# Patient Record
Sex: MALE | Race: WHITE | Employment: OTHER | URBAN - NONMETROPOLITAN AREA
[De-identification: names, ages, dates, MRNs, and addresses within clinical notes are randomized per-mention and may not be internally consistent; named-entity substitution may affect disease eponyms.]

---

## 2017-04-07 LAB
ALBUMIN SERPL-MCNC: 4.2 G/DL (ref 3.2–5.3)
ALK PHOSPHATASE: 88 IU/L (ref 35–121)
ALT SERPL-CCNC: 24 IU/L (ref 5–59)
AST SERPL-CCNC: 24 IU/L (ref 10–42)
BILIRUB SERPL-MCNC: 0.7 MG/DL (ref 0.2–1.3)
BILIRUBIN DIRECT: 0.1 MG/DL (ref 0–0.2)
CHOLESTEROL/HDL RATIO: 5
CHOLESTEROL: 180 MG/DL
HDLC SERPL-MCNC: 36 MG/DL (ref 40–60)
LDL CHOLESTEROL CALCULATED: 109 MG/DL
LDL/HDL RATIO: 3
TOTAL PROTEIN: 6.6 G/DL (ref 5.8–8)
TRIGL SERPL-MCNC: 175 MG/DL
VLDLC SERPL CALC-MCNC: 35 MG/DL

## 2017-04-11 ENCOUNTER — OFFICE VISIT (OUTPATIENT)
Dept: INTERNAL MEDICINE | Age: 77
End: 2017-04-11

## 2017-04-11 VITALS
HEART RATE: 74 BPM | WEIGHT: 243 LBS | DIASTOLIC BLOOD PRESSURE: 80 MMHG | HEIGHT: 68 IN | BODY MASS INDEX: 36.83 KG/M2 | SYSTOLIC BLOOD PRESSURE: 140 MMHG

## 2017-04-11 DIAGNOSIS — I25.10 CORONARY ARTERY DISEASE DUE TO LIPID RICH PLAQUE: Primary | ICD-10-CM

## 2017-04-11 DIAGNOSIS — E78.00 PURE HYPERCHOLESTEROLEMIA: ICD-10-CM

## 2017-04-11 DIAGNOSIS — E66.01 MORBID OBESITY DUE TO EXCESS CALORIES (HCC): ICD-10-CM

## 2017-04-11 DIAGNOSIS — I10 ESSENTIAL HYPERTENSION: ICD-10-CM

## 2017-04-11 DIAGNOSIS — I25.83 CORONARY ARTERY DISEASE DUE TO LIPID RICH PLAQUE: Primary | ICD-10-CM

## 2017-04-11 PROCEDURE — G8427 DOCREV CUR MEDS BY ELIG CLIN: HCPCS | Performed by: INTERNAL MEDICINE

## 2017-04-11 PROCEDURE — 1036F TOBACCO NON-USER: CPT | Performed by: INTERNAL MEDICINE

## 2017-04-11 PROCEDURE — G8598 ASA/ANTIPLAT THER USED: HCPCS | Performed by: INTERNAL MEDICINE

## 2017-04-11 PROCEDURE — 4040F PNEUMOC VAC/ADMIN/RCVD: CPT | Performed by: INTERNAL MEDICINE

## 2017-04-11 PROCEDURE — 1123F ACP DISCUSS/DSCN MKR DOCD: CPT | Performed by: INTERNAL MEDICINE

## 2017-04-11 PROCEDURE — 99213 OFFICE O/P EST LOW 20 MIN: CPT | Performed by: INTERNAL MEDICINE

## 2017-04-11 PROCEDURE — 93000 ELECTROCARDIOGRAM COMPLETE: CPT | Performed by: INTERNAL MEDICINE

## 2017-04-11 PROCEDURE — G8419 CALC BMI OUT NRM PARAM NOF/U: HCPCS | Performed by: INTERNAL MEDICINE

## 2017-04-11 RX ORDER — PANTOPRAZOLE SODIUM 40 MG/1
TABLET, DELAYED RELEASE ORAL
Qty: 90 TABLET | Refills: 2 | Status: SHIPPED | OUTPATIENT
Start: 2017-04-11 | End: 2018-01-03 | Stop reason: SDUPTHER

## 2017-04-11 RX ORDER — ATORVASTATIN CALCIUM 10 MG/1
10 TABLET, FILM COATED ORAL DAILY
Qty: 90 TABLET | Refills: 3 | Status: SHIPPED | OUTPATIENT
Start: 2017-04-11 | End: 2018-02-08 | Stop reason: SDUPTHER

## 2017-04-11 RX ORDER — CLOPIDOGREL BISULFATE 75 MG/1
TABLET ORAL
Qty: 90 TABLET | Refills: 2 | Status: SHIPPED | OUTPATIENT
Start: 2017-04-11 | End: 2018-04-02 | Stop reason: SDUPTHER

## 2017-10-06 LAB
ALT SERPL-CCNC: 23 IU/L (ref 5–59)
ANION GAP SERPL CALCULATED.3IONS-SCNC: 14 MMOL/L
AST SERPL-CCNC: 25 IU/L (ref 10–42)
BUN BLDV-MCNC: 23 MG/DL (ref 10–25)
CALCIUM SERPL-MCNC: 9.1 MG/DL (ref 8.7–10.8)
CHLORIDE BLD-SCNC: 104 MMOL/L (ref 95–111)
CHOLESTEROL/HDL RATIO: 5.1
CHOLESTEROL: 190 MG/DL
CO2: 25 MMOL/L (ref 21–32)
CREAT SERPL-MCNC: 1.3 MG/DL (ref 0.7–1.5)
EGFR AFRICAN AMERICAN: 65
EGFR IF NONAFRICAN AMERICAN: 53
GLUCOSE: 108 MG/DL (ref 70–100)
HDLC SERPL-MCNC: 37 MG/DL (ref 40–60)
LDL CHOLESTEROL CALCULATED: 120 MG/DL
LDL/HDL RATIO: 3.2
POTASSIUM SERPL-SCNC: 4.6 MMOL/L (ref 3.5–5.4)
SODIUM BLD-SCNC: 138 MMOL/L (ref 134–147)
TRIGL SERPL-MCNC: 163 MG/DL
VLDLC SERPL CALC-MCNC: 33 MG/DL

## 2017-10-09 ENCOUNTER — OFFICE VISIT (OUTPATIENT)
Dept: INTERNAL MEDICINE CLINIC | Age: 77
End: 2017-10-09
Payer: MEDICARE

## 2017-10-09 VITALS
DIASTOLIC BLOOD PRESSURE: 80 MMHG | HEART RATE: 56 BPM | BODY MASS INDEX: 35.16 KG/M2 | WEIGHT: 232 LBS | HEIGHT: 68 IN | SYSTOLIC BLOOD PRESSURE: 142 MMHG

## 2017-10-09 DIAGNOSIS — I10 ESSENTIAL HYPERTENSION: ICD-10-CM

## 2017-10-09 DIAGNOSIS — C64.9 MALIGNANT NEOPLASM OF KIDNEY, UNSPECIFIED LATERALITY (HCC): ICD-10-CM

## 2017-10-09 DIAGNOSIS — E78.00 PURE HYPERCHOLESTEROLEMIA: ICD-10-CM

## 2017-10-09 DIAGNOSIS — I71.40 ABDOMINAL AORTIC ANEURYSM (AAA) WITHOUT RUPTURE: ICD-10-CM

## 2017-10-09 DIAGNOSIS — I25.10 ASCVD (ARTERIOSCLEROTIC CARDIOVASCULAR DISEASE): Primary | ICD-10-CM

## 2017-10-09 DIAGNOSIS — G47.33 OSA (OBSTRUCTIVE SLEEP APNEA): ICD-10-CM

## 2017-10-09 DIAGNOSIS — N18.30 CKD (CHRONIC KIDNEY DISEASE) STAGE 3, GFR 30-59 ML/MIN (HCC): ICD-10-CM

## 2017-10-09 PROCEDURE — 93000 ELECTROCARDIOGRAM COMPLETE: CPT | Performed by: INTERNAL MEDICINE

## 2017-10-09 PROCEDURE — G8417 CALC BMI ABV UP PARAM F/U: HCPCS | Performed by: INTERNAL MEDICINE

## 2017-10-09 PROCEDURE — G8427 DOCREV CUR MEDS BY ELIG CLIN: HCPCS | Performed by: INTERNAL MEDICINE

## 2017-10-09 PROCEDURE — 1123F ACP DISCUSS/DSCN MKR DOCD: CPT | Performed by: INTERNAL MEDICINE

## 2017-10-09 PROCEDURE — 99215 OFFICE O/P EST HI 40 MIN: CPT | Performed by: INTERNAL MEDICINE

## 2017-10-09 PROCEDURE — G8598 ASA/ANTIPLAT THER USED: HCPCS | Performed by: INTERNAL MEDICINE

## 2017-10-09 PROCEDURE — 1036F TOBACCO NON-USER: CPT | Performed by: INTERNAL MEDICINE

## 2017-10-09 PROCEDURE — 4040F PNEUMOC VAC/ADMIN/RCVD: CPT | Performed by: INTERNAL MEDICINE

## 2017-10-09 PROCEDURE — G8484 FLU IMMUNIZE NO ADMIN: HCPCS | Performed by: INTERNAL MEDICINE

## 2017-10-09 ASSESSMENT — PATIENT HEALTH QUESTIONNAIRE - PHQ9
2. FEELING DOWN, DEPRESSED OR HOPELESS: 0
1. LITTLE INTEREST OR PLEASURE IN DOING THINGS: 0
SUM OF ALL RESPONSES TO PHQ9 QUESTIONS 1 & 2: 0
SUM OF ALL RESPONSES TO PHQ QUESTIONS 1-9: 0

## 2017-10-09 NOTE — PROGRESS NOTES
[facility]  PHYSICIANS [place] [address] [address] [place] [address] Mila Bardales, [address] Stef Lowery [facility]  Dept: [phone]  Dept Fax: [phone]      Chief Complaint   Patient presents with    Coronary Artery Disease    Hypertension    Hyperlipidemia       Patient presents for evaluation of ASCVD, CVA. I have never seen the patient before. This patient is followed regularly by   The patient used to see Dr. Sam Shahid is left practice. He has an extensive vascular history including an old CVA. He has had a carotid endarterectomy L as well as cardiac stents. He has had open AAA repair. He says he is feeling rather well. Denies chest pain, shortness of breath, dizziness or syncope. Patient Active Problem List   Diagnosis    CAD (coronary artery disease)    Obesity    Hypertension    Hyperlipidemia    AAA (abdominal aortic aneurysm) (HCC)    Renal cancer (HCC)    Preop cardiovascular exam    Renal calculus    CKD (chronic kidney disease) stage 3, GFR 30-59 ml/min    BENJAMIN (obstructive sleep apnea) can't tolerate CPAP       Current Outpatient Prescriptions   Medication Sig Dispense Refill    pantoprazole (PROTONIX) 40 MG tablet Take 1 tablet by mouth  daily 90 tablet 2    metoprolol tartrate (LOPRESSOR) 25 MG tablet New dose 1/2 tab BID 90 tablet 2    atorvastatin (LIPITOR) 10 MG tablet Take 1 tablet by mouth daily 90 tablet 3    clopidogrel (PLAVIX) 75 MG tablet Take 1 tablet by mouth  daily 90 tablet 2    aspirin 325 MG EC tablet Take 325 mg by mouth daily as needed       Ascorbic Acid (VITAMIN C) 1000 MG tablet Take 1,000 mg by mouth 2 times daily.  paroxetine (PAXIL) 20 MG tablet Take 20 mg by mouth daily.  hydrOXYzine (VISTARIL) 25 MG capsule Take 25 mg by mouth 4 times daily as needed.  Flaxseed, Linseed, (FLAXSEED OIL) 1000 MG CAPS Take 4 capsules by mouth 2 times daily        No current facility-administered medications for this visit.         Past Surgical

## 2017-12-20 RX ORDER — CLOPIDOGREL BISULFATE 75 MG/1
75 TABLET ORAL DAILY
Qty: 90 TABLET | Refills: 3 | Status: SHIPPED | OUTPATIENT
Start: 2017-12-20 | End: 2018-12-31 | Stop reason: SDUPTHER

## 2018-01-04 RX ORDER — PANTOPRAZOLE SODIUM 40 MG/1
TABLET, DELAYED RELEASE ORAL
Qty: 90 TABLET | Refills: 2 | Status: SHIPPED | OUTPATIENT
Start: 2018-01-04 | End: 2018-08-18 | Stop reason: SDUPTHER

## 2018-02-09 RX ORDER — ATORVASTATIN CALCIUM 10 MG/1
10 TABLET, FILM COATED ORAL DAILY
Qty: 90 TABLET | Refills: 3 | Status: ON HOLD | OUTPATIENT
Start: 2018-02-09 | End: 2019-11-30 | Stop reason: HOSPADM

## 2018-04-02 ENCOUNTER — OFFICE VISIT (OUTPATIENT)
Dept: INTERNAL MEDICINE CLINIC | Age: 78
End: 2018-04-02
Payer: MEDICARE

## 2018-04-02 VITALS
HEIGHT: 68 IN | SYSTOLIC BLOOD PRESSURE: 148 MMHG | BODY MASS INDEX: 36.68 KG/M2 | WEIGHT: 242 LBS | DIASTOLIC BLOOD PRESSURE: 78 MMHG | HEART RATE: 62 BPM

## 2018-04-02 DIAGNOSIS — I25.10 ASCVD (ARTERIOSCLEROTIC CARDIOVASCULAR DISEASE): Primary | ICD-10-CM

## 2018-04-02 DIAGNOSIS — I73.9 PAD (PERIPHERAL ARTERY DISEASE) (HCC): ICD-10-CM

## 2018-04-02 DIAGNOSIS — E78.5 HYPERLIPIDEMIA, UNSPECIFIED HYPERLIPIDEMIA TYPE: ICD-10-CM

## 2018-04-02 DIAGNOSIS — I71.40 ABDOMINAL AORTIC ANEURYSM (AAA) WITHOUT RUPTURE: ICD-10-CM

## 2018-04-02 DIAGNOSIS — I10 ESSENTIAL HYPERTENSION: ICD-10-CM

## 2018-04-02 DIAGNOSIS — N18.30 CKD (CHRONIC KIDNEY DISEASE) STAGE 3, GFR 30-59 ML/MIN (HCC): ICD-10-CM

## 2018-04-02 DIAGNOSIS — G47.33 OSA (OBSTRUCTIVE SLEEP APNEA): ICD-10-CM

## 2018-04-02 DIAGNOSIS — E66.9 OBESITY (BMI 30-39.9): ICD-10-CM

## 2018-04-02 PROCEDURE — G8598 ASA/ANTIPLAT THER USED: HCPCS | Performed by: INTERNAL MEDICINE

## 2018-04-02 PROCEDURE — 1036F TOBACCO NON-USER: CPT | Performed by: INTERNAL MEDICINE

## 2018-04-02 PROCEDURE — 99214 OFFICE O/P EST MOD 30 MIN: CPT | Performed by: INTERNAL MEDICINE

## 2018-04-02 PROCEDURE — 93000 ELECTROCARDIOGRAM COMPLETE: CPT | Performed by: INTERNAL MEDICINE

## 2018-04-02 PROCEDURE — G8427 DOCREV CUR MEDS BY ELIG CLIN: HCPCS | Performed by: INTERNAL MEDICINE

## 2018-04-02 PROCEDURE — 1123F ACP DISCUSS/DSCN MKR DOCD: CPT | Performed by: INTERNAL MEDICINE

## 2018-04-02 PROCEDURE — 4040F PNEUMOC VAC/ADMIN/RCVD: CPT | Performed by: INTERNAL MEDICINE

## 2018-04-02 PROCEDURE — G8417 CALC BMI ABV UP PARAM F/U: HCPCS | Performed by: INTERNAL MEDICINE

## 2018-04-02 RX ORDER — NITROGLYCERIN 0.4 MG/1
0.4 TABLET SUBLINGUAL EVERY 5 MIN PRN
COMMUNITY
Start: 2018-01-09 | End: 2020-01-20

## 2018-04-02 RX ORDER — THIAMINE HCL 100 MG
TABLET ORAL DAILY
COMMUNITY

## 2018-04-20 LAB
ALT SERPL-CCNC: 28 U/L (ref 5–50)
ANION GAP SERPL CALCULATED.3IONS-SCNC: 12 MEQ/L (ref 10–19)
BUN BLDV-MCNC: 21 MG/DL (ref 8–23)
CALCIUM SERPL-MCNC: 8.9 MG/DL (ref 8.5–10.5)
CHLORIDE BLD-SCNC: 102 MEQ/L (ref 95–107)
CO2: 28 MEQ/L (ref 19–31)
CREAT SERPL-MCNC: 1.6 MG/DL (ref 0.8–1.4)
EGFR AFRICAN AMERICAN: 47.1 ML/MIN/1.73 M2
EGFR IF NONAFRICAN AMERICAN: 40.7 ML/MIN/1.73 M2
GLUCOSE: 118 MG/DL (ref 70–99)
LDL CHOLESTEROL DIRECT: 141 MG/DL
POTASSIUM SERPL-SCNC: 5 MEQ/L (ref 3.5–5.4)
SODIUM BLD-SCNC: 142 MEQ/L (ref 135–146)

## 2018-07-16 LAB
CHOLESTEROL, TOTAL: 192 MG/DL
CHOLESTEROL/HDL RATIO: 5.8
CREATININE, URINE: 94.8
HDLC SERPL-MCNC: 33 MG/DL (ref 35–70)
LDL CHOLESTEROL CALCULATED: 119 MG/DL (ref 0–160)
MICROALBUMIN/CREAT 24H UR: <12 MG/G{CREAT}
MICROALBUMIN/CREAT UR-RTO: NORMAL
TRIGL SERPL-MCNC: 200 MG/DL
VLDLC SERPL CALC-MCNC: 40 MG/DL

## 2018-08-20 RX ORDER — PANTOPRAZOLE SODIUM 40 MG/1
TABLET, DELAYED RELEASE ORAL
Qty: 90 TABLET | Refills: 3 | Status: SHIPPED | OUTPATIENT
Start: 2018-08-20 | End: 2018-09-04 | Stop reason: SDUPTHER

## 2018-09-04 ENCOUNTER — OFFICE VISIT (OUTPATIENT)
Dept: INTERNAL MEDICINE CLINIC | Age: 78
End: 2018-09-04
Payer: MEDICARE

## 2018-09-04 VITALS
DIASTOLIC BLOOD PRESSURE: 70 MMHG | SYSTOLIC BLOOD PRESSURE: 100 MMHG | BODY MASS INDEX: 35.77 KG/M2 | HEIGHT: 68 IN | HEART RATE: 68 BPM | WEIGHT: 236 LBS

## 2018-09-04 DIAGNOSIS — I25.10 ASCVD (ARTERIOSCLEROTIC CARDIOVASCULAR DISEASE): Primary | ICD-10-CM

## 2018-09-04 DIAGNOSIS — G47.33 OSA (OBSTRUCTIVE SLEEP APNEA): ICD-10-CM

## 2018-09-04 DIAGNOSIS — K21.9 GASTROESOPHAGEAL REFLUX DISEASE, ESOPHAGITIS PRESENCE NOT SPECIFIED: ICD-10-CM

## 2018-09-04 DIAGNOSIS — I71.40 ABDOMINAL AORTIC ANEURYSM (AAA) WITHOUT RUPTURE: ICD-10-CM

## 2018-09-04 DIAGNOSIS — E78.5 HYPERLIPIDEMIA, UNSPECIFIED HYPERLIPIDEMIA TYPE: ICD-10-CM

## 2018-09-04 DIAGNOSIS — N18.30 CKD (CHRONIC KIDNEY DISEASE) STAGE 3, GFR 30-59 ML/MIN (HCC): ICD-10-CM

## 2018-09-04 DIAGNOSIS — I10 ESSENTIAL HYPERTENSION: ICD-10-CM

## 2018-09-04 PROCEDURE — 1123F ACP DISCUSS/DSCN MKR DOCD: CPT | Performed by: INTERNAL MEDICINE

## 2018-09-04 PROCEDURE — 4040F PNEUMOC VAC/ADMIN/RCVD: CPT | Performed by: INTERNAL MEDICINE

## 2018-09-04 PROCEDURE — 1036F TOBACCO NON-USER: CPT | Performed by: INTERNAL MEDICINE

## 2018-09-04 PROCEDURE — G8427 DOCREV CUR MEDS BY ELIG CLIN: HCPCS | Performed by: INTERNAL MEDICINE

## 2018-09-04 PROCEDURE — G8598 ASA/ANTIPLAT THER USED: HCPCS | Performed by: INTERNAL MEDICINE

## 2018-09-04 PROCEDURE — G8417 CALC BMI ABV UP PARAM F/U: HCPCS | Performed by: INTERNAL MEDICINE

## 2018-09-04 PROCEDURE — 93000 ELECTROCARDIOGRAM COMPLETE: CPT | Performed by: INTERNAL MEDICINE

## 2018-09-04 PROCEDURE — 1101F PT FALLS ASSESS-DOCD LE1/YR: CPT | Performed by: INTERNAL MEDICINE

## 2018-09-04 PROCEDURE — 99214 OFFICE O/P EST MOD 30 MIN: CPT | Performed by: INTERNAL MEDICINE

## 2018-09-04 RX ORDER — PANTOPRAZOLE SODIUM 40 MG/1
TABLET, DELAYED RELEASE ORAL
Qty: 90 TABLET | Refills: 3 | Status: SHIPPED | OUTPATIENT
Start: 2018-09-04 | End: 2019-09-03 | Stop reason: SDUPTHER

## 2018-09-04 NOTE — PROGRESS NOTES
[facility] PROFESSIONAL SERVS  PHYSICIANS [place] [address] [address] [place], [address]  Dept: [phone]  Dept Fax: [phone]      Chief Complaint   Patient presents with    Hypertension       Patient presents for evaluation of ASCVD, CVA. I last saw him 5 months ago. This patient is followed regularly by   The patient used to see Dr. Stella Guerrier is left practice. He has an extensive vascular history including an old CVA. He has had a carotid endarterectomy L, as well as cardiac stents. He has had open AAA repair. He says he is feeling rather well. Denies chest pain, shortness of breath, dizziness or syncope. He has no limitations.     Patient Active Problem List   Diagnosis    CAD (coronary artery disease)    Obesity    Hypertension    Hyperlipidemia    AAA (abdominal aortic aneurysm) (HCC)    Renal cancer (HCC)    Preop cardiovascular exam    Renal calculus    CKD (chronic kidney disease) stage 3, GFR 30-59 ml/min    BENJAMIN (obstructive sleep apnea) can't tolerate CPAP       Current Outpatient Prescriptions   Medication Sig Dispense Refill    Turmeric Curcumin 500 MG CAPS Take 500 mg by mouth 2 times daily       pantoprazole (PROTONIX) 40 MG tablet TAKE 1 TABLET BY MOUTH  DAILY 90 tablet 3    metoprolol tartrate (LOPRESSOR) 25 MG tablet 1/2 tab BID 90 tablet 2    Magnesium 500 MG TABS Take by mouth 2 times daily      CINNAMON PO Take 1,000 mg by mouth daily      Probiotic Product (PROBIOTIC DAILY PO) Take by mouth daily      nitroGLYCERIN (NITROSTAT) 0.4 MG SL tablet Place 0.4 mg under the tongue every 5 minutes as needed       atorvastatin (LIPITOR) 10 MG tablet Take 1 tablet by mouth daily 90 tablet 3    clopidogrel (PLAVIX) 75 MG tablet Take 1 tablet by mouth daily 90 tablet 3    aspirin 325 MG EC tablet Take 325 mg by mouth daily as needed       Ascorbic Acid (VITAMIN C) 1000 MG tablet Take 1,000 mg by mouth daily       paroxetine (PAXIL) 20 MG tablet Take 20 mg by mouth daily.  hydrOXYzine (VISTARIL) 25 MG capsule Take 25 mg by mouth 4 times daily as needed.  Flaxseed, Linseed, (FLAXSEED OIL) 1000 MG CAPS Take 4 capsules by mouth 2 times daily        No current facility-administered medications for this visit. Review of Systems - General ROS: negative  Psychological ROS: negative  Hematological and Lymphatic ROS: negative  Respiratory ROS: no cough, shortness of breath, or wheezing  Cardiovascular ROS: no chest pain or dyspnea on exertion  Gastrointestinal ROS: no abdominal pain, change in bowel habits, or black or bloody stools  Genito-Urinary ROS: no dysuria, trouble voiding, or hematuria  Musculoskeletal ROS: negative  Neurological ROS: no TIA or stroke symptoms  Dermatological ROS: negative    Blood pressure 100/70, pulse 68, height 5' 7.72\" (1.72 m), weight 236 lb (107 kg). Physical Examination: General appearance - alert, well appearing, and in no distress  Mental status - alert, oriented to person, place, and time  Neck - supple, no significant adenopathy  Chest - clear to auscultation, no wheezes, rales or rhonchi, symmetric air entry  Heart - normal rate, regular rhythm, normal S1, S2, no murmurs, rubs, clicks or gallops  Abdomen - soft, nontender, nondistended, no masses or organomegaly  Neurological - alert, oriented, normal speech, no focal findings or movement disorder noted  Musculoskeletal - no joint tenderness, deformity or swelling  Extremities - peripheral pulses normal, no pedal edema, no clubbing or cyanosis  Skin - normal coloration and turgor, no rashes, no suspicious skin lesions noted        Diagnosis Orders   1. ASCVD (arteriosclerotic cardiovascular disease)     2. Essential hypertension  EKG 12 Lead   3. Abdominal aortic aneurysm (AAA) without rupture ([facility] [address]     4. Hyperlipidemia, unspecified hyperlipidemia type     5. CKD (chronic kidney disease) stage 3, GFR 30-59 ml/min     6.  BENJAMIN (obstructive sleep apnea) can't

## 2018-10-10 ENCOUNTER — APPOINTMENT (OUTPATIENT)
Dept: CT IMAGING | Age: 78
End: 2018-10-10
Payer: MEDICARE

## 2018-10-10 ENCOUNTER — HOSPITAL ENCOUNTER (EMERGENCY)
Age: 78
Discharge: HOME | End: 2018-10-10
Attending: EMERGENCY MEDICINE
Payer: MEDICARE

## 2018-10-10 VITALS
SYSTOLIC BLOOD PRESSURE: 110 MMHG | DIASTOLIC BLOOD PRESSURE: 58 MMHG | TEMPERATURE: 97.9 F | HEART RATE: 53 BPM | OXYGEN SATURATION: 94 % | WEIGHT: 240 LBS | RESPIRATION RATE: 16 BRPM | HEIGHT: 68 IN | BODY MASS INDEX: 36.37 KG/M2

## 2018-10-10 DIAGNOSIS — R10.11 RIGHT UPPER QUADRANT ABDOMINAL PAIN: Primary | ICD-10-CM

## 2018-10-10 DIAGNOSIS — I71.20 THORACIC AORTIC ANEURYSM WITHOUT RUPTURE: ICD-10-CM

## 2018-10-10 LAB
ALBUMIN SERPL-MCNC: 4.1 G/DL (ref 3.5–5.1)
ALP BLD-CCNC: 78 U/L (ref 38–126)
ALT SERPL-CCNC: 22 U/L (ref 11–66)
ANION GAP SERPL CALCULATED.3IONS-SCNC: 12 MEQ/L (ref 8–16)
AST SERPL-CCNC: 23 U/L (ref 5–40)
BASOPHILS # BLD: 1.2 %
BASOPHILS ABSOLUTE: 0.1 THOU/MM3 (ref 0–0.1)
BILIRUB SERPL-MCNC: 0.5 MG/DL (ref 0.3–1.2)
BILIRUBIN URINE: NEGATIVE
BLOOD, URINE: NEGATIVE
BUN BLDV-MCNC: 22 MG/DL (ref 7–22)
CALCIUM SERPL-MCNC: 9.2 MG/DL (ref 8.5–10.5)
CHARACTER, URINE: CLEAR
CHLORIDE BLD-SCNC: 101 MEQ/L (ref 98–111)
CO2: 27 MEQ/L (ref 23–33)
COLOR: YELLOW
CREAT SERPL-MCNC: 1.4 MG/DL (ref 0.4–1.2)
EOSINOPHIL # BLD: 4 %
EOSINOPHILS ABSOLUTE: 0.2 THOU/MM3 (ref 0–0.4)
ERYTHROCYTE [DISTWIDTH] IN BLOOD BY AUTOMATED COUNT: 13.2 % (ref 11.5–14.5)
ERYTHROCYTE [DISTWIDTH] IN BLOOD BY AUTOMATED COUNT: 46.1 FL (ref 35–45)
GFR SERPL CREATININE-BSD FRML MDRD: 49 ML/MIN/1.73M2
GLUCOSE BLD-MCNC: 128 MG/DL (ref 70–108)
GLUCOSE URINE: NEGATIVE MG/DL
HCT VFR BLD CALC: 47.4 % (ref 42–52)
HEMOGLOBIN: 16 GM/DL (ref 14–18)
IMMATURE GRANS (ABS): 0.06 THOU/MM3 (ref 0–0.07)
IMMATURE GRANULOCYTES: 1 %
KETONES, URINE: NEGATIVE
LEUKOCYTE ESTERASE, URINE: NEGATIVE
LIPASE: 35.1 U/L (ref 5.6–51.3)
LYMPHOCYTES # BLD: 17 %
LYMPHOCYTES ABSOLUTE: 1 THOU/MM3 (ref 1–4.8)
MCH RBC QN AUTO: 31.9 PG (ref 26–33)
MCHC RBC AUTO-ENTMCNC: 33.8 GM/DL (ref 32.2–35.5)
MCV RBC AUTO: 94.6 FL (ref 80–94)
MONOCYTES # BLD: 10.2 %
MONOCYTES ABSOLUTE: 0.6 THOU/MM3 (ref 0.4–1.3)
NITRITE, URINE: NEGATIVE
NUCLEATED RED BLOOD CELLS: 0 /100 WBC
OSMOLALITY CALCULATION: 284.4 MOSMOL/KG (ref 275–300)
PH UA: 5
PLATELET # BLD: 152 THOU/MM3 (ref 130–400)
PMV BLD AUTO: 9.5 FL (ref 9.4–12.4)
POTASSIUM SERPL-SCNC: 4.8 MEQ/L (ref 3.5–5.2)
PROTEIN UA: NEGATIVE
RBC # BLD: 5.01 MILL/MM3 (ref 4.7–6.1)
SEG NEUTROPHILS: 66.6 %
SEGMENTED NEUTROPHILS ABSOLUTE COUNT: 3.9 THOU/MM3 (ref 1.8–7.7)
SODIUM BLD-SCNC: 140 MEQ/L (ref 135–145)
SPECIFIC GRAVITY, URINE: 1.02 (ref 1–1.03)
TOTAL PROTEIN: 6.4 G/DL (ref 6.1–8)
UROBILINOGEN, URINE: 0.2 EU/DL
WBC # BLD: 5.8 THOU/MM3 (ref 4.8–10.8)

## 2018-10-10 PROCEDURE — 36415 COLL VENOUS BLD VENIPUNCTURE: CPT

## 2018-10-10 PROCEDURE — 85025 COMPLETE CBC W/AUTO DIFF WBC: CPT

## 2018-10-10 PROCEDURE — 74176 CT ABD & PELVIS W/O CONTRAST: CPT

## 2018-10-10 PROCEDURE — 83690 ASSAY OF LIPASE: CPT

## 2018-10-10 PROCEDURE — 99284 EMERGENCY DEPT VISIT MOD MDM: CPT

## 2018-10-10 PROCEDURE — 81003 URINALYSIS AUTO W/O SCOPE: CPT

## 2018-10-10 PROCEDURE — 80053 COMPREHEN METABOLIC PANEL: CPT

## 2018-10-10 ASSESSMENT — ENCOUNTER SYMPTOMS
WHEEZING: 0
SORE THROAT: 0
PHOTOPHOBIA: 0
SHORTNESS OF BREATH: 0
VOICE CHANGE: 0
CHEST TIGHTNESS: 0
BACK PAIN: 0
BLOOD IN STOOL: 0
NAUSEA: 0
COUGH: 0
DIARRHEA: 0
VOMITING: 0
FACIAL SWELLING: 0
TROUBLE SWALLOWING: 0
ABDOMINAL PAIN: 1

## 2018-10-10 ASSESSMENT — PAIN SCALES - GENERAL
PAINLEVEL_OUTOF10: 10
PAINLEVEL_OUTOF10: 1

## 2018-10-10 ASSESSMENT — PAIN DESCRIPTION - PAIN TYPE
TYPE: ACUTE PAIN
TYPE: ACUTE PAIN

## 2018-10-10 ASSESSMENT — PAIN DESCRIPTION - LOCATION
LOCATION: FLANK
LOCATION: FLANK

## 2018-10-10 ASSESSMENT — PAIN DESCRIPTION - ORIENTATION
ORIENTATION: RIGHT
ORIENTATION: RIGHT

## 2018-10-10 ASSESSMENT — PAIN DESCRIPTION - FREQUENCY
FREQUENCY: INTERMITTENT
FREQUENCY: INTERMITTENT

## 2018-10-10 NOTE — ED PROVIDER NOTES
Start date: 5     Quit date: 5/24/1970    Smokeless tobacco: Never Used    Alcohol use No    Drug use: No    Sexual activity: Not Asked     Other Topics Concern    None     Social History Narrative    None       REVIEW OF SYSTEMS       Review of Systems   Constitutional: Negative for chills, diaphoresis and fever. HENT: Negative for facial swelling, sore throat, trouble swallowing and voice change. Eyes: Negative for photophobia and visual disturbance. Respiratory: Negative for cough, chest tightness, shortness of breath and wheezing. Cardiovascular: Negative for chest pain, palpitations and leg swelling. Gastrointestinal: Positive for abdominal pain. Negative for blood in stool, diarrhea, nausea and vomiting. Endocrine: Negative for polydipsia and polyuria. Genitourinary: Negative for dysuria, frequency, hematuria and urgency. Musculoskeletal: Negative for back pain, neck pain and neck stiffness. Skin: Negative for pallor and rash. Neurological: Negative for seizures, speech difficulty, weakness, numbness and headaches. Hematological: Does not bruise/bleed easily. Psychiatric/Behavioral: Negative for confusion. The patient is not nervous/anxious. Except as noted above the remainder of the review of systems wasreviewed and is negative. PHYSICAL EXAM    (up to 7 for level 4, 8 or more for level 5)     ED Triage Vitals [10/10/18 1105]   BP Temp Temp Source Pulse Resp SpO2 Height Weight   130/70 97.9 °F (36.6 °C) Oral 88 18 95 % 5' 8\" (1.727 m) 240 lb (108.9 kg)       Physical Exam   Constitutional: He is oriented to person, place, and time. Vital signs are normal. He appears well-developed and well-nourished. Non-toxic appearance. He does not appear ill. No distress. HENT:   Head: Normocephalic and atraumatic. Mouth/Throat: Oropharynx is clear and moist.   Eyes: Conjunctivae and EOM are normal. Right conjunctiva is not injected. Left conjunctiva is not injected.  No RDW-SD 46.1 (*)     All other components within normal limits   COMPREHENSIVE METABOLIC PANEL - Abnormal; Notable for the following:     Glucose 128 (*)     CREATININE 1.4 (*)     All other components within normal limits   GLOMERULAR FILTRATION RATE, ESTIMATED - Abnormal; Notable for the following:     Est, Glom Filt Rate 49 (*)     All other components within normal limits   URINE RT REFLEX TO CULTURE   LIPASE   ANION GAP   OSMOLALITY       All other labs were within normalrange or not returned as of this dictation. EMERGENCY DEPARTMENT COURSE and Medical Decision Making:     MDM/   The patient presents with abdominal pain. The patient is feeling better with a benign repeat examination. There is no evidence of an acute abdomen at this time. Based on history, physical exam, risk factors, and tests,  my suspicion for bowel obstruction, acute pancreatitis, abscess, perforated viscous, diverticulitis, cholecystis, appendicitis is very low and I feel the patient can be managed as an outpatient with follow up. I also see no evidence of aortic dissection, AAA, or Acute Coronary Syndrome. Instructions have been given for the patient to return to the ED for worsening of the pain, high fevers, intractable vomiting, or bleeding. Strict follow up and return precautions have been discussed. I urged the patient to contact his CT surgeon and discuss aortic findings. I do not feel these are the source of his presenting complaint today, however. Strict return precautions and follow up instructions were discussed with the patient with which the patient agrees    ED Medications administered this visit:  Medications - No data to display    CONSULTS: (None if blank)  None    Procedures:(None if blank)       CLINICAL IMPRESSION      1. Right upper quadrant abdominal pain    2.  Thoracic aortic aneurysm without rupture [facility])          DISPOSITION/PLAN   DISPOSITION Decision To Discharge 10/10/2018 01:00:06

## 2019-01-02 RX ORDER — CLOPIDOGREL BISULFATE 75 MG/1
75 TABLET ORAL DAILY
Qty: 90 TABLET | Refills: 1 | Status: SHIPPED | OUTPATIENT
Start: 2019-01-02 | End: 2019-06-04 | Stop reason: SDUPTHER

## 2019-03-05 ENCOUNTER — OFFICE VISIT (OUTPATIENT)
Dept: INTERNAL MEDICINE CLINIC | Age: 79
End: 2019-03-05
Payer: MEDICARE

## 2019-03-05 VITALS
DIASTOLIC BLOOD PRESSURE: 86 MMHG | BODY MASS INDEX: 36.98 KG/M2 | SYSTOLIC BLOOD PRESSURE: 202 MMHG | HEART RATE: 52 BPM | WEIGHT: 244 LBS | HEIGHT: 68 IN

## 2019-03-05 DIAGNOSIS — I10 ESSENTIAL HYPERTENSION: Primary | ICD-10-CM

## 2019-03-05 DIAGNOSIS — I71.40 ABDOMINAL AORTIC ANEURYSM (AAA) WITHOUT RUPTURE: ICD-10-CM

## 2019-03-05 DIAGNOSIS — E78.5 HYPERLIPIDEMIA, UNSPECIFIED HYPERLIPIDEMIA TYPE: ICD-10-CM

## 2019-03-05 DIAGNOSIS — I25.10 ASCVD (ARTERIOSCLEROTIC CARDIOVASCULAR DISEASE): ICD-10-CM

## 2019-03-05 PROCEDURE — G8417 CALC BMI ABV UP PARAM F/U: HCPCS | Performed by: INTERNAL MEDICINE

## 2019-03-05 PROCEDURE — 93000 ELECTROCARDIOGRAM COMPLETE: CPT | Performed by: INTERNAL MEDICINE

## 2019-03-05 PROCEDURE — G8598 ASA/ANTIPLAT THER USED: HCPCS | Performed by: INTERNAL MEDICINE

## 2019-03-05 PROCEDURE — 1036F TOBACCO NON-USER: CPT | Performed by: INTERNAL MEDICINE

## 2019-03-05 PROCEDURE — 4040F PNEUMOC VAC/ADMIN/RCVD: CPT | Performed by: INTERNAL MEDICINE

## 2019-03-05 PROCEDURE — G8484 FLU IMMUNIZE NO ADMIN: HCPCS | Performed by: INTERNAL MEDICINE

## 2019-03-05 PROCEDURE — 1123F ACP DISCUSS/DSCN MKR DOCD: CPT | Performed by: INTERNAL MEDICINE

## 2019-03-05 PROCEDURE — G8427 DOCREV CUR MEDS BY ELIG CLIN: HCPCS | Performed by: INTERNAL MEDICINE

## 2019-03-05 PROCEDURE — 1101F PT FALLS ASSESS-DOCD LE1/YR: CPT | Performed by: INTERNAL MEDICINE

## 2019-03-05 PROCEDURE — 99214 OFFICE O/P EST MOD 30 MIN: CPT | Performed by: INTERNAL MEDICINE

## 2019-03-05 ASSESSMENT — PATIENT HEALTH QUESTIONNAIRE - PHQ9
SUM OF ALL RESPONSES TO PHQ QUESTIONS 1-9: 0
1. LITTLE INTEREST OR PLEASURE IN DOING THINGS: 0
SUM OF ALL RESPONSES TO PHQ QUESTIONS 1-9: 0
SUM OF ALL RESPONSES TO PHQ9 QUESTIONS 1 & 2: 0
2. FEELING DOWN, DEPRESSED OR HOPELESS: 0

## 2019-05-08 NOTE — TELEPHONE ENCOUNTER
Kassi Lindsay (wife) calls stating Dr Anna Giraldo had Saundra Bushra increase his Atorvastatin 10 mg daily to 2 tablets daily at his last visit. They need a new prescription with the correct instructions sent to [facility]. **Asking if he should continue on 2 tablets of the 10 mg or should it be written for 1 tablet of the 20 mg? Please advise.     DOLV  3/5/19  DONV  9/3/19    [facility]

## 2019-05-14 DIAGNOSIS — E78.5 HYPERLIPIDEMIA, UNSPECIFIED HYPERLIPIDEMIA TYPE: Primary | ICD-10-CM

## 2019-05-14 RX ORDER — ATORVASTATIN CALCIUM 20 MG/1
20 TABLET, FILM COATED ORAL DAILY
Qty: 90 TABLET | Refills: 1 | Status: SHIPPED | OUTPATIENT
Start: 2019-05-14 | End: 2019-10-28 | Stop reason: SDUPTHER

## 2019-06-07 RX ORDER — CLOPIDOGREL BISULFATE 75 MG/1
75 TABLET ORAL DAILY
Qty: 90 TABLET | Refills: 1 | Status: SHIPPED | OUTPATIENT
Start: 2019-06-07 | End: 2019-09-03 | Stop reason: SDUPTHER

## 2019-06-13 ENCOUNTER — HOSPITAL ENCOUNTER (OUTPATIENT)
Dept: INTERVENTIONAL RADIOLOGY/VASCULAR | Age: 79
Discharge: HOME | End: 2019-06-13
Payer: MEDICARE

## 2019-06-13 ENCOUNTER — HOSPITAL ENCOUNTER (OUTPATIENT)
Dept: NON INVASIVE DIAGNOSTICS | Age: 79
Discharge: HOME | End: 2019-06-13
Payer: MEDICARE

## 2019-06-13 DIAGNOSIS — H53.131 SUDDEN VISUAL LOSS OF RIGHT EYE: ICD-10-CM

## 2019-06-13 LAB
LV EF: 63 %
LVEF MODALITY: NORMAL

## 2019-06-13 PROCEDURE — 93880 EXTRACRANIAL BILAT STUDY: CPT

## 2019-06-13 PROCEDURE — 93306 TTE W/DOPPLER COMPLETE: CPT

## 2019-09-03 ENCOUNTER — NURSE ONLY (OUTPATIENT)
Dept: LAB | Age: 79
End: 2019-09-03

## 2019-09-03 ENCOUNTER — OFFICE VISIT (OUTPATIENT)
Dept: INTERNAL MEDICINE CLINIC | Age: 79
End: 2019-09-03
Payer: MEDICARE

## 2019-09-03 VITALS
SYSTOLIC BLOOD PRESSURE: 132 MMHG | HEART RATE: 64 BPM | DIASTOLIC BLOOD PRESSURE: 72 MMHG | HEIGHT: 68 IN | WEIGHT: 248 LBS | BODY MASS INDEX: 37.59 KG/M2

## 2019-09-03 DIAGNOSIS — Z86.73 H/O: CVA (CEREBROVASCULAR ACCIDENT): ICD-10-CM

## 2019-09-03 DIAGNOSIS — I25.10 ASCVD (ARTERIOSCLEROTIC CARDIOVASCULAR DISEASE): Primary | ICD-10-CM

## 2019-09-03 DIAGNOSIS — E78.5 HYPERLIPIDEMIA, UNSPECIFIED HYPERLIPIDEMIA TYPE: ICD-10-CM

## 2019-09-03 DIAGNOSIS — I10 ESSENTIAL HYPERTENSION: ICD-10-CM

## 2019-09-03 DIAGNOSIS — K21.9 GASTROESOPHAGEAL REFLUX DISEASE, ESOPHAGITIS PRESENCE NOT SPECIFIED: ICD-10-CM

## 2019-09-03 DIAGNOSIS — N18.30 CKD (CHRONIC KIDNEY DISEASE) STAGE 3, GFR 30-59 ML/MIN (HCC): ICD-10-CM

## 2019-09-03 DIAGNOSIS — I71.40 ABDOMINAL AORTIC ANEURYSM (AAA) WITHOUT RUPTURE: ICD-10-CM

## 2019-09-03 LAB
ANION GAP SERPL CALCULATED.3IONS-SCNC: 13 MEQ/L (ref 8–16)
BUN BLDV-MCNC: 21 MG/DL (ref 7–22)
CALCIUM SERPL-MCNC: 9.6 MG/DL (ref 8.5–10.5)
CHLORIDE BLD-SCNC: 104 MEQ/L (ref 98–111)
CO2: 27 MEQ/L (ref 23–33)
CREAT SERPL-MCNC: 1.5 MG/DL (ref 0.4–1.2)
GFR SERPL CREATININE-BSD FRML MDRD: 45 ML/MIN/1.73M2
GLUCOSE BLD-MCNC: 100 MG/DL (ref 70–108)
POTASSIUM SERPL-SCNC: 4.7 MEQ/L (ref 3.5–5.2)
SODIUM BLD-SCNC: 144 MEQ/L (ref 135–145)

## 2019-09-03 PROCEDURE — G8427 DOCREV CUR MEDS BY ELIG CLIN: HCPCS | Performed by: INTERNAL MEDICINE

## 2019-09-03 PROCEDURE — 1036F TOBACCO NON-USER: CPT | Performed by: INTERNAL MEDICINE

## 2019-09-03 PROCEDURE — G8598 ASA/ANTIPLAT THER USED: HCPCS | Performed by: INTERNAL MEDICINE

## 2019-09-03 PROCEDURE — 99214 OFFICE O/P EST MOD 30 MIN: CPT | Performed by: INTERNAL MEDICINE

## 2019-09-03 PROCEDURE — 4040F PNEUMOC VAC/ADMIN/RCVD: CPT | Performed by: INTERNAL MEDICINE

## 2019-09-03 PROCEDURE — 93000 ELECTROCARDIOGRAM COMPLETE: CPT | Performed by: INTERNAL MEDICINE

## 2019-09-03 PROCEDURE — G8417 CALC BMI ABV UP PARAM F/U: HCPCS | Performed by: INTERNAL MEDICINE

## 2019-09-03 PROCEDURE — 1123F ACP DISCUSS/DSCN MKR DOCD: CPT | Performed by: INTERNAL MEDICINE

## 2019-09-03 RX ORDER — ATORVASTATIN CALCIUM 20 MG/1
20 TABLET, FILM COATED ORAL DAILY
Qty: 90 TABLET | Refills: 3 | Status: SHIPPED | OUTPATIENT
Start: 2019-09-03 | End: 2019-10-28 | Stop reason: SDUPTHER

## 2019-09-03 RX ORDER — PANTOPRAZOLE SODIUM 40 MG/1
TABLET, DELAYED RELEASE ORAL
Qty: 90 TABLET | Refills: 3 | Status: SHIPPED | OUTPATIENT
Start: 2019-09-03 | End: 2020-09-22

## 2019-09-03 RX ORDER — CLOPIDOGREL BISULFATE 75 MG/1
75 TABLET ORAL DAILY
Qty: 90 TABLET | Refills: 3 | Status: SHIPPED | OUTPATIENT
Start: 2019-09-03 | End: 2020-07-14

## 2019-09-11 ENCOUNTER — TELEPHONE (OUTPATIENT)
Dept: INTERNAL MEDICINE CLINIC | Age: 79
End: 2019-09-11

## 2019-10-26 ENCOUNTER — HOSPITAL ENCOUNTER (EMERGENCY)
Age: 79
Discharge: HOME | End: 2019-10-26
Attending: EMERGENCY MEDICINE
Payer: MEDICARE

## 2019-10-26 ENCOUNTER — APPOINTMENT (OUTPATIENT)
Dept: GENERAL RADIOLOGY | Age: 79
End: 2019-10-26
Payer: MEDICARE

## 2019-10-26 VITALS
BODY MASS INDEX: 36.37 KG/M2 | RESPIRATION RATE: 16 BRPM | TEMPERATURE: 97.4 F | DIASTOLIC BLOOD PRESSURE: 64 MMHG | HEIGHT: 68 IN | SYSTOLIC BLOOD PRESSURE: 119 MMHG | OXYGEN SATURATION: 93 % | WEIGHT: 240 LBS | HEART RATE: 60 BPM

## 2019-10-26 DIAGNOSIS — R07.9 CHEST PAIN, UNSPECIFIED TYPE: Primary | ICD-10-CM

## 2019-10-26 LAB
ALBUMIN SERPL-MCNC: 3.9 G/DL (ref 3.5–5.1)
ALP BLD-CCNC: 73 U/L (ref 38–126)
ALT SERPL-CCNC: 21 U/L (ref 11–66)
ANION GAP SERPL CALCULATED.3IONS-SCNC: 14 MEQ/L (ref 8–16)
AST SERPL-CCNC: 23 U/L (ref 5–40)
BASOPHILS # BLD: 1.1 %
BASOPHILS ABSOLUTE: 0.1 THOU/MM3 (ref 0–0.1)
BILIRUB SERPL-MCNC: 0.5 MG/DL (ref 0.3–1.2)
BUN BLDV-MCNC: 19 MG/DL (ref 7–22)
CALCIUM SERPL-MCNC: 9.3 MG/DL (ref 8.5–10.5)
CHLORIDE BLD-SCNC: 103 MEQ/L (ref 98–111)
CO2: 25 MEQ/L (ref 23–33)
CREAT SERPL-MCNC: 1.5 MG/DL (ref 0.4–1.2)
EKG ATRIAL RATE: 85 BPM
EKG P AXIS: 57 DEGREES
EKG P-R INTERVAL: 180 MS
EKG Q-T INTERVAL: 380 MS
EKG QRS DURATION: 82 MS
EKG QTC CALCULATION (BAZETT): 452 MS
EKG R AXIS: -16 DEGREES
EKG T AXIS: 10 DEGREES
EKG VENTRICULAR RATE: 85 BPM
EOSINOPHIL # BLD: 9 %
EOSINOPHILS ABSOLUTE: 0.7 THOU/MM3 (ref 0–0.4)
ERYTHROCYTE [DISTWIDTH] IN BLOOD BY AUTOMATED COUNT: 13.7 % (ref 11.5–14.5)
ERYTHROCYTE [DISTWIDTH] IN BLOOD BY AUTOMATED COUNT: 48 FL (ref 35–45)
GFR SERPL CREATININE-BSD FRML MDRD: 45 ML/MIN/1.73M2
GLUCOSE BLD-MCNC: 138 MG/DL (ref 70–108)
HCT VFR BLD CALC: 52.3 % (ref 42–52)
HEMOGLOBIN: 17.4 GM/DL (ref 14–18)
IMMATURE GRANS (ABS): 0.1 THOU/MM3 (ref 0–0.07)
IMMATURE GRANULOCYTES: 1.3 %
LIPASE: 39.5 U/L (ref 5.6–51.3)
LYMPHOCYTES # BLD: 21.1 %
LYMPHOCYTES ABSOLUTE: 1.7 THOU/MM3 (ref 1–4.8)
MCH RBC QN AUTO: 32 PG (ref 26–33)
MCHC RBC AUTO-ENTMCNC: 33.3 GM/DL (ref 32.2–35.5)
MCV RBC AUTO: 96.1 FL (ref 80–94)
MONOCYTES # BLD: 10.2 %
MONOCYTES ABSOLUTE: 0.8 THOU/MM3 (ref 0.4–1.3)
NUCLEATED RED BLOOD CELLS: 0 /100 WBC
OSMOLALITY CALCULATION: 287.6 MOSMOL/KG (ref 275–300)
PLATELET # BLD: 97 THOU/MM3 (ref 130–400)
PLATELET ESTIMATE: ABNORMAL
PMV BLD AUTO: 10.3 FL (ref 9.4–12.4)
POTASSIUM SERPL-SCNC: 4.5 MEQ/L (ref 3.5–5.2)
PRO-BNP: 194.1 PG/ML (ref 0–1800)
RBC # BLD: 5.44 MILL/MM3 (ref 4.7–6.1)
REASON FOR REJECTION: NORMAL
REJECTED TEST: NORMAL
SCAN OF BLOOD SMEAR: NORMAL
SEG NEUTROPHILS: 57.3 %
SEGMENTED NEUTROPHILS ABSOLUTE COUNT: 4.5 THOU/MM3 (ref 1.8–7.7)
SODIUM BLD-SCNC: 142 MEQ/L (ref 135–145)
TOTAL PROTEIN: 6.3 G/DL (ref 6.1–8)
TROPONIN T: < 0.01 NG/ML
TROPONIN T: < 0.01 NG/ML
WBC # BLD: 7.9 THOU/MM3 (ref 4.8–10.8)

## 2019-10-26 PROCEDURE — 83690 ASSAY OF LIPASE: CPT

## 2019-10-26 PROCEDURE — 36415 COLL VENOUS BLD VENIPUNCTURE: CPT

## 2019-10-26 PROCEDURE — 99285 EMERGENCY DEPT VISIT HI MDM: CPT

## 2019-10-26 PROCEDURE — 83880 ASSAY OF NATRIURETIC PEPTIDE: CPT

## 2019-10-26 PROCEDURE — 71045 X-RAY EXAM CHEST 1 VIEW: CPT

## 2019-10-26 PROCEDURE — 93005 ELECTROCARDIOGRAM TRACING: CPT | Performed by: EMERGENCY MEDICINE

## 2019-10-26 PROCEDURE — 84484 ASSAY OF TROPONIN QUANT: CPT

## 2019-10-26 PROCEDURE — 85025 COMPLETE CBC W/AUTO DIFF WBC: CPT

## 2019-10-26 PROCEDURE — 80053 COMPREHEN METABOLIC PANEL: CPT

## 2019-10-26 RX ORDER — ASPIRIN 81 MG/1
243 TABLET, CHEWABLE ORAL ONCE
Status: DISCONTINUED | OUTPATIENT
Start: 2019-10-26 | End: 2019-10-26 | Stop reason: HOSPADM

## 2019-10-26 ASSESSMENT — ENCOUNTER SYMPTOMS
EYE ITCHING: 0
STRIDOR: 0
EYE PAIN: 0
VOMITING: 0
ABDOMINAL PAIN: 0
ABDOMINAL DISTENTION: 0
SHORTNESS OF BREATH: 0
COUGH: 0
DIARRHEA: 0
CHEST TIGHTNESS: 1
WHEEZING: 0
BACK PAIN: 0
NAUSEA: 0
EYE DISCHARGE: 0
PHOTOPHOBIA: 0
EYE REDNESS: 0
CONSTIPATION: 0
SORE THROAT: 0
RHINORRHEA: 0

## 2019-10-26 ASSESSMENT — PAIN SCALES - GENERAL: PAINLEVEL_OUTOF10: 5

## 2019-10-26 ASSESSMENT — PAIN DESCRIPTION - PAIN TYPE: TYPE: ACUTE PAIN

## 2019-10-26 ASSESSMENT — PAIN DESCRIPTION - DESCRIPTORS: DESCRIPTORS: PRESSURE

## 2019-10-26 ASSESSMENT — PAIN DESCRIPTION - LOCATION: LOCATION: CHEST

## 2019-10-28 ENCOUNTER — CARE COORDINATION (OUTPATIENT)
Dept: CASE MANAGEMENT | Age: 79
End: 2019-10-28

## 2019-10-29 ENCOUNTER — OFFICE VISIT (OUTPATIENT)
Dept: INTERNAL MEDICINE CLINIC | Age: 79
End: 2019-10-29
Payer: MEDICARE

## 2019-10-29 VITALS
DIASTOLIC BLOOD PRESSURE: 63 MMHG | HEIGHT: 68 IN | HEART RATE: 72 BPM | WEIGHT: 249 LBS | RESPIRATION RATE: 16 BRPM | SYSTOLIC BLOOD PRESSURE: 138 MMHG | BODY MASS INDEX: 37.74 KG/M2

## 2019-10-29 DIAGNOSIS — N18.30 CKD (CHRONIC KIDNEY DISEASE) STAGE 3, GFR 30-59 ML/MIN (HCC): ICD-10-CM

## 2019-10-29 DIAGNOSIS — I71.40 ABDOMINAL AORTIC ANEURYSM (AAA) WITHOUT RUPTURE: ICD-10-CM

## 2019-10-29 DIAGNOSIS — E78.5 HYPERLIPIDEMIA, UNSPECIFIED HYPERLIPIDEMIA TYPE: ICD-10-CM

## 2019-10-29 DIAGNOSIS — I25.10 ASCVD (ARTERIOSCLEROTIC CARDIOVASCULAR DISEASE): ICD-10-CM

## 2019-10-29 DIAGNOSIS — I10 ESSENTIAL HYPERTENSION: ICD-10-CM

## 2019-10-29 DIAGNOSIS — Z86.73 H/O: CVA (CEREBROVASCULAR ACCIDENT): ICD-10-CM

## 2019-10-29 DIAGNOSIS — R07.9 CHEST PAIN, UNSPECIFIED TYPE: Primary | ICD-10-CM

## 2019-10-29 PROCEDURE — 1036F TOBACCO NON-USER: CPT | Performed by: INTERNAL MEDICINE

## 2019-10-29 PROCEDURE — 1123F ACP DISCUSS/DSCN MKR DOCD: CPT | Performed by: INTERNAL MEDICINE

## 2019-10-29 PROCEDURE — G8417 CALC BMI ABV UP PARAM F/U: HCPCS | Performed by: INTERNAL MEDICINE

## 2019-10-29 PROCEDURE — 99214 OFFICE O/P EST MOD 30 MIN: CPT | Performed by: INTERNAL MEDICINE

## 2019-10-29 PROCEDURE — 4040F PNEUMOC VAC/ADMIN/RCVD: CPT | Performed by: INTERNAL MEDICINE

## 2019-10-29 PROCEDURE — G8598 ASA/ANTIPLAT THER USED: HCPCS | Performed by: INTERNAL MEDICINE

## 2019-10-29 PROCEDURE — G8427 DOCREV CUR MEDS BY ELIG CLIN: HCPCS | Performed by: INTERNAL MEDICINE

## 2019-10-29 PROCEDURE — G8484 FLU IMMUNIZE NO ADMIN: HCPCS | Performed by: INTERNAL MEDICINE

## 2019-11-27 ENCOUNTER — HOSPITAL ENCOUNTER (OUTPATIENT)
Age: 79
Setting detail: OBSERVATION
Discharge: HOME | End: 2019-11-30
Attending: FAMILY MEDICINE | Admitting: INTERNAL MEDICINE
Payer: MEDICARE

## 2019-11-27 ENCOUNTER — APPOINTMENT (OUTPATIENT)
Dept: GENERAL RADIOLOGY | Age: 79
End: 2019-11-27
Payer: MEDICARE

## 2019-11-27 DIAGNOSIS — R07.89 CHEST TIGHTNESS: ICD-10-CM

## 2019-11-27 PROBLEM — I48.91 ATRIAL FIBRILLATION WITH RVR (HCC): Status: ACTIVE | Noted: 2019-11-27

## 2019-11-27 LAB
ALBUMIN SERPL-MCNC: 4.1 G/DL (ref 3.5–5.1)
ALP BLD-CCNC: 91 U/L (ref 38–126)
ALT SERPL-CCNC: 28 U/L (ref 11–66)
ANION GAP SERPL CALCULATED.3IONS-SCNC: 17 MEQ/L (ref 8–16)
APTT: 32.5 SECONDS (ref 22–38)
AST SERPL-CCNC: 29 U/L (ref 5–40)
BASOPHILS # BLD: 1.1 %
BASOPHILS ABSOLUTE: 0.1 THOU/MM3 (ref 0–0.1)
BILIRUB SERPL-MCNC: 0.3 MG/DL (ref 0.3–1.2)
BILIRUBIN DIRECT: < 0.2 MG/DL (ref 0–0.3)
BUN BLDV-MCNC: 18 MG/DL (ref 7–22)
CALCIUM IONIZED: 1.13 MMOL/L (ref 1.12–1.32)
CALCIUM SERPL-MCNC: 9 MG/DL (ref 8.5–10.5)
CHLORIDE BLD-SCNC: 104 MEQ/L (ref 98–111)
CO2: 20 MEQ/L (ref 23–33)
CREAT SERPL-MCNC: 1.4 MG/DL (ref 0.4–1.2)
EKG ATRIAL RATE: 327 BPM
EKG ATRIAL RATE: 93 BPM
EKG P AXIS: 53 DEGREES
EKG P AXIS: 64 DEGREES
EKG P-R INTERVAL: 176 MS
EKG P-R INTERVAL: 286 MS
EKG Q-T INTERVAL: 372 MS
EKG Q-T INTERVAL: 394 MS
EKG QRS DURATION: 128 MS
EKG QRS DURATION: 134 MS
EKG QTC CALCULATION (BAZETT): 489 MS
EKG QTC CALCULATION (BAZETT): 500 MS
EKG R AXIS: -23 DEGREES
EKG R AXIS: -6 DEGREES
EKG T AXIS: -16 DEGREES
EKG T AXIS: -43 DEGREES
EKG VENTRICULAR RATE: 109 BPM
EKG VENTRICULAR RATE: 93 BPM
EOSINOPHIL # BLD: 10.5 %
EOSINOPHILS ABSOLUTE: 0.9 THOU/MM3 (ref 0–0.4)
ERYTHROCYTE [DISTWIDTH] IN BLOOD BY AUTOMATED COUNT: 13.2 % (ref 11.5–14.5)
ERYTHROCYTE [DISTWIDTH] IN BLOOD BY AUTOMATED COUNT: 47.3 FL (ref 35–45)
GFR SERPL CREATININE-BSD FRML MDRD: 49 ML/MIN/1.73M2
GLUCOSE BLD-MCNC: 149 MG/DL (ref 70–108)
HCT VFR BLD CALC: 53.8 % (ref 42–52)
HEMOGLOBIN: 17.7 GM/DL (ref 14–18)
IMMATURE GRANS (ABS): 0.09 THOU/MM3 (ref 0–0.07)
IMMATURE GRANULOCYTES: 1 %
INR BLD: 0.93 (ref 0.85–1.13)
LYMPHOCYTES # BLD: 15 %
LYMPHOCYTES ABSOLUTE: 1.3 THOU/MM3 (ref 1–4.8)
MAGNESIUM: 2.2 MG/DL (ref 1.6–2.4)
MCH RBC QN AUTO: 31.8 PG (ref 26–33)
MCHC RBC AUTO-ENTMCNC: 32.9 GM/DL (ref 32.2–35.5)
MCV RBC AUTO: 96.8 FL (ref 80–94)
MONOCYTES # BLD: 8.1 %
MONOCYTES ABSOLUTE: 0.7 THOU/MM3 (ref 0.4–1.3)
NUCLEATED RED BLOOD CELLS: 0 /100 WBC
OSMOLALITY CALCULATION: 286 MOSMOL/KG (ref 275–300)
PLATELET # BLD: 163 THOU/MM3 (ref 130–400)
PMV BLD AUTO: 9.8 FL (ref 9.4–12.4)
POTASSIUM SERPL-SCNC: 4.4 MEQ/L (ref 3.5–5.2)
PRO-BNP: 156.8 PG/ML (ref 0–1800)
RBC # BLD: 5.56 MILL/MM3 (ref 4.7–6.1)
REASON FOR REJECTION: NORMAL
REJECTED TEST: NORMAL
SEG NEUTROPHILS: 64.3 %
SEGMENTED NEUTROPHILS ABSOLUTE COUNT: 5.7 THOU/MM3 (ref 1.8–7.7)
SODIUM BLD-SCNC: 141 MEQ/L (ref 135–145)
TOTAL PROTEIN: 6.6 G/DL (ref 6.1–8)
TROPONIN T: < 0.01 NG/ML
TROPONIN T: < 0.01 NG/ML
TSH SERPL DL<=0.05 MIU/L-ACNC: 3.64 UIU/ML (ref 0.4–4.2)
WBC # BLD: 8.9 THOU/MM3 (ref 4.8–10.8)

## 2019-11-27 PROCEDURE — 96365 THER/PROPH/DIAG IV INF INIT: CPT

## 2019-11-27 PROCEDURE — 82248 BILIRUBIN DIRECT: CPT

## 2019-11-27 PROCEDURE — 6360000002 HC RX W HCPCS: Performed by: FAMILY MEDICINE

## 2019-11-27 PROCEDURE — 84443 ASSAY THYROID STIM HORMONE: CPT

## 2019-11-27 PROCEDURE — 96366 THER/PROPH/DIAG IV INF ADDON: CPT

## 2019-11-27 PROCEDURE — 85025 COMPLETE CBC W/AUTO DIFF WBC: CPT

## 2019-11-27 PROCEDURE — 2500000003 HC RX 250 WO HCPCS

## 2019-11-27 PROCEDURE — 84484 ASSAY OF TROPONIN QUANT: CPT

## 2019-11-27 PROCEDURE — 99284 EMERGENCY DEPT VISIT MOD MDM: CPT

## 2019-11-27 PROCEDURE — 96376 TX/PRO/DX INJ SAME DRUG ADON: CPT

## 2019-11-27 PROCEDURE — G0378 HOSPITAL OBSERVATION PER HR: HCPCS

## 2019-11-27 PROCEDURE — 71045 X-RAY EXAM CHEST 1 VIEW: CPT

## 2019-11-27 PROCEDURE — 85730 THROMBOPLASTIN TIME PARTIAL: CPT

## 2019-11-27 PROCEDURE — 93005 ELECTROCARDIOGRAM TRACING: CPT | Performed by: FAMILY MEDICINE

## 2019-11-27 PROCEDURE — 83735 ASSAY OF MAGNESIUM: CPT

## 2019-11-27 PROCEDURE — 83880 ASSAY OF NATRIURETIC PEPTIDE: CPT

## 2019-11-27 PROCEDURE — 2580000003 HC RX 258: Performed by: FAMILY MEDICINE

## 2019-11-27 PROCEDURE — 6360000002 HC RX W HCPCS

## 2019-11-27 PROCEDURE — 36415 COLL VENOUS BLD VENIPUNCTURE: CPT

## 2019-11-27 PROCEDURE — 80053 COMPREHEN METABOLIC PANEL: CPT

## 2019-11-27 PROCEDURE — 99220 PR INITIAL OBSERVATION CARE/DAY 70 MINUTES: CPT | Performed by: INTERNAL MEDICINE

## 2019-11-27 PROCEDURE — 6370000000 HC RX 637 (ALT 250 FOR IP)

## 2019-11-27 PROCEDURE — 82330 ASSAY OF CALCIUM: CPT

## 2019-11-27 PROCEDURE — 85610 PROTHROMBIN TIME: CPT

## 2019-11-27 RX ORDER — ASCORBIC ACID 500 MG
1000 TABLET ORAL DAILY
Status: DISCONTINUED | OUTPATIENT
Start: 2019-11-28 | End: 2019-11-30 | Stop reason: HOSPADM

## 2019-11-27 RX ORDER — NITROGLYCERIN 0.4 MG/1
0.4 TABLET SUBLINGUAL EVERY 5 MIN PRN
Status: DISCONTINUED | OUTPATIENT
Start: 2019-11-27 | End: 2019-11-29 | Stop reason: SDUPTHER

## 2019-11-27 RX ORDER — HYDROXYZINE PAMOATE 25 MG/1
25 CAPSULE ORAL 4 TIMES DAILY PRN
Status: DISCONTINUED | OUTPATIENT
Start: 2019-11-27 | End: 2019-11-30 | Stop reason: HOSPADM

## 2019-11-27 RX ORDER — HEPARIN SODIUM 1000 [USP'U]/ML
80 INJECTION, SOLUTION INTRAVENOUS; SUBCUTANEOUS ONCE
Status: COMPLETED | OUTPATIENT
Start: 2019-11-27 | End: 2019-11-27

## 2019-11-27 RX ORDER — PANTOPRAZOLE SODIUM 40 MG/1
40 TABLET, DELAYED RELEASE ORAL
Status: DISCONTINUED | OUTPATIENT
Start: 2019-11-28 | End: 2019-11-30 | Stop reason: HOSPADM

## 2019-11-27 RX ORDER — ECHINACEA 400 MG
4 CAPSULE ORAL 2 TIMES DAILY
Status: DISCONTINUED | OUTPATIENT
Start: 2019-11-27 | End: 2019-11-27 | Stop reason: RX

## 2019-11-27 RX ORDER — HEPARIN SODIUM 10000 [USP'U]/100ML
18 INJECTION, SOLUTION INTRAVENOUS CONTINUOUS
Status: DISCONTINUED | OUTPATIENT
Start: 2019-11-27 | End: 2019-11-30

## 2019-11-27 RX ORDER — SODIUM CHLORIDE 9 MG/ML
INJECTION, SOLUTION INTRAVENOUS CONTINUOUS
Status: DISCONTINUED | OUTPATIENT
Start: 2019-11-27 | End: 2019-11-28 | Stop reason: DRUGHIGH

## 2019-11-27 RX ORDER — SODIUM CHLORIDE 0.9 % (FLUSH) 0.9 %
10 SYRINGE (ML) INJECTION PRN
Status: DISCONTINUED | OUTPATIENT
Start: 2019-11-27 | End: 2019-11-29

## 2019-11-27 RX ORDER — PAROXETINE HYDROCHLORIDE 20 MG/1
20 TABLET, FILM COATED ORAL DAILY
Status: DISCONTINUED | OUTPATIENT
Start: 2019-11-28 | End: 2019-11-30 | Stop reason: HOSPADM

## 2019-11-27 RX ORDER — ONDANSETRON 2 MG/ML
4 INJECTION INTRAMUSCULAR; INTRAVENOUS EVERY 6 HOURS PRN
Status: DISCONTINUED | OUTPATIENT
Start: 2019-11-27 | End: 2019-11-30 | Stop reason: HOSPADM

## 2019-11-27 RX ORDER — CLOPIDOGREL BISULFATE 75 MG/1
75 TABLET ORAL DAILY
Status: DISCONTINUED | OUTPATIENT
Start: 2019-11-28 | End: 2019-11-30 | Stop reason: HOSPADM

## 2019-11-27 RX ORDER — ACETAMINOPHEN 325 MG/1
650 TABLET ORAL EVERY 4 HOURS PRN
Status: DISCONTINUED | OUTPATIENT
Start: 2019-11-27 | End: 2019-11-29

## 2019-11-27 RX ORDER — HEPARIN SODIUM 1000 [USP'U]/ML
40 INJECTION, SOLUTION INTRAVENOUS; SUBCUTANEOUS PRN
Status: DISCONTINUED | OUTPATIENT
Start: 2019-11-27 | End: 2019-11-30 | Stop reason: HOSPADM

## 2019-11-27 RX ORDER — HEPARIN SODIUM 1000 [USP'U]/ML
80 INJECTION, SOLUTION INTRAVENOUS; SUBCUTANEOUS PRN
Status: DISCONTINUED | OUTPATIENT
Start: 2019-11-27 | End: 2019-11-30 | Stop reason: HOSPADM

## 2019-11-27 RX ORDER — SODIUM CHLORIDE 0.9 % (FLUSH) 0.9 %
10 SYRINGE (ML) INJECTION EVERY 12 HOURS SCHEDULED
Status: DISCONTINUED | OUTPATIENT
Start: 2019-11-27 | End: 2019-11-29

## 2019-11-27 RX ORDER — ATORVASTATIN CALCIUM 10 MG/1
10 TABLET, FILM COATED ORAL NIGHTLY
Status: DISCONTINUED | OUTPATIENT
Start: 2019-11-27 | End: 2019-11-30

## 2019-11-27 RX ORDER — METOPROLOL TARTRATE 50 MG/1
50 TABLET, FILM COATED ORAL 2 TIMES DAILY
Status: DISCONTINUED | OUTPATIENT
Start: 2019-11-27 | End: 2019-11-30 | Stop reason: HOSPADM

## 2019-11-27 RX ADMIN — HEPARIN SODIUM 8710 UNITS: 1000 INJECTION INTRAVENOUS; SUBCUTANEOUS at 18:18

## 2019-11-27 RX ADMIN — SODIUM CHLORIDE: 9 INJECTION, SOLUTION INTRAVENOUS at 18:29

## 2019-11-27 RX ADMIN — HEPARIN SODIUM 18 UNITS/KG/HR: 10000 INJECTION, SOLUTION INTRAVENOUS at 18:19

## 2019-11-27 ASSESSMENT — PAIN SCALES - GENERAL: PAINLEVEL_OUTOF10: 0

## 2019-11-27 ASSESSMENT — ENCOUNTER SYMPTOMS
CHEST TIGHTNESS: 1
ABDOMINAL PAIN: 0

## 2019-11-28 LAB
ANION GAP SERPL CALCULATED.3IONS-SCNC: 14 MEQ/L (ref 8–16)
APTT: 96 SECONDS (ref 22–38)
APTT: > 200 SECONDS (ref 22–38)
APTT: > 200 SECONDS (ref 22–38)
BUN BLDV-MCNC: 17 MG/DL (ref 7–22)
CALCIUM SERPL-MCNC: 8.6 MG/DL (ref 8.5–10.5)
CHLORIDE BLD-SCNC: 104 MEQ/L (ref 98–111)
CO2: 21 MEQ/L (ref 23–33)
CREAT SERPL-MCNC: 1.4 MG/DL (ref 0.4–1.2)
ERYTHROCYTE [DISTWIDTH] IN BLOOD BY AUTOMATED COUNT: 13.4 % (ref 11.5–14.5)
ERYTHROCYTE [DISTWIDTH] IN BLOOD BY AUTOMATED COUNT: 46.6 FL (ref 35–45)
GFR SERPL CREATININE-BSD FRML MDRD: 49 ML/MIN/1.73M2
GLUCOSE BLD-MCNC: 118 MG/DL (ref 70–108)
HCT VFR BLD CALC: 49.1 % (ref 42–52)
HEMOGLOBIN: 16.7 GM/DL (ref 14–18)
MCH RBC QN AUTO: 32.2 PG (ref 26–33)
MCHC RBC AUTO-ENTMCNC: 34 GM/DL (ref 32.2–35.5)
MCV RBC AUTO: 94.8 FL (ref 80–94)
OSMOLALITY CALCULATION: 280.2 MOSMOL/KG (ref 275–300)
PLATELET # BLD: 135 THOU/MM3 (ref 130–400)
PMV BLD AUTO: 9.7 FL (ref 9.4–12.4)
POTASSIUM SERPL-SCNC: 4.7 MEQ/L (ref 3.5–5.2)
RBC # BLD: 5.18 MILL/MM3 (ref 4.7–6.1)
SODIUM BLD-SCNC: 139 MEQ/L (ref 135–145)
T4 FREE: 1.05 NG/DL (ref 0.93–1.76)
TROPONIN T: < 0.01 NG/ML
WBC # BLD: 7.8 THOU/MM3 (ref 4.8–10.8)

## 2019-11-28 PROCEDURE — 84439 ASSAY OF FREE THYROXINE: CPT

## 2019-11-28 PROCEDURE — 36415 COLL VENOUS BLD VENIPUNCTURE: CPT

## 2019-11-28 PROCEDURE — 84484 ASSAY OF TROPONIN QUANT: CPT

## 2019-11-28 PROCEDURE — 99226 PR SBSQ OBSERVATION CARE/DAY 35 MINUTES: CPT | Performed by: NURSE PRACTITIONER

## 2019-11-28 PROCEDURE — 2580000003 HC RX 258: Performed by: INTERNAL MEDICINE

## 2019-11-28 PROCEDURE — 85730 THROMBOPLASTIN TIME PARTIAL: CPT

## 2019-11-28 PROCEDURE — 6360000002 HC RX W HCPCS: Performed by: FAMILY MEDICINE

## 2019-11-28 PROCEDURE — 2709999900 HC NON-CHARGEABLE SUPPLY

## 2019-11-28 PROCEDURE — 6370000000 HC RX 637 (ALT 250 FOR IP): Performed by: INTERNAL MEDICINE

## 2019-11-28 PROCEDURE — 85027 COMPLETE CBC AUTOMATED: CPT

## 2019-11-28 PROCEDURE — 80048 BASIC METABOLIC PNL TOTAL CA: CPT

## 2019-11-28 PROCEDURE — G0378 HOSPITAL OBSERVATION PER HR: HCPCS

## 2019-11-28 PROCEDURE — 99204 OFFICE O/P NEW MOD 45 MIN: CPT | Performed by: INTERNAL MEDICINE

## 2019-11-28 PROCEDURE — 94760 N-INVAS EAR/PLS OXIMETRY 1: CPT

## 2019-11-28 RX ORDER — SODIUM CHLORIDE 9 MG/ML
INJECTION, SOLUTION INTRAVENOUS CONTINUOUS
Status: DISCONTINUED | OUTPATIENT
Start: 2019-11-28 | End: 2019-11-30 | Stop reason: HOSPADM

## 2019-11-28 RX ADMIN — PANTOPRAZOLE SODIUM 40 MG: 40 TABLET, DELAYED RELEASE ORAL at 06:39

## 2019-11-28 RX ADMIN — ATORVASTATIN CALCIUM 10 MG: 10 TABLET, FILM COATED ORAL at 20:31

## 2019-11-28 RX ADMIN — ACETAMINOPHEN 650 MG: 325 TABLET ORAL at 10:23

## 2019-11-28 RX ADMIN — ATORVASTATIN CALCIUM 10 MG: 10 TABLET, FILM COATED ORAL at 00:27

## 2019-11-28 RX ADMIN — HEPARIN SODIUM 13.96 UNITS/KG/HR: 10000 INJECTION, SOLUTION INTRAVENOUS at 09:58

## 2019-11-28 RX ADMIN — METOPROLOL TARTRATE 50 MG: 50 TABLET, FILM COATED ORAL at 10:24

## 2019-11-28 RX ADMIN — SODIUM CHLORIDE: 9 INJECTION, SOLUTION INTRAVENOUS at 18:54

## 2019-11-28 RX ADMIN — PAROXETINE HYDROCHLORIDE 20 MG: 20 TABLET, FILM COATED ORAL at 10:24

## 2019-11-28 RX ADMIN — METOPROLOL TARTRATE 50 MG: 50 TABLET, FILM COATED ORAL at 20:30

## 2019-11-28 RX ADMIN — ASPIRIN 325 MG: 325 TABLET, DELAYED RELEASE ORAL at 10:22

## 2019-11-28 RX ADMIN — CLOPIDOGREL BISULFATE 75 MG: 75 TABLET ORAL at 10:22

## 2019-11-28 RX ADMIN — METOPROLOL TARTRATE 50 MG: 50 TABLET, FILM COATED ORAL at 00:27

## 2019-11-28 ASSESSMENT — PAIN - FUNCTIONAL ASSESSMENT
PAIN_FUNCTIONAL_ASSESSMENT: ACTIVITIES ARE NOT PREVENTED
PAIN_FUNCTIONAL_ASSESSMENT: ACTIVITIES ARE NOT PREVENTED

## 2019-11-28 ASSESSMENT — PAIN SCALES - GENERAL
PAINLEVEL_OUTOF10: 0
PAINLEVEL_OUTOF10: 0
PAINLEVEL_OUTOF10: 4
PAINLEVEL_OUTOF10: 0
PAINLEVEL_OUTOF10: 4
PAINLEVEL_OUTOF10: 0

## 2019-11-28 ASSESSMENT — PAIN DESCRIPTION - FREQUENCY
FREQUENCY: CONTINUOUS
FREQUENCY: CONTINUOUS

## 2019-11-28 ASSESSMENT — PAIN DESCRIPTION - ONSET
ONSET: GRADUAL
ONSET: GRADUAL

## 2019-11-28 ASSESSMENT — PAIN DESCRIPTION - DESCRIPTORS
DESCRIPTORS: HEADACHE
DESCRIPTORS: HEADACHE

## 2019-11-28 ASSESSMENT — PAIN DESCRIPTION - PAIN TYPE
TYPE: ACUTE PAIN
TYPE: ACUTE PAIN

## 2019-11-28 ASSESSMENT — PAIN DESCRIPTION - LOCATION
LOCATION: HEAD
LOCATION: HEAD

## 2019-11-29 ENCOUNTER — APPOINTMENT (OUTPATIENT)
Dept: CARDIAC CATH/INVASIVE PROCEDURES | Age: 79
End: 2019-11-29
Payer: MEDICARE

## 2019-11-29 LAB
ABO: NORMAL
ACTIVATED CLOTTING TIME: 378 SECONDS (ref 1–150)
ANION GAP SERPL CALCULATED.3IONS-SCNC: 12 MEQ/L (ref 8–16)
ANTIBODY SCREEN: NORMAL
APTT: 50 SECONDS (ref 22–38)
APTT: 66.6 SECONDS (ref 22–38)
BUN BLDV-MCNC: 16 MG/DL (ref 7–22)
CALCIUM SERPL-MCNC: 8.8 MG/DL (ref 8.5–10.5)
CHLORIDE BLD-SCNC: 102 MEQ/L (ref 98–111)
CHOLESTEROL, TOTAL: 132 MG/DL (ref 100–199)
CO2: 20 MEQ/L (ref 23–33)
CREAT SERPL-MCNC: 1.3 MG/DL (ref 0.4–1.2)
EKG ATRIAL RATE: 58 BPM
EKG P AXIS: 22 DEGREES
EKG P-R INTERVAL: 194 MS
EKG Q-T INTERVAL: 464 MS
EKG QRS DURATION: 78 MS
EKG QTC CALCULATION (BAZETT): 455 MS
EKG T AXIS: 22 DEGREES
EKG VENTRICULAR RATE: 58 BPM
ERYTHROCYTE [DISTWIDTH] IN BLOOD BY AUTOMATED COUNT: 13.2 % (ref 11.5–14.5)
ERYTHROCYTE [DISTWIDTH] IN BLOOD BY AUTOMATED COUNT: 46.4 FL (ref 35–45)
GFR SERPL CREATININE-BSD FRML MDRD: 53 ML/MIN/1.73M2
GLUCOSE BLD-MCNC: 116 MG/DL (ref 70–108)
HCT VFR BLD CALC: 48.2 % (ref 42–52)
HDLC SERPL-MCNC: 28 MG/DL
HEMOGLOBIN: 16 GM/DL (ref 14–18)
INR BLD: 0.95 (ref 0.85–1.13)
LDL CHOLESTEROL CALCULATED: 32 MG/DL
MCH RBC QN AUTO: 31.7 PG (ref 26–33)
MCHC RBC AUTO-ENTMCNC: 33.2 GM/DL (ref 32.2–35.5)
MCV RBC AUTO: 95.6 FL (ref 80–94)
PLATELET # BLD: 144 THOU/MM3 (ref 130–400)
PMV BLD AUTO: 9.7 FL (ref 9.4–12.4)
POTASSIUM REFLEX MAGNESIUM: 4.4 MEQ/L (ref 3.5–5.2)
RBC # BLD: 5.04 MILL/MM3 (ref 4.7–6.1)
RH FACTOR: NORMAL
RH FACTOR: NORMAL
SODIUM BLD-SCNC: 134 MEQ/L (ref 135–145)
TRIGL SERPL-MCNC: 362 MG/DL (ref 0–199)
WBC # BLD: 7.5 THOU/MM3 (ref 4.8–10.8)

## 2019-11-29 PROCEDURE — G0378 HOSPITAL OBSERVATION PER HR: HCPCS

## 2019-11-29 PROCEDURE — C1894 INTRO/SHEATH, NON-LASER: HCPCS

## 2019-11-29 PROCEDURE — 86900 BLOOD TYPING SEROLOGIC ABO: CPT

## 2019-11-29 PROCEDURE — 85347 COAGULATION TIME ACTIVATED: CPT

## 2019-11-29 PROCEDURE — C1887 CATHETER, GUIDING: HCPCS

## 2019-11-29 PROCEDURE — 94760 N-INVAS EAR/PLS OXIMETRY 1: CPT

## 2019-11-29 PROCEDURE — 92928 PRQ TCAT PLMT NTRAC ST 1 LES: CPT | Performed by: INTERNAL MEDICINE

## 2019-11-29 PROCEDURE — 85027 COMPLETE CBC AUTOMATED: CPT

## 2019-11-29 PROCEDURE — 6360000002 HC RX W HCPCS

## 2019-11-29 PROCEDURE — 93307 TTE W/O DOPPLER COMPLETE: CPT

## 2019-11-29 PROCEDURE — 86850 RBC ANTIBODY SCREEN: CPT

## 2019-11-29 PROCEDURE — 6360000004 HC RX CONTRAST MEDICATION: Performed by: INTERNAL MEDICINE

## 2019-11-29 PROCEDURE — 86901 BLOOD TYPING SEROLOGIC RH(D): CPT

## 2019-11-29 PROCEDURE — 85730 THROMBOPLASTIN TIME PARTIAL: CPT

## 2019-11-29 PROCEDURE — 93458 L HRT ARTERY/VENTRICLE ANGIO: CPT | Performed by: INTERNAL MEDICINE

## 2019-11-29 PROCEDURE — 6370000000 HC RX 637 (ALT 250 FOR IP): Performed by: INTERNAL MEDICINE

## 2019-11-29 PROCEDURE — 93458 L HRT ARTERY/VENTRICLE ANGIO: CPT

## 2019-11-29 PROCEDURE — 36415 COLL VENOUS BLD VENIPUNCTURE: CPT

## 2019-11-29 PROCEDURE — 80048 BASIC METABOLIC PNL TOTAL CA: CPT

## 2019-11-29 PROCEDURE — 99232 SBSQ HOSP IP/OBS MODERATE 35: CPT | Performed by: NURSE PRACTITIONER

## 2019-11-29 PROCEDURE — 2500000003 HC RX 250 WO HCPCS

## 2019-11-29 PROCEDURE — 2709999900 HC NON-CHARGEABLE SUPPLY

## 2019-11-29 PROCEDURE — C1769 GUIDE WIRE: HCPCS

## 2019-11-29 PROCEDURE — 2580000003 HC RX 258: Performed by: INTERNAL MEDICINE

## 2019-11-29 PROCEDURE — C1874 STENT, COATED/COV W/DEL SYS: HCPCS

## 2019-11-29 PROCEDURE — 80061 LIPID PANEL: CPT

## 2019-11-29 PROCEDURE — 99024 POSTOP FOLLOW-UP VISIT: CPT | Performed by: PHYSICIAN ASSISTANT

## 2019-11-29 PROCEDURE — 6370000000 HC RX 637 (ALT 250 FOR IP)

## 2019-11-29 PROCEDURE — 93005 ELECTROCARDIOGRAM TRACING: CPT | Performed by: NURSE PRACTITIONER

## 2019-11-29 PROCEDURE — C1725 CATH, TRANSLUMIN NON-LASER: HCPCS

## 2019-11-29 PROCEDURE — C9600 PERC DRUG-EL COR STENT SING: HCPCS

## 2019-11-29 PROCEDURE — 85610 PROTHROMBIN TIME: CPT

## 2019-11-29 RX ORDER — NITROGLYCERIN 0.4 MG/1
0.4 TABLET SUBLINGUAL EVERY 5 MIN PRN
Status: DISCONTINUED | OUTPATIENT
Start: 2019-11-29 | End: 2019-11-30 | Stop reason: HOSPADM

## 2019-11-29 RX ORDER — ASPIRIN 325 MG
325 TABLET ORAL ONCE
Status: COMPLETED | OUTPATIENT
Start: 2019-11-29 | End: 2019-11-29

## 2019-11-29 RX ORDER — SODIUM CHLORIDE 0.9 % (FLUSH) 0.9 %
10 SYRINGE (ML) INJECTION EVERY 12 HOURS SCHEDULED
Status: DISCONTINUED | OUTPATIENT
Start: 2019-11-29 | End: 2019-11-30 | Stop reason: HOSPADM

## 2019-11-29 RX ORDER — SODIUM CHLORIDE 9 MG/ML
INJECTION, SOLUTION INTRAVENOUS CONTINUOUS
Status: DISCONTINUED | OUTPATIENT
Start: 2019-11-29 | End: 2019-11-30 | Stop reason: HOSPADM

## 2019-11-29 RX ORDER — SODIUM CHLORIDE 0.9 % (FLUSH) 0.9 %
10 SYRINGE (ML) INJECTION PRN
Status: DISCONTINUED | OUTPATIENT
Start: 2019-11-29 | End: 2019-11-30 | Stop reason: HOSPADM

## 2019-11-29 RX ORDER — ACETAMINOPHEN 325 MG/1
650 TABLET ORAL EVERY 4 HOURS PRN
Status: DISCONTINUED | OUTPATIENT
Start: 2019-11-29 | End: 2019-11-30 | Stop reason: HOSPADM

## 2019-11-29 RX ORDER — SODIUM CHLORIDE 0.9 % (FLUSH) 0.9 %
10 SYRINGE (ML) INJECTION EVERY 12 HOURS SCHEDULED
Status: DISCONTINUED | OUTPATIENT
Start: 2019-11-29 | End: 2019-11-29 | Stop reason: SDUPTHER

## 2019-11-29 RX ORDER — SODIUM CHLORIDE 0.9 % (FLUSH) 0.9 %
10 SYRINGE (ML) INJECTION PRN
Status: DISCONTINUED | OUTPATIENT
Start: 2019-11-29 | End: 2019-11-29 | Stop reason: SDUPTHER

## 2019-11-29 RX ADMIN — PAROXETINE HYDROCHLORIDE 20 MG: 20 TABLET, FILM COATED ORAL at 16:08

## 2019-11-29 RX ADMIN — IOPAMIDOL 75 ML: 755 INJECTION, SOLUTION INTRAVENOUS at 14:18

## 2019-11-29 RX ADMIN — SODIUM CHLORIDE: 9 INJECTION, SOLUTION INTRAVENOUS at 07:02

## 2019-11-29 RX ADMIN — METOPROLOL TARTRATE 50 MG: 50 TABLET, FILM COATED ORAL at 16:08

## 2019-11-29 RX ADMIN — METOPROLOL TARTRATE 50 MG: 50 TABLET, FILM COATED ORAL at 23:32

## 2019-11-29 RX ADMIN — ASPIRIN 325 MG: 325 TABLET, FILM COATED ORAL at 04:39

## 2019-11-29 RX ADMIN — PANTOPRAZOLE SODIUM 40 MG: 40 TABLET, DELAYED RELEASE ORAL at 04:39

## 2019-11-29 RX ADMIN — ATORVASTATIN CALCIUM 10 MG: 10 TABLET, FILM COATED ORAL at 23:32

## 2019-11-29 RX ADMIN — CLOPIDOGREL BISULFATE 75 MG: 75 TABLET ORAL at 04:39

## 2019-11-29 RX ADMIN — ACETAMINOPHEN 650 MG: 325 TABLET ORAL at 17:32

## 2019-11-29 ASSESSMENT — PAIN SCALES - GENERAL
PAINLEVEL_OUTOF10: 0
PAINLEVEL_OUTOF10: 3
PAINLEVEL_OUTOF10: 0

## 2019-11-30 VITALS
WEIGHT: 249 LBS | OXYGEN SATURATION: 92 % | HEART RATE: 55 BPM | TEMPERATURE: 98.3 F | DIASTOLIC BLOOD PRESSURE: 72 MMHG | SYSTOLIC BLOOD PRESSURE: 149 MMHG | BODY MASS INDEX: 37.74 KG/M2 | RESPIRATION RATE: 16 BRPM | HEIGHT: 68 IN

## 2019-11-30 PROBLEM — I48.91 ATRIAL FIBRILLATION WITH RVR (HCC): Status: RESOLVED | Noted: 2019-11-27 | Resolved: 2019-11-30

## 2019-11-30 LAB
ANION GAP SERPL CALCULATED.3IONS-SCNC: 15 MEQ/L (ref 8–16)
BUN BLDV-MCNC: 16 MG/DL (ref 7–22)
CALCIUM SERPL-MCNC: 8.8 MG/DL (ref 8.5–10.5)
CHLORIDE BLD-SCNC: 105 MEQ/L (ref 98–111)
CO2: 19 MEQ/L (ref 23–33)
CREAT SERPL-MCNC: 1.5 MG/DL (ref 0.4–1.2)
GFR SERPL CREATININE-BSD FRML MDRD: 45 ML/MIN/1.73M2
GLUCOSE BLD-MCNC: 112 MG/DL (ref 70–108)
POTASSIUM REFLEX MAGNESIUM: 4.5 MEQ/L (ref 3.5–5.2)
REASON FOR REJECTION: NORMAL
REJECTED TEST: NORMAL
SODIUM BLD-SCNC: 139 MEQ/L (ref 135–145)

## 2019-11-30 PROCEDURE — 6370000000 HC RX 637 (ALT 250 FOR IP): Performed by: INTERNAL MEDICINE

## 2019-11-30 PROCEDURE — 99217 PR OBSERVATION CARE DISCHARGE MANAGEMENT: CPT | Performed by: NURSE PRACTITIONER

## 2019-11-30 PROCEDURE — 36415 COLL VENOUS BLD VENIPUNCTURE: CPT

## 2019-11-30 PROCEDURE — G0378 HOSPITAL OBSERVATION PER HR: HCPCS

## 2019-11-30 PROCEDURE — 80048 BASIC METABOLIC PNL TOTAL CA: CPT

## 2019-11-30 PROCEDURE — 99214 OFFICE O/P EST MOD 30 MIN: CPT | Performed by: PHYSICIAN ASSISTANT

## 2019-11-30 PROCEDURE — 94760 N-INVAS EAR/PLS OXIMETRY 1: CPT

## 2019-11-30 PROCEDURE — 6370000000 HC RX 637 (ALT 250 FOR IP): Performed by: PHYSICIAN ASSISTANT

## 2019-11-30 RX ORDER — ATORVASTATIN CALCIUM 40 MG/1
40 TABLET, FILM COATED ORAL NIGHTLY
Status: DISCONTINUED | OUTPATIENT
Start: 2019-11-30 | End: 2019-11-30 | Stop reason: HOSPADM

## 2019-11-30 RX ORDER — ASPIRIN 81 MG/1
81 TABLET ORAL DAILY
Qty: 90 TABLET | Refills: 0 | Status: SHIPPED | OUTPATIENT
Start: 2019-11-30 | End: 2019-12-17 | Stop reason: ALTCHOICE

## 2019-11-30 RX ORDER — METOPROLOL TARTRATE 50 MG/1
50 TABLET, FILM COATED ORAL 2 TIMES DAILY
Qty: 60 TABLET | Refills: 3 | Status: SHIPPED | OUTPATIENT
Start: 2019-11-30 | End: 2019-12-17 | Stop reason: SDUPTHER

## 2019-11-30 RX ORDER — ATORVASTATIN CALCIUM 40 MG/1
40 TABLET, FILM COATED ORAL NIGHTLY
Qty: 30 TABLET | Refills: 0 | Status: SHIPPED | OUTPATIENT
Start: 2019-11-30 | End: 2019-12-17 | Stop reason: SDUPTHER

## 2019-11-30 RX ADMIN — CLOPIDOGREL BISULFATE 75 MG: 75 TABLET ORAL at 09:17

## 2019-11-30 RX ADMIN — PAROXETINE HYDROCHLORIDE 20 MG: 20 TABLET, FILM COATED ORAL at 09:18

## 2019-11-30 RX ADMIN — APIXABAN 2.5 MG: 2.5 TABLET, FILM COATED ORAL at 09:17

## 2019-11-30 RX ADMIN — METOPROLOL TARTRATE 50 MG: 50 TABLET, FILM COATED ORAL at 09:18

## 2019-11-30 RX ADMIN — PANTOPRAZOLE SODIUM 40 MG: 40 TABLET, DELAYED RELEASE ORAL at 06:05

## 2019-11-30 RX ADMIN — ASPIRIN 325 MG: 325 TABLET, DELAYED RELEASE ORAL at 09:21

## 2019-11-30 ASSESSMENT — PAIN SCALES - GENERAL
PAINLEVEL_OUTOF10: 0
PAINLEVEL_OUTOF10: 0

## 2019-12-17 ENCOUNTER — OFFICE VISIT (OUTPATIENT)
Dept: CARDIOLOGY CLINIC | Age: 79
End: 2019-12-17
Payer: MEDICARE

## 2019-12-17 VITALS
SYSTOLIC BLOOD PRESSURE: 124 MMHG | WEIGHT: 251.2 LBS | HEIGHT: 68 IN | BODY MASS INDEX: 38.07 KG/M2 | HEART RATE: 64 BPM | DIASTOLIC BLOOD PRESSURE: 72 MMHG

## 2019-12-17 DIAGNOSIS — Z95.820 S/P ANGIOPLASTY WITH STENT: ICD-10-CM

## 2019-12-17 DIAGNOSIS — I10 ESSENTIAL HYPERTENSION: ICD-10-CM

## 2019-12-17 DIAGNOSIS — I48.0 PAROXYSMAL ATRIAL FIBRILLATION (HCC): ICD-10-CM

## 2019-12-17 DIAGNOSIS — I25.10 CORONARY ARTERY DISEASE INVOLVING NATIVE CORONARY ARTERY OF NATIVE HEART WITHOUT ANGINA PECTORIS: Primary | ICD-10-CM

## 2019-12-17 PROCEDURE — 99213 OFFICE O/P EST LOW 20 MIN: CPT | Performed by: PHYSICIAN ASSISTANT

## 2019-12-17 PROCEDURE — 1036F TOBACCO NON-USER: CPT | Performed by: PHYSICIAN ASSISTANT

## 2019-12-17 PROCEDURE — 1123F ACP DISCUSS/DSCN MKR DOCD: CPT | Performed by: PHYSICIAN ASSISTANT

## 2019-12-17 PROCEDURE — G8427 DOCREV CUR MEDS BY ELIG CLIN: HCPCS | Performed by: PHYSICIAN ASSISTANT

## 2019-12-17 PROCEDURE — 93000 ELECTROCARDIOGRAM COMPLETE: CPT | Performed by: PHYSICIAN ASSISTANT

## 2019-12-17 PROCEDURE — G8484 FLU IMMUNIZE NO ADMIN: HCPCS | Performed by: PHYSICIAN ASSISTANT

## 2019-12-17 PROCEDURE — 4040F PNEUMOC VAC/ADMIN/RCVD: CPT | Performed by: PHYSICIAN ASSISTANT

## 2019-12-17 PROCEDURE — G8417 CALC BMI ABV UP PARAM F/U: HCPCS | Performed by: PHYSICIAN ASSISTANT

## 2019-12-17 PROCEDURE — G8598 ASA/ANTIPLAT THER USED: HCPCS | Performed by: PHYSICIAN ASSISTANT

## 2019-12-17 RX ORDER — ATORVASTATIN CALCIUM 40 MG/1
40 TABLET, FILM COATED ORAL NIGHTLY
Qty: 90 TABLET | Refills: 3 | Status: SHIPPED | OUTPATIENT
Start: 2019-12-17 | End: 2020-10-07 | Stop reason: SDUPTHER

## 2019-12-17 RX ORDER — ASPIRIN 81 MG/1
81 TABLET ORAL DAILY
Qty: 90 TABLET | Refills: 3 | Status: CANCELLED | OUTPATIENT
Start: 2019-12-17

## 2019-12-17 RX ORDER — METOPROLOL TARTRATE 50 MG/1
50 TABLET, FILM COATED ORAL 2 TIMES DAILY
Qty: 180 TABLET | Refills: 3 | Status: SHIPPED | OUTPATIENT
Start: 2019-12-17 | End: 2020-01-20 | Stop reason: DRUGHIGH

## 2020-01-17 ENCOUNTER — TELEPHONE (OUTPATIENT)
Dept: CARDIOLOGY CLINIC | Age: 80
End: 2020-01-17

## 2020-01-17 NOTE — TELEPHONE ENCOUNTER
Pts wife Raymonde Councilman on nurse line. Call to [facility]. No answer and unable to LM.

## 2020-01-20 ENCOUNTER — OFFICE VISIT (OUTPATIENT)
Dept: CARDIOLOGY CLINIC | Age: 80
End: 2020-01-20
Payer: MEDICARE

## 2020-01-20 VITALS
HEART RATE: 68 BPM | SYSTOLIC BLOOD PRESSURE: 133 MMHG | WEIGHT: 248 LBS | DIASTOLIC BLOOD PRESSURE: 67 MMHG | HEIGHT: 68 IN | BODY MASS INDEX: 37.59 KG/M2

## 2020-01-20 PROCEDURE — 4040F PNEUMOC VAC/ADMIN/RCVD: CPT | Performed by: PHYSICIAN ASSISTANT

## 2020-01-20 PROCEDURE — G8484 FLU IMMUNIZE NO ADMIN: HCPCS | Performed by: PHYSICIAN ASSISTANT

## 2020-01-20 PROCEDURE — G8427 DOCREV CUR MEDS BY ELIG CLIN: HCPCS | Performed by: PHYSICIAN ASSISTANT

## 2020-01-20 PROCEDURE — 1123F ACP DISCUSS/DSCN MKR DOCD: CPT | Performed by: PHYSICIAN ASSISTANT

## 2020-01-20 PROCEDURE — 1036F TOBACCO NON-USER: CPT | Performed by: PHYSICIAN ASSISTANT

## 2020-01-20 PROCEDURE — 99213 OFFICE O/P EST LOW 20 MIN: CPT | Performed by: PHYSICIAN ASSISTANT

## 2020-01-20 PROCEDURE — G8417 CALC BMI ABV UP PARAM F/U: HCPCS | Performed by: PHYSICIAN ASSISTANT

## 2020-01-20 RX ORDER — METOPROLOL TARTRATE 50 MG/1
75 TABLET, FILM COATED ORAL 2 TIMES DAILY
Qty: 180 TABLET | Refills: 3 | Status: SHIPPED | OUTPATIENT
Start: 2020-01-20 | End: 2020-03-02 | Stop reason: SDUPTHER

## 2020-01-20 NOTE — TELEPHONE ENCOUNTER
Spoke to wife Priyank Engle. Patient has been having BP issues and irregular heart rate. Appointment made with Karishma RENAE today.

## 2020-01-20 NOTE — PROGRESS NOTES
Subjective:      Patient ID: Nasima Mcelroy is a [de-identified] y.o. male. [facility] PROFESSIONAL SERVICES  [facility]   [address]   [address]   [address] [address]   Dept: [phone]   Dept Fax: [phone]: [phone]      No chief complaint on file.     ***  Cardiologist:  Dr. Ab Gómez        General:   No fever, no chills, No fatigue or weight loss  Pulmonary:    No dyspnea, no wheezing  Cardiac:    Denies recent chest pain   GI:     No nausea or vomiting, no abdominal pain  Neuro:    No dizziness or light headedness  Musculoskeletal:  No recent active issues  Extremities:   No edema, good peripheral pulses      Past Medical History:  No date: AAA (abdominal aortic aneurysm) ([facility])  No date: CAD (coronary artery disease)  No date: Cancer of kidney ([facility] [address]  No date: Heart attack ([facility] [address]  No date: Hx of blood clots  No date: Hyperlipidemia  No date: Hypertension  No date: Kidney stones  No date: Obesity  No date: Osteoarthritis  No date: Stroke ([facility])     -- Tetracyclines & Related -- Rash    Current Outpatient Medications:  atorvastatin (LIPITOR) 40 MG tablet, Take 1 tablet by mouth nightly, Disp: 90 tablet, Rfl: 3  metoprolol tartrate (LOPRESSOR) 50 MG tablet, Take 1 tablet by mouth 2 times daily, Disp: 180 tablet, Rfl: 3  apixaban (ELIQUIS) 2.5 MG TABS tablet, Take 1 tablet by mouth 2 times daily, Disp: 180 tablet, Rfl: 3  pantoprazole (PROTONIX) 40 MG tablet, TAKE 1 TABLET BY MOUTH  DAILY, Disp: 90 tablet, Rfl: 3  clopidogrel (PLAVIX) 75 MG tablet, Take 1 tablet by mouth daily, Disp: 90 tablet, Rfl: 3  Turmeric Curcumin 500 MG CAPS, Take 500 mg by mouth 2 times daily , Disp: , Rfl:   Magnesium 500 MG TABS, Take by mouth daily , Disp: , Rfl:   CINNAMON PO, Take 1,000 mg by mouth daily, Disp: , Rfl:   Probiotic Product (PROBIOTIC DAILY PO), Take by mouth daily, Disp: , Rfl:   nitroGLYCERIN (NITROSTAT) 0.4 MG SL tablet, Place 0.4 mg under the tongue every 5 minutes as needed , Disp: , Rfl:
 CINNAMON PO Take 1,000 mg by mouth daily      Probiotic Product (PROBIOTIC DAILY PO) Take by mouth daily      Ascorbic Acid (VITAMIN C) 1000 MG tablet Take 1,000 mg by mouth daily       Flaxseed, Linseed, (FLAXSEED OIL) 1000 MG CAPS Take 4 capsules by mouth 2 times daily        No current facility-administered medications for this visit. Social History     Socioeconomic History    Marital status:      Spouse name: Pastora aDmon Number of children: 1    Years of education: None    Highest education level: None   Occupational History    None   Social Needs    Financial resource strain: None    Food insecurity:     Worry: None     Inability: None    Transportation needs:     Medical: None     Non-medical: None   Tobacco Use    Smoking status: Former Smoker     Packs/day: 1.00     Years: 15.00     Pack years: 15.00     Types: Pipe     Start date:      Last attempt to quit: 1970     Years since quittin.6    Smokeless tobacco: Never Used   Substance and Sexual Activity    Alcohol use: No     Alcohol/week: 0.0 standard drinks    Drug use: No    Sexual activity: None   Lifestyle    Physical activity:     Days per week: None     Minutes per session: None    Stress: None   Relationships    Social connections:     Talks on phone: None     Gets together: None     Attends Alevism service: None     Active member of club or organization: None     Attends meetings of clubs or organizations: None     Relationship status: None    Intimate partner violence:     Fear of current or ex partner: None     Emotionally abused: None     Physically abused: None     Forced sexual activity: None   Other Topics Concern    None   Social History Narrative    None       Family History   Problem Relation Age of Onset    Alzheimer's Disease Mother     Heart Disease Father        Blood pressure 133/67, pulse 68, height 5' 8\" (1.727 m), weight 248 lb (112.5 kg).     General:   Well developed, well

## 2020-02-27 ENCOUNTER — TELEPHONE (OUTPATIENT)
Dept: CARDIOLOGY CLINIC | Age: 80
End: 2020-02-27

## 2020-03-03 ENCOUNTER — OFFICE VISIT (OUTPATIENT)
Dept: CARDIOLOGY CLINIC | Age: 80
End: 2020-03-03
Payer: MEDICARE

## 2020-03-03 VITALS
DIASTOLIC BLOOD PRESSURE: 80 MMHG | HEIGHT: 68 IN | BODY MASS INDEX: 37.86 KG/M2 | WEIGHT: 249.8 LBS | SYSTOLIC BLOOD PRESSURE: 134 MMHG | HEART RATE: 57 BPM

## 2020-03-03 PROCEDURE — 1036F TOBACCO NON-USER: CPT | Performed by: INTERNAL MEDICINE

## 2020-03-03 PROCEDURE — G8417 CALC BMI ABV UP PARAM F/U: HCPCS | Performed by: INTERNAL MEDICINE

## 2020-03-03 PROCEDURE — 1123F ACP DISCUSS/DSCN MKR DOCD: CPT | Performed by: INTERNAL MEDICINE

## 2020-03-03 PROCEDURE — 4040F PNEUMOC VAC/ADMIN/RCVD: CPT | Performed by: INTERNAL MEDICINE

## 2020-03-03 PROCEDURE — G8427 DOCREV CUR MEDS BY ELIG CLIN: HCPCS | Performed by: INTERNAL MEDICINE

## 2020-03-03 PROCEDURE — 93000 ELECTROCARDIOGRAM COMPLETE: CPT | Performed by: INTERNAL MEDICINE

## 2020-03-03 PROCEDURE — G8484 FLU IMMUNIZE NO ADMIN: HCPCS | Performed by: INTERNAL MEDICINE

## 2020-03-03 PROCEDURE — 99213 OFFICE O/P EST LOW 20 MIN: CPT | Performed by: INTERNAL MEDICINE

## 2020-03-03 RX ORDER — PAROXETINE HYDROCHLORIDE 20 MG/1
20 TABLET, FILM COATED ORAL EVERY MORNING
COMMUNITY

## 2020-03-03 RX ORDER — AMLODIPINE BESYLATE 2.5 MG/1
2.5 TABLET ORAL DAILY
Qty: 90 TABLET | Refills: 3 | Status: SHIPPED | OUTPATIENT
Start: 2020-03-03 | End: 2020-12-14

## 2020-03-03 RX ORDER — METOPROLOL TARTRATE 50 MG/1
75 TABLET, FILM COATED ORAL 2 TIMES DAILY
Qty: 270 TABLET | Refills: 3 | Status: SHIPPED | OUTPATIENT
Start: 2020-03-03 | End: 2020-12-14

## 2020-03-03 NOTE — PROGRESS NOTES
[address] [facility] [address] [place] [address]  Dept: [phone]  Dept Fax: [phone]  Loc: [phone]    Visit Date: 3/3/2020    Mr. Severino Quevedo is a [de-identified] y.o. male  who presented for:  Chief Complaint   Patient presents with    3 Month Follow-Up     CAD  PCI 11/2019      HPI:   RHODA   Aayush Dean is a pleasant [de-identified]year old male patient who  has a past medical history of AAA (abdominal aortic aneurysm) ([facility] [address] CAD (coronary artery disease), Cancer of kidney ([facility] [address] [facility] Heart attack ([facility] [address] [facility] Hx of blood clots, Hyperlipidemia, Hypertension, Kidney stones, Obesity, Osteoarthritis, and Stroke ([facility] [address] [facility] The patient underwent LHC in 11/2019 for unstable angina. He was found to have severe ISR in RCA, underwent successful PCI. He presents for follow up. Patient denies chest pain, shortness of breath, dyspnea on exertion, orthopnea, paroxysmal nocturnal dyspnea, palpitations, dizziness, syncope, weight gain or leg swelling. He is participating in cardiac rehab. Has known h/o atrial fibrillation.       Current Outpatient Medications:     amLODIPine (NORVASC) 2.5 MG tablet, Take 1 tablet by mouth daily, Disp: 90 tablet, Rfl: 3    metoprolol tartrate (LOPRESSOR) 50 MG tablet, Take 1.5 tablets by mouth 2 times daily (Patient taking differently: Take 50 mg by mouth 2 times daily ), Disp: 270 tablet, Rfl: 3    PARoxetine (PAXIL) 20 MG tablet, Take 20 mg by mouth every morning, Disp: , Rfl:     atorvastatin (LIPITOR) 40 MG tablet, Take 1 tablet by mouth nightly, Disp: 90 tablet, Rfl: 3    apixaban (ELIQUIS) 2.5 MG TABS tablet, Take 1 tablet by mouth 2 times daily, Disp: 180 tablet, Rfl: 3    pantoprazole (PROTONIX) 40 MG tablet, TAKE 1 TABLET BY MOUTH  DAILY, Disp: 90 tablet, Rfl: 3    clopidogrel (PLAVIX) 75 MG tablet, Take 1 tablet by mouth daily, Disp: 90 tablet, Rfl: 3    Turmeric Curcumin 500 MG CAPS, Take 500 mg by mouth 2 times daily , Disp: , Rfl:    Magnesium 500 MG TABS, Take by mouth daily , Disp: , Rfl:     CINNAMON PO, Take 1,000 mg by mouth daily, Disp: , Rfl:     Probiotic Product (PROBIOTIC DAILY PO), Take by mouth daily, Disp: , Rfl:     Ascorbic Acid (VITAMIN C) 1000 MG tablet, Take 1,000 mg by mouth daily , Disp: , Rfl:     Flaxseed, Linseed, (FLAXSEED OIL) 1000 MG CAPS, Take 4 capsules by mouth 2 times daily , Disp: , Rfl:     Past Medical History  Kaajl Horner  has a past medical history of AAA (abdominal aortic aneurysm) ([facility] [address] CAD (coronary artery disease), Cancer of kidney ([facility] [address] Heart attack ([facility] [address] Hx of blood clots, Hyperlipidemia, Hypertension, Kidney stones, Obesity, Osteoarthritis, and Stroke ([facility] [address] Social History  Kajal Horner  reports that he quit smoking about 49 years ago. His smoking use included pipe. He started smoking about 65 years ago. He has a 15.00 pack-year smoking history. He has never used smokeless tobacco. He reports that he does not drink alcohol or use drugs. Family History  Kajal Horner family history includes Alzheimer's Disease in his mother; Heart Disease in his father. \    Past Surgical History   Past Surgical History:   Procedure Laterality Date    ABDOMEN SURGERY      AAA    ABDOMINAL AORTIC ANEURYSM REPAIR  2005    CARDIAC SURGERY  2008, 1996    stents   [address]  2005    AAA repair    CAROTID ENDARTERECTOMY  2006    FINGER AMPUTATION      JOINT REPLACEMENT  2007    knees bilateral    KNEE SURGERY Left 6-2013    PARTIAL NEPHRECTOMY  2010    Right    PTCA      SHOULDER SURGERY  1997    TONSILLECTOMY  1948    VASCULAR SURGERY         Review of Systems   Constitutional: Negative for chills and fever  HENT: Negative for congestion, sinus pressure, sneezing and sore throat. Eyes: Negative for pain, discharge, redness and itching.    Respiratory: Negative for apnea, cough  Gastrointestinal: Negative for blood in stool, constipation, diarrhea   Endocrine: Negative for cold intolerance, heat intolerance, polydipsia. Genitourinary: Negative for dysuria, enuresis, flank pain and hematuria. Musculoskeletal: Negative for arthralgias, joint swelling and neck pain. Neurological: Negative for numbness and headaches. Psychiatric/Behavioral: Negative for agitation, confusion, decreased concentration and dysphoric mood. Objective:     /80   Pulse 57   Ht 5' 8\" (1.727 m)   Wt 249 lb 12.8 oz (113.3 kg)   BMI 37.98 kg/m²     Wt Readings from Last 3 Encounters:   03/03/20 249 lb 12.8 oz (113.3 kg)   01/20/20 248 lb (112.5 kg)   12/17/19 251 lb 3.2 oz (113.9 kg)     BP Readings from Last 3 Encounters:   03/03/20 134/80   01/20/20 133/67   12/17/19 124/72       Nursing note and vitals reviewed. Physical Exam   Constitutional: Oriented to person, place, and time. Appears well-developed and well-nourished. ENT: Moist mucous membranes. No bleeding. Tongue is midline. Head: Normocephalic and atraumatic. Eyes: EOM are normal. Pupils are equal, round, and reactive to light. Neck: Normal range of motion. Neck supple. No JVD present. Cardiovascular: Normal rate, regular rhythm, systolic murmur, no rubs, and intact distal pulses. Pulmonary/Chest: Effort normal and breath sounds normal. No respiratory distress. No wheezes. No rales. Abdominal: Soft. Bowel sounds are normal. No distension. There is no tenderness. Musculoskeletal: Normal range of motion. no edema. Neurological: Alert and oriented to person, place, and time. No cranial nerve deficit. Coordination normal.   Skin: Skin is warm and dry. Psychiatric: Normal mood and affect.        Lab Results   Component Value Date    CKTOTAL 60 04/28/2015    CKTOTAL 78 11/04/2014       Lab Results   Component Value Date    WBC 7.5 11/29/2019    RBC 5.04 11/29/2019    RBC 5.17 10/27/2015    HGB 16.0 11/29/2019    HCT 48.2 11/29/2019    MCV 95.6 11/29/2019    MCH 31.7 11/29/2019    MCHC 33.2 11/29/2019    RDW 13.8 10/27/2015     11/29/2019    MPV msec                                              cm/s                                                     TV Peak A-Wave: 44.7                              AV P1/2t: 573 msec     cm/s   MV E' Septal Velocity: 5.9 IVRT: 60 msec   cm/s                                              TV Peak Gradient: 1.26   MV A' Septal Velocity: 8.3                        mmHg   cm/s                       AV DVI (Vmax):0.85   MV E' Lateral Velocity:                           PV Peak Velocity: 59.5   7.4 cm/s                                          cm/s   MV A' Lateral Velocity:                           PV Peak Gradient: 1.42   8.5 cm/s                                          mmHg   E/E' septal: 11.69   E/E' lateral: 9.32   MR Velocity: 452 cm/s                             ID ED Velocity: 115 cm/s     [id number]        Ekg:   EKG Interpretation:  normal EKG, normal sinus rhythm, RBBB, unchanged from previous tracings. Cath:                 CARDIAC CATHETERIZATION     PATIENT NAME: Mike Ware                   :        1940  MED REC NO:   [id number]                           ROOM:       0031  ACCOUNT NO:   [de-identified]                           ADMIT DATE: 2019  PROVIDER:     Maryjane Flood MD     DATE OF PROCEDURE:  2019     CHIEF COMPLAINT:  Chest pain.     INDICATIONS:  1. Unstable angina. 2.  Known history of coronary artery disease. 3.  History of myocardial infarction. 4.  Prior history of PCI, . 5.  Recurrent hospitalizations for chest pain with worsening angina  consistent with class III/IV anginal symptoms resistant to medications.     PROCEDURES PERFORMED:  1. Left cardiac catheterization with selective coronary angiogram.  2.  Left ventriculography.   3.  Successful PCI with stenting of proximal RCA in-stent restenosis.     DESCRIPTION OF PROCEDURE:  After informed consent, the patient was  brought to the cardiac catheterization room. He was prepped and draped  in a sterile fashion. 2% lidocaine was injected in the skin and  subcutaneous tissue overlying the right radial artery. Using modified  Seldinger technique, I was able to obtain access in the right radial  artery. Standard antithrombotic/antispasmodic medications were given  through the right radial sheath. I then proceeded with diagnostic  coronary angiogram using 6-[place]izen [place] JL-3.5 diagnostic catheter and  6-[place]izen [place] JR-4 diagnostic catheters. The patient was found to have  severe in-stent restenosis in the proximal RCA stent. Severity was  around 90% to 95%. Also, slow flow consistent with AWAIS-2 flow was  noted in the right coronary artery. With injection, the patient became  slightly bradycardic, heart rate down to 40s which resolved after he was  given atropine. Heparin was given and ACT was confirmed to be above  250. Over the 0.035 J-wire, I passed the 6-[place]izen [place] JR-4 guiding catheter  which I used to cannulate the right coronary artery. Runthrough wire  was used to cross the lesion and wire the RCA. I then proceeded with  predilation using 2.5 mm x 50 mm noncompliant balloon. Stenosis  improved with residual 40% to 50%. I then decided to proceed with  another predilation, this time using a 3.0 mm x 12 mm noncompliant  balloon. Upon revision of prior cath, apparently 3.0 stents were placed  back in 2008. I then proceeded with stenting. A 3.0 mm x 23 mm Xience  drug-eluting stent was placed covering the lesion from normal-to-normal  segments. The same stent balloon was used for postdilation at high  pressure up to 20 atmospheres x2. Repeat angiogram revealed the stent  to be well expanded. Wires were removed. Angiogram revealed no  complications including no dissection, distal embolization, or  perforation.   Minimal blood loss estimated at less than 20 mL.     FINDINGS:  HEMODYNAMICS: LVEDP 15 mmHg.     LEFT VENTRICULOGRAM:  No significant wall motion abnormality noted. Ejection fraction estimated to be at 60%.     CORONARY ANGIOGRAM:  1. RCA:  Right coronary artery was noticed to have severe 90% to 95%  in-stent restenosis within the proximal RCA. Mid RCA has mild diffuse  disease. Distal RCA with mild diffuse disease. RCA is a dominant  vessel and bifurcates into RPDA and RPL. Both RPL and RPDA with mild  diffuse disease without any obstructive lesions. 2.  Left main:  Left main coronary artery has no significant stenosis  and is widely patent. Vessel bifurcates into LAD and LCX. 3.  Left anterior descending artery:  Left anterior descending artery  has 30% diffuse stenosis in the proximal LAD. There seems to be a high  first diagonal branch versus possibly ramus branch that has 30% to 50%  proximal stenosis. Mid LAD has mild diffuse disease, distal LAD with  mild diffuse disease. 4.  Left circumflex artery:  LCX has 30% stenosis in the proximal  segment. OM1 is a large branching vessel with mild diffuse disease. Distal LCX has 30% to 50% lesion.     MEDICATIONS:  See MAR.     COMPLICATIONS:  None.     ACCESS:  Right radial artery access. Vasc Band was applied for  hemostasis.     CONCLUSION:  1. Unstable angina. 2.  Coronary artery disease. 3.  Severe in-stent restenosis within previously placed stent in the  proximal RCA, status post successful PCI stenting.     RECOMMENDATIONS:  1. Continue aspirin. 2.  Continue Plavix. 3.  The patient receives renal dose of Plavix of 300.  4. We will increase Lipitor from 10 mg p.o. daily to 40 mg p.o. daily. 5.  Further optimization of coronary artery disease and risk factor  modification. 6.  Outpatient followup with Cardiology.     Findings and plan of care were discussed with the patient and his family  in detail. Questions were answered.   They are agreeable to the plan.           Kory Thorpe MD    AssessmentPlan: Scott Lazo is a pleasant [de-identified]year old male patient who  has a past medical history of AAA (abdominal aortic aneurysm) ([facility] [address] CAD (coronary artery disease), Cancer of kidney ([address] Heart attack ([facility] [address] Hx of blood clots, Hyperlipidemia, Hypertension, Kidney stones, Obesity, Osteoarthritis, and Stroke ([facility] [address] The patient underwent LHC in 11/2019 for unstable angina. He was found to have severe ISR in RCA, underwent successful PCI. He presents for follow up. Patient denies chest pain, shortness of breath, dyspnea on exertion, orthopnea, paroxysmal nocturnal dyspnea, palpitations, dizziness, syncope, weight gain or leg swelling. He is participating in cardiac rehab. Has known h/o atrial fibrillation. 1. CAD  2. Severe ISR RCA with UA (S/P PCI 11/2019)  3. PAF  4. LAE  5. HTN, INADEQUATELY CONTROLLED      Denies chest pain or MULLINS   Feels well   Cont cardiac rehab   BP inadequately controlled   Added Norvasc 2.5 mg po daily    The patient was instructed to check the blood pressure at home, and record the readings. Patient will call office with blood pressure readings, will adjust patient's antihypertensive medications as needed accordingly    Cont metoprolol   Cont Plavix   Cont LIPITOR    Cont eliquis         Above findings and plan of care were discussed with patient in details, patient's questions were answered.      Disposition:  RTC in 12 MONTHS      Electronically signed by Ana Monge MD, [facility]    3/3/2020 at 11:46 AM

## 2020-07-14 RX ORDER — CLOPIDOGREL BISULFATE 75 MG/1
75 TABLET ORAL DAILY
Qty: 90 TABLET | Refills: 3 | Status: SHIPPED | OUTPATIENT
Start: 2020-07-14 | End: 2021-06-08

## 2020-09-22 RX ORDER — PANTOPRAZOLE SODIUM 40 MG/1
TABLET, DELAYED RELEASE ORAL
Qty: 90 TABLET | Refills: 3 | Status: SHIPPED | OUTPATIENT
Start: 2020-09-22

## 2020-10-08 RX ORDER — ATORVASTATIN CALCIUM 40 MG/1
40 TABLET, FILM COATED ORAL NIGHTLY
Qty: 90 TABLET | Refills: 1 | Status: SHIPPED | OUTPATIENT
Start: 2020-10-08 | End: 2021-03-16 | Stop reason: SDUPTHER

## 2020-12-14 RX ORDER — METOPROLOL TARTRATE 50 MG/1
TABLET, FILM COATED ORAL
Qty: 270 TABLET | Refills: 3 | Status: SHIPPED | OUTPATIENT
Start: 2020-12-14 | End: 2021-01-07 | Stop reason: DRUGHIGH

## 2020-12-14 RX ORDER — AMLODIPINE BESYLATE 2.5 MG/1
2.5 TABLET ORAL DAILY
Qty: 90 TABLET | Refills: 3 | Status: SHIPPED | OUTPATIENT
Start: 2020-12-14 | End: 2021-09-23 | Stop reason: SDUPTHER

## 2021-01-06 ENCOUNTER — TELEPHONE (OUTPATIENT)
Dept: CARDIOLOGY CLINIC | Age: 81
End: 2021-01-06

## 2021-01-06 NOTE — TELEPHONE ENCOUNTER
Called patient and appt scheduled on 1/7/2021 at 0900 with Ben Gaspar. I called [facility] for records and spoke with Marlyn Dawson.

## 2021-01-06 NOTE — PROGRESS NOTES
[facility] PROFESSIONAL SERVICES  [facility] [place]   [address] [facility] [address]   Dept: [phone]   Dept Fax: [phone]   Loc: [phone]      Chief Complaint   Patient presents with    Follow-Up from Hospital     [facility] for afib     [facility] ER f/u for AFB RVR on 1/5/21 in 78 y/o male with history of AFB, CAD/ISR with PCI 11/20/19, AAA repair, HTN, HLD, BENJAMIN with CPAP, CVA, blood clots, skin cancer, kidney cancer. He returned to [place] ABUNDIO [place] prior to being sent home from the ER. Denies chest pain, palpitations, sob, QUINTON, lightheadedness, dizziness or syncope. Did note chest discomfort, sob, palpitations, and some fatigue at the time of RVR.     Cardiologist:  Dr. Damaris Hansontery:   No fever, no chills, No fatigue or weight loss  Pulmonary:    No dyspnea, no wheezing  Cardiac:    Denies recent chest pain   GI:     No nausea or vomiting, no abdominal pain  Neuro:    No dizziness or light headedness  Musculoskeletal:  No recent active issues  Extremities:   No edema, good peripheral pulses      Past Medical History:   Diagnosis Date    AAA (abdominal aortic aneurysm) ([facility] [address]     CAD (coronary artery disease)     Cancer (HCC)     cheek and nose     Cancer of kidney ([facility] [address]     Heart attack ([facility] [address]     History of placement of chest tube     Due to pt falling and breaking 4 ribs and puncturing his lung    Hx of blood clots     Hyperlipidemia     Hypertension     Kidney stones     Obesity     Osteoarthritis     Stroke (HCC)        Allergies   Allergen Reactions    Tetracyclines & Related Rash       Current Outpatient Medications   Medication Sig Dispense Refill    metoprolol tartrate (LOPRESSOR) 25 MG tablet Take 25 mg by mouth 2 times daily      Coenzyme Q10 (CO Q-10) 100 MG CAPS Take by mouth      Cholecalciferol (VITAMIN D3) 125 MCG (5000 UT) TABS Take by mouth      APPLE CIDER VINEGAR PO Take 450 mg by mouth  amLODIPine (NORVASC) 2.5 MG tablet TAKE 1 TABLET BY MOUTH  DAILY 90 tablet 3    apixaban (ELIQUIS) 2.5 MG TABS tablet Take 1 tablet by mouth 2 times daily 180 tablet 1    atorvastatin (LIPITOR) 40 MG tablet Take 1 tablet by mouth nightly 90 tablet 1    pantoprazole (PROTONIX) 40 MG tablet TAKE 1 TABLET BY MOUTH  DAILY 90 tablet 3    clopidogrel (PLAVIX) 75 MG tablet TAKE 1 TABLET BY MOUTH  DAILY 90 tablet 3    PARoxetine (PAXIL) 20 MG tablet Take 20 mg by mouth every morning      Turmeric Curcumin 500 MG CAPS Take 500 mg by mouth 2 times daily       Magnesium 500 MG TABS Take by mouth daily       CINNAMON PO Take 1,000 mg by mouth daily      Probiotic Product (PROBIOTIC DAILY PO) Take by mouth daily      Ascorbic Acid (VITAMIN C) 1000 MG tablet Take 1,000 mg by mouth daily       Flaxseed, Linseed, (FLAXSEED OIL) 1000 MG CAPS Take 4 capsules by mouth 2 times daily        No current facility-administered medications for this visit.         Social History     Socioeconomic History    Marital status:      Spouse name: Lee Ann Hall Number of children: 1    Years of education: None    Highest education level: None   Occupational History    None   Social Needs    Financial resource strain: None    Food insecurity     Worry: None     Inability: None    Transportation needs     Medical: None     Non-medical: None   Tobacco Use    Smoking status: Former Smoker     Packs/day: 1.00     Years: 15.00     Pack years: 15.00     Types: Pipe     Start date:      Quit date: 1970     Years since quittin.6    Smokeless tobacco: Never Used   Substance and Sexual Activity    Alcohol use: No     Alcohol/week: 0.0 standard drinks    Drug use: No    Sexual activity: None   Lifestyle    Physical activity     Days per week: None     Minutes per session: None    Stress: None   Relationships    Social connections     Talks on phone: None     Gets together: None     Attends Restorationist service: None Active member of club or organization: None     Attends meetings of clubs or organizations: None     Relationship status: None    Intimate partner violence     Fear of current or ex partner: None     Emotionally abused: None     Physically abused: None     Forced sexual activity: None   Other Topics Concern    None   Social History Narrative    None       Family History   Problem Relation Age of Onset    Alzheimer's Disease Mother     Heart Disease Father        Blood pressure (!) 153/73, pulse 61, height 5' 8\" (1.727 m), weight 257 lb 12.8 oz (116.9 kg). General:   Well developed, well nourished  Lungs:   Clear to auscultation  Heart:    Normal S1 S2, No murmur, rubs, or gallops  Abdomen:   Soft, non tender, no organomegalies, positive bowel sounds  Extremities:   No edema, no cyanosis, good peripheral pulses  Neurological:   Awake, alert, oriented. No obvious focal deficits  Musculoskeletal:  No obvious deformities    EKG:     [facility]: 11/29/19  FINDINGS:  HEMODYNAMICS:  LVEDP 15 mmHg.     LEFT VENTRICULOGRAM:  No significant wall motion abnormality noted. Ejection fraction estimated to be at 60%.     CORONARY ANGIOGRAM:  1. RCA:  Right coronary artery was noticed to have severe 90% to 95%  in-stent restenosis within the proximal RCA. Mid RCA has mild diffuse  disease. Distal RCA with mild diffuse disease. RCA is a dominant  vessel and bifurcates into RPDA and RPL. Both RPL and RPDA with mild  diffuse disease without any obstructive lesions. 2.  Left main:  Left main coronary artery has no significant stenosis  and is widely patent. Vessel bifurcates into LAD and LCX. 3.  Left anterior descending artery:  Left anterior descending artery  has 30% diffuse stenosis in the proximal LAD. There seems to be a high  first diagonal branch versus possibly ramus branch that has 30% to 50%  proximal stenosis. Mid LAD has mild diffuse disease, distal LAD with  mild diffuse disease. 4.  Left circumflex artery:  LCX has 30% stenosis in the proximal  segment. OM1 is a large branching vessel with mild diffuse disease. Distal LCX has 30% to 50% lesion. CONCLUSION:  1. Unstable angina. 2.  Coronary artery disease. 3.  Severe in-stent restenosis within previously placed stent in the  proximal RCA, status post successful PCI stenting.     RECOMMENDATIONS:  1. Continue aspirin. 2.  Continue Plavix. 3.  The patient receives renal dose of Plavix of 300.  4. We will increase Lipitor from 10 mg p.o. daily to 40 mg p.o. daily. 5.  Further optimization of coronary artery disease and risk factor  modification. 6.  Outpatient followup with Cardiology.     Findings and plan of care were discussed with the patient and his family  in detail. Questions were answered. They are agreeable to the plan.           Mary Peguero MD    Echo: 11/29/19  Summary   Normal left ventricle size and systolic function. Ejection fraction was   estimated at 60 %. There were no regional left ventricular wall motion   abnormalities and wall thickness was within normal limits. The left atrium is Mildly dilated. Signature      ----------------------------------------------------------------   Electronically signed by Jeanine Goldberg MD        Diagnosis Orders   1. Paroxysmal atrial fibrillation (HCC)     2. Coronary artery disease involving native coronary artery of native heart without angina pectoris     3. S/P angioplasty with stent     4. Essential hypertension     5. Hyperlipidemia, unspecified hyperlipidemia type     6. BENJAMIN on CPAP         No orders of the defined types were placed in this encounter. Hospital f/u for PAFB/RVR 1/5/21. Converted to SR prior to being discharged from ER. Apical regular today. HR 61  On eliquis, lopressor. HR on lower side, no further titration of lopressor at this time. B/P elevated in office today but he has not taken his medications yet today. His spouse is with him and states they monitor his B/P and HR at home and they have been good with B/P in the 130's/70's     Discussed use, benefit, and side effects of prescribed medications. All patient questions answered. Pt voiced understanding. Instructed to continue current medications, diet and exercise. Continue risk factor modification and medical management. Patient agreed with treatment plan. Follow up as directed.     Continue Dr Noe Rodriguez current treatment plan  Follow up with Dr Nereyda Rollins as scheduled or sooner if needed

## 2021-01-07 ENCOUNTER — OFFICE VISIT (OUTPATIENT)
Dept: CARDIOLOGY CLINIC | Age: 81
End: 2021-01-07
Payer: MEDICARE

## 2021-01-07 VITALS
DIASTOLIC BLOOD PRESSURE: 73 MMHG | HEIGHT: 68 IN | WEIGHT: 257.8 LBS | SYSTOLIC BLOOD PRESSURE: 153 MMHG | BODY MASS INDEX: 39.07 KG/M2 | HEART RATE: 61 BPM

## 2021-01-07 DIAGNOSIS — Z95.820 S/P ANGIOPLASTY WITH STENT: ICD-10-CM

## 2021-01-07 DIAGNOSIS — I48.0 PAROXYSMAL ATRIAL FIBRILLATION (HCC): Primary | ICD-10-CM

## 2021-01-07 DIAGNOSIS — Z99.89 OSA ON CPAP: ICD-10-CM

## 2021-01-07 DIAGNOSIS — I25.10 CORONARY ARTERY DISEASE INVOLVING NATIVE CORONARY ARTERY OF NATIVE HEART WITHOUT ANGINA PECTORIS: ICD-10-CM

## 2021-01-07 DIAGNOSIS — G47.33 OSA ON CPAP: ICD-10-CM

## 2021-01-07 DIAGNOSIS — E78.5 HYPERLIPIDEMIA, UNSPECIFIED HYPERLIPIDEMIA TYPE: ICD-10-CM

## 2021-01-07 DIAGNOSIS — I10 ESSENTIAL HYPERTENSION: ICD-10-CM

## 2021-01-07 PROCEDURE — G8427 DOCREV CUR MEDS BY ELIG CLIN: HCPCS | Performed by: NURSE PRACTITIONER

## 2021-01-07 PROCEDURE — G8417 CALC BMI ABV UP PARAM F/U: HCPCS | Performed by: NURSE PRACTITIONER

## 2021-01-07 PROCEDURE — G8484 FLU IMMUNIZE NO ADMIN: HCPCS | Performed by: NURSE PRACTITIONER

## 2021-01-07 PROCEDURE — 1123F ACP DISCUSS/DSCN MKR DOCD: CPT | Performed by: NURSE PRACTITIONER

## 2021-01-07 PROCEDURE — 1036F TOBACCO NON-USER: CPT | Performed by: NURSE PRACTITIONER

## 2021-01-07 PROCEDURE — 99213 OFFICE O/P EST LOW 20 MIN: CPT | Performed by: NURSE PRACTITIONER

## 2021-01-07 PROCEDURE — 4040F PNEUMOC VAC/ADMIN/RCVD: CPT | Performed by: NURSE PRACTITIONER

## 2021-01-07 RX ORDER — METOPROLOL TARTRATE 50 MG/1
50 TABLET, FILM COATED ORAL 2 TIMES DAILY
COMMUNITY
End: 2021-07-21 | Stop reason: SDUPTHER

## 2021-01-07 RX ORDER — DIMENHYDRINATE 50 MG
TABLET ORAL
COMMUNITY

## 2021-03-03 ENCOUNTER — TELEPHONE (OUTPATIENT)
Dept: CARDIOLOGY CLINIC | Age: 81
End: 2021-03-03

## 2021-03-03 NOTE — TELEPHONE ENCOUNTER
Pt wife called asking if pt is ok to hold plavix for 7 days and eliquis for 3 days prior to an epidural   Pt needs to start holding tomorrow 3/4

## 2021-03-16 ENCOUNTER — OFFICE VISIT (OUTPATIENT)
Dept: CARDIOLOGY CLINIC | Age: 81
End: 2021-03-16
Payer: MEDICARE

## 2021-03-16 VITALS
HEIGHT: 68 IN | SYSTOLIC BLOOD PRESSURE: 158 MMHG | DIASTOLIC BLOOD PRESSURE: 63 MMHG | BODY MASS INDEX: 39.31 KG/M2 | WEIGHT: 259.4 LBS | HEART RATE: 48 BPM

## 2021-03-16 DIAGNOSIS — E78.5 HYPERLIPIDEMIA, UNSPECIFIED HYPERLIPIDEMIA TYPE: ICD-10-CM

## 2021-03-16 PROCEDURE — 99214 OFFICE O/P EST MOD 30 MIN: CPT | Performed by: INTERNAL MEDICINE

## 2021-03-16 PROCEDURE — 1123F ACP DISCUSS/DSCN MKR DOCD: CPT | Performed by: INTERNAL MEDICINE

## 2021-03-16 PROCEDURE — G8417 CALC BMI ABV UP PARAM F/U: HCPCS | Performed by: INTERNAL MEDICINE

## 2021-03-16 PROCEDURE — G8427 DOCREV CUR MEDS BY ELIG CLIN: HCPCS | Performed by: INTERNAL MEDICINE

## 2021-03-16 PROCEDURE — 1036F TOBACCO NON-USER: CPT | Performed by: INTERNAL MEDICINE

## 2021-03-16 PROCEDURE — 93000 ELECTROCARDIOGRAM COMPLETE: CPT | Performed by: INTERNAL MEDICINE

## 2021-03-16 PROCEDURE — G8484 FLU IMMUNIZE NO ADMIN: HCPCS | Performed by: INTERNAL MEDICINE

## 2021-03-16 PROCEDURE — 4040F PNEUMOC VAC/ADMIN/RCVD: CPT | Performed by: INTERNAL MEDICINE

## 2021-03-16 RX ORDER — ATORVASTATIN CALCIUM 40 MG/1
40 TABLET, FILM COATED ORAL NIGHTLY
Qty: 90 TABLET | Refills: 3 | Status: SHIPPED | OUTPATIENT
Start: 2021-03-16 | End: 2022-02-02

## 2021-03-16 NOTE — PROGRESS NOTES
[id number] [address] [facility] [address]  Dept: [phone]  Dept Fax: [phone]  Loc: [phone]    Visit Date: 3/16/2021    Mr. Wende Councilman is a 79 y.o. male  who presented for:    Chest pain     HPI:   RHODA Bertrand is a pleasant 79year old male patient who  has a past medical history of AAA (abdominal aortic aneurysm) ([facility] [address] CAD (coronary artery disease), Cancer ([facility] [address] Cancer of kidney ([facility] [address] Heart attack ([facility] [address] History of placement of chest tube, Hx of blood clots, Hyperlipidemia, Hypertension, Kidney stones, Obesity, Osteoarthritis, and Stroke ([facility] [address] He had prior CEA, AAA repair. The patient underwent LHC in 11/2019 for unstable angina. He was found to have severe ISR in RCA, underwent successful PCI. In 01/2021, the patient was evaluated AF RVR, converted to SR, discharged home from ER. He recently called office and requested holding Eliquis, Plavix for a planned Epidural. He had rib fracture 07/2020, been having mild rib cage pains since then. The patient presents for follow up. Patient denies chest pain, shortness of breath, dyspnea on exertion, orthopnea, paroxysmal nocturnal dyspnea, palpitations, dizziness, syncope, weight gain or leg swelling.          Current Outpatient Medications:     metoprolol tartrate (LOPRESSOR) 25 MG tablet, Take 25 mg by mouth 2 times daily, Disp: , Rfl:     Coenzyme Q10 (CO Q-10) 100 MG CAPS, Take by mouth, Disp: , Rfl:     Cholecalciferol (VITAMIN D3) 125 MCG (5000 UT) TABS, Take by mouth, Disp: , Rfl:     APPLE CIDER VINEGAR PO, Take 450 mg by mouth, Disp: , Rfl:     amLODIPine (NORVASC) 2.5 MG tablet, TAKE 1 TABLET BY MOUTH  DAILY, Disp: 90 tablet, Rfl: 3    apixaban (ELIQUIS) 2.5 MG TABS tablet, Take 1 tablet by mouth 2 times daily, Disp: 180 tablet, Rfl: 1    atorvastatin (LIPITOR) 40 MG tablet, Take 1 tablet by mouth nightly, Disp: 90 tablet, Rfl: 1    pantoprazole (PROTONIX) 40 MG tablet, TAKE 1 TABLET BY MOUTH  DAILY, Disp: 90 tablet, Rfl: 3    clopidogrel (PLAVIX) 75 MG tablet, TAKE 1 TABLET BY MOUTH  DAILY, Disp: 90 tablet, Rfl: 3    PARoxetine (PAXIL) 20 MG tablet, Take 20 mg by mouth every morning, Disp: , Rfl:     Turmeric Curcumin 500 MG CAPS, Take 500 mg by mouth 2 times daily , Disp: , Rfl:     Magnesium 500 MG TABS, Take by mouth daily , Disp: , Rfl:     CINNAMON PO, Take 1,000 mg by mouth daily, Disp: , Rfl:     Probiotic Product (PROBIOTIC DAILY PO), Take by mouth daily, Disp: , Rfl:     Ascorbic Acid (VITAMIN C) 1000 MG tablet, Take 1,000 mg by mouth daily , Disp: , Rfl:     Flaxseed, Linseed, (FLAXSEED OIL) 1000 MG CAPS, Take 4 capsules by mouth 2 times daily , Disp: , Rfl:     Past Medical History  James Daigle  has a past medical history of AAA (abdominal aortic aneurysm) ([facility] [address] CAD (coronary artery disease), Cancer ([facility] [address] Cancer of kidney ([facility] [address] Heart attack ([facility] [address] History of placement of chest tube, Hx of blood clots, Hyperlipidemia, Hypertension, Kidney stones, Obesity, Osteoarthritis, and Stroke ([facility] [address] Social History  James Daigle  reports that he quit smoking about 50 years ago. His smoking use included pipe. He started smoking about 66 years ago. He has a 15.00 pack-year smoking history. He has never used smokeless tobacco. He reports that he does not drink alcohol or use drugs. Family History  James Daigle family history includes Alzheimer's Disease in his mother; Heart Disease in his father.     Past Surgical History   Past Surgical History:   Procedure Laterality Date    ABDOMEN SURGERY      AAA    ABDOMINAL AORTIC ANEURYSM REPAIR  2005    CARDIAC SURGERY  2008, 1996    stents   [address]  2005    AAA repair    CAROTID ENDARTERECTOMY  2006    FINGER AMPUTATION      JOINT REPLACEMENT  2007    knees bilateral    KNEE SURGERY Left 6-2013    PARTIAL NEPHRECTOMY  2010    Right    PTCA     [facility] TONSILLECTOMY  1948    VASCULAR SURGERY         Review of Systems   Constitutional: Negative for chills and fever  HENT: Negative for congestion, sinus pressure, sneezing and sore throat. Eyes: Negative for pain, discharge, redness and itching. Respiratory: Negative for apnea, cough  Gastrointestinal: Negative for blood in stool, constipation, diarrhea   Endocrine: Negative for cold intolerance, heat intolerance, polydipsia. Genitourinary: Negative for dysuria, enuresis, flank pain and hematuria. Musculoskeletal: Negative for arthralgias, joint swelling and neck pain. Neurological: Negative for numbness and headaches. Psychiatric/Behavioral: Negative for agitation, confusion, decreased concentration and dysphoric mood. Objective: There were no vitals taken for this visit. Wt Readings from Last 3 Encounters:   01/07/21 257 lb 12.8 oz (116.9 kg)   03/03/20 249 lb 12.8 oz (113.3 kg)   01/20/20 248 lb (112.5 kg)     BP Readings from Last 3 Encounters:   01/07/21 (!) 153/73   03/03/20 134/80   01/20/20 133/67       Nursing note and vitals reviewed. Physical Exam   Constitutional: Oriented to person, place, and time. Appears well-developed and well-nourished. ENT: Moist mucous membranes. No bleeding. Tongue is midline. Head: Normocephalic and atraumatic. Eyes: EOM are normal. Pupils are equal, round, and reactive to light. Neck: Normal range of motion. Neck supple. No JVD present. Cardiovascular: Normal rate, regular rhythm, murmur, no rubs, and intact distal pulses. Pulmonary/Chest: Effort normal and breath sounds normal. No respiratory distress. No wheezes. No rales. Abdominal: Soft. Bowel sounds are normal. No distension. There is no tenderness. Musculoskeletal: Normal range of motion. no edema. Neurological: Alert and oriented to person, place, and time. No cranial nerve deficit. Coordination normal.   Skin: Skin is warm and dry. Psychiatric: Normal mood and affect.        Lab Results Component Value Date    CKTOTAL 60 04/28/2015    CKTOTAL 78 11/04/2014       Lab Results   Component Value Date    WBC 7.5 11/29/2019    RBC 5.04 11/29/2019    RBC 5.17 10/27/2015    HGB 16.0 11/29/2019    HCT 48.2 11/29/2019    MCV 95.6 11/29/2019    MCH 31.7 11/29/2019    MCHC 33.2 11/29/2019    RDW 13.8 10/27/2015     11/29/2019    MPV 9.7 11/29/2019       Lab Results   Component Value Date     11/30/2019    K 4.5 11/30/2019     11/30/2019    CO2 19 11/30/2019    BUN 16 11/30/2019    LABALBU 4.1 11/27/2019    CREATININE 1.5 11/30/2019    CALCIUM 8.8 11/30/2019    LABGLOM 45 11/30/2019    GLUCOSE 112 11/30/2019    GLUCOSE 118 04/19/2018       Lab Results   Component Value Date    ALKPHOS 91 11/27/2019    ALT 28 11/27/2019    AST 29 11/27/2019    PROT 6.6 11/27/2019    BILITOT 0.3 11/27/2019    BILIDIR <0.2 11/27/2019    LABALBU 4.1 11/27/2019       Lab Results   Component Value Date    MG 2.2 11/27/2019       Lab Results   Component Value Date    INR 0.95 11/29/2019    INR 0.93 11/27/2019    INR 0.94 01/31/2012         No results found for: LABA1C    Lab Results   Component Value Date    TRIG 362 11/29/2019    HDL 28 11/29/2019    LDLCALC 32 11/29/2019    LDLDIRECT 141 04/19/2018    LABVLDL 33 10/05/2017       Lab Results   Component Value Date    TSH 3.640 11/27/2019         Testing Reviewed:      I have individually reviewed the cardiac test below:    ECHO:   Results for orders placed during the hospital encounter of 06/13/19   Echocardiogram 2D/ M-Mode/ Colorflow/ Do    Narrative Transthoracic Echocardiography Report (TTE)     Demographics      Patient Name    Sachin Rodrigues  Gender               Male                   SHITAL      MR #            [id number]       Race                                                       Ethnicity      Account #       [de-identified]       Room Number      Accession       [id number]       Date of Study        06/13/2019   Number      Date of Birth   1940 Referring Physician  Akbar Starr CNP      Age             77 year(s)      Sonographer          Paige Cordova, RDCS                                      Interpreting         Echo reader of the                                   Physician            week                                                        Charito Vo MD     Procedure    Type of Study      TTE procedure:ECHOCARDIOGRAM COMPLETE 2D W DOPPLER W COLOR. Procedure Date  Date: 06/13/2019 Start: 03:25 PM    Study Location: Echo Lab  Technical Quality: Limited visualization due to body habitus. Indications:Amaurosis fugax. Additional Medical History:Coronary artery disease, Remote MI, AAA repair,  Stroke, Blood clots, KIdney cancer, Osteoarthritis, Hypertension,  Hyperlipidemia, CKD    Patient Status: Routine    Height: 67.72 inches Weight: 244 pounds BSA: 2.22 m^2 BMI: 37.41 kg/m^2    BP: 183/84 mmHg     Conclusions      Summary   Normal left ventricle size and systolic function. Ejection fraction was   estimated at 60 to 65 %. There were no regional left ventricular wall   motion abnormalities and wall thickness was within normal limits. The left atrium is Mildly dilated. Signature      ----------------------------------------------------------------   Electronically signed by Charito Vo MD (Interpreting   physician) on 06/13/2019 at 05:45 PM   ----------------------------------------------------------------      Findings      Mitral Valve   The mitral valve structure was normal with normal leaflet separation. DOPPLER: The transmitral velocity was within the normal range with no   evidence for mitral stenosis. Mild mitral regurgitation is present. Aortic Valve   The aortic valve was trileaflet with normal thickness and cuspal   separation. DOPPLER: Transaortic velocity was within the normal range with   no evidence of aortic stenosis. Mild aortic regurgitation is noted.       Tricuspid Valve   The tricuspid valve structure was normal with normal leaflet separation. DOPPLER: There was no evidence of tricuspid stenosis. There was no   evidence of tricuspid regurgitation. Pulmonic Valve   The pulmonic valve leaflets exhibited normal thickness, no calcification,   and normal cuspal separation. DOPPLER: The transpulmonic velocity was   within the normal range with no evidence for regurgitation. Left Atrium   The left atrium is Mildly dilated. Left Ventricle   Normal left ventricle size and systolic function. Ejection fraction was   estimated at 60 to 65 %. There were no regional left ventricular wall   motion abnormalities and wall thickness was within normal limits. Right Atrium   Right atrial size was normal.      Right Ventricle   The right ventricular size was normal with normal systolic function and   wall thickness. Pericardial Effusion   The pericardium was normal in appearance with no evidence of a pericardial   effusion. Pleural Effusion   No evidence of pleural effusion. Aorta / Great Vessels   -Aortic root dimension within normal limits.   -The Pulmonary artery is within normal limits. -IVC size is within normal limits with normal respiratory phasic changes.      M-Mode/2D Measurements & Calculations      LV Diastolic    LV Systolic Dimension:    AV Cusp Separation: 1.9 cmLA   Dimension: 4.3  2.7 cm                    Dimension: 4 cmAO Root   cm              LV Volume Diastolic: 83.8 Dimension: 3 cmLA Area: 17.6   LV FS:37.2 %    ml                        cm^2   LV PW           LV Volume Systolic: 27 ml   Diastolic: 1 cm LV EDV/LV EDV Index: 83.1   Septum          ml/37 m^2LV ESV/LV ESV   Diastolic: 1 cm Index: 27 VQ/98 m^2       RV Diastolic Dimension: 3.3 cm                   EF Calculated: 67.5 %                                             LA/Aorta: 1.33                                                LA volume/Index: 47.4 ml /21m^2     Doppler Measurements & Calculations      MV Peak E-Wave: 69 cm/s    AV Peak Velocity: 101  LVOT Peak Velocity: 85.7   MV Peak A-Wave: 75.1 cm/s  cm/s                   cm/s   MV E/A Ratio: 0.92         AV Peak Gradient: 4.08 LVOT Peak Gradient: 3   MV Peak Gradient: 1.9 mmHg mmHg                   mmHg      MV Deceleration Time: 215                         TV Peak E-Wave: 56.2   msec                                              cm/s                                                     TV Peak A-Wave: 44.7                              AV P1/2t: 573 msec     cm/s   MV E' Septal Velocity: 5.9 IVRT: 60 msec   cm/s                                              TV Peak Gradient: 1.26   MV A' Septal Velocity: 8.3                        mmHg   cm/s                       AV DVI (Vmax):0.85   MV E' Lateral Velocity:                           PV Peak Velocity: 59.5   7.4 cm/s                                          cm/s   MV A' Lateral Velocity:                           PV Peak Gradient: 1.42   8.5 cm/s                                          mmHg   E/E' septal: 11.69   E/E' lateral: 9.32   MR Velocity: 452 cm/s                             [place] ED Velocity: 115 cm/s     [id number]%3d        Ekg:   EKG Interpretation:  nonspecific ST and T waves changes, sinus tachycardia. Cath:11/2019 11/29/19  FINDINGS:  HEMODYNAMICS:  LVEDP 15 mmHg.     LEFT VENTRICULOGRAM:  No significant wall motion abnormality noted. Ejection fraction estimated to be at 60%.     CORONARY ANGIOGRAM:  1.  RCA:  Right coronary artery was noticed to have severe 90% to 95%  in-stent restenosis within the proximal RCA.  Mid RCA has mild diffuse  disease.  Distal RCA with mild diffuse disease. [place] is a dominant  vessel and bifurcates into RPDA and RPL.  Both RPL and RPDA with mild  diffuse disease without any obstructive lesions.   2.  Left main:  Left main coronary artery has no significant stenosis  and is widely patent.  Vessel bifurcates into LAD and LCX. 3.  Left anterior descending artery:  Left anterior descending artery  has 30% diffuse stenosis in the proximal LAD.  There seems to be a high  first diagonal branch versus possibly ramus branch that has 30% to 50%  proximal stenosis.  Mid LAD has mild diffuse disease, distal LAD with  mild diffuse disease. 4.  Left circumflex artery:  LCX has 30% stenosis in the proximal  segment.  OM1 is a large branching vessel with mild diffuse disease. Distal LCX has 30% to 50% lesion. CONCLUSION:  1.  Unstable angina. 2.  Coronary artery disease. 3.  Severe in-stent restenosis within previously placed stent in the  proximal RCA, status post successful PCI stenting.     RECOMMENDATIONS:  1.  Continue aspirin. 2.  Continue Plavix. 3.  The patient receives renal dose of Plavix of 300. 4.  We will increase Lipitor from 10 mg p.o. daily to 40 mg p.o. daily. 5.  Further optimization of coronary artery disease and risk factor  modification. 6.  Outpatient followup with Cardiology.     Findings and plan of care were discussed with the patient and his family  in detail.  Questions were answered. Henry Patel are agreeable to the plan.           Fabi Grant MD    AssessmentPlan:   Sangeeta Gillette is a pleasant 79year old male patient who  has a past medical history of AAA (abdominal aortic aneurysm) ([address] CAD (coronary artery disease), Cancer ([address] Cancer of kidney ([address] Heart attack ([address] History of placement of chest tube, Hx of blood clots, Hyperlipidemia, Hypertension, Kidney stones, Obesity, Osteoarthritis, and Stroke ([address] He had prior CEA, AAA repair. The patient underwent LHC in 11/2019 for unstable angina. He was found to have severe ISR in RCA, underwent successful PCI. In 01/2021, the patient was evaluated AF RVR, converted to SR, discharged home from ER.  He recently called office and requested holding Eliquis, Plavix for a planned Epidural. He had rib fracture 07/2020, been having mild rib cage pains since then. The patient presents for follow up. Patient denies chest pain, shortness of breath, dyspnea on exertion, orthopnea, paroxysmal nocturnal dyspnea, palpitations, dizziness, syncope, weight gain or leg swelling. 1. atypical CP, rib fracture   2. CAD  3. Severe RCA ISR, s/p PCI/Stenting 11/2019  4. Paroxysmal atrial fibrillation   5. LAE  6. Hypertension   7. Dyslipidemia  8. BENJAMIN     Had epidural last week   hes back on plavix and eliquis    On nocturnal CPAP    The patient was evaluated at hospital for AF 01/2021   Rate control   Metoprolol    On Eliquis 2.5 mg BID    No bleeding complaints    Cont Plavix    Lipitor   Hyperlipidemia: on statins, followed periodically. Patient need periodic lipid and liver profile    Norvasc was previously added   /73, HR 51   The patient was instructed to check the blood pressure at home, and record the readings. Patient will call office with blood pressure readings, will adjust patient's antihypertensive medications as needed accordingly    CP is atypical, h/o rib fracture (chest wall pain)   Patient was advised to report to the ER if has recurrent symptoms with specific instructions given about severity and duration of symptoms    Above findings and plan of care were discussed with patient in details, patient's questions were answered.      Disposition:  RTC in 6 months             Electronically signed by Gilbert Shabazz MD, [facility]    3/15/2021 at 9:53 PM EDT

## 2021-05-03 NOTE — PROGRESS NOTES
[address] [address] [place] [address] [facility] [address]  Dept: [phone]  Dept Fax: [phone]  Loc: [phone]    Visit Date: 5/4/2021    Mr. Sarah Balbuena is a 79 y.o. male  who presented for:    Fatigue  A Fib      HPI:   HPI   Shantal Valenzuela is a pleasant 79year old male patient who  has a past medical history of AAA (abdominal aortic aneurysm) ([facility] [address] CAD (coronary artery disease), Cancer ([facility] [address] Cancer of kidney ([facility] [address] Heart attack ([facility] [address] History of placement of chest tube, Hx of blood clots, Hyperlipidemia, Hypertension, Kidney stones, Obesity, Osteoarthritis, and Stroke ([facility] [address] He had prior CEA, AAA repair. The patient underwent LHC in 11/2019 for unstable angina. He was found to have severe ISR in RCA, underwent successful PCI. In 01/2021, the patient was evaluated AF RVR, converted to SR, discharged home from ER. Last week, he felt tired. He also had bilateral arm pain, radiated to hands, lasted for two hours, resolved with Tylenol, occurred at rest, similar to what he with his MI in the past. He had some palpitations. Patient denies orthopnea, paroxysmal nocturnal dyspnea, palpitations, dizziness, syncope, weight gain or leg swelling. He reports worsening shortness of breath, dyspnea on exertion.        Current Outpatient Medications:     apixaban (ELIQUIS) 2.5 MG TABS tablet, Take 1 tablet by mouth 2 times daily, Disp: 180 tablet, Rfl: 3    atorvastatin (LIPITOR) 40 MG tablet, Take 1 tablet by mouth nightly, Disp: 90 tablet, Rfl: 3    Metoprolol Tartrate 75 MG TABS, Take 50 mg by mouth 2 times daily , Disp: , Rfl:     Coenzyme Q10 (CO Q-10) 100 MG CAPS, Take by mouth, Disp: , Rfl:     Cholecalciferol (VITAMIN D3) 125 MCG (5000 UT) TABS, Take by mouth, Disp: , Rfl:     APPLE CIDER VINEGAR PO, Take 450 mg by mouth, Disp: , Rfl:     amLODIPine (NORVASC) 2.5 MG tablet, TAKE 1 TABLET BY MOUTH  DAILY, Disp: 90 tablet, Rfl: 3   pantoprazole (PROTONIX) 40 MG tablet, TAKE 1 TABLET BY MOUTH  DAILY, Disp: 90 tablet, Rfl: 3    clopidogrel (PLAVIX) 75 MG tablet, TAKE 1 TABLET BY MOUTH  DAILY, Disp: 90 tablet, Rfl: 3    PARoxetine (PAXIL) 20 MG tablet, Take 20 mg by mouth every morning, Disp: , Rfl:     Turmeric Curcumin 500 MG CAPS, Take 500 mg by mouth 2 times daily , Disp: , Rfl:     Magnesium 500 MG TABS, Take by mouth daily , Disp: , Rfl:     CINNAMON PO, Take 1,000 mg by mouth daily, Disp: , Rfl:     Probiotic Product (PROBIOTIC DAILY PO), Take by mouth daily, Disp: , Rfl:     Ascorbic Acid (VITAMIN C) 1000 MG tablet, Take 1,000 mg by mouth daily , Disp: , Rfl:     Flaxseed, Linseed, (FLAXSEED OIL) 1000 MG CAPS, Take 4 capsules by mouth 2 times daily , Disp: , Rfl:     Past Medical History  Savanah eLmons  has a past medical history of AAA (abdominal aortic aneurysm) ([facility] [address] CAD (coronary artery disease), Cancer ([facility] [address] Cancer of kidney ([facility] [address] Heart attack ([facility] [address] History of placement of chest tube, Hx of blood clots, Hyperlipidemia, Hypertension, Kidney stones, Obesity, Osteoarthritis, and Stroke ([facility] [address] Social History  Savanah Lemons  reports that he quit smoking about 50 years ago. His smoking use included pipe. He started smoking about 66 years ago. He has a 15.00 pack-year smoking history. He has never used smokeless tobacco. He reports that he does not drink alcohol or use drugs. Family History  Savanah Lemons family history includes Alzheimer's Disease in his mother; Heart Disease in his father.     Past Surgical History   Past Surgical History:   Procedure Laterality Date    ABDOMEN SURGERY      AAA    ABDOMINAL AORTIC ANEURYSM REPAIR  2005    CARDIAC SURGERY  2008, 1996    stents   [address]  2005    AAA repair    CAROTID ENDARTERECTOMY  2006    FINGER AMPUTATION      JOINT REPLACEMENT  2007    knees bilateral    KNEE SURGERY Left 6-2013    PARTIAL NEPHRECTOMY  2010    Right    PTCA      SHOULDER SURGERY  1997    TONSILLECTOMY 1948    VASCULAR SURGERY         Review of Systems   Constitutional: Negative for chills and fever  HENT: Negative for congestion, sinus pressure, sneezing and sore throat. Eyes: Negative for pain, discharge, redness and itching. Respiratory: Negative for apnea, cough  Gastrointestinal: Negative for blood in stool, constipation, diarrhea   Endocrine: Negative for cold intolerance, heat intolerance, polydipsia. Genitourinary: Negative for dysuria, enuresis, flank pain and hematuria. Musculoskeletal: Negative for arthralgias, joint swelling and neck pain. Neurological: Negative for numbness and headaches. Psychiatric/Behavioral: Negative for agitation, confusion, decreased concentration and dysphoric mood. Objective: There were no vitals taken for this visit. Wt Readings from Last 3 Encounters:   03/16/21 259 lb 6.4 oz (117.7 kg)   01/07/21 257 lb 12.8 oz (116.9 kg)   03/03/20 249 lb 12.8 oz (113.3 kg)     BP Readings from Last 3 Encounters:   03/16/21 (!) 158/63   01/07/21 (!) 153/73   03/03/20 134/80       Nursing note and vitals reviewed. Physical Exam   Constitutional: Oriented to person, place, and time. Appears well-developed and well-nourished. ENT: Moist mucous membranes. No bleeding. Tongue is midline. Head: Normocephalic and atraumatic. Eyes: EOM are normal. Pupils are equal, round, and reactive to light. Neck: Normal range of motion. Neck supple. No JVD present. Cardiovascular: Normal rate, regular rhythm,  murmur, no rubs, and intact distal pulses. Pulmonary/Chest: Effort normal and breath sounds normal. No respiratory distress. No wheezes. No rales. Abdominal: Soft. Bowel sounds are normal. No distension. There is no tenderness. Musculoskeletal: Normal range of motion. no edema. Neurological: Alert and oriented to person, place, and time. No cranial nerve deficit. Coordination normal.   Skin: Skin is warm and dry. Psychiatric: Normal mood and affect. Lab Results   Component Value Date    CKTOTAL 60 04/28/2015    CKTOTAL 78 11/04/2014       Lab Results   Component Value Date    WBC 7.5 11/29/2019    RBC 5.04 11/29/2019    RBC 5.17 10/27/2015    HGB 16.0 11/29/2019    HCT 48.2 11/29/2019    MCV 95.6 11/29/2019    MCH 31.7 11/29/2019    MCHC 33.2 11/29/2019    RDW 13.8 10/27/2015     11/29/2019    MPV 9.7 11/29/2019       Lab Results   Component Value Date     11/30/2019    K 4.5 11/30/2019     11/30/2019    CO2 19 11/30/2019    BUN 16 11/30/2019    LABALBU 4.1 11/27/2019    CREATININE 1.5 11/30/2019    CALCIUM 8.8 11/30/2019    LABGLOM 45 11/30/2019    GLUCOSE 112 11/30/2019    GLUCOSE 118 04/19/2018       Lab Results   Component Value Date    ALKPHOS 91 11/27/2019    ALT 28 11/27/2019    AST 29 11/27/2019    PROT 6.6 11/27/2019    BILITOT 0.3 11/27/2019    BILIDIR <0.2 11/27/2019    LABALBU 4.1 11/27/2019       Lab Results   Component Value Date    MG 2.2 11/27/2019       Lab Results   Component Value Date    INR 0.95 11/29/2019    INR 0.93 11/27/2019    INR 0.94 01/31/2012         No results found for: LABA1C    Lab Results   Component Value Date    TRIG 362 11/29/2019    HDL 28 11/29/2019    LDLCALC 32 11/29/2019    LDLDIRECT 141 04/19/2018    LABVLDL 33 10/05/2017       Lab Results   Component Value Date    TSH 3.640 11/27/2019         Testing Reviewed:      I have individually reviewed the cardiac test below:    ECHO:   Results for orders placed during the hospital encounter of 06/13/19   Echocardiogram 2D/ M-Mode/ Colorflow/ Do    Narrative Transthoracic Echocardiography Report (TTE)     Demographics      Patient Name    Jena Moseley  Gender               Male                   G      MR #            [id number]       Race                                                       Ethnicity      Account #       [de-identified]       Room Number      Accession       [id number]       Date of Study        06/13/2019   Number      Date of Birth 1940      Referring Physician  Adrienne Knox CNP      Age             77 year(s)      Sonographer          Ruel Rey [facility]                                      Interpreting         Echo reader of the                                   Physician            week                                                        Mar Amos MD     Procedure    Type of Study      TTE procedure:ECHOCARDIOGRAM COMPLETE 2D W DOPPLER W COLOR. Procedure Date  Date: 06/13/2019 Start: 03:25 PM    Study Location: Echo Lab  Technical Quality: Limited visualization due to body habitus. Indications:Amaurosis fugax. Additional Medical History:Coronary artery disease, Remote MI, AAA repair,  Stroke, Blood clots, KIdney cancer, Osteoarthritis, Hypertension,  Hyperlipidemia, CKD    Patient Status: Routine    Height: 67.72 inches Weight: 244 pounds BSA: 2.22 m^2 BMI: 37.41 kg/m^2    BP: 183/84 mmHg     Conclusions      Summary   Normal left ventricle size and systolic function. Ejection fraction was   estimated at 60 to 65 %. There were no regional left ventricular wall   motion abnormalities and wall thickness was within normal limits. The left atrium is Mildly dilated. Signature      ----------------------------------------------------------------   Electronically signed by Mar Amos MD (Interpreting   physician) on 06/13/2019 at 05:45 PM   ----------------------------------------------------------------      Findings      Mitral Valve   The mitral valve structure was normal with normal leaflet separation. DOPPLER: The transmitral velocity was within the normal range with no   evidence for mitral stenosis. Mild mitral regurgitation is present. Aortic Valve   The aortic valve was trileaflet with normal thickness and cuspal   separation. DOPPLER: Transaortic velocity was within the normal range with   no evidence of aortic stenosis. Mild aortic regurgitation is noted.       Tricuspid Valve   The tricuspid valve structure was normal with normal leaflet separation. DOPPLER: There was no evidence of tricuspid stenosis. There was no   evidence of tricuspid regurgitation. Pulmonic Valve   The pulmonic valve leaflets exhibited normal thickness, no calcification,   and normal cuspal separation. DOPPLER: The transpulmonic velocity was   within the normal range with no evidence for regurgitation. Left Atrium   The left atrium is Mildly dilated. Left Ventricle   Normal left ventricle size and systolic function. Ejection fraction was   estimated at 60 to 65 %. There were no regional left ventricular wall   motion abnormalities and wall thickness was within normal limits. Right Atrium   Right atrial size was normal.      Right Ventricle   The right ventricular size was normal with normal systolic function and   wall thickness. Pericardial Effusion   The pericardium was normal in appearance with no evidence of a pericardial   effusion. Pleural Effusion   No evidence of pleural effusion. Aorta / Great Vessels   -Aortic root dimension within normal limits.   -The Pulmonary artery is within normal limits. -IVC size is within normal limits with normal respiratory phasic changes.      M-Mode/2D Measurements & Calculations      LV Diastolic    LV Systolic Dimension:    AV Cusp Separation: 1.9 cmLA   Dimension: 4.3  2.7 cm                    Dimension: 4 cmAO Root   cm              LV Volume Diastolic: 84.9 Dimension: 3 cmLA Area: 17.6   LV FS:37.2 %    ml                        cm^2   LV PW           LV Volume Systolic: 27 ml   Diastolic: 1 cm LV EDV/LV EDV Index: 83.1   Septum          ml/37 m^2LV ESV/LV ESV   Diastolic: 1 cm Index: 27 UT/25 m^2       RV Diastolic Dimension: 3.3 cm                   EF Calculated: 67.5 %                                             LA/Aorta: 1.33                                                LA volume/Index: 47.4 ml /21m^2     Doppler Measurements & Calculations is widely patent.  Vessel bifurcates into LAD and LCX. 3.  Left anterior descending artery:  Left anterior descending artery  has 30% diffuse stenosis in the proximal LAD.  There seems to be a high  first diagonal branch versus possibly ramus branch that has 30% to 50%  proximal stenosis.  Mid LAD has mild diffuse disease, distal LAD with  mild diffuse disease. 4.  Left circumflex artery:  LCX has 30% stenosis in the proximal  segment.  OM1 is a large branching vessel with mild diffuse disease. Distal LCX has 30% to 50% lesion. CONCLUSION:  1.  Unstable angina. 2.  Coronary artery disease. 3.  Severe in-stent restenosis within previously placed stent in the  proximal RCA, status post successful PCI stenting.     RECOMMENDATIONS:  1.  Continue aspirin. 2.  Continue Plavix. 3.  The patient receives renal dose of Plavix of 300. 4.  We will increase Lipitor from 10 mg p.o. daily to 40 mg p.o. daily. 5.  Further optimization of coronary artery disease and risk factor  modification. 6.  Outpatient followup with Cardiology.     Findings and plan of care were discussed with the patient and his family  in detail.  Questions were answered. James Hernandez are agreeable to the plan.           Jaylyn Weaver MD    AssessmentPlan:   Rizwan Jenkins is a pleasant 79year old male patient who  has a past medical history of AAA (abdominal aortic aneurysm) ([address] CAD (coronary artery disease), Cancer ([address] Cancer of kidney ([address] Heart attack ([address] History of placement of chest tube, Hx of blood clots, Hyperlipidemia, Hypertension, Kidney stones, Obesity, Osteoarthritis, and Stroke ([address] He had prior CEA, AAA repair. The patient underwent LHC in 11/2019 for unstable angina. He was found to have severe ISR in RCA, underwent successful PCI. In 01/2021, the patient was evaluated AF RVR, converted to SR, discharged home from ER. Last week, he felt tired.  He also had bilateral arm pain, radiated to hands, lasted for two hours, resolved with Tylenol, occurred at rest, similar to what he with his MI in the past. He had some palpitations. Patient denies orthopnea, paroxysmal nocturnal dyspnea, palpitations, dizziness, syncope, weight gain or leg swelling. He reports worsening shortness of breath, dyspnea on exertion. 1. Arm pain, angina equivalent, patient states its similar to what he had prior to PCI   2. Worsening MULLINS, SOB   3. CAD  4. 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 Paroxysmal atrial fibrillation   6. LAE  7. Hypertension   8. Dyslipidemia  9. BENJAMIN     On CPAP, noncompliant, counseled    Has CKD   Eliquis 2.5 mg po BID    No Bleeding complications    Plavix 75 mg po daily   Metoprolol    Norvasc    BP controlled    Lipitor 40 mg po daily   Coenzyme Q 10    Last LDL was from 2019   Check lipid panel, LFTs    Angina, worsening MULLINS is concerning   DDX includes CHF, CAD   Will need to investigate for underlying structural heart disease, will proceed with a transthoracic echocardiogram    Based on patient's risk factors and clinical presentation, stress test is indicated to investigate for possible underlying ischemic heart disease. Patient  agrees with that work up and its risks and benefits and potential need for additional testing if stress test is abnormal such as cardiac catheterization         Above findings and plan of care were discussed with patient in details, patient's questions were answered.      Disposition:  RTC in 6 months             Electronically signed by Candy Beyer MD, [facility] [place], [place]    5/3/2021 at 3:54 PM EDT

## 2021-05-04 ENCOUNTER — OFFICE VISIT (OUTPATIENT)
Dept: CARDIOLOGY CLINIC | Age: 81
End: 2021-05-04
Payer: MEDICARE

## 2021-05-04 VITALS
WEIGHT: 255 LBS | HEIGHT: 68 IN | HEART RATE: 58 BPM | BODY MASS INDEX: 38.65 KG/M2 | DIASTOLIC BLOOD PRESSURE: 78 MMHG | SYSTOLIC BLOOD PRESSURE: 130 MMHG

## 2021-05-04 DIAGNOSIS — I50.32 CHRONIC HEART FAILURE WITH PRESERVED EJECTION FRACTION (HFPEF) (HCC): ICD-10-CM

## 2021-05-04 DIAGNOSIS — I25.10 CAD IN NATIVE ARTERY: ICD-10-CM

## 2021-05-04 DIAGNOSIS — R06.09 DOE (DYSPNEA ON EXERTION): ICD-10-CM

## 2021-05-04 PROCEDURE — 99214 OFFICE O/P EST MOD 30 MIN: CPT | Performed by: INTERNAL MEDICINE

## 2021-05-04 PROCEDURE — 4040F PNEUMOC VAC/ADMIN/RCVD: CPT | Performed by: INTERNAL MEDICINE

## 2021-05-04 PROCEDURE — 1036F TOBACCO NON-USER: CPT | Performed by: INTERNAL MEDICINE

## 2021-05-04 PROCEDURE — 1123F ACP DISCUSS/DSCN MKR DOCD: CPT | Performed by: INTERNAL MEDICINE

## 2021-05-04 PROCEDURE — 93000 ELECTROCARDIOGRAM COMPLETE: CPT | Performed by: INTERNAL MEDICINE

## 2021-05-04 PROCEDURE — G8417 CALC BMI ABV UP PARAM F/U: HCPCS | Performed by: INTERNAL MEDICINE

## 2021-05-04 PROCEDURE — G8427 DOCREV CUR MEDS BY ELIG CLIN: HCPCS | Performed by: INTERNAL MEDICINE

## 2021-05-04 NOTE — PROGRESS NOTES
Follow-up. Wife states for the last week she feels like patient has been in and out of atrial fibrillation. He has intermittent shortness of breath and palpitations. EKG completed. Recent BP and HR log scanned in.

## 2021-05-06 ENCOUNTER — HOSPITAL ENCOUNTER (INPATIENT)
Age: 81
LOS: 2 days | Discharge: HOME | DRG: 865 | End: 2021-05-08
Attending: INTERNAL MEDICINE | Admitting: INTERNAL MEDICINE
Payer: MEDICARE

## 2021-05-06 ENCOUNTER — APPOINTMENT (OUTPATIENT)
Dept: CT IMAGING | Age: 81
DRG: 865 | End: 2021-05-06
Payer: MEDICARE

## 2021-05-06 ENCOUNTER — APPOINTMENT (OUTPATIENT)
Dept: GENERAL RADIOLOGY | Age: 81
DRG: 865 | End: 2021-05-06
Payer: MEDICARE

## 2021-05-06 ENCOUNTER — TELEPHONE (OUTPATIENT)
Dept: CARDIOLOGY CLINIC | Age: 81
End: 2021-05-06

## 2021-05-06 DIAGNOSIS — R09.02 HYPOXIA: Primary | ICD-10-CM

## 2021-05-06 DIAGNOSIS — R06.02 SHORTNESS OF BREATH: ICD-10-CM

## 2021-05-06 PROBLEM — R06.00 DYSPNEA: Status: ACTIVE | Noted: 2021-05-06

## 2021-05-06 LAB
ANION GAP SERPL CALCULATED.3IONS-SCNC: 10 MEQ/L (ref 8–16)
BACTERIA: ABNORMAL /HPF
BASOPHILS # BLD: 0.5 %
BASOPHILS ABSOLUTE: 0.1 THOU/MM3 (ref 0–0.1)
BILIRUBIN URINE: NEGATIVE
BLOOD, URINE: NEGATIVE
BUN BLDV-MCNC: 18 MG/DL (ref 7–22)
CALCIUM SERPL-MCNC: 9 MG/DL (ref 8.5–10.5)
CASTS 2: ABNORMAL /LPF
CASTS UA: ABNORMAL /LPF
CHARACTER, URINE: CLEAR
CHLORIDE BLD-SCNC: 101 MEQ/L (ref 98–111)
CO2: 25 MEQ/L (ref 23–33)
COLOR: YELLOW
CREAT SERPL-MCNC: 1.3 MG/DL (ref 0.4–1.2)
CRYSTALS, UA: ABNORMAL
EOSINOPHIL # BLD: 0.1 %
EOSINOPHILS ABSOLUTE: 0 THOU/MM3 (ref 0–0.4)
EPITHELIAL CELLS, UA: ABNORMAL /HPF
ERYTHROCYTE [DISTWIDTH] IN BLOOD BY AUTOMATED COUNT: 13.4 % (ref 11.5–14.5)
ERYTHROCYTE [DISTWIDTH] IN BLOOD BY AUTOMATED COUNT: 48.8 FL (ref 35–45)
GFR SERPL CREATININE-BSD FRML MDRD: 53 ML/MIN/1.73M2
GLUCOSE BLD-MCNC: 136 MG/DL (ref 70–108)
GLUCOSE URINE: NEGATIVE MG/DL
HCT VFR BLD CALC: 46 % (ref 42–52)
HEMOGLOBIN: 14.8 GM/DL (ref 14–18)
IMMATURE GRANS (ABS): 0.81 THOU/MM3 (ref 0–0.07)
IMMATURE GRANULOCYTES: 4.2 %
KETONES, URINE: ABNORMAL
LACTIC ACID: 1.8 MMOL/L (ref 0.5–2)
LEUKOCYTE ESTERASE, URINE: NEGATIVE
LYMPHOCYTES # BLD: 3.8 %
LYMPHOCYTES ABSOLUTE: 0.7 THOU/MM3 (ref 1–4.8)
MCH RBC QN AUTO: 32.1 PG (ref 26–33)
MCHC RBC AUTO-ENTMCNC: 32.2 GM/DL (ref 32.2–35.5)
MCV RBC AUTO: 99.8 FL (ref 80–94)
MISCELLANEOUS 2: ABNORMAL
MONOCYTES # BLD: 3.9 %
MONOCYTES ABSOLUTE: 0.8 THOU/MM3 (ref 0.4–1.3)
NITRITE, URINE: NEGATIVE
NUCLEATED RED BLOOD CELLS: 0 /100 WBC
OSMOLALITY CALCULATION: 275.9 MOSMOL/KG (ref 275–300)
PH UA: 5 (ref 5–9)
PLATELET # BLD: 178 THOU/MM3 (ref 130–400)
PLATELET ESTIMATE: ADEQUATE
PMV BLD AUTO: 9.6 FL (ref 9.4–12.4)
POTASSIUM REFLEX MAGNESIUM: 4.4 MEQ/L (ref 3.5–5.2)
PRO-BNP: 994 PG/ML (ref 0–1800)
PROCALCITONIN: 0.23 NG/ML (ref 0.01–0.09)
PROTEIN UA: 30
RBC # BLD: 4.61 MILL/MM3 (ref 4.7–6.1)
RBC URINE: ABNORMAL /HPF
RENAL EPITHELIAL, UA: ABNORMAL
SARS-COV-2, NAAT: NOT DETECTED
SCAN OF BLOOD SMEAR: NORMAL
SEG NEUTROPHILS: 87.5 %
SEGMENTED NEUTROPHILS ABSOLUTE COUNT: 17 THOU/MM3 (ref 1.8–7.7)
SODIUM BLD-SCNC: 136 MEQ/L (ref 135–145)
SPECIFIC GRAVITY, URINE: 1.02 (ref 1–1.03)
TROPONIN T: < 0.01 NG/ML
UROBILINOGEN, URINE: 1 EU/DL (ref 0–1)
WBC # BLD: 19.4 THOU/MM3 (ref 4.8–10.8)
WBC UA: ABNORMAL /HPF
YEAST: ABNORMAL

## 2021-05-06 PROCEDURE — 36415 COLL VENOUS BLD VENIPUNCTURE: CPT

## 2021-05-06 PROCEDURE — 87040 BLOOD CULTURE FOR BACTERIA: CPT

## 2021-05-06 PROCEDURE — 84484 ASSAY OF TROPONIN QUANT: CPT

## 2021-05-06 PROCEDURE — 6360000004 HC RX CONTRAST MEDICATION: Performed by: NURSE PRACTITIONER

## 2021-05-06 PROCEDURE — 71260 CT THORAX DX C+: CPT

## 2021-05-06 PROCEDURE — 87500 VANOMYCIN DNA AMP PROBE: CPT

## 2021-05-06 PROCEDURE — 71045 X-RAY EXAM CHEST 1 VIEW: CPT

## 2021-05-06 PROCEDURE — 83605 ASSAY OF LACTIC ACID: CPT

## 2021-05-06 PROCEDURE — 2580000003 HC RX 258: Performed by: NURSE PRACTITIONER

## 2021-05-06 PROCEDURE — 85025 COMPLETE CBC W/AUTO DIFF WBC: CPT

## 2021-05-06 PROCEDURE — 6360000002 HC RX W HCPCS: Performed by: NURSE PRACTITIONER

## 2021-05-06 PROCEDURE — 87081 CULTURE SCREEN ONLY: CPT

## 2021-05-06 PROCEDURE — 84145 PROCALCITONIN (PCT): CPT

## 2021-05-06 PROCEDURE — 93005 ELECTROCARDIOGRAM TRACING: CPT | Performed by: INTERNAL MEDICINE

## 2021-05-06 PROCEDURE — 87641 MR-STAPH DNA AMP PROBE: CPT

## 2021-05-06 PROCEDURE — 99223 1ST HOSP IP/OBS HIGH 75: CPT | Performed by: NURSE PRACTITIONER

## 2021-05-06 PROCEDURE — 81001 URINALYSIS AUTO W/SCOPE: CPT

## 2021-05-06 PROCEDURE — 80048 BASIC METABOLIC PNL TOTAL CA: CPT

## 2021-05-06 PROCEDURE — 83880 ASSAY OF NATRIURETIC PEPTIDE: CPT

## 2021-05-06 PROCEDURE — 99283 EMERGENCY DEPT VISIT LOW MDM: CPT

## 2021-05-06 PROCEDURE — 6370000000 HC RX 637 (ALT 250 FOR IP): Performed by: NURSE PRACTITIONER

## 2021-05-06 PROCEDURE — 87635 SARS-COV-2 COVID-19 AMP PRB: CPT

## 2021-05-06 PROCEDURE — 1200000003 HC TELEMETRY R&B

## 2021-05-06 RX ORDER — AMLODIPINE BESYLATE 2.5 MG/1
2.5 TABLET ORAL DAILY
Status: DISCONTINUED | OUTPATIENT
Start: 2021-05-06 | End: 2021-05-08 | Stop reason: HOSPADM

## 2021-05-06 RX ORDER — POLYETHYLENE GLYCOL 3350 17 G/17G
17 POWDER, FOR SOLUTION ORAL DAILY PRN
Status: DISCONTINUED | OUTPATIENT
Start: 2021-05-06 | End: 2021-05-08 | Stop reason: HOSPADM

## 2021-05-06 RX ORDER — ASCORBIC ACID 500 MG
1000 TABLET ORAL DAILY
Status: DISCONTINUED | OUTPATIENT
Start: 2021-05-06 | End: 2021-05-08 | Stop reason: HOSPADM

## 2021-05-06 RX ORDER — ALBUTEROL SULFATE 2.5 MG/3ML
2.5 SOLUTION RESPIRATORY (INHALATION)
Status: DISCONTINUED | OUTPATIENT
Start: 2021-05-06 | End: 2021-05-08 | Stop reason: HOSPADM

## 2021-05-06 RX ORDER — ACETAMINOPHEN 500 MG
1000 TABLET ORAL EVERY 6 HOURS PRN
COMMUNITY

## 2021-05-06 RX ORDER — ATORVASTATIN CALCIUM 40 MG/1
40 TABLET, FILM COATED ORAL NIGHTLY
Status: DISCONTINUED | OUTPATIENT
Start: 2021-05-06 | End: 2021-05-08 | Stop reason: HOSPADM

## 2021-05-06 RX ORDER — PROMETHAZINE HYDROCHLORIDE 25 MG/1
12.5 TABLET ORAL EVERY 6 HOURS PRN
Status: DISCONTINUED | OUTPATIENT
Start: 2021-05-06 | End: 2021-05-08 | Stop reason: HOSPADM

## 2021-05-06 RX ORDER — PAROXETINE HYDROCHLORIDE 20 MG/1
20 TABLET, FILM COATED ORAL EVERY MORNING
Status: DISCONTINUED | OUTPATIENT
Start: 2021-05-07 | End: 2021-05-08 | Stop reason: HOSPADM

## 2021-05-06 RX ORDER — CLOPIDOGREL BISULFATE 75 MG/1
75 TABLET ORAL DAILY
Status: DISCONTINUED | OUTPATIENT
Start: 2021-05-06 | End: 2021-05-08 | Stop reason: HOSPADM

## 2021-05-06 RX ORDER — METOPROLOL TARTRATE 50 MG/1
50 TABLET, FILM COATED ORAL 2 TIMES DAILY
Status: DISCONTINUED | OUTPATIENT
Start: 2021-05-06 | End: 2021-05-08 | Stop reason: HOSPADM

## 2021-05-06 RX ORDER — ACETAMINOPHEN 325 MG/1
650 TABLET ORAL EVERY 6 HOURS PRN
Status: DISCONTINUED | OUTPATIENT
Start: 2021-05-06 | End: 2021-05-08 | Stop reason: HOSPADM

## 2021-05-06 RX ORDER — PANTOPRAZOLE SODIUM 40 MG/1
40 TABLET, DELAYED RELEASE ORAL
Status: DISCONTINUED | OUTPATIENT
Start: 2021-05-07 | End: 2021-05-08 | Stop reason: HOSPADM

## 2021-05-06 RX ORDER — SODIUM CHLORIDE 0.9 % (FLUSH) 0.9 %
5-40 SYRINGE (ML) INJECTION PRN
Status: DISCONTINUED | OUTPATIENT
Start: 2021-05-06 | End: 2021-05-08 | Stop reason: HOSPADM

## 2021-05-06 RX ORDER — SODIUM CHLORIDE 0.9 % (FLUSH) 0.9 %
5-40 SYRINGE (ML) INJECTION EVERY 12 HOURS SCHEDULED
Status: DISCONTINUED | OUTPATIENT
Start: 2021-05-06 | End: 2021-05-08 | Stop reason: HOSPADM

## 2021-05-06 RX ORDER — ONDANSETRON 2 MG/ML
4 INJECTION INTRAMUSCULAR; INTRAVENOUS EVERY 6 HOURS PRN
Status: DISCONTINUED | OUTPATIENT
Start: 2021-05-06 | End: 2021-05-08 | Stop reason: HOSPADM

## 2021-05-06 RX ORDER — POLYETHYLENE GLYCOL 3350 17 G/17G
17 POWDER, FOR SOLUTION ORAL DAILY PRN
COMMUNITY
End: 2022-01-05

## 2021-05-06 RX ORDER — SODIUM CHLORIDE 9 MG/ML
25 INJECTION, SOLUTION INTRAVENOUS PRN
Status: DISCONTINUED | OUTPATIENT
Start: 2021-05-06 | End: 2021-05-08 | Stop reason: HOSPADM

## 2021-05-06 RX ORDER — ACETAMINOPHEN 650 MG/1
650 SUPPOSITORY RECTAL EVERY 6 HOURS PRN
Status: DISCONTINUED | OUTPATIENT
Start: 2021-05-06 | End: 2021-05-08 | Stop reason: HOSPADM

## 2021-05-06 RX ADMIN — APIXABAN 2.5 MG: 2.5 TABLET, FILM COATED ORAL at 21:54

## 2021-05-06 RX ADMIN — IOPAMIDOL 85 ML: 755 INJECTION, SOLUTION INTRAVENOUS at 18:14

## 2021-05-06 RX ADMIN — ATORVASTATIN CALCIUM 40 MG: 40 TABLET, FILM COATED ORAL at 21:53

## 2021-05-06 RX ADMIN — AZITHROMYCIN DIHYDRATE 500 MG: 500 INJECTION, POWDER, LYOPHILIZED, FOR SOLUTION INTRAVENOUS at 22:25

## 2021-05-06 RX ADMIN — OXYCODONE HYDROCHLORIDE AND ACETAMINOPHEN 1000 MG: 500 TABLET ORAL at 21:53

## 2021-05-06 RX ADMIN — CEFTRIAXONE SODIUM 1000 MG: 1 INJECTION, POWDER, FOR SOLUTION INTRAMUSCULAR; INTRAVENOUS at 21:52

## 2021-05-06 RX ADMIN — AMLODIPINE BESYLATE 2.5 MG: 2.5 TABLET ORAL at 21:54

## 2021-05-06 RX ADMIN — SODIUM CHLORIDE, PRESERVATIVE FREE 10 ML: 5 INJECTION INTRAVENOUS at 21:53

## 2021-05-06 RX ADMIN — METOPROLOL TARTRATE 50 MG: 50 TABLET, FILM COATED ORAL at 21:54

## 2021-05-06 RX ADMIN — CLOPIDOGREL BISULFATE 75 MG: 75 TABLET ORAL at 21:55

## 2021-05-06 ASSESSMENT — PAIN DESCRIPTION - FREQUENCY: FREQUENCY: CONTINUOUS

## 2021-05-06 ASSESSMENT — PAIN DESCRIPTION - PAIN TYPE: TYPE: ACUTE PAIN

## 2021-05-06 ASSESSMENT — PAIN DESCRIPTION - DESCRIPTORS: DESCRIPTORS: HEADACHE

## 2021-05-06 ASSESSMENT — PAIN DESCRIPTION - LOCATION: LOCATION: HEAD

## 2021-05-06 NOTE — ED NOTES
Bedside report received from [facility]. This nurse assuming care. Patient resting in bed. Respirations easy and unlabored. No distress noted. Call light within reach.        Janora Sicard, RN  05/06/21 0058

## 2021-05-06 NOTE — ED NOTES
ED nurse-to-nurse bedside report    Chief Complaint   Patient presents with    Shortness of Breath    Fever    Chest Pain    Headache      LOC: alert and orientated to name, place, date  Vital signs   Vitals:    05/06/21 1429 05/06/21 1434 05/06/21 1558   BP: (!) 124/57  (!) 108/59   Pulse: 72  56   Resp: 29  24   Temp: 97.6 °F (36.4 °C)     TempSrc: Oral     SpO2: (!) 88% 93% 94%   Weight: 250 lb (113.4 kg)     Height: 5' 8\" (1.727 m)        Pain:    Pain Interventions: nonpharmacological   Pain Goal: 0  Oxygen: Yes    Current needs required 3L nasal cannula    Telemetry: Yes  LDAs:   Peripheral IV 05/06/21 Right Forearm (Active)   Site Assessment Clean;Dry; Intact 05/06/21 1500   Line Status Blood return noted;Specimen collected; Flushed;Normal saline locked 05/06/21 1500   Dressing Status Clean;Dry; Intact 05/06/21 1500   Dressing Intervention New 05/06/21 1500       Peripheral IV 05/06/21 Right Antecubital (Active)   Site Assessment Clean;Dry; Intact 05/06/21 1528   Line Status Flushed;Normal saline locked 05/06/21 1528   Dressing Status Clean;Dry; Intact 05/06/21 1528   Dressing Intervention New 05/06/21 1528     Continuous Infusions:   Mobility: Requires assistance * 1  Morales Fall Risk Score:    Fall Risk 3/5/2019 10/9/2017   2 or more falls in past year? no no   Fall with injury in past year? no no     Fall Interventions: side rails up x2, call light in reach, family at bedside   Report given to: HANNAH Fine RN  05/06/21 5616

## 2021-05-06 NOTE — H&P
History & Physical        Patient:  Alysia Ware  YOB: 1940    MRN: [id number]     Acct: [de-identified]    PCP: MARTIN Paniagua CNP    Date of Admission: 5/6/2021    Date of Service: Pt seen/examined on 05/06/21  and Admitted to Inpatient with expected LOS greater than two midnights due to medical therapy. ASSESSMENT/PLAN:    1. Dyspnea--COVID-19 not detected, BNP is normal, initial troponin is normal, x-ray does not show anything acute, obtaining a CT chest to rule out any underlying pathology that may have been missed on the chest x-ray; will give Zithromax/Rocephin on 5/6 x 1 dose and reevaluate tomorrow  2. Acute hypoxic respiratory failure--COVID-19 not detected; BNP is normal; on supplemental oxygen, wean as able, add incentive spirometry  3. Acute febrile illness--T-max was reported at 102 at home, he has been afebrile here, monitor, blood cultures show no growth to date; continue to watch closely  4. Leukocytosis--subjective fever at home as high as 102, was given Tylenol at home; has been afebrile here; monitor closely  5. CKD stage III--stable  6. Chronic diastolic heart failure  7. PAF--follows with Dr. Bharat Moseley; saw him in the office on May 3, 2021 and plan is to investigate for underlying structural heart disease with a GUNNER and possible stress test that is scheduled for May 17; he is on Eliquis and Lopressor  8. CAD status post stent November 2019--on Plavix, statin, Lopressor  9. BENJAMIN--noncompliant with CPAP, uses 3 L of oxygen nightly  10. Essential hypertension, uncontrolled--on Norvasc, Lopressor monitor  11. Hyperlipidemia--treated  12. History AAA  13. Nocturnal hypoxemia--on 3 L of oxygen at night  14.  Obesity with BMI 38.01    Chief Complaint: Shortness of breath      History Of Present Illness:    79 y.o. male who presented to [facility] with shortness of breath; patient has a past medical history of AAA, stroke, CAD, hypertension, hyperlipidemia and PAF; patient states over the last week he has had shortness of breath and chest pain; states he only has a minimal cough which is his baseline; he states that was concerning to him today as this morning he had a low-grade temperature of 99.7 it then went up to 100.6 at around 11  so wife gave him Tylenol and they came here for evaluation; so far work-up is essentially normal except he does have an elevated white count; he is not in any acute distress at this time; he is able to speak in complete sentences; he has been afebrile here, he uses 3 L of oxygen at night and here his sat on room air was 88% so he was placed on 3 L. In the emergency department, creatinine was at 1.3, BMP normal, troponin T is normal, white count is 19.8; chest x-ray is normal, telemetry reviewed and he is in a sinus bradycardia with occasional PACs; CT chest has been ordered to further evaluate his lung situation as his procalcitonin is 0.23; urinalysis does not show any infection; is being admitted to the hospital service for further care and evaluation.     Past Medical History:          Diagnosis Date    AAA (abdominal aortic aneurysm) ([address]     CAD (coronary artery disease)     Cancer (HCC)     cheek and nose     Cancer of kidney ([address]     Heart attack ([address]     History of placement of chest tube     Due to pt falling and breaking 4 ribs and puncturing his lung    Hx of blood clots     Hyperlipidemia     Hypertension     Kidney stones     Obesity     Osteoarthritis     Stroke [facility])        Past Surgical History:          Procedure Laterality Date    ABDOMEN SURGERY      AAA    ABDOMINAL AORTIC ANEURYSM REPAIR  2005    CARDIAC SURGERY  [facility]  2005    AAA repair    CAROTID ENDARTERECTOMY  2006    FINGER AMPUTATION      JOINT REPLACEMENT  2007    knees bilateral    KNEE SURGERY Left 6-2013    PARTIAL NEPHRECTOMY  2010    Right    PTCA     [facility] TONSILLECTOMY  1948    VASCULAR SURGERY         Medications Prior to Admission:      Prior to Admission medications    Medication Sig Start Date End Date Taking? Authorizing Provider   OXYGEN Inhale 3 L into the lungs nightly   Yes Historical Provider, MD   apixaban (ELIQUIS) 2.5 MG TABS tablet Take 1 tablet by mouth 2 times daily 3/16/21   Rogelio Guillory MD   atorvastatin (LIPITOR) 40 MG tablet Take 1 tablet by mouth nightly 3/16/21   Rogelio Guillory MD   metoprolol tartrate (LOPRESSOR) 50 MG tablet Take 50 mg by mouth 2 times daily     Historical Provider, MD   Coenzyme Q10 (CO Q-10) 100 MG CAPS Take by mouth    Historical Provider, MD   Cholecalciferol (VITAMIN D3) 125 MCG (5000 UT) TABS Take by mouth    Historical Provider, MD   APPLE CIDER VINEGAR PO Take 450 mg by mouth    Historical Provider, MD   amLODIPine (NORVASC) 2.5 MG tablet TAKE 1 TABLET BY MOUTH  DAILY 12/14/20   Rogelio Guillory MD   pantoprazole (PROTONIX) 40 MG tablet TAKE 1 TABLET BY MOUTH  DAILY 9/22/20   Nikunj Smith MD   clopidogrel (PLAVIX) 75 MG tablet TAKE 1 TABLET BY MOUTH  DAILY 7/14/20   Nikunj Smith MD   PARoxetine (PAXIL) 20 MG tablet Take 20 mg by mouth every morning    Historical Provider, MD   Turmeric Curcumin 500 MG CAPS Take 500 mg by mouth 2 times daily     Historical Provider, MD   Magnesium 500 MG TABS Take by mouth daily     Historical Provider, MD   CINNAMON PO Take 1,000 mg by mouth daily    Historical Provider, MD   Probiotic Product (PROBIOTIC DAILY PO) Take by mouth daily    Historical Provider, MD   Ascorbic Acid (VITAMIN C) 1000 MG tablet Take 1,000 mg by mouth daily     Historical Provider, MD   Flaxseed, Linseed, (FLAXSEED OIL) 1000 MG CAPS Take 4 capsules by mouth 2 times daily     Historical Provider, MD       Allergies:  Patient has no known allergies. Social History:   reports that he quit smoking about 50 years ago. His smoking use included pipe. He started smoking about 66 years ago.  He has a 15.00 pack-year smoking history. He has never used smokeless tobacco. He reports that he does not drink alcohol or use drugs. Family History:      Positive as follows:        Problem Relation Age of Onset    Alzheimer's Disease Mother     Heart Disease Father        REVIEW OF SYSTEMS:     Constitutional: ROS: positive for  - fever  Head: no headache, no head injury, no migraine   Eyes ROS: denies blurred/double vision  Ears ROS: no hearing difficulty, no tinnitus  Mouth and Throat ROS: no ulceration, dysphagia, dental caries  Psychological ROS: no depression, no anxiety, no panic attacks, denies suicide/homicide ideation  Endocrine ROS: denies polyuria, polydypsia, no heat or cold intolerance  Respiratory ROS: positive for - shortness of breath and minimal nonproductive cough  Cardiovascular ROS: positive for - chest pain  Gastrointestinal ROS: no abdominal pain, change in bowel habits, or black or bloody stools  Genito-Urinary ROS: denies dysuria, frequency, urgency; denies hematuria  Musculoskeletal ROS: negative  Neurological ROS: no syncope, no seizures, no numbness or tingling of hands, no numbness or tingling of feet, no paresis  Dermatology: no skin rash, no eczema  Endocrine: no polyuria, polydypsia, no heat/cold intolerance  Hematology: denies bruising easily, denies bleeding problems, denies clotting disorders    PHYSICAL EXAM:    BP (!) 108/59   Pulse 56   Temp 97.6 °F (36.4 °C) (Oral) Comment: tylenol @ 1100  Resp 24   Ht 5' 8\" (1.727 m)   Wt 250 lb (113.4 kg)   SpO2 94%   BMI 38.01 kg/m²     General appearance:  No apparent distress, appears stated age and cooperative. HEENT:  Normal cephalic, atraumatic without obvious deformity. Pupils equal, round, and reactive to light. Conjunctivae/corneas clear. Neck: Supple, with full range of motion. No jugular venous distention. Trachea midline. Respiratory:  Normal respiratory effort.  Clear to auscultation, bilaterally without Rales/Wheezes/Rhonchi. Cardiovascular:  Regular rate and rhythm with normal S1/S2 without murmurs, rubs or gallops. Abdomen: Soft, non-tender, non-distended with normal bowel sounds. Musculoskeletal:  No clubbing, cyanosis or edema bilaterally. Full range of motion without deformity. Skin: Skin color, texture, turgor normal.    Neurologic:  Neurovascularly intact without any focal sensory/motor deficits. Cranial nerves: II-XII intact, grossly non-focal.  Psychiatric:  Alert and oriented, thought content appropriate  Capillary Refill: Brisk,< 3 seconds   Peripheral Pulses: +2 palpable, equal bilaterally       Labs:     Recent Labs     05/06/21  1456   WBC 19.4*   HGB 14.8   HCT 46.0        Recent Labs     05/06/21  1456      K 4.4      CO2 25   BUN 18   CREATININE 1.3*   CALCIUM 9.0     No results for input(s): AST, ALT, BILIDIR, BILITOT, ALKPHOS in the last 72 hours. No results for input(s): INR in the last 72 hours. Recent Labs     05/06/21  1456   TROPONINT < 0.010     Procalcitonin:  Recent Labs     05/06/21  1456   PROCAL 0.23*     Lactic Acid:   Recent Labs     05/06/21  1456   LACTA 1.8        Urinalysis:      Lab Results   Component Value Date    NITRU NEGATIVE 05/06/2021    WBCUA 0-2 05/06/2021    BACTERIA NONE SEEN 05/06/2021    RBCUA 3-5 05/06/2021    BLOODU NEGATIVE 05/06/2021    GLUCOSEU NEGATIVE 05/06/2021       Radiology:     XR CHEST PORTABLE   Final Result   Stable radiographic appearance of the chest. No evidence of an acute process. **This report has been created using voice recognition software. It may contain minor errors which are inherent in voice recognition technology. **      Final report electronically signed by Dr. Osmel Arnold MD on 5/6/2021 4:29 PM      CT CHEST W CONTRAST    (Results Pending)     EKG:  I have reviewed the EKG with the following interpretation: Sinus rhythm heart rate 72 with right bundle branch block      Thank you [facility] MARTIN Johnson CNP for the opportunity to be involved in this patient's care.     Electronically signed by MARTIN Meadows CNP on 5/6/2021 at 4:51 PM

## 2021-05-06 NOTE — TELEPHONE ENCOUNTER
Patient's wife LM on nurse line that he was seen recently by Dr. Jose Clark and testing was ordered. She states he has a fever of 102 and 101.6, she states he is breathing extremely fast like around 42 times per minute. I called phone number [phone] and spoke to Vikki Balderrama and I advised him to call 911 to go to ER for evaluation. He states he will have his wife call us who isn't in the house right now.

## 2021-05-06 NOTE — ED NOTES
ED to inpatient nurses report    Chief Complaint   Patient presents with    Shortness of Breath    Fever    Chest Pain    Headache      Present to ED from home  LOC: alert and orientated to name, place, date  Vital signs   Vitals:    05/06/21 1429 05/06/21 1434 05/06/21 1558   BP: (!) 124/57  (!) 108/59   Pulse: 72  56   Resp: 29  24   Temp: 97.6 °F (36.4 °C)     TempSrc: Oral     SpO2: (!) 88% 93% 94%   Weight: 250 lb (113.4 kg)     Height: 5' 8\" (1.727 m)        Oxygen Baseline room air     Current needs required 3L nasal cannula Bipap/Cpap No  LDAs:   Peripheral IV 05/06/21 Right Forearm (Active)   Site Assessment Clean;Dry; Intact 05/06/21 1500   Line Status Blood return noted;Specimen collected; Flushed;Normal saline locked 05/06/21 1500   Dressing Status Clean;Dry; Intact 05/06/21 1500   Dressing Intervention New 05/06/21 1500       Peripheral IV 05/06/21 Right Antecubital (Active)   Site Assessment Clean;Dry; Intact 05/06/21 1528   Line Status Flushed;Normal saline locked 05/06/21 1528   Dressing Status Clean;Dry; Intact 05/06/21 1528   Dressing Intervention New 05/06/21 1528     Mobility: Requires assistance * 1  Pending ED orders: none   Present condition: stable       Electronically signed by Nikia Crocker RN on 5/6/2021 at 4:54 PM       Nikia Crocker RN  05/06/21 [id number]

## 2021-05-06 NOTE — ED TRIAGE NOTES
Patient to ED with complaints of shortness of breath, headache, fever, and chest discomfort. Patient states he has had similar symptoms and was seen by his family physician Tuesday but the symptoms came back early this morning. Patient states his fever was 102 this morning and took tylenol around 1100. Patient pulse ox 88% upon arrival. Patient states he wears 3L at night. Patient placed on 3L nasal cannula and oxygen reevaluated at 94%.

## 2021-05-06 NOTE — ED NOTES
Patient resting in bed. Respirations easy and unlabored. No distress noted. Call light within reach.        Javier Bartlett RN  05/06/21 5902

## 2021-05-06 NOTE — ED PROVIDER NOTES
Genitourinary: Negative for decreased urine volume, dysuria, flank pain, frequency and urgency. Musculoskeletal: Negative for arthralgias, back pain, joint swelling, myalgias, neck pain and neck stiffness. Skin: Negative for color change and wound. Neurological: Negative for dizziness, tremors, weakness, light-headedness, numbness and headaches. PAST MEDICAL HISTORY    has a past medical history of AAA (abdominal aortic aneurysm) ([facility] [address] CAD (coronary artery disease), Cancer ([facility] [facility] Cancer of kidney ([facility] [address] Heart attack ([facility] [address] [facility] History of placement of chest tube, Hx of blood clots, Hyperlipidemia, Hypertension, Kidney stones, Obesity, Osteoarthritis, and Stroke ([facility] [address] [facility] SURGICAL HISTORY      has a past surgical history that includes joint replacement (2007); Carotid endarterectomy (2006); Abdominal aortic aneurysm repair (2005); shoulder surgery (1997); Tonsillectomy (1948); partial nephrectomy (2010); Cardiac surgery (2008, 1996); Cardiac surgery (2005); Abdomen surgery; vascular surgery; knee surgery (Left, 6-2013); Finger amputation; and Percutaneous Transluminal Coronary Angio.     CURRENT MEDICATIONS       Discharge Medication List as of 5/8/2021 12:03 PM      CONTINUE these medications which have NOT CHANGED    Details   OXYGEN Inhale 3 L into the lungs nightlyHistorical Med      acetaminophen (TYLENOL) 500 MG tablet Take 1,000 mg by mouth every 6 hours as needed for PainHistorical Med      polyethylene glycol (GLYCOLAX) 17 GM/SCOOP powder Take 17 g by mouth daily as neededHistorical Med      apixaban (ELIQUIS) 2.5 MG TABS tablet Take 1 tablet by mouth 2 times daily, Disp-180 tablet, R-3Normal      atorvastatin (LIPITOR) 40 MG tablet Take 1 tablet by mouth nightly, Disp-90 tablet, R-3Normal      metoprolol tartrate (LOPRESSOR) 50 MG tablet Take 50 mg by mouth 2 times daily Historical Med      Coenzyme Q10 (CO Q-10) 100 MG CAPS Take by mouthHistorical Med      Cholecalciferol (VITAMIN D3) 125 MCG (5000 UT) TABS Take by mouthHistorical Med      APPLE CIDER VINEGAR PO Take 450 mg by mouthHistorical Med      amLODIPine (NORVASC) 2.5 MG tablet TAKE 1 TABLET BY MOUTH  DAILY, Disp-90 tablet, R-3Requesting 1 year supplyNormal      pantoprazole (PROTONIX) 40 MG tablet TAKE 1 TABLET BY MOUTH  DAILY, Disp-90 tablet,R-3Requesting 1 year supplyNormal      clopidogrel (PLAVIX) 75 MG tablet TAKE 1 TABLET BY MOUTH  DAILY, Disp-90 tablet,R-3Normal      PARoxetine (PAXIL) 20 MG tablet Take 20 mg by mouth every morningHistorical Med      Turmeric Curcumin 500 MG CAPS Take 1,000 mg by mouth daily Historical Med      Magnesium 500 MG TABS Take by mouth daily Historical Med      CINNAMON PO Take 1,000 mg by mouth dailyHistorical Med      Probiotic Product (PROBIOTIC DAILY PO) Take by mouth dailyHistorical Med      Ascorbic Acid (VITAMIN C) 1000 MG tablet Take 1,000 mg by mouth daily Historical Med      Flaxseed, Linseed, (FLAXSEED OIL) 1000 MG CAPS Take 4 capsules by mouth daily Historical Med             ALLERGIES     has No Known Allergies. FAMILY HISTORY     He indicated that his mother is . He indicated that his father is . family history includes Alzheimer's Disease in his mother; Heart Disease in his father. SOCIAL HISTORY      reports that he quit smoking about 50 years ago. His smoking use included pipe. He started smoking about 66 years ago. He has a 15.00 pack-year smoking history. He has never used smokeless tobacco. He reports that he does not drink alcohol or use drugs. PHYSICAL EXAM     INITIAL VITALS:  height is 5' 8\" (1.727 m) and weight is 253 lb 8 oz (115 kg). His oral temperature is 98 °F (36.7 °C). His blood pressure is 125/66 and his pulse is 77. His respiration is 17 and oxygen saturation is 96%. Physical Exam  Vitals signs and nursing note reviewed. Constitutional:       General: He is awake. He is not in acute distress. Appearance: Normal appearance.  He is well-developed and normal weight. He is not ill-appearing, toxic-appearing or diaphoretic. HENT:      Head: Normocephalic and atraumatic. Nose: Nose normal.      Mouth/Throat:      Mouth: Mucous membranes are moist.      Pharynx: Oropharynx is clear. Eyes:      Extraocular Movements: Extraocular movements intact. Pupils: Pupils are equal, round, and reactive to light. Neck:      Musculoskeletal: Normal range of motion and neck supple. No neck rigidity or muscular tenderness. Cardiovascular:      Rate and Rhythm: Normal rate and regular rhythm. No extrasystoles are present. Pulses: Normal pulses. Heart sounds: Normal heart sounds, S1 normal and S2 normal. Heart sounds not distant. No murmur. No friction rub. No gallop. Pulmonary:      Effort: Pulmonary effort is normal. No tachypnea, bradypnea, accessory muscle usage, prolonged expiration, respiratory distress or retractions. Breath sounds: Normal breath sounds. No stridor. No wheezing, rhonchi or rales. Chest:      Chest wall: No tenderness. Abdominal:      General: Abdomen is flat. Bowel sounds are normal. There is no distension. Palpations: Abdomen is soft. There is no shifting dullness, hepatomegaly, splenomegaly or mass. Tenderness: There is no abdominal tenderness. Hernia: No hernia is present. Musculoskeletal: Normal range of motion. General: No swelling, tenderness, deformity or signs of injury. Right lower leg: No edema. Left lower leg: No edema. Skin:     General: Skin is warm and dry. Capillary Refill: Capillary refill takes less than 2 seconds. Coloration: Skin is not jaundiced or pale. Neurological:      General: No focal deficit present. Mental Status: He is alert and oriented to person, place, and time. Mental status is at baseline. GCS: GCS eye subscore is 4. GCS verbal subscore is 5. GCS motor subscore is 6.       Cranial Nerves: Cranial nerves are intact. Sensory: Sensation is intact. Psychiatric:         Mood and Affect: Mood normal.         Behavior: Behavior normal. Behavior is cooperative. DIFFERENTIAL DIAGNOSIS:   COVID-19, pneumonia, atrial fibrillation, A. fib RVR, viral illness    DIAGNOSTIC RESULTS     EKG: All EKG's are interpreted by the Emergency Department Physician who either signs or Co-signs this chart in the absence of a cardiologist.    Normal sinus rhythm with RBBB, rate of 72, , QRS of 130, QTc of 448. Compared with EKG from November 29, 2019, RBBB is now present. RADIOLOGY: non-plainfilm images(s) such as CT, Ultrasound and MRI are read by the radiologist.    CT CHEST W CONTRAST   Final Result   Impression:   Aneurysm of the distal thoracic aorta. This document has been electronically signed by: Oh Garces MD on    05/06/2021 06:49 PM      All CTs at this facility use dose modulation techniques and iterative    reconstructions, and/or weight-based dosing   when appropriate to reduce radiation to a low as reasonably achievable. XR CHEST PORTABLE   Final Result   Stable radiographic appearance of the chest. No evidence of an acute process. **This report has been created using voice recognition software. It may contain minor errors which are inherent in voice recognition technology. **      Final report electronically signed by Dr. Domi Stallworth MD on 5/6/2021 4:29 PM          LABS:     Labs Reviewed   BASIC METABOLIC PANEL W/ REFLEX TO MG FOR LOW K - Abnormal; Notable for the following components:       Result Value    Glucose 136 (*)     CREATININE 1.3 (*)     All other components within normal limits   CBC WITH AUTO DIFFERENTIAL - Abnormal; Notable for the following components:    WBC 19.4 (*)     RBC 4.61 (*)     MCV 99.8 (*)     RDW-SD 48.8 (*)     Segs Absolute 17.0 (*)     Lymphocytes Absolute 0.7 (*)     Immature Grans (Abs) 0.81 (*)     All other components within normal limits   PROCALCITONIN - Abnormal; Notable for the following components:    Procalcitonin 0.23 (*)     All other components within normal limits   GLOMERULAR FILTRATION RATE, ESTIMATED - Abnormal; Notable for the following components:    Est, Glom Filt Rate 53 (*)     All other components within normal limits   URINE WITH REFLEXED MICRO - Abnormal; Notable for the following components:    Ketones, Urine TRACE (*)     Protein, UA 30 (*)     All other components within normal limits   BASIC METABOLIC PANEL W/ REFLEX TO MG FOR LOW K - Abnormal; Notable for the following components:    CREATININE 1.4 (*)     All other components within normal limits   CBC - Abnormal; Notable for the following components:    WBC 12.5 (*)     RBC 4.26 (*)     Hemoglobin 13.4 (*)     MCV 99.8 (*)     MCHC 31.5 (*)     RDW-SD 49.7 (*)     All other components within normal limits   GLOMERULAR FILTRATION RATE, ESTIMATED - Abnormal; Notable for the following components:    Est, Glom Filt Rate 49 (*)     All other components within normal limits   CULTURE, BLOOD 1    Narrative:     Source: blood-Adult-suboptimal <5.5oz./set volume       Site: Peripheral Vein            Current Antibiotics: not stated   CULTURE, BLOOD 2    Narrative:     Source: blood-Adult-suboptimal <5.5oz./set volume       Site: Peripheral Vein            Current Antibiotics: not stated   COVID-19, RAPID   VRE SCREEN BY PCR   CULTURE, MRSA, SCREENING    Narrative:     Source: rectal       Site: swab          Current Antibiotics: not stated   BRAIN NATRIURETIC PEPTIDE   TROPONIN   LACTIC ACID, PLASMA   ANION GAP   OSMOLALITY   SCAN OF BLOOD SMEAR   MRSA BY PCR   ANION GAP       EMERGENCY DEPARTMENT COURSE:   Vitals:    Vitals:    05/07/21 1545 05/07/21 2100 05/07/21 2345 05/08/21 0800   BP: 132/61 137/60 124/72 125/66   Pulse: 59 64 71 77   Resp: 17 18 18 17   Temp: 98.3 °F (36.8 °C) 98.1 °F (36.7 °C) 98.6 °F (37 °C) 98 °F (36.7 °C)   TempSrc: Oral Oral Oral Oral   SpO2: 96% to the scribe in my presence, and it accurately records my words and actions.     MARTIN Castillo CNP, 5/6/21, 5:55 AM       MARTIN Castillo CNP  05/10/21 0573

## 2021-05-07 LAB
ANION GAP SERPL CALCULATED.3IONS-SCNC: 9 MEQ/L (ref 8–16)
BUN BLDV-MCNC: 22 MG/DL (ref 7–22)
CALCIUM SERPL-MCNC: 8.5 MG/DL (ref 8.5–10.5)
CHLORIDE BLD-SCNC: 104 MEQ/L (ref 98–111)
CO2: 25 MEQ/L (ref 23–33)
CREAT SERPL-MCNC: 1.4 MG/DL (ref 0.4–1.2)
ERYTHROCYTE [DISTWIDTH] IN BLOOD BY AUTOMATED COUNT: 13.7 % (ref 11.5–14.5)
ERYTHROCYTE [DISTWIDTH] IN BLOOD BY AUTOMATED COUNT: 49.7 FL (ref 35–45)
GFR SERPL CREATININE-BSD FRML MDRD: 49 ML/MIN/1.73M2
GLUCOSE BLD-MCNC: 99 MG/DL (ref 70–108)
HCT VFR BLD CALC: 42.5 % (ref 42–52)
HEMOGLOBIN: 13.4 GM/DL (ref 14–18)
LV EF: 58 %
LVEF MODALITY: NORMAL
MCH RBC QN AUTO: 31.5 PG (ref 26–33)
MCHC RBC AUTO-ENTMCNC: 31.5 GM/DL (ref 32.2–35.5)
MCV RBC AUTO: 99.8 FL (ref 80–94)
MRSA SCREEN RT-PCR: NEGATIVE
PLATELET # BLD: 142 THOU/MM3 (ref 130–400)
PMV BLD AUTO: 10.1 FL (ref 9.4–12.4)
POTASSIUM REFLEX MAGNESIUM: 3.9 MEQ/L (ref 3.5–5.2)
RBC # BLD: 4.26 MILL/MM3 (ref 4.7–6.1)
SODIUM BLD-SCNC: 138 MEQ/L (ref 135–145)
VANCOMYCIN RESISTANT ENTEROCOCCUS: NEGATIVE
WBC # BLD: 12.5 THOU/MM3 (ref 4.8–10.8)

## 2021-05-07 PROCEDURE — 2580000003 HC RX 258: Performed by: NURSE PRACTITIONER

## 2021-05-07 PROCEDURE — 85027 COMPLETE CBC AUTOMATED: CPT

## 2021-05-07 PROCEDURE — 99232 SBSQ HOSP IP/OBS MODERATE 35: CPT | Performed by: INTERNAL MEDICINE

## 2021-05-07 PROCEDURE — 6370000000 HC RX 637 (ALT 250 FOR IP): Performed by: NURSE PRACTITIONER

## 2021-05-07 PROCEDURE — 1200000003 HC TELEMETRY R&B

## 2021-05-07 PROCEDURE — 36415 COLL VENOUS BLD VENIPUNCTURE: CPT

## 2021-05-07 PROCEDURE — 80048 BASIC METABOLIC PNL TOTAL CA: CPT

## 2021-05-07 PROCEDURE — 93306 TTE W/DOPPLER COMPLETE: CPT

## 2021-05-07 RX ADMIN — ATORVASTATIN CALCIUM 40 MG: 40 TABLET, FILM COATED ORAL at 21:14

## 2021-05-07 RX ADMIN — OXYCODONE HYDROCHLORIDE AND ACETAMINOPHEN 1000 MG: 500 TABLET ORAL at 08:53

## 2021-05-07 RX ADMIN — SODIUM CHLORIDE, PRESERVATIVE FREE 10 ML: 5 INJECTION INTRAVENOUS at 21:14

## 2021-05-07 RX ADMIN — APIXABAN 2.5 MG: 2.5 TABLET, FILM COATED ORAL at 21:14

## 2021-05-07 RX ADMIN — CLOPIDOGREL BISULFATE 75 MG: 75 TABLET ORAL at 08:53

## 2021-05-07 RX ADMIN — AMLODIPINE BESYLATE 2.5 MG: 2.5 TABLET ORAL at 08:53

## 2021-05-07 RX ADMIN — APIXABAN 2.5 MG: 2.5 TABLET, FILM COATED ORAL at 08:53

## 2021-05-07 RX ADMIN — PANTOPRAZOLE SODIUM 40 MG: 40 TABLET, DELAYED RELEASE ORAL at 05:34

## 2021-05-07 RX ADMIN — PAROXETINE HYDROCHLORIDE 20 MG: 20 TABLET, FILM COATED ORAL at 08:53

## 2021-05-07 RX ADMIN — SODIUM CHLORIDE, PRESERVATIVE FREE 10 ML: 5 INJECTION INTRAVENOUS at 08:53

## 2021-05-07 RX ADMIN — POLYETHYLENE GLYCOL 3350 17 G: 17 POWDER, FOR SOLUTION ORAL at 21:23

## 2021-05-07 ASSESSMENT — PAIN SCALES - GENERAL: PAINLEVEL_OUTOF10: 0

## 2021-05-07 NOTE — CARE COORDINATION
5/7/21, 7:19 AM EDT  DISCHARGE PLANNING EVALUATION:    Cammie Pan       Admitted: 5/6/2021/ Jim Crouch 13 day: 1   Location: [facility]03/003-A Reason for admit: Dyspnea [R06.00]   PMH:  has a past medical history of AAA (abdominal aortic aneurysm) ([facility] [address] CAD (coronary artery disease), Cancer ([facility] [address] Cancer of kidney ([facility] [address] Heart attack ([facility] [address] History of placement of chest tube, Hx of blood clots, Hyperlipidemia, Hypertension, Kidney stones, Obesity, Osteoarthritis, and Stroke ([facility] [address] Procedure: CT chest: Aneurysm of the distal thoracic aorta. To ED patient states over the last week he has had shortness of breath and chest pain, fever at home  Barriers to Discharge:  Elevated procal, telemetry, oxygen 2L/min weaned off (wears O2 at HS 3L/min BL), med nebs, on Eliquis, Zithromax and Rocephin IV. PCP: MARTIN Loza CNP  Readmission Risk Score: 15%    Patient Goals/Plan/Treatment Preferences: Met with Marcellemidensi Comer and his wife Roberto Menon. Lives home, drives, has PCP, and has home O2 through [facility] at 3L/min at [facility] only. He declines  or in-home services and is scheduled for stress test with Dr Velasquez Larson on May 17, 2021    Transportation/Food Security/Housekeeping Addressed:  No issues identified.

## 2021-05-07 NOTE — PLAN OF CARE
Problem: Falls - Risk of:  Goal: Will remain free from falls  Description: Will remain free from falls  Outcome: Ongoing  Note: Patient free from falls this shift with call light within reach, slipper socks in place, and bed alarm on. Problem: Falls - Risk of:  Goal: Absence of physical injury  Description: Absence of physical injury  Outcome: Ongoing  Note: Patient free from physical injury this shift with call light within reach, slipper socks in place, and bed alarm on. Problem: Cardiovascular  Goal: No DVT, peripheral vascular complications  Outcome: Ongoing  Note: Patient shows no signs or symptoms of DVT, currently on medication for prevention and frequent ambulation. Problem: Respiratory  Goal: O2 Sat > 90%  Outcome: Ongoing  Note: Patient maintaining oxygenation > 90% with home dose of oxygen and weaning on-going. Problem: GI  Goal: No bowel complications  Outcome: Ongoing   Care plan reviewed with patient. Patient verbalizes understanding of the plan of care and contribute to goal setting.

## 2021-05-07 NOTE — PROGRESS NOTES
Pt arrived in 4K 3 from ED and via cart/stretcher. Complaints: Shortness of breath. IV none infusing into the antecubital right, condition patent and no redness, INT. The best day to schedule a follow up Dr appointment is:  Monday a.m. The patient is interested in [facility]. Lianas meds to beds program?:  Yes    Two person skin assessment by this nurse and [facility] RN. Wife at bedside. MRSA/VRE swabs collected and sent.

## 2021-05-07 NOTE — PROGRESS NOTES
Hospitalist Progress Note      Patient:  Tracy Lanier    Unit/Bed:4K-03/003-A  YOB: 1940  MRN: [id number]   Acct: [de-identified]   PCP: MARTIN Henderson CNP  Date of Admission: 5/6/2021    Assessment/Plan:  Acute febrile illness: unclear etiology. COVID negative. Did not have get check for influenzae but no upper respiratory symptoms reported. Leukocytosis 19.4 on admission, downtrending today 12.5. Procal 0.23. Low suspicion for bacterial infection. Suspect possible viral illness. Patient feeling better at this time. Acute hypoxic respiratory failure: unclear etiology. Placed on 3L NC on admission due to hypoxia 88% on RA. COVID-19 negative. Currently on room air with no dyspnea or increased work of breathing. Resolved. Paroxysmal Afib: currently NSR. CHADsVasc 4. HAS-BLED 3. On Lopressor and Eliquis. On Tele. Essential HTN: controlled. On Norvasc and Lopressor. Hx HFpEF: Follow with Dr. Manjula Alicia. Will get ECHO given mild BLE edema and was supposed to get ECHO done outpatient. Ace wraps knee high and elevated legs to help with edema. Possibly related to PAF. No significant signs for acute decompensated HF otherwise. Hx CAD: s/p PCI for in-stent restenosis in 11/2019. On plavix. lopressor    Hx CKDIII: stable. Monitor I/Os. Hx AAA: Seen on CT this admission, 4.7 cm. Monitor. BENJAMIN: on 3L NC nightly    Obesity: BMI 38.01. Recommend lifestyle modifications.       Tele:   [x] yes             [] no    Code Status: Full Code    Diet: Cardiac    DVT prophylaxis: [] Lovenox                                 [] SCDs                                 [] SQ Heparin                                 [] Encourage ambulation                                 [x] Already on Anticoagulation      Disposition:      [] TBD                             [x] Home                             [] TCU                             [] Rehab S/w pt's brother Rm stating pt requesting order for Scooter to be billed thru insurance. Advised brother pt would need to see Dr. Chaudhari specifically for scooter eval visit to be billed thru ins. Brother voiced understanding and confirmed appt sched w/ Dr. Chaudhari 03/24/20.   [] Psych                             [] SNF                             [] [facility]                             [] Other-    Chief Complaint:  Dyspnea    Hospital Course:   80 yo with history of AAA, CAD, CVA,  HTN, HLD, and PAF, presented with chief complaints of dyspnea and associated chest pain x 1 week reportedly. Reported associated persistent fever with Tmax 102 per wife. On presentation, patient was afebrile and hemodynamically stable, bradycardic, hypoxic to 88%, placed on 3L NC. CXR negative. Labs showed Cr 1.3, BNP and Trop x1 normal, procal at 0.23. UA negative for infection. Admitted for further evaluation. Started on empiric abx therapy. CT chest revealed 4.7 cm aneurysm of distal thoracic aorta, otherwise negative. Of note, wears 3L NC nightly for BENJAMIN. Can not tolerate CPAP. Interval:   No acute events overnight. Subjective:   Per patient and wife, patient had nausea/vomiting on 04/29/2021, had associated dyspnea and mild chest pain. He reports he could feel multiple episodes of afib with his \"heart skipping beats\". Had office visit with Dr. Zunilda Quintana 05/03, EKG had no acute findings. Has follow up ECHO and stress test ordered for outpatient 05/17. Started having fever since Tuesday with Tmax 102 which prompted this ED visit. Reports having no symptoms at this time, including dyspnea, chest pain, dizziness/lightheadedness, nausea/vomiting, diarrhea, abdominal pain, or dysuria. Reports no acutely changing BLE edema, has mild edema at baseline. ROS (12 point review of systems completed. Pertinent positives noted.  Otherwise ROS is negative)     Medications:  Reviewed    Infusion Medications    sodium chloride       Scheduled Medications    amLODIPine  2.5 mg Oral Daily    apixaban  2.5 mg Oral BID    vitamin C  1,000 mg Oral Daily    atorvastatin  40 mg Oral Nightly    clopidogrel  75 mg Oral Daily    metoprolol tartrate  50 mg Oral BID    pantoprazole  40 mg and coronal reconstruction images provided. Comparison:  RAMIN,SR  - XR CHEST PORTABLE  - 05/06/2021 02:41 PM EDT Findings: Moderate atherosclerotic vessel disease within the thoracic aorta. Aneurysm of the distal thoracic aorta measuring 4.7 cm anterior to posterior. Moderate atherosclerotic vessel disease within the thoracic aorta. No dissection. No mediastinal or axillary adenopathy. Normal thyroid. Low lung volumes. No pulmonary nodules. No areas of consolidation. No pneumothorax. Degenerative changes within the spine. Scarring right kidney. Hernia in the epigastric region containing nonobstructed colon. Simple left renal cyst.     Impression: Aneurysm of the distal thoracic aorta. This document has been electronically signed by: Tori Russo MD on 05/06/2021 06:49 PM All CTs at this facility use dose modulation techniques and iterative reconstructions, and/or weight-based dosing when appropriate to reduce radiation to a low as reasonably achievable. Xr Chest Portable    Result Date: 5/6/2021  PROCEDURE: XR CHEST PORTABLE CLINICAL INFORMATION: shortness of breath. Chest pain for one week. COMPARISON: Chest radiograph dated 11/27/2019. TECHNIQUE: AP upright view of the chest. FINDINGS: There are linear/bandlike opacities at the bilateral lung bases, stable compared to prior exam as evidence for recurrent atelectasis or scar. The lungs otherwise appear clear. The pulmonary vasculature and cardiac mediastinal silhouette are within normal  limits. Redemonstration of bilateral shoulder arthroplasties. Stable radiographic appearance of the chest. No evidence of an acute process. **This report has been created using voice recognition software. It may contain minor errors which are inherent in voice recognition technology. ** Final report electronically signed by Dr. Maria Dolores Marsh MD on 5/6/2021 4:29 PM          Jennifer Garcia MD   PGY1, Internal Medicine   5/7/2021

## 2021-05-08 VITALS
RESPIRATION RATE: 17 BRPM | HEIGHT: 68 IN | DIASTOLIC BLOOD PRESSURE: 66 MMHG | HEART RATE: 77 BPM | TEMPERATURE: 98 F | BODY MASS INDEX: 38.42 KG/M2 | SYSTOLIC BLOOD PRESSURE: 125 MMHG | OXYGEN SATURATION: 96 % | WEIGHT: 253.5 LBS

## 2021-05-08 LAB
EKG ATRIAL RATE: 72 BPM
EKG P AXIS: 29 DEGREES
EKG P-R INTERVAL: 178 MS
EKG Q-T INTERVAL: 410 MS
EKG QRS DURATION: 130 MS
EKG QTC CALCULATION (BAZETT): 448 MS
EKG R AXIS: 38 DEGREES
EKG T AXIS: 11 DEGREES
EKG VENTRICULAR RATE: 72 BPM
MRSA SCREEN: NORMAL

## 2021-05-08 PROCEDURE — 6370000000 HC RX 637 (ALT 250 FOR IP): Performed by: STUDENT IN AN ORGANIZED HEALTH CARE EDUCATION/TRAINING PROGRAM

## 2021-05-08 PROCEDURE — 93010 ELECTROCARDIOGRAM REPORT: CPT | Performed by: INTERNAL MEDICINE

## 2021-05-08 PROCEDURE — 6370000000 HC RX 637 (ALT 250 FOR IP): Performed by: NURSE PRACTITIONER

## 2021-05-08 PROCEDURE — 99238 HOSP IP/OBS DSCHRG MGMT 30/<: CPT | Performed by: INTERNAL MEDICINE

## 2021-05-08 PROCEDURE — 2580000003 HC RX 258: Performed by: NURSE PRACTITIONER

## 2021-05-08 RX ADMIN — SODIUM CHLORIDE, PRESERVATIVE FREE 10 ML: 5 INJECTION INTRAVENOUS at 08:02

## 2021-05-08 RX ADMIN — METOPROLOL TARTRATE 50 MG: 50 TABLET, FILM COATED ORAL at 08:02

## 2021-05-08 RX ADMIN — PANTOPRAZOLE SODIUM 40 MG: 40 TABLET, DELAYED RELEASE ORAL at 08:02

## 2021-05-08 RX ADMIN — APIXABAN 2.5 MG: 2.5 TABLET, FILM COATED ORAL at 08:02

## 2021-05-08 RX ADMIN — PAROXETINE HYDROCHLORIDE 20 MG: 20 TABLET, FILM COATED ORAL at 08:02

## 2021-05-08 RX ADMIN — AMLODIPINE BESYLATE 2.5 MG: 2.5 TABLET ORAL at 08:02

## 2021-05-08 RX ADMIN — CLOPIDOGREL BISULFATE 75 MG: 75 TABLET ORAL at 08:02

## 2021-05-08 RX ADMIN — OXYCODONE HYDROCHLORIDE AND ACETAMINOPHEN 1000 MG: 500 TABLET ORAL at 08:02

## 2021-05-08 NOTE — PROGRESS NOTES
Discharge teaching and instructions for diagnosis/procedure of shortness of breath completed with patient using teachback method. AVS reviewed. Printed prescriptions given to patient. Patient voiced understanding regarding prescriptions, follow up appointments, and care of self at home.  Discharged in a wheelchair to  home with support per family

## 2021-05-08 NOTE — DISCHARGE SUMMARY
Discharge Summary     Patient Identification:  Charles Dryer  : 1940  MRN: [id number]   Account: [de-identified]     Admit date: 2021  Discharge date: 21   Attending provider: Matteo Rhodes DO        Primary care provider: MARTIN Cota CNP     Discharge Diagnoses: Active Problems:    Dyspnea    Hypoxia  Resolved Problems:    * No resolved hospital problems. *    From prior note: \"Assessment/Plan:  Acute febrile illness: unclear etiology. COVID negative. Did not have get check for influenzae but no upper respiratory symptoms reported. Leukocytosis 19.4 on admission, downtrending today 12.5. Procal 0.23. Low suspicion for bacterial infection. Suspect possible viral illness. Patient feeling better at this time.      Acute hypoxic respiratory failure: unclear etiology. Placed on 3L NC on admission due to hypoxia 88% on RA. COVID-19 negative. Currently on room air with no dyspnea or increased work of breathing. Resolved.     Paroxysmal Afib: currently NSR. CHADsVasc 4. HAS-BLED 3. On Lopressor and Eliquis. On Tele.     Essential HTN: controlled. On Norvasc and Lopressor.     Hx HFpEF: Follow with Dr. Jose Clark. Will get ECHO given mild BLE edema and was supposed to get ECHO done outpatient. Ace wraps knee high and elevated legs to help with edema. Possibly related to PAF. No significant signs for acute decompensated HF otherwise.     Hx CAD: s/p PCI for in-stent restenosis in 2019. On plavix. lopressor     Hx CKDIII: stable. Monitor I/Os.    Hx AAA: Seen on CT this admission, 4.7 cm. Monitor.     BENJAMIN: on 3L NC nightly     Obesity: BMI 38.01. Recommend lifestyle modifications. \"        21: Pt feels well, no further fevers. Walked the em without issue and without hypoxia. He is agreeable with discharge.     Discharge Medications:   Vista Sprang   Martinez Medication Instructions AL:[id number]    Printed on:21 1113   Medication Information                      acetaminophen Oral   SpO2: 96% 98% 96% 96%   Weight:       Height:         Weight: Weight: 253 lb 8 oz (115 kg)     24 hour intake/output:    Intake/Output Summary (Last 24 hours) at 5/8/2021 1113  Last data filed at 5/7/2021 2053  Gross per 24 hour   Intake 580 ml   Output 1100 ml   Net -520 ml       General appearance - alert, well appearing, and in no distress, oriented to person, place, and time and overweight  Chest - clear to auscultation, no wheezes, rales or rhonchi, symmetric air entry  Heart - normal rate, regular rhythm, normal S1, S2, no murmurs, rubs, clicks or gallops  Abdomen - soft, nontender, nondistended, no masses or organomegaly  bowel sounds normal  Obese: Yes; Protuberant: No   Neurological - alert, oriented, normal speech, no focal findings or movement disorder noted  Extremities - peripheral pulses normal, no pedal edema, no clubbing or cyanosis  Skin - normal coloration and turgor, no rashes, no suspicious skin lesions noted    Significant Diagnostics:   Radiology: Echo Complete 2d W Doppler W Color    Result Date: 5/7/2021  Transthoracic Echocardiography Report (TTE)  Demographics   Patient Name    Cleone Habermann  Gender               Male                  G   MR #            [id number]       Race                                                    Ethnicity   Account #       [de-identified]       Room Number          0003   Accession       [id number]      Date of Study        05/07/2021  Number   Date of Birth   1940      Referring Physician  Larisa Song MD   Age             79 year(s)      Sonographer          Kae Garcia, [facility]                                   Interpreting         Zainab Brito MD                                  Physician  Procedure Type of Study   TTE procedure:ECHOCARDIOGRAM COMPLETE 2D W DOPPLER W COLOR.   Procedure Date Date: 05/07/2021 Start: 11:35 AM Study Location: Bedside Technical Quality: Adequate visualization Indications:Lower extremity edema. Additional Medical History:Atrial fibrillation, CAD, obesity, hypertension, hyperlipidemia, rectal cancer, chronic kidney disease, sleep apnea Patient Status: Routine Height: 68.11 inches Weight: 253.53 pounds BSA: 2.26 m^2 BMI: 38.42 kg/m^2 BP: 128/64 mmHg Allergies   - Tetracycline. Conclusions   Summary  Normal left ventricle size and systolic function. Ejection fraction was  estimated at 55-60%. There were no regional left ventricular wall motion  abnormalities and wall thickness was within normal limits. Trace aortic regurgitation. Trace mitral regurgitation. Trace tricuspid regurgitation. Signature   ----------------------------------------------------------------  Electronically signed by Stepan Sigala MD (Interpreting  physician) on 05/07/2021 at 01:14 PM  ----------------------------------------------------------------   Findings   Mitral Valve  The mitral valve structure was normal with normal leaflet separation. DOPPLER: The transmitral velocity was within the normal range with no  evidence for mitral stenosis. There no evidence of Trace mitral  regurgitation. Aortic Valve  The aortic valve was trileaflet with normal thickness and cuspal  separation. DOPPLER: Transaortic velocity was within the normal range with  no evidence of aortic stenosis. There was evidence of Trace aortic  regurgitation. Tricuspid Valve  The tricuspid valve structure was normal with normal leaflet separation. DOPPLER: There was no evidence of tricuspid stenosis. There was evidence  of Trace tricuspid regurgitation. Pulmonic Valve  The pulmonic valve leaflets exhibited normal thickness, no calcification,  and normal cuspal separation. DOPPLER: The transpulmonic velocity was  within the normal range with no evidence for regurgitation. Left Atrium  Left atrial size was normal.   Left Ventricle  Normal left ventricle size and systolic function.  Ejection fraction was  estimated at 55-60%. There were no regional left ventricular wall motion  abnormalities and wall thickness was within normal limits. Right Atrium  Right atrial size was normal.   Right Ventricle  The right ventricular size was normal with normal systolic function and  wall thickness. Pericardial Effusion  The pericardium was normal in appearance with no evidence of a pericardial  effusion. Pleural Effusion  No evidence of pleural effusion. Aorta / Great Vessels  -Aortic root dimension within normal limits.  -The Pulmonary artery is within normal limits. -IVC size is within normal limits with normal respiratory phasic changes.   M-Mode/2D Measurements & Calculations   LV Diastolic   LV Systolic Dimension: 3  AV Cusp Separation: 2.1 cmLA  Dimension: 4.3 cm                        Dimension: 4.7 cmAO Root  cm             LV Volume Diastolic: 89.6 Dimension: 3.6 cmLA Area: 14.3  LV FS:30.2 %   ml                        cm^2  LV PW          LV Volume Systolic: 35 ml  Diastolic: 1   LV EDV/LV EDV Index: 83.1  cm             ml/37 m^2LV ESV/LV ESV  Septum         Index: 35 ml/15 m^2       RV Diastolic Dimension: 3.1 cm  Diastolic: 1.2 EF Calculated: 57.9 %  cm                                       LA/Aorta: 1.31                                            LA volume/Index: 36.8 ml /16m^2  Doppler Measurements & Calculations   MV Peak E-Wave: 86.5 cm/s AV Peak Velocity: 116 LVOT Peak Velocity: 99.8  MV Peak A-Wave: 94.3 cm/s cm/s                  cm/s  MV E/A Ratio: 0.92        AV Peak Gradient:     LVOT Peak Gradient: 4 mmHg  MV Peak Gradient: 2.99    5.38 mmHg  mmHg                                            TV Peak E-Wave: 50.1 cm/s                                                  TV Peak A-Wave: 38.1 cm/s  MV Deceleration Time: 208  msec                                            TV Peak Gradient: 1 mmHg  MV P1/2t: 61 msec         AV P1/2t: 504 msec    TR Velocity:204 cm/s  MVA by PHT:3.61 cm^2 dated 11/27/2019. TECHNIQUE: AP upright view of the chest. FINDINGS: There are linear/bandlike opacities at the bilateral lung bases, stable compared to prior exam as evidence for recurrent atelectasis or scar. The lungs otherwise appear clear. The pulmonary vasculature and cardiac mediastinal silhouette are within normal  limits. Redemonstration of bilateral shoulder arthroplasties. Stable radiographic appearance of the chest. No evidence of an acute process. **This report has been created using voice recognition software. It may contain minor errors which are inherent in voice recognition technology. ** Final report electronically signed by Dr. Elvis Carlton MD on 5/6/2021 4:29 PM      Labs:   Recent Results (from the past 72 hour(s))   EKG 12 Lead    Collection Time: 05/06/21  2:24 PM   Result Value Ref Range    Ventricular Rate 72 BPM    Atrial Rate 72 BPM    P-R Interval 178 ms    QRS Duration 130 ms    Q-T Interval 410 ms    QTc Calculation (Bazett) 448 ms    P Axis 29 degrees    R Axis 38 degrees    T Axis 11 degrees   Culture, Blood 1    Collection Time: 05/06/21  2:44 PM    Specimen: Blood   Result Value Ref Range    Blood Culture, Routine No growth-preliminary     Basic Metabolic Panel w/ Reflex to MG    Collection Time: 05/06/21  2:56 PM   Result Value Ref Range    Sodium 136 135 - 145 meq/L    Potassium reflex Magnesium 4.4 3.5 - 5.2 meq/L    Chloride 101 98 - 111 meq/L    CO2 25 23 - 33 meq/L    Glucose 136 (H) 70 - 108 mg/dL    BUN 18 7 - 22 mg/dL    CREATININE 1.3 (H) 0.4 - 1.2 mg/dL    Calcium 9.0 8.5 - 10.5 mg/dL   CBC Auto Differential    Collection Time: 05/06/21  2:56 PM   Result Value Ref Range    WBC 19.4 (H) 4.8 - 10.8 thou/mm3    RBC 4.61 (L) 4.70 - 6.10 mill/mm3    Hemoglobin 14.8 14.0 - 18.0 gm/dl    Hematocrit 46.0 42.0 - 52.0 %    MCV 99.8 (H) 80.0 - 94.0 fL    MCH 32.1 26.0 - 33.0 pg    MCHC 32.2 32.2 - 35.5 gm/dl    RDW-CV 13.4 11.5 - 14.5 %    RDW-SD 48.8 (H) 35.0 - 45.0 fL Platelets 860 177 - 078 thou/mm3    MPV 9.6 9.4 - 12.4 fL    Seg Neutrophils 87.5 %    Lymphocytes 3.8 %    Monocytes 3.9 %    Eosinophils 0.1 %    Basophils 0.5 %    Immature Granulocytes 4.2 %    Platelet Estimate ADEQUATE Adequate    Segs Absolute 17.0 (H) 1 - 7 thou/mm3    Lymphocytes Absolute 0.7 (L) 1.0 - 4.8 thou/mm3    Monocytes Absolute 0.8 0.4 - 1.3 thou/mm3    Eosinophils Absolute 0.0 0.0 - 0.4 thou/mm3    Basophils Absolute 0.1 0.0 - 0.1 thou/mm3    Immature Grans (Abs) 0.81 (H) 0.00 - 0.07 thou/mm3    nRBC 0 /100 wbc   Brain Natriuretic Peptide    Collection Time: 05/06/21  2:56 PM   Result Value Ref Range    Pro-.0 0.0 - 1800.0 pg/mL   Troponin    Collection Time: 05/06/21  2:56 PM   Result Value Ref Range    Troponin T < 0.010 ng/ml   Lactic Acid, Plasma    Collection Time: 05/06/21  2:56 PM   Result Value Ref Range    Lactic Acid 1.8 0.5 - 2.0 mmol/L   Procalcitonin    Collection Time: 05/06/21  2:56 PM   Result Value Ref Range    Procalcitonin 0.23 (H) 0.01 - 0.09 ng/mL   Culture, Blood 2    Collection Time: 05/06/21  2:56 PM    Specimen: Blood   Result Value Ref Range    Blood Culture, Routine No growth-preliminary     Anion Gap    Collection Time: 05/06/21  2:56 PM   Result Value Ref Range    Anion Gap 10.0 8.0 - 16.0 meq/L   Glomerular Filtration Rate, Estimated    Collection Time: 05/06/21  2:56 PM   Result Value Ref Range    Est, Glom Filt Rate 53 (A) ml/min/1.73m2   Osmolality    Collection Time: 05/06/21  2:56 PM   Result Value Ref Range    Osmolality Calc 275.9 275.0 - 300.0 mOsmol/kg   Scan of Blood Smear    Collection Time: 05/06/21  2:56 PM   Result Value Ref Range    SCAN OF BLOOD SMEAR see below    COVID-19, Rapid    Collection Time: 05/06/21  3:10 PM    Specimen: Nasopharyngeal Swab   Result Value Ref Range    SARS-CoV-2, NAAT NOT DETECTED NOT DETECTED   Urine with Reflexed Micro    Collection Time: 05/06/21  3:10 PM   Result Value Ref Range    Glucose, Ur NEGATIVE NEGATIVE

## 2021-05-10 ASSESSMENT — ENCOUNTER SYMPTOMS
PHOTOPHOBIA: 0
CHEST TIGHTNESS: 0
CONSTIPATION: 0
SHORTNESS OF BREATH: 1
WHEEZING: 0
COUGH: 1
NAUSEA: 0
SORE THROAT: 0
ABDOMINAL PAIN: 0
COLOR CHANGE: 0
VOMITING: 0
BACK PAIN: 0
RHINORRHEA: 0
ABDOMINAL DISTENTION: 0
TROUBLE SWALLOWING: 0
APNEA: 0
DIARRHEA: 0
FACIAL SWELLING: 0
SINUS PRESSURE: 0
SINUS PAIN: 0

## 2021-05-10 NOTE — CARE COORDINATION
5/10/21, 7:18 AM EDT  Home with wife on 05/08; no needs. Patient goals/plan/ treatment preferences discussed by  and . Patient goals/plan/ treatment preferences reviewed with patient/ family. Patient/ family verbalize understanding of discharge plan and are in agreement with goal/plan/treatment preferences. Understanding was demonstrated using the teach back method. AVS provided by RN at time of discharge, which includes all necessary medical information pertaining to the patients current course of illness, treatment, post-discharge goals of care, and treatment preferences.         IMM Letter  IMM Letter given to Patient/Family/Significant other/Guardian/POA/by[de-identified] pt access  IMM Letter date given[de-identified] 05/06/21  IMM Letter time given[de-identified] [id number]

## 2021-05-12 LAB
BLOOD CULTURE, ROUTINE: NORMAL
BLOOD CULTURE, ROUTINE: NORMAL

## 2021-05-17 ENCOUNTER — HOSPITAL ENCOUNTER (OUTPATIENT)
Dept: NON INVASIVE DIAGNOSTICS | Age: 81
Discharge: HOME | End: 2021-05-17
Payer: MEDICARE

## 2021-05-17 ENCOUNTER — TELEPHONE (OUTPATIENT)
Dept: CARDIOLOGY CLINIC | Age: 81
End: 2021-05-17

## 2021-05-17 VITALS — WEIGHT: 252 LBS | BODY MASS INDEX: 38.19 KG/M2 | HEIGHT: 68 IN

## 2021-05-17 DIAGNOSIS — I25.10 CAD IN NATIVE ARTERY: ICD-10-CM

## 2021-05-17 DIAGNOSIS — I50.32 CHRONIC HEART FAILURE WITH PRESERVED EJECTION FRACTION (HFPEF) (HCC): ICD-10-CM

## 2021-05-17 DIAGNOSIS — R06.09 DOE (DYSPNEA ON EXERTION): ICD-10-CM

## 2021-05-17 PROCEDURE — 93017 CV STRESS TEST TRACING ONLY: CPT | Performed by: INTERNAL MEDICINE

## 2021-05-17 PROCEDURE — A9500 TC99M SESTAMIBI: HCPCS | Performed by: INTERNAL MEDICINE

## 2021-05-17 PROCEDURE — 78452 HT MUSCLE IMAGE SPECT MULT: CPT

## 2021-05-17 PROCEDURE — 6360000002 HC RX W HCPCS

## 2021-05-17 PROCEDURE — 3430000000 HC RX DIAGNOSTIC RADIOPHARMACEUTICAL: Performed by: INTERNAL MEDICINE

## 2021-05-17 RX ADMIN — Medication 34 MILLICURIE: at 11:15

## 2021-05-17 RX ADMIN — Medication 9.3 MILLICURIE: at 10:12

## 2021-05-17 NOTE — TELEPHONE ENCOUNTER
Result note for Stress test Sharonda Chang        ----- Message from Rasta Tyler MD sent at 5/17/2021  2:54 PM EDT -----  Please inform patient that stress test result was unremarkable with no evidence for ischemia.

## 2021-05-20 NOTE — PROGRESS NOTES
Physician Progress Note      PATIENT:               Dinorah Aguiar  CSN #:                  [id number]  :                       1940  ADMIT DATE:       2021 2:21 PM  [address] [place] DATE:        2021 12:58 PM  RESPONDING  PROVIDER #:        Laura Leger DO          QUERY TEXT:    Dr Charli Ledezma,    Patient admitted with Dypsnea & Hypoxia. Noted documentation of acute   respiratory failure in note. In order to support the diagnosis of acute   respiratory failure, please include additional clinical indicators in your   documentation. Or please document if the diagnosis of acute respiratory   failure has been ruled out after further study. The medical record reflects the following:  Risk Factors: Utilizes 3LNC HS. [facility] transferred to [facility] for hypoxia &Shortness   of breath. Clinical Indicators: No documented Work of breathing. Respiratory:  Normal   respiratory effort. Clear to auscultation, bilaterally without   Rales/Wheezes/Rhonchi. Utilizing 3 lnc then titrating to RA.   Treatment: application of 3LNC supplemental 02 to maintain SPO2 > 88%,    Acute Respiratory Failure Clinical Indicators per  MS-DRG Training Guide and   Quick Reference Guide:  pO2 < 60 mmHg or SpO2 (pulse oximetry) < 91% breathing room air  pCO2 > 50 and pH < 7.35  P/F ratio (pO2 / FIO2) < 300  pO2 decrease or pCO2 increase by 10 mmHg from baseline (if known)  Supplemental oxygen of 40% or more  Presence of respiratory distress, tachypnea, dyspnea, shortness of breath,   wheezing  Unable to speak in complete sentences  Use of accessory muscles to breathe  Extreme anxiety and feeling of impending doom  Tripod position  Confusion/altered mental status/obtunded  Options provided:  -- Acute Respiratory Failure as evidenced by use of 3LNC continuous   supplemental 02 titrating to RA to maintain SPO2 > 88%  -- Acute Respiratory Failure ruled out after study  -- Other - I will add my own diagnosis  -- Disagree - Not applicable / Not valid  -- Disagree - Clinically unable to determine / Unknown  -- Refer to Clinical Documentation Reviewer    PROVIDER RESPONSE TEXT:    This patient is in acute respiratory failure as evidenced by use of 3LNC   continuous supplemental 02 titrating to RA to maintain SPO2 > 88%.     Query created by: Terry Koch on 5/17/2021 12:04 PM      Electronically signed by:  Racquel Murry DO 5/20/2021 8:30 AM

## 2021-06-07 DIAGNOSIS — I25.10 ASCVD (ARTERIOSCLEROTIC CARDIOVASCULAR DISEASE): ICD-10-CM

## 2021-06-08 RX ORDER — CLOPIDOGREL BISULFATE 75 MG/1
75 TABLET ORAL DAILY
Qty: 90 TABLET | Refills: 3 | Status: SHIPPED | OUTPATIENT
Start: 2021-06-08 | End: 2022-07-26

## 2021-07-21 RX ORDER — METOPROLOL TARTRATE 50 MG/1
50 TABLET, FILM COATED ORAL 2 TIMES DAILY
Qty: 60 TABLET | Refills: 0 | Status: SHIPPED | OUTPATIENT
Start: 2021-07-21 | End: 2021-08-04

## 2021-07-21 RX ORDER — METOPROLOL TARTRATE 50 MG/1
50 TABLET, FILM COATED ORAL 2 TIMES DAILY
Qty: 180 TABLET | Refills: 1 | Status: SHIPPED | OUTPATIENT
Start: 2021-07-21 | End: 2021-11-29

## 2021-07-21 NOTE — TELEPHONE ENCOUNTER
Tristan Avalos called requesting a refill on the following medications:  Requested Prescriptions     Pending Prescriptions Disp Refills    metoprolol tartrate (LOPRESSOR) 50 MG tablet 60 tablet      Sig: Take 1 tablet by mouth 2 times daily     Pharmacy verified:[facility]      Date of last visit:   Date of next visit (if applicable): 7/77/7782

## 2021-08-02 NOTE — PROGRESS NOTES
[facility] PROFESSIONAL SERVICES  [facility]   [address]   [address] [address]   Dept: [phone]   Dept Fax: [phone]   Loc: [phone]      Chief Complaint   Patient presents with    Shortness of Breath    Atrial Fibrillation     Cardiologist:  Dr. Cammie Barcenas  78 yo male presents for sob, afib. Hx of PAF, CAD, BENJAMIN, HTN. No chest pain, angina, MULLINS, orthopnea, PND, sob at rest, palpitations, LE edema, or syncope. Wife states she thinks his SOB has been worse over the last 6 months, patient denies feeling this way. States he is more active, especially with it being warmer out. Denies SOB at rest or with low exertion activities such as walking. Gets SOB with significant exertion. Patient cannot tell when he is in afib/flutter. Monitors bp, hr and states he feels he is in afib when he has his BP taken.    Recent ST, Echo were normal.     General:   No fever, no chills, No fatigue or weight loss  Pulmonary:    No dyspnea, no wheezing  Cardiac:    Denies recent chest pain   GI:     No nausea or vomiting, no abdominal pain  Neuro:     No dizziness or light headedness  Musculoskeletal:  No recent active issues  Extremities:   No edema, good peripheral pulses      Past Medical History:   Diagnosis Date    AAA (abdominal aortic aneurysm) ([address]     CAD (coronary artery disease)     Cancer (HCC)     cheek and nose     Cancer of kidney ([address]     Heart attack ([address]     History of placement of chest tube     Due to pt falling and breaking 4 ribs and puncturing his lung    Hx of blood clots     Hyperlipidemia     Hypertension     Kidney stones     Obesity     Osteoarthritis     Stroke ([address]        No Known Allergies    Current Outpatient Medications   Medication Sig Dispense Refill    Omega-3 Fatty Acids (FISH OIL PO) Take by mouth      metoprolol tartrate (LOPRESSOR) 50 MG tablet Take 1 tablet by mouth 2 times daily 180 tablet 1    clopidogrel (PLAVIX) 75 MG tablet TAKE 1 TABLET BY MOUTH  DAILY 90 tablet 3    OXYGEN Inhale 3 L into the lungs nightly      acetaminophen (TYLENOL) 500 MG tablet Take 1,000 mg by mouth every 6 hours as needed for Pain      polyethylene glycol (GLYCOLAX) 17 GM/SCOOP powder Take 17 g by mouth daily as needed      apixaban (ELIQUIS) 2.5 MG TABS tablet Take 1 tablet by mouth 2 times daily 180 tablet 3    atorvastatin (LIPITOR) 40 MG tablet Take 1 tablet by mouth nightly 90 tablet 3    Coenzyme Q10 (CO Q-10) 100 MG CAPS Take by mouth      Cholecalciferol (VITAMIN D3) 125 MCG (5000 UT) TABS Take by mouth      APPLE CIDER VINEGAR PO Take 450 mg by mouth      amLODIPine (NORVASC) 2.5 MG tablet TAKE 1 TABLET BY MOUTH  DAILY 90 tablet 3    pantoprazole (PROTONIX) 40 MG tablet TAKE 1 TABLET BY MOUTH  DAILY 90 tablet 3    PARoxetine (PAXIL) 20 MG tablet Take 20 mg by mouth every morning      Magnesium 500 MG TABS Take by mouth daily       CINNAMON PO Take 1,000 mg by mouth daily      Probiotic Product (PROBIOTIC DAILY PO) Take by mouth daily      Ascorbic Acid (VITAMIN C) 1000 MG tablet Take 1,000 mg by mouth daily       Turmeric Curcumin 500 MG CAPS Take by mouth       No current facility-administered medications for this visit.        Social History     Socioeconomic History    Marital status:      Spouse name: Rogers Mcdermott Number of children: 3    Years of education: None    Highest education level: None   Occupational History    None   Tobacco Use    Smoking status: Former Smoker     Packs/day: 1.00     Years: 15.00     Pack years: 15.00     Types: Pipe     Start date: 5     Quit date: 1970     Years since quittin.4    Smokeless tobacco: Never Used   Vaping Use    Vaping Use: Never used   Substance and Sexual Activity    Alcohol use: No     Alcohol/week: 0.0 standard drinks    Drug use: No    Sexual activity: None   Other Topics Concern    None   Social History Narrative    None     Social Determinants of Health     Financial Resource Strain:     Difficulty of Paying Living Expenses:    Food Insecurity:     Worried About Running Out of Food in the Last Year:     [address] in the Last Year:    Transportation Needs:     Lack of Transportation (Medical):  Lack of Transportation (Non-Medical):    Physical Activity:     Days of Exercise per Week:     Minutes of Exercise per Session:    Stress:     Feeling of Stress :    Social Connections:     Frequency of Communication with Friends and Family:     Frequency of Social Gatherings with Friends and Family:     Attends [employer] Services:     Active Member of Clubs or Organizations:     Attends Club or Organization Meetings:     Marital Status:    Intimate Partner Violence:     Fear of Current or Ex-Partner:     Emotionally Abused:     Physically Abused:     Sexually Abused:        Family History   Problem Relation Age of Onset    Alzheimer's Disease Mother     Heart Disease Father        Blood pressure 137/77, pulse 67, height 5' 8\" (1.727 m), weight 258 lb 6.4 oz (117.2 kg). General:   Well developed, well nourished  Lungs:   Clear to auscultation  Heart:    Normal S1 S2, No murmur, rubs, or gallops  Abdomen:   Soft, non tender, no organomegalies, positive bowel sounds, obese abdomen  Extremities:   No edema, no cyanosis, good peripheral pulses  Neurological:   Awake, alert, oriented. No obvious focal deficits  Musculoskeletal:  No obvious deformities    EKG:     TTE  Conclusions      Summary   Normal left ventricle size and systolic function. Ejection fraction was   estimated at 55-60%. There were no regional left ventricular wall motion   abnormalities and wall thickness was within normal limits. Trace aortic regurgitation. Trace mitral regurgitation. Trace tricuspid regurgitation.       Signature      ----------------------------------------------------------------   Electronically signed by Michelle Canas MD (Interpreting   physician) on 05/07/2021 at 01:14 PM    ST  Conclusions      Summary   There is mild attenuation artifact noted in the inferior wall seems to be   related to diaphragm artifact. The nuclear images is not suggestive for myocardial ischemia. Recommendation   Clinical correlation is recommended. Signatures      ----------------------------------------------------------------   Electronically signed by Yandel Bangura MD (Interpreting   Cardiologist) on 05/17/2021 at 14:20     Diagnosis Orders   1. Atrial fibrillation/flutter ([address]     2. Coronary artery disease involving native coronary artery of native heart without angina pectoris     3. Essential hypertension     4. Pure hypercholesterolemia     5. BENJAMIN (obstructive sleep apnea) can't tolerate CPAP     6. LAE (left atrial enlargement)         No orders of the defined types were placed in this encounter. Assessment/Plan:   Patient has been doing well cardiac wise it seems. Patient has some SOB on significant exertion. None at rest. No CP. Patient has BENJAMIN, cannot tolerate CPAP, does use low dose oxygen at night. States he does not wake up feeling tired most days. Feels he gets good sleep. D/w patient that losing weight and keeping BENJAMIN under control may help improve SOB. He should know his limits with activity and not to overdue it, especially when it is warm out. Will continue current treatment including norvasc, lopressor. eliquis for hx of afib/flutter. lipitor. Plavix. Patient to call office if symptoms worsen or progress. Disposition:   F/u with Dr Lynn Finch in 6 months.      Continue Dr Huggins Ulen current treatment plan  Follow up with Dr Lynn Finch as scheduled or sooner if needed

## 2021-08-04 ENCOUNTER — OFFICE VISIT (OUTPATIENT)
Dept: CARDIOLOGY CLINIC | Age: 81
End: 2021-08-04
Payer: MEDICARE

## 2021-08-04 VITALS
HEART RATE: 67 BPM | SYSTOLIC BLOOD PRESSURE: 137 MMHG | HEIGHT: 68 IN | WEIGHT: 258.4 LBS | DIASTOLIC BLOOD PRESSURE: 77 MMHG | BODY MASS INDEX: 39.16 KG/M2

## 2021-08-04 DIAGNOSIS — G47.33 OSA (OBSTRUCTIVE SLEEP APNEA): ICD-10-CM

## 2021-08-04 DIAGNOSIS — I25.10 CORONARY ARTERY DISEASE INVOLVING NATIVE CORONARY ARTERY OF NATIVE HEART WITHOUT ANGINA PECTORIS: ICD-10-CM

## 2021-08-04 DIAGNOSIS — I10 ESSENTIAL HYPERTENSION: ICD-10-CM

## 2021-08-04 DIAGNOSIS — E78.00 PURE HYPERCHOLESTEROLEMIA: ICD-10-CM

## 2021-08-04 DIAGNOSIS — I51.7 LAE (LEFT ATRIAL ENLARGEMENT): ICD-10-CM

## 2021-08-04 PROCEDURE — 1036F TOBACCO NON-USER: CPT | Performed by: STUDENT IN AN ORGANIZED HEALTH CARE EDUCATION/TRAINING PROGRAM

## 2021-08-04 PROCEDURE — G8427 DOCREV CUR MEDS BY ELIG CLIN: HCPCS | Performed by: STUDENT IN AN ORGANIZED HEALTH CARE EDUCATION/TRAINING PROGRAM

## 2021-08-04 PROCEDURE — G8417 CALC BMI ABV UP PARAM F/U: HCPCS | Performed by: STUDENT IN AN ORGANIZED HEALTH CARE EDUCATION/TRAINING PROGRAM

## 2021-08-04 PROCEDURE — 99213 OFFICE O/P EST LOW 20 MIN: CPT | Performed by: STUDENT IN AN ORGANIZED HEALTH CARE EDUCATION/TRAINING PROGRAM

## 2021-08-04 PROCEDURE — 1123F ACP DISCUSS/DSCN MKR DOCD: CPT | Performed by: STUDENT IN AN ORGANIZED HEALTH CARE EDUCATION/TRAINING PROGRAM

## 2021-08-04 PROCEDURE — 4040F PNEUMOC VAC/ADMIN/RCVD: CPT | Performed by: STUDENT IN AN ORGANIZED HEALTH CARE EDUCATION/TRAINING PROGRAM

## 2021-09-22 NOTE — PROGRESS NOTES
[id number] [facility] [address] [place] [address]  Dept: [phone]  Dept Fax: [phone]  Loc: [phone]    Visit Date: 9/23/2021    Mr. Chepe Lamar is a 79 y.o. male  who presented for:    Atrial fibrillation     HPI:   RHODA Hills is a pleasant 79year old male patient who  has a past medical history of AAA (abdominal aortic aneurysm) ([facility] CAD (coronary artery disease), Cancer ([facility] [facility] Cancer of kidney ([facility] [facility] Heart attack ([facility] [address] [facility] History of placement of chest tube, Hx of blood clots, Hyperlipidemia, Hypertension, Kidney stones, Obesity, Osteoarthritis, and Stroke ([facility] [address] He had prior CEA, AAA repair. The patient underwent LHC in 11/2019 for unstable angina. He was found to have severe ISR in RCA, underwent successful PCI. Nuclear stress test 5/2021 was negative for ischemia. Echocardiogram 5/2021 revealed an EF of 55-60%. Chest CTA that was done 5/2021 while patient was hospitalized revealed distal thoracic aorta aneurysm, diameter 4.7 cm. The patient reports occasional palpitations. Patient denies chest pain, shortness of breath, dyspnea on exertion, orthopnea, paroxysmal nocturnal dyspnea, palpitations, dizziness, syncope, weight gain or leg swelling.          Current Outpatient Medications:     Turmeric Curcumin 500 MG CAPS, Take by mouth, Disp: , Rfl:     Omega-3 Fatty Acids (FISH OIL PO), Take by mouth, Disp: , Rfl:     metoprolol tartrate (LOPRESSOR) 50 MG tablet, Take 1 tablet by mouth 2 times daily, Disp: 180 tablet, Rfl: 1    clopidogrel (PLAVIX) 75 MG tablet, TAKE 1 TABLET BY MOUTH  DAILY, Disp: 90 tablet, Rfl: 3    OXYGEN, Inhale 3 L into the lungs nightly, Disp: , Rfl:     acetaminophen (TYLENOL) 500 MG tablet, Take 1,000 mg by mouth every 6 hours as needed for Pain, Disp: , Rfl:     polyethylene glycol (GLYCOLAX) 17 GM/SCOOP powder, Take 17 g by mouth daily as needed, Disp: , Rfl:     apixaban (ELIQUIS) 2.5 MG TABS tablet, Take 1 tablet by mouth 2 times daily, Disp: 180 tablet, Rfl: 3    atorvastatin (LIPITOR) 40 MG tablet, Take 1 tablet by mouth nightly, Disp: 90 tablet, Rfl: 3    Coenzyme Q10 (CO Q-10) 100 MG CAPS, Take by mouth, Disp: , Rfl:     Cholecalciferol (VITAMIN D3) 125 MCG (5000 UT) TABS, Take by mouth, Disp: , Rfl:     APPLE CIDER VINEGAR PO, Take 450 mg by mouth, Disp: , Rfl:     amLODIPine (NORVASC) 2.5 MG tablet, TAKE 1 TABLET BY MOUTH  DAILY, Disp: 90 tablet, Rfl: 3    pantoprazole (PROTONIX) 40 MG tablet, TAKE 1 TABLET BY MOUTH  DAILY, Disp: 90 tablet, Rfl: 3    PARoxetine (PAXIL) 20 MG tablet, Take 20 mg by mouth every morning, Disp: , Rfl:     Magnesium 500 MG TABS, Take by mouth daily , Disp: , Rfl:     CINNAMON PO, Take 1,000 mg by mouth daily, Disp: , Rfl:     Probiotic Product (PROBIOTIC DAILY PO), Take by mouth daily, Disp: , Rfl:     Ascorbic Acid (VITAMIN C) 1000 MG tablet, Take 1,000 mg by mouth daily , Disp: , Rfl:     Past Medical History  Dottie Soler  has a past medical history of AAA (abdominal aortic aneurysm) ([facility] [address] CAD (coronary artery disease), Cancer ([facility] [address] Cancer of kidney ([facility] [address] Heart attack ([facility] [address] History of placement of chest tube, Hx of blood clots, Hyperlipidemia, Hypertension, Kidney stones, Obesity, Osteoarthritis, and Stroke ([facility] [address] [facility] Social History  Dottie Soler  reports that he quit smoking about 51 years ago. His smoking use included pipe. He started smoking about 66 years ago. He has a 15.00 pack-year smoking history. He has never used smokeless tobacco. He reports that he does not drink alcohol and does not use drugs. Family History  Dottie Soler family history includes Alzheimer's Disease in his mother; Heart Disease in his father.     Past Surgical History   Past Surgical History:   Procedure Laterality Date    ABDOMEN SURGERY      AAA    ABDOMINAL AORTIC ANEURYSM REPAIR  2005    CARDIAC SURGERY  2008, 1996    stents   [address] SURGERY  2005    AAA repair    CAROTID ENDARTERECTOMY  2006    FINGER AMPUTATION      JOINT REPLACEMENT  2007    knees bilateral    KNEE SURGERY Left 6-2013    PARTIAL NEPHRECTOMY  2010    Right    PTCA      SHOULDER SURGERY  1997    TONSILLECTOMY  1948    VASCULAR SURGERY         Review of Systems   Constitutional: Negative for chills and fever  HENT: Negative for congestion, sinus pressure, sneezing and sore throat. Eyes: Negative for pain, discharge, redness and itching. Respiratory: Negative for apnea, cough  Gastrointestinal: Negative for blood in stool, constipation, diarrhea   Endocrine: Negative for cold intolerance, heat intolerance, polydipsia. Genitourinary: Negative for dysuria, enuresis, flank pain and hematuria. Musculoskeletal: Negative for arthralgias, joint swelling and neck pain. Neurological: Negative for numbness and headaches. Psychiatric/Behavioral: Negative for agitation, confusion, decreased concentration and dysphoric mood. Objective: There were no vitals taken for this visit. Wt Readings from Last 3 Encounters:   08/04/21 258 lb 6.4 oz (117.2 kg)   05/17/21 252 lb (114.3 kg)   05/07/21 253 lb 8 oz (115 kg)     BP Readings from Last 3 Encounters:   08/04/21 137/77   05/08/21 125/66   05/04/21 130/78       Nursing note and vitals reviewed. Physical Exam   Constitutional: Oriented to person, place, and time. Appears well-developed and well-nourished. ENT: Moist mucous membranes. No bleeding. Tongue is midline. Head: Normocephalic and atraumatic. Eyes: EOM are normal. Pupils are equal, round, and reactive to light. Neck: Normal range of motion. Neck supple. No JVD present. Cardiovascular: Normal rate, regular rhythm, murmur, no rubs, and intact distal pulses. Pulmonary/Chest: Effort normal and breath sounds normal. No respiratory distress. No wheezes. No rales. Abdominal: Soft. Bowel sounds are normal. No distension. There is no tenderness. Musculoskeletal: Normal range of motion. no edema. Neurological: Alert and oriented to person, place, and time. No cranial nerve deficit. Coordination normal.   Skin: Skin is warm and dry. Psychiatric: Normal mood and affect.        Lab Results   Component Value Date    CKTOTAL 60 04/28/2015    CKTOTAL 78 11/04/2014       Lab Results   Component Value Date    WBC 12.5 05/07/2021    RBC 4.26 05/07/2021    RBC 5.17 10/27/2015    HGB 13.4 05/07/2021    HCT 42.5 05/07/2021    MCV 99.8 05/07/2021    MCH 31.5 05/07/2021    MCHC 31.5 05/07/2021    RDW 13.8 10/27/2015     05/07/2021    MPV 10.1 05/07/2021       Lab Results   Component Value Date     05/07/2021    K 3.9 05/07/2021     05/07/2021    CO2 25 05/07/2021    BUN 22 05/07/2021    LABALBU 4.1 11/27/2019    CREATININE 1.4 05/07/2021    CALCIUM 8.5 05/07/2021    LABGLOM 49 05/07/2021    GLUCOSE 99 05/07/2021    GLUCOSE 118 04/19/2018       Lab Results   Component Value Date    ALKPHOS 91 11/27/2019    ALT 28 11/27/2019    AST 29 11/27/2019    PROT 6.6 11/27/2019    BILITOT 0.3 11/27/2019    BILIDIR <0.2 11/27/2019    LABALBU 4.1 11/27/2019       Lab Results   Component Value Date    MG 2.2 11/27/2019       Lab Results   Component Value Date    INR 0.95 11/29/2019    INR 0.93 11/27/2019    INR 0.94 01/31/2012         No results found for: LABA1C    Lab Results   Component Value Date    TRIG 362 11/29/2019    HDL 28 11/29/2019    LDLCALC 32 11/29/2019    LDLDIRECT 141 04/19/2018    LABVLDL 33 10/05/2017       Lab Results   Component Value Date    TSH 3.640 11/27/2019         Testing Reviewed:      I have individually reviewed the cardiac test below:    ECHO: Results for orders placed during the hospital encounter of 05/06/21    ECHO Complete 2D W Doppler W Color    Narrative  Transthoracic Echocardiography Report (TTE)    Demographics    Patient Name    Evlyn Dasen  Gender               Male  G    MR #            [id number]       Race                     Ethnicity    Account # [id number]       Room Number          0003    Accession       [id number]      Date of Study        05/07/2021  Number    Date of Birth   1940      Referring Physician  Ely Steele MD    Age             79 year(s)      Sonographer          Hakeem Schumacher MD  Physician    Procedure    Type of Study    TTE procedure:ECHOCARDIOGRAM COMPLETE 2D W DOPPLER W COLOR. Procedure Date  Date: 05/07/2021 Start: 11:35 AM    Study Location: Bedside  Technical Quality: Adequate visualization    Indications:Lower extremity edema. Additional Medical History:Atrial fibrillation, CAD, obesity, hypertension,  hyperlipidemia, rectal cancer, chronic kidney disease, sleep apnea    Patient Status: Routine    Height: 68.11 inches Weight: 253.53 pounds BSA: 2.26 m^2 BMI: 38.42 kg/m^2    BP: 128/64 mmHg    Allergies  - Tetracycline. Conclusions    Summary  Normal left ventricle size and systolic function. Ejection fraction was  estimated at 55-60%. There were no regional left ventricular wall motion  abnormalities and wall thickness was within normal limits. Trace aortic regurgitation. Trace mitral regurgitation. Trace tricuspid regurgitation. Signature    ----------------------------------------------------------------  Electronically signed by Pamella Osler MD (Interpreting  physician) on 05/07/2021 at 01:14 PM  ----------------------------------------------------------------    Findings    Mitral Valve  The mitral valve structure was normal with normal leaflet separation. DOPPLER: The transmitral velocity was within the normal range with no  evidence for mitral stenosis. There no evidence of Trace mitral  regurgitation. Aortic Valve  The aortic valve was trileaflet with normal thickness and cuspal  separation. DOPPLER: Transaortic velocity was within the normal range with  no evidence of aortic stenosis.  There was evidence of Trace aortic  regurgitation. Tricuspid Valve  The tricuspid valve structure was normal with normal leaflet separation. DOPPLER: There was no evidence of tricuspid stenosis. There was evidence  of Trace tricuspid regurgitation. Pulmonic Valve  The pulmonic valve leaflets exhibited normal thickness, no calcification,  and normal cuspal separation. DOPPLER: The transpulmonic velocity was  within the normal range with no evidence for regurgitation. Left Atrium  Left atrial size was normal.    Left Ventricle  Normal left ventricle size and systolic function. Ejection fraction was  estimated at 55-60%. There were no regional left ventricular wall motion  abnormalities and wall thickness was within normal limits. Right Atrium  Right atrial size was normal.    Right Ventricle  The right ventricular size was normal with normal systolic function and  wall thickness. Pericardial Effusion  The pericardium was normal in appearance with no evidence of a pericardial  effusion. Pleural Effusion  No evidence of pleural effusion. Aorta / Great Vessels  -Aortic root dimension within normal limits.  -The Pulmonary artery is within normal limits. -IVC size is within normal limits with normal respiratory phasic changes.     M-Mode/2D Measurements & Calculations    LV Diastolic   LV Systolic Dimension: 3  AV Cusp Separation: 2.1 cmLA  Dimension: 4.3 cm                        Dimension: 4.7 cmAO Root  cm             LV Volume Diastolic: 19.4 Dimension: 3.6 cmLA Area: 14.3  LV FS:30.2 %   ml                        cm^2  LV PW          LV Volume Systolic: 35 ml  Diastolic: 1   LV EDV/LV EDV Index: 83.1  cm             ml/37 m^2LV ESV/LV ESV  Septum         Index: 35 ml/15 m^2       RV Diastolic Dimension: 3.1 cm  Diastolic: 1.2 EF Calculated: 57.9 %  cm                                       LA/Aorta: 1.31    LA volume/Index: 36.8 ml /16m^2    Doppler Measurements & Calculations    MV Peak E-Wave: 86.5 cm/s AV Peak Velocity: 116 LVOT Peak Velocity: 99.8  MV Peak A-Wave: 94.3 cm/s cm/s                  cm/s  MV E/A Ratio: 0.92        AV Peak Gradient:     LVOT Peak Gradient: 4 mmHg  MV Peak Gradient: 2.99    5.38 mmHg  mmHg                                            TV Peak E-Wave: 50.1 cm/s  TV Peak A-Wave: 38.1 cm/s  MV Deceleration Time: 208  msec                                            TV Peak Gradient: 1 mmHg  MV P1/2t: 61 msec         AV P1/2t: 504 msec    TR Velocity:204 cm/s  MVA by PHT:3.61 cm^2                            TR Gradient:16.65 mmHg  PV Peak Velocity: 56.1  MV E' Septal Velocity:                          cm/s  6.6 cm/s                  AV DVI (Vmax):0.86    PV Peak Gradient: 1.26  MV A' Septal Velocity:                          mmHg  8.3 cm/s  MV E' Lateral Velocity: 6  cm/s  MV A' Lateral Velocity:  8.1 cm/s  E/E' septal: 13.11  E/E' lateral: 14.42  MR Velocity: 314 cm/s    [id number]     Stress Test:5/2021   Summary   There is mild attenuation artifact noted in the inferior wall seems to be   related to diaphragm artifact. The nuclear images is not suggestive for myocardial ischemia. Recommendation   Clinical correlation is recommended. Signatures      ----------------------------------------------------------------   Electronically signed by Pj Mercer MD (Interpreting   Cardiologist) on 05/17/2021 at 14:20   ----------------------------------------------------------------      66 31 35  11/29/19  FINDINGS:  HEMODYNAMICS:  LVEDP 15 mmHg.     LEFT VENTRICULOGRAM:  No significant wall motion abnormality noted. Ejection fraction estimated to be at 60%.     CORONARY ANGIOGRAM:  1.  RCA:  Right coronary artery was noticed to have severe 90% to 95%  in-stent restenosis within the proximal RCA.  Mid RCA has mild diffuse  disease.  Distal RCA with mild diffuse disease. Meera Saunders is a dominant  vessel and bifurcates into RPDA and RPL.  Both RPL and RPDA with mild  diffuse disease without any obstructive lesions. 2.  Left main:  Left main coronary artery has no significant stenosis  and is widely patent.  Vessel bifurcates into LAD and LCX. 3.  Left anterior descending artery:  Left anterior descending artery  has 30% diffuse stenosis in the proximal LAD.  There seems to be a high  first diagonal branch versus possibly ramus branch that has 30% to 50%  proximal stenosis.  Mid LAD has mild diffuse disease, distal LAD with  mild diffuse disease. 4.  Left circumflex artery:  LCX has 30% stenosis in the proximal  segment.  OM1 is a large branching vessel with mild diffuse disease. Distal LCX has 30% to 50% lesion. CONCLUSION:  1.  Unstable angina. 2.  Coronary artery disease. 3.  Severe in-stent restenosis within previously placed stent in the  proximal RCA, status post successful PCI stenting.     RECOMMENDATIONS:  1.  Continue aspirin. 2.  Continue Plavix. 3.  The patient receives renal dose of Plavix of 300. 4.  We will increase Lipitor from 10 mg p.o. daily to 40 mg p.o. daily. 5.  Further optimization of coronary artery disease and risk factor  modification. 6.  Outpatient followup with Cardiology.     Findings and plan of care were discussed with the patient and his family  in detail.  Questions were answered. Jennifer Barcenas are agreeable to the plan.     Carroll Astorga MD    Narrative   CT of the chest after administration of IV contrast.       Technique: Axial CT images from the lung apices through the adrenal glands    after administration of IV contrast.  Sagittal and coronal reconstruction    images provided.       Comparison:    CR,SR  - XR CHEST PORTABLE  - 05/06/2021 02:41 PM EDT       Findings:  Moderate atherosclerotic vessel disease within the thoracic    aorta.  Aneurysm of the distal thoracic aorta measuring 4.7 cm anterior to    posterior.  Moderate atherosclerotic vessel disease within the thoracic    aorta.  No dissection. No mediastinal or axillary adenopathy. Normal thyroid. Low lung volumes.  No pulmonary nodules.  No areas of consolidation.  No    pneumothorax. Degenerative changes within the spine. Scarring right kidney. Wilbern Sieving in the epigastric region containing    nonobstructed colon.  Simple left renal cyst.           Impression   Impression:   Aneurysm of the distal thoracic aorta.       This document has been electronically signed by: Andreina Luis MD on    05/06/2021 06:49 PM       All CTs at this facility use dose modulation techniques and iterative    reconstructions, and/or weight-based dosing   when appropriate to reduce radiation to a low as reasonably achievable. AssessmentPlan:   Sully Conrad is a pleasant 79year old male patient who  has a past medical history of AAA (abdominal aortic aneurysm) ([address] CAD (coronary artery disease), Cancer ([address] Cancer of kidney ([address] Heart attack ([address] History of placement of chest tube, Hx of blood clots, Hyperlipidemia, Hypertension, Kidney stones, Obesity, Osteoarthritis, and Stroke ([address] He had prior CEA, AAA repair. The patient underwent LHC in 11/2019 for unstable angina. He was found to have severe ISR in RCA, underwent successful PCI. Nuclear stress test 5/2021 was negative for ischemia. Echocardiogram 5/2021 revealed an EF of 55-60%. Chest CTA that was done 5/2021 while patient was hospitalized revealed distal thoracic aorta aneurysm, diameter 4.7 cm. The patient reports occasional palpitations. Patient denies chest pain, shortness of breath, dyspnea on exertion, orthopnea, paroxysmal nocturnal dyspnea, palpitations, dizziness, syncope, weight gain or leg swelling. 1. CAD  2. Severe RCA ISR, s/p PCI/Stenting 11/2019  3. Paroxysmal atrial fibrillation   4. LAE  5. Chronic HFpEF  6. PAD  7. S/p CEA  8. AAA, s/p repair 2005 (surgical repair, [facility])   9.  Thoracic aortic aneurysm (4.7 cm on CTA 5/2021)  10. Hypertension   11. Dyslipidemia  12. BENJAMIN     Eliquis 2.5 mg po BID    No bleeding complications    Has h/o CAD, prior PCI   Stress test 5/2021 was negative for ischemia    Plavix 75 mg po daily   Metoprolol    Norvasc    /80    The patient was instructed to check the blood pressure at home, and record the readings. Patient will call office with blood pressure readings, will adjust patient's antihypertensive medications as needed accordingly    Lipitor 40 mg po daily   Coenzyme Q 10    Last LDL was from 2019 was 32   Check lipid panel, LFTs     WILL NEED SURVEILLANCE CTA FOR ANEURYSM     Above findings and plan of care were discussed with patient in details, patient's questions were answered.      Disposition:  RTC in 10 months             Electronically signed by Fahad Leon MD, [facility]    9/22/2021 at 4:30 PM EDT

## 2021-09-23 ENCOUNTER — OFFICE VISIT (OUTPATIENT)
Dept: CARDIOLOGY CLINIC | Age: 81
End: 2021-09-23
Payer: MEDICARE

## 2021-09-23 VITALS
WEIGHT: 257.2 LBS | HEART RATE: 76 BPM | BODY MASS INDEX: 38.98 KG/M2 | SYSTOLIC BLOOD PRESSURE: 132 MMHG | DIASTOLIC BLOOD PRESSURE: 80 MMHG | HEIGHT: 68 IN

## 2021-09-23 DIAGNOSIS — I25.10 CAD IN NATIVE ARTERY: Primary | ICD-10-CM

## 2021-09-23 PROCEDURE — 1123F ACP DISCUSS/DSCN MKR DOCD: CPT | Performed by: INTERNAL MEDICINE

## 2021-09-23 PROCEDURE — 1036F TOBACCO NON-USER: CPT | Performed by: INTERNAL MEDICINE

## 2021-09-23 PROCEDURE — G8427 DOCREV CUR MEDS BY ELIG CLIN: HCPCS | Performed by: INTERNAL MEDICINE

## 2021-09-23 PROCEDURE — 99214 OFFICE O/P EST MOD 30 MIN: CPT | Performed by: INTERNAL MEDICINE

## 2021-09-23 PROCEDURE — G8417 CALC BMI ABV UP PARAM F/U: HCPCS | Performed by: INTERNAL MEDICINE

## 2021-09-23 PROCEDURE — 4040F PNEUMOC VAC/ADMIN/RCVD: CPT | Performed by: INTERNAL MEDICINE

## 2021-09-23 RX ORDER — AMLODIPINE BESYLATE 2.5 MG/1
2.5 TABLET ORAL DAILY
Qty: 90 TABLET | Refills: 3 | Status: SHIPPED | OUTPATIENT
Start: 2021-09-23 | End: 2022-01-05 | Stop reason: ALTCHOICE

## 2021-10-06 LAB
ALBUMIN SERPL-MCNC: 4.1 G/DL (ref 3.2–5.3)
ALK PHOSPHATASE: 61 U/L (ref 39–130)
ALT SERPL-CCNC: 28 U/L (ref 0–40)
AST SERPL-CCNC: 32 U/L (ref 0–41)
BILIRUB SERPL-MCNC: 1.2 MG/DL (ref 0.3–1.2)
BILIRUBIN DIRECT: 0.2 MG/DL (ref 0–0.4)
CHOLESTEROL/HDL RATIO: 3.8 (ref 1–5)
CHOLESTEROL: 138 MG/DL (ref 150–200)
HDLC SERPL-MCNC: 36 MG/DL
LDL CHOLESTEROL CALCULATED: 64 MG/DL
LDL/HDL RATIO: 1.8
TOTAL PROTEIN: 6.5 G/DL (ref 6–8)
TRIGL SERPL-MCNC: 189 MG/DL (ref 27–150)
VLDLC SERPL CALC-MCNC: 38 MG/DL (ref 0–30)

## 2021-11-29 RX ORDER — METOPROLOL TARTRATE 50 MG/1
TABLET, FILM COATED ORAL
Qty: 180 TABLET | Refills: 0 | Status: SHIPPED | OUTPATIENT
Start: 2021-11-29 | End: 2021-12-29 | Stop reason: DRUGHIGH

## 2021-12-15 ENCOUNTER — OFFICE VISIT (OUTPATIENT)
Dept: CARDIOLOGY CLINIC | Age: 81
End: 2021-12-15
Payer: MEDICARE

## 2021-12-15 VITALS
HEIGHT: 68 IN | BODY MASS INDEX: 39.4 KG/M2 | DIASTOLIC BLOOD PRESSURE: 79 MMHG | WEIGHT: 260 LBS | SYSTOLIC BLOOD PRESSURE: 137 MMHG | HEART RATE: 60 BPM

## 2021-12-15 DIAGNOSIS — I48.0 PAROXYSMAL ATRIAL FIBRILLATION (HCC): ICD-10-CM

## 2021-12-15 DIAGNOSIS — I10 PRIMARY HYPERTENSION: ICD-10-CM

## 2021-12-15 DIAGNOSIS — I71.40 ABDOMINAL AORTIC ANEURYSM (AAA) WITHOUT RUPTURE: ICD-10-CM

## 2021-12-15 DIAGNOSIS — I25.10 CORONARY ARTERY DISEASE INVOLVING NATIVE CORONARY ARTERY OF NATIVE HEART WITHOUT ANGINA PECTORIS: Primary | ICD-10-CM

## 2021-12-15 DIAGNOSIS — E78.01 FAMILIAL HYPERCHOLESTEROLEMIA: ICD-10-CM

## 2021-12-15 DIAGNOSIS — R06.02 SOB (SHORTNESS OF BREATH): ICD-10-CM

## 2021-12-15 PROCEDURE — G8484 FLU IMMUNIZE NO ADMIN: HCPCS | Performed by: STUDENT IN AN ORGANIZED HEALTH CARE EDUCATION/TRAINING PROGRAM

## 2021-12-15 PROCEDURE — 1036F TOBACCO NON-USER: CPT | Performed by: STUDENT IN AN ORGANIZED HEALTH CARE EDUCATION/TRAINING PROGRAM

## 2021-12-15 PROCEDURE — G8417 CALC BMI ABV UP PARAM F/U: HCPCS | Performed by: STUDENT IN AN ORGANIZED HEALTH CARE EDUCATION/TRAINING PROGRAM

## 2021-12-15 PROCEDURE — G8427 DOCREV CUR MEDS BY ELIG CLIN: HCPCS | Performed by: STUDENT IN AN ORGANIZED HEALTH CARE EDUCATION/TRAINING PROGRAM

## 2021-12-15 PROCEDURE — 4040F PNEUMOC VAC/ADMIN/RCVD: CPT | Performed by: STUDENT IN AN ORGANIZED HEALTH CARE EDUCATION/TRAINING PROGRAM

## 2021-12-15 PROCEDURE — 93000 ELECTROCARDIOGRAM COMPLETE: CPT | Performed by: STUDENT IN AN ORGANIZED HEALTH CARE EDUCATION/TRAINING PROGRAM

## 2021-12-15 PROCEDURE — 1123F ACP DISCUSS/DSCN MKR DOCD: CPT | Performed by: STUDENT IN AN ORGANIZED HEALTH CARE EDUCATION/TRAINING PROGRAM

## 2021-12-15 PROCEDURE — 99213 OFFICE O/P EST LOW 20 MIN: CPT | Performed by: STUDENT IN AN ORGANIZED HEALTH CARE EDUCATION/TRAINING PROGRAM

## 2021-12-15 NOTE — PROGRESS NOTES
[facility] PROFESSIONAL SERVICES  [facility] [address]   [address] [address]   Dept: [phone]   Dept Fax: [phone]   Loc: [phone]      Chief Complaint   Patient presents with    Follow-up     Cardiologist:  Dr. David Gayle  78 yo male presents for sob. Hx of PAF, CAD, BENJAMIN, HTN. No chest pain, angina, orthopnea, PND, palpitations, LE edema, or syncope. SOB at rest and with exertion. Has been worse over the last 4 months.      General:   No fever, no chills, + fatigue or weight loss  Pulmonary:    + dyspnea, no wheezing  Cardiac:    Denies recent chest pain   GI:     No nausea or vomiting, no abdominal pain  Neuro:     No dizziness or light headedness  Musculoskeletal:  No recent active issues  Extremities:   No edema    Past Medical History:   Diagnosis Date    AAA (abdominal aortic aneurysm) (HCC)     CAD (coronary artery disease)     Cancer (HCC)     cheek and nose     Cancer of kidney ([address]     Heart attack ([address]     History of placement of chest tube     Due to pt falling and breaking 4 ribs and puncturing his lung    Hx of blood clots     Hyperlipidemia     Hypertension     Kidney stones     Obesity     Osteoarthritis     Stroke ([address]        No Known Allergies    Current Outpatient Medications   Medication Sig Dispense Refill    metoprolol tartrate (LOPRESSOR) 50 MG tablet TAKE 1 TABLET BY MOUTH  TWICE DAILY 180 tablet 0    amLODIPine (NORVASC) 2.5 MG tablet Take 1 tablet by mouth daily 90 tablet 3    Zinc Sulfate (ZINC 15 PO) Take by mouth      Turmeric Curcumin 500 MG CAPS Take by mouth      Omega-3 Fatty Acids (FISH OIL PO) Take by mouth      clopidogrel (PLAVIX) 75 MG tablet TAKE 1 TABLET BY MOUTH  DAILY 90 tablet 3    OXYGEN Inhale 3 L into the lungs nightly      acetaminophen (TYLENOL) 500 MG tablet Take 1,000 mg by mouth every 6 hours as needed for Pain      polyethylene glycol (GLYCOLAX) 17 GM/SCOOP powder Take 17 g by Exercise per Week: Not on file    Minutes of Exercise per Session: Not on file   Stress:     Feeling of Stress : Not on file   Social Connections:     Frequency of Communication with Friends and Family: Not on file    Frequency of Social Gatherings with Friends and Family: Not on file    Attends Buddhism Services: Not on file    Active Member of [address] [place] [address] [employer] or Organizations: Not on file    Attends Club or Organization Meetings: Not on file    Marital Status: Not on file   Intimate Partner Violence:     Fear of Current or Ex-Partner: Not on file    Emotionally Abused: Not on file    Physically Abused: Not on file    Sexually Abused: Not on file   Housing Stability:     Unable to Pay for Housing in the Last Year: Not on file    Number of [place] in the Last Year: Not on file    Unstable Housing in the Last Year: Not on file       Family History   Problem Relation Age of Onset    Alzheimer's Disease Mother     Heart Disease Father        Blood pressure 137/79, pulse 68, height 5' 8\" (1.727 m), weight 260 lb (117.9 kg). General:   Well developed, well nourished  Lungs:   Clear to auscultation  Heart:    Irregular, normal rate, Normal S1 S2, No murmur, rubs, or gallops  Abdomen:   Soft, non tender, no organomegalies, positive bowel sounds, obese abdomen  Extremities:   No edema, no cyanosis, good peripheral pulses  Neurological:   Awake, alert, oriented. No obvious focal deficits  Musculoskeletal:  No obvious deformities    EKG: reviewed, currently atrial flutter, CVR at 60. TTE  Conclusions      Summary   Normal left ventricle size and systolic function. Ejection fraction was   estimated at 55-60%. There were no regional left ventricular wall motion   abnormalities and wall thickness was within normal limits. Trace aortic regurgitation. Trace mitral regurgitation. Trace tricuspid regurgitation.       Signature      ---------------------------------------------------------------- Electronically signed by Lisa Mills MD (Interpreting   physician) on 05/07/2021 at 01:14 PM    ST  Conclusions      Summary   There is mild attenuation artifact noted in the inferior wall seems to be   related to diaphragm artifact. The nuclear images is not suggestive for myocardial ischemia. Recommendation   Clinical correlation is recommended. Signatures      ----------------------------------------------------------------   Electronically signed by Lisa Mills MD (Interpreting   Cardiologist) on 05/17/2021 at 14:20     Diagnosis Orders   1. Coronary artery disease involving native coronary artery of native heart without angina pectoris     2. Primary hypertension     3. Familial hypercholesterolemia     4. Abdominal aortic aneurysm (AAA) without rupture (HCC)     5. Paroxysmal atrial fibrillation ([address]     6. SOB (shortness of breath)  EKG 12 lead       Orders Placed This Encounter   Procedures    EKG 12 lead     Order Specific Question:   Reason for Exam?     Answer: Other       Assessment/Plan:   PAfib, on eliquis-- in atrial flutter at present, taking lopressor 50 mg BID. Will check holter and see if any RVR or SVR issues. CAD-continue GDMT. AAA--needs surveillance  HTN-well controlled. Not low. HLD-on lipitor 40 mg daily. Holter monitor to check for HR/rhythm concerns, per Dr Ron Ling. Will see if any RVR/SVR concerns as a possibility for his symptoms. Less likely given controlled ventricular rate at present. Despite atrial flutter, HR is controlled, vitals are stable at present. Patient in no acute distress. Disposition:   F/u with Dr Ron Ling in Feb as scheduled.      Continue Dr Shahida May current treatment plan  Follow up with Dr Ron Ling as scheduled or sooner if needed

## 2021-12-22 ENCOUNTER — HOSPITAL ENCOUNTER (OUTPATIENT)
Dept: NON INVASIVE DIAGNOSTICS | Age: 81
Discharge: HOME | End: 2021-12-22
Payer: MEDICARE

## 2021-12-22 DIAGNOSIS — I48.0 PAROXYSMAL ATRIAL FIBRILLATION (HCC): ICD-10-CM

## 2021-12-22 PROCEDURE — 93226 XTRNL ECG REC<48 HR SCAN A/R: CPT

## 2021-12-22 PROCEDURE — 93225 XTRNL ECG REC<48 HRS REC: CPT

## 2021-12-22 NOTE — PROCEDURES
The skin was prepped and a 48 hour holter monitor was applied. The patient was instructed on the documentation of symptoms and the purpose of the holter as well as the things to avoid while wearing the holter. The patient was instructed to remove and return the holter on 12/24/2021.   The serial number of the holter that was applied is [id number]

## 2021-12-28 LAB
ACQUISITION DURATION: NORMAL S
AVERAGE HEART RATE: 66 BPM
HOOKUP DATE: NORMAL
HOOKUP TIME: NORMAL
MAX HEART RATE TIME/DATE: NORMAL
MAX HEART RATE: 96 BPM
MIN HEART RATE TIME/DATE: NORMAL
MIN HEART RATE: 38 BPM
NUMBER OF QRS COMPLEXES: NORMAL
NUMBER OF SUPRAVENTRICULAR COUPLETS: 0
NUMBER OF SUPRAVENTRICULAR ECTOPICS: 0
NUMBER OF SUPRAVENTRICULAR ISOLATED BEATS: 0
NUMBER OF VENTRICULAR BIGEMINAL CYCLES: 0
NUMBER OF VENTRICULAR COUPLETS: 0
NUMBER OF VENTRICULAR ECTOPICS: 170

## 2021-12-29 ENCOUNTER — TELEPHONE (OUTPATIENT)
Dept: CARDIOLOGY CLINIC | Age: 81
End: 2021-12-29

## 2021-12-29 NOTE — TELEPHONE ENCOUNTER
Holter Monitor result note:      LETTY Reed [facility] Clinical Support Pool  Does have a slower heart rate at times on monitor, with some pauses, all less than 3 seconds. No fast heart rate of clinical significance, max HR was 96 bpm. Would cut back lopressor to 25 mg BID and f/u with Dr Rosa Elena Funk as scheduled in February.

## 2022-01-05 ENCOUNTER — TELEPHONE (OUTPATIENT)
Dept: CARDIOLOGY CLINIC | Age: 82
End: 2022-01-05

## 2022-01-05 ENCOUNTER — OFFICE VISIT (OUTPATIENT)
Dept: CARDIOLOGY CLINIC | Age: 82
End: 2022-01-05
Payer: MEDICARE

## 2022-01-05 VITALS
WEIGHT: 258 LBS | HEART RATE: 78 BPM | BODY MASS INDEX: 39.1 KG/M2 | SYSTOLIC BLOOD PRESSURE: 177 MMHG | HEIGHT: 68 IN | DIASTOLIC BLOOD PRESSURE: 83 MMHG

## 2022-01-05 DIAGNOSIS — I50.32 CHRONIC HEART FAILURE WITH PRESERVED EJECTION FRACTION (HFPEF) (HCC): Primary | ICD-10-CM

## 2022-01-05 PROCEDURE — 1036F TOBACCO NON-USER: CPT | Performed by: INTERNAL MEDICINE

## 2022-01-05 PROCEDURE — 1123F ACP DISCUSS/DSCN MKR DOCD: CPT | Performed by: INTERNAL MEDICINE

## 2022-01-05 PROCEDURE — 4040F PNEUMOC VAC/ADMIN/RCVD: CPT | Performed by: INTERNAL MEDICINE

## 2022-01-05 PROCEDURE — G8427 DOCREV CUR MEDS BY ELIG CLIN: HCPCS | Performed by: INTERNAL MEDICINE

## 2022-01-05 PROCEDURE — G8484 FLU IMMUNIZE NO ADMIN: HCPCS | Performed by: INTERNAL MEDICINE

## 2022-01-05 PROCEDURE — G8417 CALC BMI ABV UP PARAM F/U: HCPCS | Performed by: INTERNAL MEDICINE

## 2022-01-05 PROCEDURE — 99214 OFFICE O/P EST MOD 30 MIN: CPT | Performed by: INTERNAL MEDICINE

## 2022-01-05 RX ORDER — FUROSEMIDE 20 MG/1
20 TABLET ORAL DAILY
Qty: 90 TABLET | Refills: 1 | Status: SHIPPED | OUTPATIENT
Start: 2022-01-05 | End: 2022-05-16

## 2022-01-05 RX ORDER — AMLODIPINE BESYLATE 5 MG/1
5 TABLET ORAL DAILY
Qty: 30 TABLET | Refills: 3 | Status: SHIPPED | OUTPATIENT
Start: 2022-01-05 | End: 2022-02-24

## 2022-01-05 NOTE — TELEPHONE ENCOUNTER
Pt spouse, Gaby Wolf is calling and states that pt is still sob after lowering the lopresser and that his BP is elevated. This moring it was 149/90.   Please advise pt at [phone]

## 2022-01-05 NOTE — PROGRESS NOTES
[id number] [facility] [address] [place] [address]  Dept: [phone]  Dept Fax: [phone]  Loc: [phone]    Visit Date: 1/5/2022    Mr. Tahir Ruvalcaba is a 79 y.o. male  who presented for:  Chief Complaint   Patient presents with    Check-Up     fu holter med changes     Atrial Fibrillation    Coronary Artery Disease   shortness of breath     HPI:   HPI   Nimco Gordon is a pleasant 79year old male patient who  has a past medical history of AAA (abdominal aortic aneurysm) ([facility] [address] CAD (coronary artery disease), Cancer ([facility] [address] Cancer of kidney ([facility] [address] Heart attack ([facility] [address] History of placement of chest tube, Hx of blood clots, Hyperlipidemia, Hypertension, Kidney stones, Obesity, Osteoarthritis, and Stroke ([facility] [address] He had prior CEA, AAA repair. The patient underwent LHC in 11/2019 for unstable angina. He was found to have severe ISR in RCA, underwent successful PCI. LVEDP 15 mmHg. Nuclear stress test 5/2021 was negative for ischemia. Echocardiogram 05/2021 revealed an EF of 55-60%, trace AI/MR/TR. Chest CTA that was done 5/2021 while patient was hospitalized revealed distal thoracic aorta aneurysm, diameter 4.7 cm. He continues to have SOB and MULLINS. He states that he \"gets out breath just by walking to his kitchen\". SOB have worsened over the past three months. Cardiac monitor on 12/2021 revealed persistent atrial fibrillaiton in 100% with minimum heart rate 38, average heart rate 66, maximum heart rate of 96. Metoprolol dose was reduced. He denies dizziness, syncope, palpitations or chest pain. Patient has mild leg edema on exam. He does not check home weight. Patient states that home BP readings have been higher since lopressor dose was cut down. /83. HR 78.        Current Outpatient Medications:     NONFORMULARY, 500 mg 2 times daily Querectin, Disp: , Rfl:     NONFORMULARY, 4 times daily KEYA Connor, Disp: , Rfl:    metoprolol tartrate (LOPRESSOR) 25 MG tablet, Take 12.5 mg by mouth 2 times daily , Disp: , Rfl:     amLODIPine (NORVASC) 2.5 MG tablet, Take 1 tablet by mouth daily, Disp: 90 tablet, Rfl: 3    Zinc Sulfate (ZINC 15 PO), Take by mouth, Disp: , Rfl:     Turmeric Curcumin 500 MG CAPS, Take by mouth, Disp: , Rfl:     Omega-3 Fatty Acids (FISH OIL PO), Take by mouth, Disp: , Rfl:     clopidogrel (PLAVIX) 75 MG tablet, TAKE 1 TABLET BY MOUTH  DAILY, Disp: 90 tablet, Rfl: 3    OXYGEN, Inhale 3 L into the lungs nightly, Disp: , Rfl:     acetaminophen (TYLENOL) 500 MG tablet, Take 1,000 mg by mouth every 6 hours as needed for Pain, Disp: , Rfl:     apixaban (ELIQUIS) 2.5 MG TABS tablet, Take 1 tablet by mouth 2 times daily, Disp: 180 tablet, Rfl: 3    atorvastatin (LIPITOR) 40 MG tablet, Take 1 tablet by mouth nightly, Disp: 90 tablet, Rfl: 3    Coenzyme Q10 (CO Q-10) 100 MG CAPS, Take by mouth, Disp: , Rfl:     Cholecalciferol (VITAMIN D3) 125 MCG (5000 UT) TABS, Take by mouth, Disp: , Rfl:     APPLE CIDER VINEGAR PO, Take 450 mg by mouth, Disp: , Rfl:     pantoprazole (PROTONIX) 40 MG tablet, TAKE 1 TABLET BY MOUTH  DAILY, Disp: 90 tablet, Rfl: 3    PARoxetine (PAXIL) 20 MG tablet, Take 20 mg by mouth every morning, Disp: , Rfl:     Magnesium 500 MG TABS, Take by mouth daily , Disp: , Rfl:     CINNAMON PO, Take 1,000 mg by mouth daily, Disp: , Rfl:     Probiotic Product (PROBIOTIC DAILY PO), Take by mouth daily, Disp: , Rfl:     Ascorbic Acid (VITAMIN C) 1000 MG tablet, Take 1,000 mg by mouth daily , Disp: , Rfl:     Past Medical History  Genet Can  has a past medical history of AAA (abdominal aortic aneurysm) ([facility] [address] CAD (coronary artery disease), Cancer ([facility] [address] Cancer of kidney ([facility] [address] Heart attack ([facility] [address] History of placement of chest tube, Hx of blood clots, Hyperlipidemia, Hypertension, Kidney stones, Obesity, Osteoarthritis, and Stroke ([facility] [address]     Social History  Genet Can  reports that he quit smoking about 51 years ago. His smoking use included pipe. He started smoking about 67 years ago. He has a 15.00 pack-year smoking history. He has never used smokeless tobacco. He reports that he does not drink alcohol and does not use drugs. Family History  Alonzo William family history includes Alzheimer's Disease in his mother; Heart Disease in his father. Past Surgical History   Past Surgical History:   Procedure Laterality Date    ABDOMEN SURGERY      AAA    ABDOMINAL AORTIC ANEURYSM REPAIR  2005    CARDIAC SURGERY  2008, 1996    stents   [address]  2005    AAA repair    CAROTID ENDARTERECTOMY  2006    FINGER AMPUTATION      JOINT REPLACEMENT  2007    knees bilateral    KNEE SURGERY Left 6-2013    PARTIAL NEPHRECTOMY  2010    Right    PTCA      SHOULDER SURGERY  1997    TONSILLECTOMY  1948    VASCULAR SURGERY         Review of Systems   Constitutional: Negative for chills and fever  HENT: Negative for congestion, sinus pressure, sneezing and sore throat. Eyes: Negative for pain, discharge, redness and itching. Respiratory: Negative for apnea, cough  Gastrointestinal: Negative for blood in stool, constipation, diarrhea   Endocrine: Negative for cold intolerance, heat intolerance, polydipsia. Genitourinary: Negative for dysuria, enuresis, flank pain and hematuria. Musculoskeletal: Negative for arthralgias, joint swelling and neck pain. Neurological: Negative for numbness and headaches. Psychiatric/Behavioral: Negative for agitation, confusion, decreased concentration and dysphoric mood. Objective:     BP (!) 177/83   Pulse 78   Ht 5' 8\" (1.727 m)   Wt 258 lb (117 kg)   BMI 39.23 kg/m²     Wt Readings from Last 3 Encounters:   01/05/22 258 lb (117 kg)   12/15/21 260 lb (117.9 kg)   09/23/21 257 lb 3.2 oz (116.7 kg)     BP Readings from Last 3 Encounters:   01/05/22 (!) 177/83   12/15/21 137/79   09/23/21 132/80       Nursing note and vitals reviewed.     Physical Exam   Constitutional: Oriented to person, place, and time. Appears well-developed and well-nourished. ENT: Moist mucous membranes. No bleeding. Tongue is midline. Head: Normocephalic and atraumatic. Eyes: EOM are normal. Pupils are equal, round, and reactive to light. Neck: Normal range of motion. Neck supple. No JVD present. Cardiovascular: Normal rate, regular rhythm, systolic murmur, no rubs, and intact distal pulses. Pulmonary/Chest: Effort normal and breath sounds normal. No respiratory distress. No wheezes. No rales. Abdominal: Soft. Bowel sounds are normal. No distension. There is no tenderness. Musculoskeletal: Normal range of motion. +1 pitting edema. Neurological: Alert and oriented to person, place, and time. No cranial nerve deficit. Coordination normal.   Skin: Skin is warm and dry. Psychiatric: Normal mood and affect.        Lab Results   Component Value Date    CKTOTAL 60 04/28/2015    CKTOTAL 78 11/04/2014       Lab Results   Component Value Date    WBC 12.5 05/07/2021    RBC 4.26 05/07/2021    RBC 5.17 10/27/2015    HGB 13.4 05/07/2021    HCT 42.5 05/07/2021    MCV 99.8 05/07/2021    MCH 31.5 05/07/2021    MCHC 31.5 05/07/2021    RDW 13.8 10/27/2015     05/07/2021    MPV 10.1 05/07/2021       Lab Results   Component Value Date     05/07/2021    K 3.9 05/07/2021     05/07/2021    CO2 25 05/07/2021    BUN 22 05/07/2021    LABALBU 4.1 10/05/2021    CREATININE 1.4 05/07/2021    CALCIUM 8.5 05/07/2021    LABGLOM 49 05/07/2021    GLUCOSE 99 05/07/2021    GLUCOSE 118 04/19/2018       Lab Results   Component Value Date    ALKPHOS 61 10/05/2021    ALKPHOS 91 11/27/2019    ALT 28 10/05/2021    AST 32 10/05/2021    PROT 6.5 10/05/2021    BILITOT 1.2 10/05/2021    BILIDIR 0.2 10/05/2021    LABALBU 4.1 10/05/2021       Lab Results   Component Value Date    MG 2.2 11/27/2019       Lab Results   Component Value Date    INR 0.95 11/29/2019    INR 0.93 11/27/2019    INR 0.94 01/31/2012         No results found for: LABA1C    Lab Results   Component Value Date    TRIG 189 10/05/2021    HDL 36 10/05/2021    LDLCALC 64 10/05/2021    LDLDIRECT 141 04/19/2018    LABVLDL 38 10/05/2021       Lab Results   Component Value Date    TSH 3.640 11/27/2019         Testing Reviewed:      I have individually reviewed the cardiac test below:    ECHO: Results for orders placed during the hospital encounter of 05/06/21    ECHO Complete 2D W Doppler W Color    Narrative  Transthoracic Echocardiography Report (TTE)    Demographics    Patient Name    La Adjutant  Gender               Male  G    MR #            [id number]       Race                     Ethnicity    Account #       [de-identified]       Room Number          0003    Accession       [id number]      Date of Study        05/07/2021  Number    Date of Birth   1940      Referring Physician  Trino Blake MD    Age             79 year(s)      Sonographer          Sofy Middleton MD  Physician    Procedure    Type of Study    TTE procedure:ECHOCARDIOGRAM COMPLETE 2D W DOPPLER W COLOR. Procedure Date  Date: 05/07/2021 Start: 11:35 AM    Study Location: Bedside  Technical Quality: Adequate visualization    Indications:Lower extremity edema. Additional Medical History:Atrial fibrillation, CAD, obesity, hypertension,  hyperlipidemia, rectal cancer, chronic kidney disease, sleep apnea    Patient Status: Routine    Height: 68.11 inches Weight: 253.53 pounds BSA: 2.26 m^2 BMI: 38.42 kg/m^2    BP: 128/64 mmHg    Allergies  - Tetracycline. Conclusions    Summary  Normal left ventricle size and systolic function. Ejection fraction was  estimated at 55-60%. There were no regional left ventricular wall motion  abnormalities and wall thickness was within normal limits. Trace aortic regurgitation. Trace mitral regurgitation.   Trace tricuspid regurgitation. Signature    ----------------------------------------------------------------  Electronically signed by Valentine Rodríguez MD (Interpreting  physician) on 05/07/2021 at 01:14 PM  ----------------------------------------------------------------    Findings    Mitral Valve  The mitral valve structure was normal with normal leaflet separation. DOPPLER: The transmitral velocity was within the normal range with no  evidence for mitral stenosis. There no evidence of Trace mitral  regurgitation. Aortic Valve  The aortic valve was trileaflet with normal thickness and cuspal  separation. DOPPLER: Transaortic velocity was within the normal range with  no evidence of aortic stenosis. There was evidence of Trace aortic  regurgitation. Tricuspid Valve  The tricuspid valve structure was normal with normal leaflet separation. DOPPLER: There was no evidence of tricuspid stenosis. There was evidence  of Trace tricuspid regurgitation. Pulmonic Valve  The pulmonic valve leaflets exhibited normal thickness, no calcification,  and normal cuspal separation. DOPPLER: The transpulmonic velocity was  within the normal range with no evidence for regurgitation. Left Atrium  Left atrial size was normal.    Left Ventricle  Normal left ventricle size and systolic function. Ejection fraction was  estimated at 55-60%. There were no regional left ventricular wall motion  abnormalities and wall thickness was within normal limits. Right Atrium  Right atrial size was normal.    Right Ventricle  The right ventricular size was normal with normal systolic function and  wall thickness. Pericardial Effusion  The pericardium was normal in appearance with no evidence of a pericardial  effusion. Pleural Effusion  No evidence of pleural effusion. Aorta / Great Vessels  -Aortic root dimension within normal limits.  -The Pulmonary artery is within normal limits.   -IVC size is within normal limits with normal respiratory phasic changes. M-Mode/2D Measurements & Calculations    LV Diastolic   LV Systolic Dimension: 3  AV Cusp Separation: 2.1 cmLA  Dimension: 4.3 cm                        Dimension: 4.7 cmAO Root  cm             LV Volume Diastolic: 39.2 Dimension: 3.6 cmLA Area: 14.3  LV FS:30.2 %   ml                        cm^2  LV PW          LV Volume Systolic: 35 ml  Diastolic: 1   LV EDV/LV EDV Index: 83.1  cm             ml/37 m^2LV ESV/LV ESV  Septum         Index: 35 ml/15 m^2       RV Diastolic Dimension: 3.1 cm  Diastolic: 1.2 EF Calculated: 57.9 %  cm                                       LA/Aorta: 1.31    LA volume/Index: 36.8 ml /16m^2    Doppler Measurements & Calculations    MV Peak E-Wave: 86.5 cm/s AV Peak Velocity: 116 LVOT Peak Velocity: 99.8  MV Peak A-Wave: 94.3 cm/s cm/s                  cm/s  MV E/A Ratio: 0.92        AV Peak Gradient:     LVOT Peak Gradient: 4 mmHg  MV Peak Gradient: 2.99    5.38 mmHg  mmHg                                            TV Peak E-Wave: 50.1 cm/s  TV Peak A-Wave: 38.1 cm/s  MV Deceleration Time: 208  msec                                            TV Peak Gradient: 1 mmHg  MV P1/2t: 61 msec         AV P1/2t: 504 msec    TR Velocity:204 cm/s  MVA by PHT:3.61 cm^2                            TR Gradient:16.65 mmHg  PV Peak Velocity: 56.1  MV E' Septal Velocity:                          cm/s  6.6 cm/s                  AV DVI (Vmax):0.86    PV Peak Gradient: 1.26  MV A' Septal Velocity:                          mmHg  8.3 cm/s  MV E' Lateral Velocity: 6  cm/s  MV A' Lateral Velocity:  8.1 cm/s  E/E' septal: 13.11  E/E' lateral: 14.42  MR Velocity: 314 cm/s    [email or web address] [id number]       Holter Monitor:12/2021  CONCLUSION:   Atrial fibrillation , persistent   Patient was in atrial fibrillaiton in 100% of the study duration  Minimum heart rate 38, average heart rate 66, maximum heart rate of 96  Multiple pauses noted, longest fot 2.72 seconds  Premature ventricular complexes , occasional   One 5 beat run of nonsustained ventricular tachycrdia      Confirmed by Nadine Flowers ([id number]) on 12/28/2021 4:00:15 PM    Stress Test:5/2021   Summary   There is mild attenuation artifact noted in the inferior wall seems to be   related to diaphragm artifact.   The nuclear images is not suggestive for myocardial ischemia.      Recommendation   Clinical correlation is recommended.      Signatures      ----------------------------------------------------------------   Electronically signed by Ab George MD (Interpreting   Cardiologist) on 05/17/2021 at 14:20   ----------------------------------------------------------------        Cath:11/2019 11/29/19  FINDINGS:  HEMODYNAMICS:  LVEDP 15 mmHg.     LEFT VENTRICULOGRAM:  No significant wall motion abnormality noted. Ejection fraction estimated to be at 60%.     CORONARY ANGIOGRAM:  1.  RCA:  Right coronary artery was noticed to have severe 90% to 95%  in-stent restenosis within the proximal RCA.  Mid RCA has mild diffuse  disease.  Distal RCA with mild diffuse disease. Carnella Collado is a dominant  vessel and bifurcates into RPDA and RPL.  Both RPL and RPDA with mild  diffuse disease without any obstructive lesions. 2.  Left main:  Left main coronary artery has no significant stenosis  and is widely patent.  Vessel bifurcates into LAD and LCX. 3.  Left anterior descending artery:  Left anterior descending artery  has 30% diffuse stenosis in the proximal LAD.  There seems to be a high  first diagonal branch versus possibly ramus branch that has 30% to 50%  proximal stenosis.  Mid LAD has mild diffuse disease, distal LAD with  mild diffuse disease. 4.  Left circumflex artery:  LCX has 30% stenosis in the proximal  segment.  OM1 is a large branching vessel with mild diffuse disease. Distal LCX has 30% to 50% lesion.   CONCLUSION:  1.  Unstable angina. 2.  Coronary artery disease. 3.  Severe in-stent restenosis within previously placed stent in the  proximal RCA, status post successful PCI stenting.     RECOMMENDATIONS:  1.  Continue aspirin. 2.  Continue Plavix. 3.  The patient receives renal dose of Plavix of 300. 4.  We will increase Lipitor from 10 mg p.o. daily to 40 mg p.o. daily. 5.  Further optimization of coronary artery disease and risk factor  modification. 6.  Outpatient followup with Cardiology.     Findings and plan of care were discussed with the patient and his family  in detail.  Questions were answered. Jluisfatmata Hernandez are agreeable to the plan.     Jacob Kawasaki, MD     Narrative   CT of the chest after administration of IV contrast.       Technique: Axial CT images from the lung apices through the adrenal glands    after administration of IV contrast.  Sagittal and coronal reconstruction    images provided.       Comparison:    CR,SR  - XR CHEST PORTABLE  - 05/06/2021 02:41 PM EDT       Findings: Moderate atherosclerotic vessel disease within the thoracic    aorta.  Aneurysm of the distal thoracic aorta measuring 4.7 cm anterior to    posterior.  Moderate atherosclerotic vessel disease within the thoracic    aorta.  No dissection. No mediastinal or axillary adenopathy. Normal thyroid. Low lung volumes.  No pulmonary nodules.  No areas of consolidation.  No    pneumothorax. Degenerative changes within the spine.    Scarring right kidney.  Hernia in the epigastric region containing    nonobstructed colon.  Simple left renal cyst.           Impression   Impression:   Aneurysm of the distal thoracic aorta.       This document has been electronically signed by: Rodolfo Lindo MD on    05/06/2021 06:49 PM       All CTs at this facility use dose modulation techniques and iterative    reconstructions, and/or weight-based dosing   when appropriate to reduce radiation to a low as reasonably achievable.        AssessmentPlan:   [place] F[place] Sarah Balbuena is a pleasant 79year old male patient who  has a past medical history of AAA (abdominal aortic aneurysm) ([facility] [address] CAD (coronary artery disease), Cancer ([facility] Cancer of kidney ([facility] [address] Heart attack ([facility] [address] History of placement of chest tube, Hx of blood clots, Hyperlipidemia, Hypertension, Kidney stones, Obesity, Osteoarthritis, and Stroke ([facility] [address] He had prior CEA, AAA repair. The patient underwent LHC in 11/2019 for unstable angina. He was found to have severe ISR in RCA, underwent successful PCI. LVEDP 15 mmHg. Nuclear stress test 5/2021 was negative for ischemia. Echocardiogram 05/2021 revealed an EF of 55-60%, trace AI/MR/TR. Chest CTA that was done 5/2021 while patient was hospitalized revealed distal thoracic aorta aneurysm, diameter 4.7 cm. He continues to have SOB and MULLINS. He states that he \"gets out breath just by walking to his kitchen\". SOB have worsened over the past three months. Cardiac monitor on 12/2021 revealed persistent atrial fibrillaiton in 100% with minimum heart rate 38, average heart rate 66, maximum heart rate of 96. Metoprolol dose was reduced. He denies dizziness, syncope, palpitations or chest pain. Patient has mild leg edema on exam. He does not check home weight. Patient states that home BP readings have been higher since lopressor dose was cut down. /83. HR 78.   1. SOB, MULLINS, leg edema. Mild volume overload   2. Chronic HFpEF  3. CAD  4. 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 Paroxysmal atrial fibrillation   6. PAD, S/p CEA  7. AAA, s/p repair 2005 (surgical repair, at [facility])   6. Thoracic aortic aneurysm (4.7 cm on CTA 5/2021)  9. Hypertension   10. Dyslipidemia  11.  BENJAMIN     He reports worsening SOB, MULLINS  · Stress test 5/2021 was negative for ischemia    He denies chest pain    AF with relatively slow HR   BP increased after metoprolol dose was decreased   Will increase Norvasc to 5 mg po daily   The patient was instructed to check the blood pressure at home, and record the readings. Patient will call office with blood pressure readings, will adjust patient's antihypertensive medications as needed accordingly    On Eliquis   Rule out anemia. Denies melena. Check CBC   Has CHF, leg edema   Add lasix 20 mg po daily   Check BMP, Mg in 1-2 weeks    Daily weight   Limit Na intake   Call office if not better, weight gain. ...  Has remote h/o tobacco abuse   May consider referral to pulmonary medicine if not better with above measures   · WILL NEED continued SURVEILLANCE CTA FOR ANEURYSM       Above findings and plan of care were discussed with patient in details, patient's questions were answered.      Disposition:  RTC in 6 months             Electronically signed by Dennise Lara MD, [facility]    1/5/2022 at 1:58 PM EST

## 2022-01-05 NOTE — PROGRESS NOTES
Pt here for holter fu - med changes    Pt forgot b/p log ranging high 180s/90s    Pt continues with sob, is worse

## 2022-01-21 LAB
ABSOLUTE BASO #: 0.1 X10E9/L (ref 0–0.2)
ABSOLUTE EOS #: 0.4 X10E9/L (ref 0–0.4)
ABSOLUTE LYMPH #: 1.5 X10E9/L (ref 1–3.5)
ABSOLUTE MONO #: 0.8 X10E9/L (ref 0–0.9)
ABSOLUTE NEUT #: 7.5 X10E9/L (ref 1.5–6.6)
ALBUMIN SERPL-MCNC: 4.3 G/DL (ref 3.2–5.3)
ALK PHOSPHATASE: 72 U/L (ref 39–130)
ALT SERPL-CCNC: 29 U/L (ref 0–40)
ANION GAP SERPL CALCULATED.3IONS-SCNC: 10 MMOL/L (ref 5–15)
AST SERPL-CCNC: 25 U/L (ref 0–41)
BASOPHILS RELATIVE PERCENT: 1.3 %
BILIRUB SERPL-MCNC: 1.3 MG/DL (ref 0.3–1.2)
BUN BLDV-MCNC: 25 MG/DL (ref 5–27)
CALCIUM SERPL-MCNC: 10.3 MG/DL (ref 8.5–10.5)
CHLORIDE BLD-SCNC: 103 MMOL/L (ref 98–109)
CHOLESTEROL/HDL RATIO: 4.3 (ref 1–5)
CHOLESTEROL: 137 MG/DL (ref 150–200)
CO2: 29 MMOL/L (ref 22–32)
CREAT SERPL-MCNC: 1.54 MG/DL (ref 0.6–1.3)
EGFR AFRICAN AMERICAN: 53 ML/MIN/1.73SQ.M
EGFR IF NONAFRICAN AMERICAN: 43 ML/MIN/1.73SQ.M
EOSINOPHILS RELATIVE PERCENT: 3.6 %
GLUCOSE: 145 MG/DL (ref 65–99)
HCT VFR BLD CALC: 50.5 % (ref 39–49)
HDLC SERPL-MCNC: 32 MG/DL
HEMOGLOBIN: 17 G/DL (ref 13–17)
LDL CHOLESTEROL CALCULATED: 69 MG/DL
LDL/HDL RATIO: 2.2
LYMPHOCYTE %: 14.6 %
MAGNESIUM: 1.9 MG/DL (ref 1.8–2.6)
MCH RBC QN AUTO: 32.6 PG (ref 27–34)
MCHC RBC AUTO-ENTMCNC: 33.7 G/DL (ref 32–36)
MCV RBC AUTO: 97 FL (ref 80–100)
MONOCYTES # BLD: 7.6 %
NEUTROPHILS RELATIVE PERCENT: 72.9 %
PDW BLD-RTO: 14.3 % (ref 11.5–15)
PLATELETS: 158 X10E9/L (ref 150–450)
PMV BLD AUTO: 8.8 FL (ref 7–12)
POTASSIUM SERPL-SCNC: 4.3 MMOL/L (ref 3.5–5)
RBC: 5.22 X10E12/L (ref 4.1–5.7)
SODIUM BLD-SCNC: 142 MMOL/L (ref 134–146)
TOTAL PROTEIN: 7.1 G/DL (ref 6–8)
TRIGL SERPL-MCNC: 179 MG/DL (ref 27–150)
VLDLC SERPL CALC-MCNC: 36 MG/DL (ref 0–30)
WBC: 10.2 X10E9/L (ref 4–11)

## 2022-02-02 RX ORDER — ATORVASTATIN CALCIUM 40 MG/1
TABLET, FILM COATED ORAL
Qty: 90 TABLET | Refills: 2 | Status: SHIPPED | OUTPATIENT
Start: 2022-02-02

## 2022-02-14 ENCOUNTER — TELEPHONE (OUTPATIENT)
Dept: CARDIOLOGY CLINIC | Age: 82
End: 2022-02-14

## 2022-02-24 RX ORDER — AMLODIPINE BESYLATE 5 MG/1
5 TABLET ORAL DAILY
Qty: 90 TABLET | Refills: 1 | Status: SHIPPED | OUTPATIENT
Start: 2022-02-24 | End: 2022-08-15

## 2022-03-08 NOTE — PROGRESS NOTES
[facility] PROFESSIONAL SERVICES  [facility]   [address] [facility] [address]   [place] [address]   Dept: [phone]   Dept Fax: [phone]   Loc: [phone]      Chief Complaint   Patient presents with    Shortness of Breath     4-5 months     Cardiologist:  Dr. Princess Olvera  80 yo male presents for SOB. Hx of Afib, CAD, AAA, cancer, DVT, CVA, CEA/AAA repair. No chest pain, angina, PND, palpitations, LE edema, or syncope. Sleeps in recliner at night. No new symptoms, no chest pain, swelling is about the same, better some days and worse others. Is on lasix 20 mg daily, this did bring his weight down some, has been steady now around 250 for a while. Swelling did improve some but SOB still there, no change. Did see William Jeffrey, Dr Augusto Holt recently.      General:   No fever, no chills, no weight loss, + fatigue  Pulmonary:    + dyspnea, no wheezing  Cardiac:    Denies recent chest pain   GI:     No nausea or vomiting, no abdominal pain  Neuro:      No dizziness or light headedness  Musculoskeletal:  No recent active issues  Extremities:   + edema      Past Medical History:   Diagnosis Date    AAA (abdominal aortic aneurysm) (HCC)     CAD (coronary artery disease)     Cancer (HCC)     cheek and nose     Cancer of kidney ([address]     Heart attack ([address]     History of placement of chest tube     Due to pt falling and breaking 4 ribs and puncturing his lung    Hx of blood clots     Hyperlipidemia     Hypertension     Kidney stones     Obesity     Osteoarthritis     Stroke ([address]        No Known Allergies    Current Outpatient Medications   Medication Sig Dispense Refill    amLODIPine (NORVASC) 5 MG tablet TAKE 1 TABLET BY MOUTH  DAILY 90 tablet 1    atorvastatin (LIPITOR) 40 MG tablet TAKE 1 TABLET BY MOUTH AT  NIGHT 90 tablet 2    NONFORMULARY 500 mg 2 times daily Querectin      NONFORMULARY 4 times daily [facility]      furosemide (LASIX) 20 MG tablet Take 1 tablet by mouth daily 90 tablet 1    metoprolol tartrate (LOPRESSOR) 25 MG tablet Take 12.5 mg by mouth 2 times daily       Zinc Sulfate (ZINC 15 PO) Take by mouth      Turmeric Curcumin 500 MG CAPS Take by mouth      Omega-3 Fatty Acids (FISH OIL PO) Take by mouth      clopidogrel (PLAVIX) 75 MG tablet TAKE 1 TABLET BY MOUTH  DAILY 90 tablet 3    OXYGEN Inhale 3 L into the lungs nightly      acetaminophen (TYLENOL) 500 MG tablet Take 1,000 mg by mouth every 6 hours as needed for Pain      apixaban (ELIQUIS) 2.5 MG TABS tablet Take 1 tablet by mouth 2 times daily 180 tablet 3    Coenzyme Q10 (CO Q-10) 100 MG CAPS Take by mouth      Cholecalciferol (VITAMIN D3) 125 MCG (5000 UT) TABS Take by mouth      APPLE CIDER VINEGAR PO Take 450 mg by mouth      pantoprazole (PROTONIX) 40 MG tablet TAKE 1 TABLET BY MOUTH  DAILY 90 tablet 3    PARoxetine (PAXIL) 20 MG tablet Take 20 mg by mouth every morning      Magnesium 500 MG TABS Take by mouth daily       CINNAMON PO Take 1,000 mg by mouth daily      Probiotic Product (PROBIOTIC DAILY PO) Take by mouth daily      Ascorbic Acid (VITAMIN C) 1000 MG tablet Take 1,000 mg by mouth daily        No current facility-administered medications for this visit.        Social History     Socioeconomic History    Marital status:      Spouse name: Timoteo Ma Number of children: 3    Years of education: None    Highest education level: None   Occupational History    None   Tobacco Use    Smoking status: Former Smoker     Packs/day: 1.00     Years: 15.00     Pack years: 15.00     Types: Pipe     Start date: 5     Quit date: 1970     Years since quittin.8    Smokeless tobacco: Never Used   Vaping Use    Vaping Use: Never used   Substance and Sexual Activity    Alcohol use: No     Alcohol/week: 0.0 standard drinks    Drug use: No    Sexual activity: None   Other Topics Concern    None   Social History Narrative    None     Social Determinants of Health Financial Resource Strain:     Difficulty of Paying Living Expenses: Not on file   Food Insecurity:     Worried About Running Out of Food in the Last Year: Not on file    Hanny of Food in the Last Year: Not on file   Transportation Needs:     Lack of Transportation (Medical): Not on file    Lack of Transportation (Non-Medical): Not on file   Physical Activity:     Days of Exercise per Week: Not on file    Minutes of Exercise per Session: Not on file   Stress:     Feeling of Stress : Not on file   Social Connections:     Frequency of Communication with Friends and Family: Not on file    Frequency of Social Gatherings with Friends and Family: Not on file    Attends Latter day Services: Not on file    Active Member of [address] [place] [address] or Organizations: Not on file    Attends Club or Organization Meetings: Not on file    Marital Status: Not on file   Intimate Partner Violence:     Fear of Current or Ex-Partner: Not on file    Emotionally Abused: Not on file    Physically Abused: Not on file    Sexually Abused: Not on file   Housing Stability:     Unable to Pay for Housing in the Last Year: Not on file    Number of [place] in the Last Year: Not on file    Unstable Housing in the Last Year: Not on file       Family History   Problem Relation Age of Onset    Alzheimer's Disease Mother     Heart Disease Father        Blood pressure 130/84, pulse 76, height 5' 8\" (1.727 m), weight 251 lb 14.4 oz (114.3 kg), SpO2 95 %. General:   Well developed, well nourished  Lungs:   Clear to auscultation  Heart:    Normal S1 S2, No murmur, rubs, or gallops  Abdomen:   Soft, non tender, no organomegalies, positive bowel sounds  Extremities:   No edema, no cyanosis, good peripheral pulses  Neurological:   Awake, alert, oriented. No obvious focal deficits  Musculoskeletal:  No obvious deformities    EKG:          Diagnosis Orders   1. Paroxysmal atrial fibrillation (HCC)     2.  Coronary artery disease involving native coronary artery of native heart without angina pectoris     3. Primary hypertension     4. Abdominal aortic aneurysm (AAA) without rupture ([facility]  CT CHEST W WO CONTRAST    Basic Metabolic Panel       Orders Placed This Encounter   Procedures    CT CHEST W WO CONTRAST     Standing Status:   Future     Standing Expiration Date:   3/9/2023     Order Specific Question:   STAT Creatinine as needed:     Answer: Yes    Basic Metabolic Panel     Standing Status:   Future     Standing Expiration Date:   3/9/2023       Assessment/Plan:   SOB-ongoing, now essentially chronic. Has not changed much, stress was negative, no afib concerns. Does sleep in a recliner, since shoulder surgery?, this does not seem to be a cardiac cause for SOB. CBC, BMP were okay, mild creat. Cardiac wise patient seems fair at this time. Will monitor for any acute changes. PAF-controlled, monitor showed no concerns with low or fast HR. Continue current treatment including eliquis. HTN-well controlled. AAA--will order repeat CT scan for May, 1 year after last scan. Bmp prior to monitor kidney function. Disposition:   F/u with Dr Caryle Bears as scheduled.    Continue Dr Meryl Jimenes current treatment plan  Follow up with Dr Caryle Bears as scheduled or sooner if needed

## 2022-03-09 ENCOUNTER — OFFICE VISIT (OUTPATIENT)
Dept: CARDIOLOGY CLINIC | Age: 82
End: 2022-03-09
Payer: MEDICARE

## 2022-03-09 VITALS
HEART RATE: 76 BPM | SYSTOLIC BLOOD PRESSURE: 130 MMHG | DIASTOLIC BLOOD PRESSURE: 84 MMHG | WEIGHT: 251.9 LBS | HEIGHT: 68 IN | BODY MASS INDEX: 38.18 KG/M2 | OXYGEN SATURATION: 95 %

## 2022-03-09 DIAGNOSIS — I10 PRIMARY HYPERTENSION: ICD-10-CM

## 2022-03-09 DIAGNOSIS — I48.0 PAROXYSMAL ATRIAL FIBRILLATION (HCC): Primary | ICD-10-CM

## 2022-03-09 DIAGNOSIS — I71.40 ABDOMINAL AORTIC ANEURYSM (AAA) WITHOUT RUPTURE: ICD-10-CM

## 2022-03-09 DIAGNOSIS — I25.10 CORONARY ARTERY DISEASE INVOLVING NATIVE CORONARY ARTERY OF NATIVE HEART WITHOUT ANGINA PECTORIS: ICD-10-CM

## 2022-03-09 PROCEDURE — G8484 FLU IMMUNIZE NO ADMIN: HCPCS | Performed by: STUDENT IN AN ORGANIZED HEALTH CARE EDUCATION/TRAINING PROGRAM

## 2022-03-09 PROCEDURE — 1123F ACP DISCUSS/DSCN MKR DOCD: CPT | Performed by: STUDENT IN AN ORGANIZED HEALTH CARE EDUCATION/TRAINING PROGRAM

## 2022-03-09 PROCEDURE — 1036F TOBACCO NON-USER: CPT | Performed by: STUDENT IN AN ORGANIZED HEALTH CARE EDUCATION/TRAINING PROGRAM

## 2022-03-09 PROCEDURE — G8427 DOCREV CUR MEDS BY ELIG CLIN: HCPCS | Performed by: STUDENT IN AN ORGANIZED HEALTH CARE EDUCATION/TRAINING PROGRAM

## 2022-03-09 PROCEDURE — G8417 CALC BMI ABV UP PARAM F/U: HCPCS | Performed by: STUDENT IN AN ORGANIZED HEALTH CARE EDUCATION/TRAINING PROGRAM

## 2022-03-09 PROCEDURE — 99214 OFFICE O/P EST MOD 30 MIN: CPT | Performed by: STUDENT IN AN ORGANIZED HEALTH CARE EDUCATION/TRAINING PROGRAM

## 2022-03-09 PROCEDURE — 4040F PNEUMOC VAC/ADMIN/RCVD: CPT | Performed by: STUDENT IN AN ORGANIZED HEALTH CARE EDUCATION/TRAINING PROGRAM

## 2022-05-05 ENCOUNTER — TELEPHONE (OUTPATIENT)
Dept: CARDIOLOGY CLINIC | Age: 82
End: 2022-05-05

## 2022-05-05 NOTE — TELEPHONE ENCOUNTER
Result note for Basic Metabolic Panel      ----- Message from Victor Hugo Noe PA-C sent at 5/5/2022  8:38 AM EDT -----  Creat/kidney function is basically at baseline right now. Going for CT scan soon. Have him hold lasix the day of his procedure and make sure to stay well hydrated both before and after the CT scan to minimize risk to his kidneys.

## 2022-05-06 RX ORDER — APIXABAN 2.5 MG/1
TABLET, FILM COATED ORAL
Qty: 180 TABLET | Refills: 2 | Status: SHIPPED | OUTPATIENT
Start: 2022-05-06

## 2022-05-11 ENCOUNTER — HOSPITAL ENCOUNTER (OUTPATIENT)
Dept: CT IMAGING | Age: 82
Discharge: HOME | End: 2022-05-11
Payer: MEDICARE

## 2022-05-11 DIAGNOSIS — I71.40 ABDOMINAL AORTIC ANEURYSM (AAA) WITHOUT RUPTURE: ICD-10-CM

## 2022-05-11 LAB — POC CREATININE WHOLE BLOOD: 1.6 MG/DL (ref 0.5–1.2)

## 2022-05-11 PROCEDURE — 82565 ASSAY OF CREATININE: CPT

## 2022-05-11 PROCEDURE — 71275 CT ANGIOGRAPHY CHEST: CPT

## 2022-05-11 PROCEDURE — 6360000004 HC RX CONTRAST MEDICATION: Performed by: STUDENT IN AN ORGANIZED HEALTH CARE EDUCATION/TRAINING PROGRAM

## 2022-05-11 RX ADMIN — IOPAMIDOL 85 ML: 755 INJECTION, SOLUTION INTRAVENOUS at 14:39

## 2022-05-12 ENCOUNTER — TELEPHONE (OUTPATIENT)
Dept: CARDIOLOGY CLINIC | Age: 82
End: 2022-05-12

## 2022-05-12 NOTE — TELEPHONE ENCOUNTER
Pt had CTA of Chest 5/12/22       1. 4.7 cm distal descending thoracic aortic aneurysm. This is minimally enlarged when compared to the non-CTA CT chest dated 5/6/2021 at which time it measured 4.5 cm by my measurements. 2. No evidence of dissection. 3. The proximal suprarenal abdominal aorta measures 3.4 cm and tapers to normal caliber below the kidneys. 4. Chronic lung changes. 5. Ventral abdominal hernia containing transverse colon         Dr. Joshua Donald advise?

## 2022-05-12 NOTE — TELEPHONE ENCOUNTER
Only mild enlargement when compared to prior study  Will need surveillance  Also, patient needs to follow up with vascular surgery (if he already follows with one, advise to see soon.  If not, we can make a referral to Dr Raffi Monzon)

## 2022-05-13 ENCOUNTER — HOSPITAL ENCOUNTER (EMERGENCY)
Age: 82
Discharge: HOME | End: 2022-05-13
Attending: EMERGENCY MEDICINE
Payer: MEDICARE

## 2022-05-13 VITALS
TEMPERATURE: 97.5 F | OXYGEN SATURATION: 93 % | DIASTOLIC BLOOD PRESSURE: 75 MMHG | SYSTOLIC BLOOD PRESSURE: 162 MMHG | RESPIRATION RATE: 17 BRPM | HEART RATE: 66 BPM

## 2022-05-13 DIAGNOSIS — R79.89 ELEVATED SERUM CREATININE: ICD-10-CM

## 2022-05-13 DIAGNOSIS — Z01.89 ROUTINE LAB DRAW: Primary | ICD-10-CM

## 2022-05-13 LAB
ANION GAP SERPL CALCULATED.3IONS-SCNC: 12 MEQ/L (ref 8–16)
BUN BLDV-MCNC: 16 MG/DL (ref 7–22)
CALCIUM SERPL-MCNC: 8.8 MG/DL (ref 8.5–10.5)
CHLORIDE BLD-SCNC: 101 MEQ/L (ref 98–111)
CO2: 23 MEQ/L (ref 23–33)
CREAT SERPL-MCNC: 1.3 MG/DL (ref 0.4–1.2)
GFR SERPL CREATININE-BSD FRML MDRD: 53 ML/MIN/1.73M2
GLUCOSE BLD-MCNC: 88 MG/DL (ref 70–108)
OSMOLALITY CALCULATION: 272.6 MOSMOL/KG (ref 275–300)
POTASSIUM SERPL-SCNC: 5.1 MEQ/L (ref 3.5–5.2)
SODIUM BLD-SCNC: 136 MEQ/L (ref 135–145)

## 2022-05-13 PROCEDURE — 36415 COLL VENOUS BLD VENIPUNCTURE: CPT

## 2022-05-13 PROCEDURE — 80048 BASIC METABOLIC PNL TOTAL CA: CPT

## 2022-05-13 PROCEDURE — 99283 EMERGENCY DEPT VISIT LOW MDM: CPT

## 2022-05-13 ASSESSMENT — ENCOUNTER SYMPTOMS
SORE THROAT: 0
VOMITING: 0
TROUBLE SWALLOWING: 0
NAUSEA: 0
COUGH: 0
DIARRHEA: 0
PHOTOPHOBIA: 0
ABDOMINAL PAIN: 0
BACK PAIN: 0
SHORTNESS OF BREATH: 0

## 2022-05-13 ASSESSMENT — PAIN - FUNCTIONAL ASSESSMENT: PAIN_FUNCTIONAL_ASSESSMENT: NONE - DENIES PAIN

## 2022-05-13 NOTE — ED PROVIDER NOTES
[address] [address]          Pt Name: Kamran Fam  MRN: [id number]  [place] 1940  Date of evaluation: 5/13/2022  Treating Resident Physician: Georgia Sheikh MD  Supervising Physician: Sara Hunt MD    [address] [place] [address]       Chief Complaint   Patient presents with    Labs Only     History obtained from the patient. HISTORY OF PRESENT ILLNESS    HPI  Kamran Fam is a 79 y.o. male with PMHx of thoracic aortic aneurysm who presents to the emergency department for a lab draw. Patient had a CTA performed on 5/12/2022 to monitor and known thoracic aortic aneurysm. It was 4.5 cm a year ago now is 4.7 cm. During the CT scan, there was some dye extravasated. The patient was sent here by his PCP today get a BMP performed because they were unable to get venous access at their office. The patient has no other acute complaints at this time. REVIEW OF SYSTEMS   Review of Systems   Constitutional: Negative for chills and fever. HENT: Negative for sore throat and trouble swallowing. Eyes: Negative for photophobia and visual disturbance. Respiratory: Negative for cough and shortness of breath. Cardiovascular: Negative for chest pain, palpitations and leg swelling. Gastrointestinal: Negative for abdominal pain, diarrhea, nausea and vomiting. Genitourinary: Negative for difficulty urinating and flank pain. Musculoskeletal: Negative for arthralgias, back pain, myalgias and neck pain. Skin: Negative for rash. Neurological: Negative for seizures, syncope, weakness and headaches.          PAST MEDICAL AND SURGICAL HISTORY     Past Medical History:   Diagnosis Date    AAA (abdominal aortic aneurysm) ([address]     CAD (coronary artery disease)     Cancer ([address]     cheek and nose     Cancer of kidney ([address]     Heart attack ([address]     History of placement of chest tube     Due to pt falling and breaking 4 ribs and puncturing his lung  Hx of blood clots     Hyperlipidemia     Hypertension     Kidney stones     Obesity     Osteoarthritis     Stroke [facility])      Past Surgical History:   Procedure Laterality Date    ABDOMEN SURGERY      AAA    ABDOMINAL AORTIC ANEURYSM REPAIR  2005    CARDIAC SURGERY  [facility], [facility]  2005    AAA repair    CAROTID ENDARTERECTOMY  2006    FINGER AMPUTATION      JOINT REPLACEMENT  2007    knees bilateral    KNEE SURGERY Left 6-2013    PARTIAL NEPHRECTOMY  2010    Right    PTCA      SHOULDER SURGERY  1997    TONSILLECTOMY  1948    VASCULAR SURGERY           MEDICATIONS   No current facility-administered medications for this encounter.     Current Outpatient Medications:     ELIQUIS 2.5 MG TABS tablet, TAKE 1 TABLET BY MOUTH  TWICE DAILY, Disp: 180 tablet, Rfl: 2    amLODIPine (NORVASC) 5 MG tablet, TAKE 1 TABLET BY MOUTH  DAILY, Disp: 90 tablet, Rfl: 1    atorvastatin (LIPITOR) 40 MG tablet, TAKE 1 TABLET BY MOUTH AT  NIGHT, Disp: 90 tablet, Rfl: 2    NONFORMULARY, 500 mg 2 times daily Querectin, Disp: , Rfl:     NONFORMULARY, 4 times daily KEYA Connor, Disp: , Rfl:     furosemide (LASIX) 20 MG tablet, Take 1 tablet by mouth daily, Disp: 90 tablet, Rfl: 1    metoprolol tartrate (LOPRESSOR) 25 MG tablet, Take 12.5 mg by mouth 2 times daily , Disp: , Rfl:     Zinc Sulfate (ZINC 15 PO), Take by mouth, Disp: , Rfl:     Turmeric Curcumin 500 MG CAPS, Take by mouth, Disp: , Rfl:     Omega-3 Fatty Acids (FISH OIL PO), Take by mouth, Disp: , Rfl:     clopidogrel (PLAVIX) 75 MG tablet, TAKE 1 TABLET BY MOUTH  DAILY, Disp: 90 tablet, Rfl: 3    OXYGEN, Inhale 3 L into the lungs nightly, Disp: , Rfl:     acetaminophen (TYLENOL) 500 MG tablet, Take 1,000 mg by mouth every 6 hours as needed for Pain, Disp: , Rfl:     Coenzyme Q10 (CO Q-10) 100 MG CAPS, Take by mouth, Disp: , Rfl:     Cholecalciferol (VITAMIN D3) 125 MCG (5000 UT) TABS, Take by mouth, Disp: , Rfl:    APPLE CIDER VINEGAR PO, Take 450 mg by mouth, Disp: , Rfl:     pantoprazole (PROTONIX) 40 MG tablet, TAKE 1 TABLET BY MOUTH  DAILY, Disp: 90 tablet, Rfl: 3    PARoxetine (PAXIL) 20 MG tablet, Take 20 mg by mouth every morning, Disp: , Rfl:     Magnesium 500 MG TABS, Take by mouth daily , Disp: , Rfl:     CINNAMON PO, Take 1,000 mg by mouth daily, Disp: , Rfl:     Probiotic Product (PROBIOTIC DAILY PO), Take by mouth daily, Disp: , Rfl:     Ascorbic Acid (VITAMIN C) 1000 MG tablet, Take 1,000 mg by mouth daily , Disp: , Rfl:       SOCIAL HISTORY     Social History     Social History Narrative    Not on file     Social History     Tobacco Use    Smoking status: Former Smoker     Packs/day: 1.00     Years: 15.00     Pack years: 15.00     Types: Pipe     Start date:      Quit date: 1970     Years since quittin.0    Smokeless tobacco: Never Used   Vaping Use    Vaping Use: Never used   Substance Use Topics    Alcohol use: No     Alcohol/week: 0.0 standard drinks    Drug use: No         ALLERGIES   No Known Allergies      FAMILY HISTORY     Family History   Problem Relation Age of Onset    Alzheimer's Disease Mother     Heart Disease Father          PREVIOUS RECORDS   Previous records reviewed: I reviewed the patient's past medical records including relevant labs, imaging and procedures. PHYSICAL EXAM     ED Triage Vitals [22 1700]   BP Temp Temp Source Pulse Resp SpO2 Height Weight   (!) 162/75 97.5 °F (36.4 °C) Oral 66 17 93 % -- --     Initial vital signs and nursing assessment reviewed and normal. There is no height or weight on file to calculate BMI. Pulsoximetry is normal per my interpretation. Additional Vital Signs:  Vitals:    22 1700   BP: (!) 162/75   Pulse: 66   Resp: 17   Temp: 97.5 °F (36.4 °C)   SpO2: 93%       Physical Exam  Vitals and nursing note reviewed. Constitutional:       General: He is not in acute distress. Appearance: Normal appearance. He is normal weight. He is not toxic-appearing. HENT:      Head: Normocephalic and atraumatic. Right Ear: Tympanic membrane normal.      Left Ear: Tympanic membrane normal.      Nose: Nose normal.      Mouth/Throat:      Mouth: Mucous membranes are moist.      Pharynx: Oropharynx is clear. Eyes:      General: No scleral icterus. Extraocular Movements: Extraocular movements intact. Conjunctiva/sclera: Conjunctivae normal.      Pupils: Pupils are equal, round, and reactive to light. Cardiovascular:      Rate and Rhythm: Normal rate and regular rhythm. Pulses: Normal pulses. Heart sounds: Normal heart sounds. No murmur heard. No friction rub. No gallop. Pulmonary:      Effort: Pulmonary effort is normal.      Breath sounds: Normal breath sounds. No wheezing or rales. Abdominal:      Palpations: Abdomen is soft. Tenderness: There is no abdominal tenderness. There is no guarding or rebound. Musculoskeletal:         General: Normal range of motion. Cervical back: Normal range of motion and neck supple. Right lower leg: No edema. Left lower leg: No edema. Skin:     General: Skin is warm and dry. Capillary Refill: Capillary refill takes less than 2 seconds. Neurological:      General: No focal deficit present. Mental Status: He is alert and oriented to person, place, and time. Cranial Nerves: No cranial nerve deficit. Sensory: No sensory deficit. Motor: No weakness. Coordination: Coordination normal.             MEDICAL DECISION MAKING   Initial Assessment:   79-year-old male here for lab draw only. Differential diagnosis includes but is not limited to: TO secondary to contrast, electrolyte abnormality    Although some of these diagnoses are unlikely they were considered in my medical decision making.     Plan:    BMP        ED RESULTS   Laboratory results:  Labs Reviewed   BASIC METABOLIC PANEL       Radiologic studies results:  No orders to display       ED Medications administered this visit: Medications - No data to display      ED COURSE          MDM:   The patient has no acute complaints. He is here because they could not get venous access and draw labs at his normal PCP office. Patient states that his PCP told him to come here for those labs and that he is trending the patient's kidney function. Strict return precautions and follow up instructions were discussed with the patient prior to discharge, with which the patient agrees. MEDICATION CHANGES     New Prescriptions    No medications on file         FINAL DISPOSITION     Final diagnoses:   Routine lab draw     Condition: condition: stable  Dispo: Discharge to home      This transcription was electronically signed. Parts of this transcriptions may have been dictated by use of voice recognition software and electronically transcribed, and parts may have been transcribed with the assistance of an ED scribe. The transcription may contain errors not detected in proofreading. Please refer to my supervising physician's documentation if my documentation differs.     Electronically Signed: Faheem Estrada MD, 05/13/22, 5:27 PM         Chantelle Donahue MD  Resident  05/13/22 9759

## 2022-05-13 NOTE — ED NOTES
Pt to ED c/o needing BMP drawn. [facility] outpatient was unable to draw his blood so they sent him here. Pt has no complaints. Respirations unlabored. Wife at bedside.       Kayli HARRIS RN  05/13/22 2249

## 2022-05-13 NOTE — ED PROVIDER NOTES
[facility]  EMERGENCY MEDICINE ATTENDING ATTESTATION      Evaluation of Yolette Colón. Case discussed and care plan developed with resident physician, Dr. Jaxon Loya. I agree with the resident physician documentation and plan as documented by him, except if my documentation differs. Patient seen, interviewed and examined by me. I reviewed the medical, surgical, family and social history, medications and allergies. I have reviewed the nursing documentation. I have reviewed the patient's vital signs and are abnormal from Systolic hypertension per my interpretation. There is no height or weight on file to calculate BMI. Pulsoxymetry is normal per my interpretation. Brief H&P   Patient sent by PCP for blood work. Patient had a CT scan 3 days ago and had extravasation of contrast during the study. PCP ordered a BMP today but they were unable to obtain a sample due to difficult stick. Patient was advised to come to the emergency department for this blood draw today. Currently offers no complaints    Physical exam is notable for well appearing, nonfocal exam, stigmata of recent venipunctures in bilateral forearms, no swelling, redness, tenderness on IV site from previous CT scan. Medical Decision Making   MDM:   1. Encounter for blood work  Plan:    Will draw BMP, await for results, then refer to PCP. Please see the resident physician completed note for final disposition except as documented on this attestation. I have reviewed and interpreted all available lab, radiology and ekg results available at the moment. Diagnosis, treatment and disposition plans were discussed and agreed upon by patient. This transcription was electronically signed. It was dictated by use of voice recognition software and electronically transcribed. The transcription may contain errors not detected in proofreading.      I performed direct supervision and was present for the critical portion following procedures: None  Critical care time on this case: None    Electronically signed by Anay Reynoso MD on 5/13/22 at 5:44 PM EDT       Anay Reynoso MD  05/13/22 8581

## 2022-05-15 LAB — SARS-COV-2: DETECTED

## 2022-05-16 RX ORDER — FUROSEMIDE 20 MG/1
20 TABLET ORAL DAILY
Qty: 90 TABLET | Refills: 0 | Status: SHIPPED | OUTPATIENT
Start: 2022-05-16 | End: 2022-08-17 | Stop reason: ALTCHOICE

## 2022-05-18 ENCOUNTER — APPOINTMENT (OUTPATIENT)
Dept: GENERAL RADIOLOGY | Age: 82
DRG: 177 | End: 2022-05-18
Payer: MEDICARE

## 2022-05-18 ENCOUNTER — HOSPITAL ENCOUNTER (INPATIENT)
Age: 82
LOS: 3 days | Discharge: HOME | DRG: 177 | End: 2022-05-21
Attending: EMERGENCY MEDICINE | Admitting: INTERNAL MEDICINE
Payer: MEDICARE

## 2022-05-18 DIAGNOSIS — J12.82 PNEUMONIA DUE TO COVID-19 VIRUS: ICD-10-CM

## 2022-05-18 DIAGNOSIS — R73.9 STEROID-INDUCED HYPERGLYCEMIA: Primary | ICD-10-CM

## 2022-05-18 DIAGNOSIS — T38.0X5A STEROID-INDUCED HYPERGLYCEMIA: Primary | ICD-10-CM

## 2022-05-18 DIAGNOSIS — U07.1 PNEUMONIA DUE TO COVID-19 VIRUS: ICD-10-CM

## 2022-05-18 PROBLEM — J96.01 ACUTE HYPOXEMIC RESPIRATORY FAILURE (HCC): Status: ACTIVE | Noted: 2022-05-18

## 2022-05-18 LAB
ALBUMIN SERPL-MCNC: 4.1 G/DL (ref 3.5–5.1)
ALP BLD-CCNC: 67 U/L (ref 38–126)
ALT SERPL-CCNC: 81 U/L (ref 11–66)
ANION GAP SERPL CALCULATED.3IONS-SCNC: 12 MEQ/L (ref 8–16)
AST SERPL-CCNC: 49 U/L (ref 5–40)
BASOPHILS # BLD: 0.1 %
BASOPHILS ABSOLUTE: 0 THOU/MM3 (ref 0–0.1)
BILIRUB SERPL-MCNC: 1.4 MG/DL (ref 0.3–1.2)
BUN BLDV-MCNC: 35 MG/DL (ref 7–22)
C-REACTIVE PROTEIN: 7.83 MG/DL (ref 0–1)
CALCIUM SERPL-MCNC: 9.1 MG/DL (ref 8.5–10.5)
CHLORIDE BLD-SCNC: 101 MEQ/L (ref 98–111)
CO2: 25 MEQ/L (ref 23–33)
CREAT SERPL-MCNC: 1.5 MG/DL (ref 0.4–1.2)
EKG Q-T INTERVAL: 404 MS
EKG Q-T INTERVAL: 434 MS
EKG QRS DURATION: 128 MS
EKG QRS DURATION: 132 MS
EKG QTC CALCULATION (BAZETT): 451 MS
EKG QTC CALCULATION (BAZETT): 491 MS
EKG R AXIS: 30 DEGREES
EKG R AXIS: 51 DEGREES
EKG T AXIS: -4 DEGREES
EKG T AXIS: 5 DEGREES
EKG VENTRICULAR RATE: 75 BPM
EKG VENTRICULAR RATE: 77 BPM
EOSINOPHIL # BLD: 0.1 %
EOSINOPHILS ABSOLUTE: 0 THOU/MM3 (ref 0–0.4)
ERYTHROCYTE [DISTWIDTH] IN BLOOD BY AUTOMATED COUNT: 13.6 % (ref 11.5–14.5)
ERYTHROCYTE [DISTWIDTH] IN BLOOD BY AUTOMATED COUNT: 49.8 FL (ref 35–45)
FERRITIN: 275 NG/ML (ref 22–322)
GFR SERPL CREATININE-BSD FRML MDRD: 45 ML/MIN/1.73M2
GLUCOSE BLD-MCNC: 159 MG/DL (ref 70–108)
HCT VFR BLD CALC: 41.6 % (ref 42–52)
HEMOGLOBIN: 14.1 GM/DL (ref 14–18)
IMMATURE GRANS (ABS): 0.2 THOU/MM3 (ref 0–0.07)
IMMATURE GRANULOCYTES: 1.4 %
LACTIC ACID: 1.5 MMOL/L (ref 0.5–2)
LYMPHOCYTES # BLD: 2.8 %
LYMPHOCYTES ABSOLUTE: 0.4 THOU/MM3 (ref 1–4.8)
MCH RBC QN AUTO: 33.9 PG (ref 26–33)
MCHC RBC AUTO-ENTMCNC: 33.9 GM/DL (ref 32.2–35.5)
MCV RBC AUTO: 100 FL (ref 80–94)
MONOCYTES # BLD: 4.2 %
MONOCYTES ABSOLUTE: 0.6 THOU/MM3 (ref 0.4–1.3)
NUCLEATED RED BLOOD CELLS: 0 /100 WBC
OSMOLALITY CALCULATION: 287 MOSMOL/KG (ref 275–300)
PLATELET # BLD: 122 THOU/MM3 (ref 130–400)
PLATELET ESTIMATE: ABNORMAL
PMV BLD AUTO: 9.8 FL (ref 9.4–12.4)
POTASSIUM REFLEX MAGNESIUM: 4.3 MEQ/L (ref 3.5–5.2)
PRO-BNP: 5066 PG/ML (ref 0–1800)
PROCALCITONIN: 1.46 NG/ML (ref 0.01–0.09)
RBC # BLD: 4.16 MILL/MM3 (ref 4.7–6.1)
SARS-COV-2, NAAT: DETECTED
SCAN OF BLOOD SMEAR: NORMAL
SEG NEUTROPHILS: 91.4 %
SEGMENTED NEUTROPHILS ABSOLUTE COUNT: 13.5 THOU/MM3 (ref 1.8–7.7)
SODIUM BLD-SCNC: 138 MEQ/L (ref 135–145)
TOTAL PROTEIN: 6.4 G/DL (ref 6.1–8)
TROPONIN T: < 0.01 NG/ML
WBC # BLD: 14.8 THOU/MM3 (ref 4.8–10.8)

## 2022-05-18 PROCEDURE — 2700000000 HC OXYGEN THERAPY PER DAY

## 2022-05-18 PROCEDURE — 93005 ELECTROCARDIOGRAM TRACING: CPT

## 2022-05-18 PROCEDURE — 36415 COLL VENOUS BLD VENIPUNCTURE: CPT

## 2022-05-18 PROCEDURE — 6370000000 HC RX 637 (ALT 250 FOR IP): Performed by: EMERGENCY MEDICINE

## 2022-05-18 PROCEDURE — 83880 ASSAY OF NATRIURETIC PEPTIDE: CPT

## 2022-05-18 PROCEDURE — 6360000002 HC RX W HCPCS

## 2022-05-18 PROCEDURE — 96374 THER/PROPH/DIAG INJ IV PUSH: CPT

## 2022-05-18 PROCEDURE — 93010 ELECTROCARDIOGRAM REPORT: CPT | Performed by: INTERNAL MEDICINE

## 2022-05-18 PROCEDURE — 99223 1ST HOSP IP/OBS HIGH 75: CPT

## 2022-05-18 PROCEDURE — 6360000002 HC RX W HCPCS: Performed by: EMERGENCY MEDICINE

## 2022-05-18 PROCEDURE — 2580000003 HC RX 258

## 2022-05-18 PROCEDURE — 93005 ELECTROCARDIOGRAM TRACING: CPT | Performed by: EMERGENCY MEDICINE

## 2022-05-18 PROCEDURE — 87635 SARS-COV-2 COVID-19 AMP PRB: CPT

## 2022-05-18 PROCEDURE — 71045 X-RAY EXAM CHEST 1 VIEW: CPT

## 2022-05-18 PROCEDURE — 82728 ASSAY OF FERRITIN: CPT

## 2022-05-18 PROCEDURE — 85025 COMPLETE CBC W/AUTO DIFF WBC: CPT

## 2022-05-18 PROCEDURE — 86140 C-REACTIVE PROTEIN: CPT

## 2022-05-18 PROCEDURE — 1200000003 HC TELEMETRY R&B

## 2022-05-18 PROCEDURE — 99285 EMERGENCY DEPT VISIT HI MDM: CPT

## 2022-05-18 PROCEDURE — 83605 ASSAY OF LACTIC ACID: CPT

## 2022-05-18 PROCEDURE — 6370000000 HC RX 637 (ALT 250 FOR IP)

## 2022-05-18 PROCEDURE — 94640 AIRWAY INHALATION TREATMENT: CPT

## 2022-05-18 PROCEDURE — 2500000003 HC RX 250 WO HCPCS

## 2022-05-18 PROCEDURE — 80053 COMPREHEN METABOLIC PANEL: CPT

## 2022-05-18 PROCEDURE — 84145 PROCALCITONIN (PCT): CPT

## 2022-05-18 PROCEDURE — 94761 N-INVAS EAR/PLS OXIMETRY MLT: CPT

## 2022-05-18 PROCEDURE — 84484 ASSAY OF TROPONIN QUANT: CPT

## 2022-05-18 RX ORDER — DEXTROSE MONOHYDRATE 50 MG/ML
100 INJECTION, SOLUTION INTRAVENOUS PRN
Status: DISCONTINUED | OUTPATIENT
Start: 2022-05-18 | End: 2022-05-21 | Stop reason: SDUPTHER

## 2022-05-18 RX ORDER — VITAMIN B COMPLEX
5000 TABLET ORAL DAILY
Status: DISCONTINUED | OUTPATIENT
Start: 2022-05-18 | End: 2022-05-21 | Stop reason: HOSPADM

## 2022-05-18 RX ORDER — PANTOPRAZOLE SODIUM 40 MG/1
40 TABLET, DELAYED RELEASE ORAL
Status: DISCONTINUED | OUTPATIENT
Start: 2022-05-19 | End: 2022-05-21 | Stop reason: HOSPADM

## 2022-05-18 RX ORDER — POLYETHYLENE GLYCOL 3350 17 G/17G
17 POWDER, FOR SOLUTION ORAL DAILY PRN
Status: DISCONTINUED | OUTPATIENT
Start: 2022-05-18 | End: 2022-05-21 | Stop reason: HOSPADM

## 2022-05-18 RX ORDER — ONDANSETRON 2 MG/ML
4 INJECTION INTRAMUSCULAR; INTRAVENOUS EVERY 6 HOURS PRN
Status: DISCONTINUED | OUTPATIENT
Start: 2022-05-18 | End: 2022-05-21 | Stop reason: HOSPADM

## 2022-05-18 RX ORDER — ASCORBIC ACID 500 MG
1000 TABLET ORAL DAILY
Status: DISCONTINUED | OUTPATIENT
Start: 2022-05-18 | End: 2022-05-21 | Stop reason: HOSPADM

## 2022-05-18 RX ORDER — INSULIN LISPRO 100 [IU]/ML
0-6 INJECTION, SOLUTION INTRAVENOUS; SUBCUTANEOUS
Status: DISCONTINUED | OUTPATIENT
Start: 2022-05-19 | End: 2022-05-21

## 2022-05-18 RX ORDER — CLOPIDOGREL BISULFATE 75 MG/1
75 TABLET ORAL DAILY
Status: DISCONTINUED | OUTPATIENT
Start: 2022-05-19 | End: 2022-05-21 | Stop reason: HOSPADM

## 2022-05-18 RX ORDER — INSULIN LISPRO 100 [IU]/ML
0-3 INJECTION, SOLUTION INTRAVENOUS; SUBCUTANEOUS NIGHTLY
Status: DISCONTINUED | OUTPATIENT
Start: 2022-05-18 | End: 2022-05-21

## 2022-05-18 RX ORDER — POTASSIUM CHLORIDE 7.45 MG/ML
10 INJECTION INTRAVENOUS PRN
Status: DISCONTINUED | OUTPATIENT
Start: 2022-05-18 | End: 2022-05-21 | Stop reason: HOSPADM

## 2022-05-18 RX ORDER — ZINC SULFATE 50(220)MG
50 CAPSULE ORAL DAILY
Status: DISCONTINUED | OUTPATIENT
Start: 2022-05-18 | End: 2022-05-21 | Stop reason: HOSPADM

## 2022-05-18 RX ORDER — ACETAMINOPHEN 650 MG/1
650 SUPPOSITORY RECTAL EVERY 6 HOURS PRN
Status: DISCONTINUED | OUTPATIENT
Start: 2022-05-18 | End: 2022-05-21 | Stop reason: HOSPADM

## 2022-05-18 RX ORDER — GUAIFENESIN 600 MG/1
600 TABLET, EXTENDED RELEASE ORAL 2 TIMES DAILY
Status: DISCONTINUED | OUTPATIENT
Start: 2022-05-18 | End: 2022-05-21 | Stop reason: HOSPADM

## 2022-05-18 RX ORDER — PAROXETINE HYDROCHLORIDE 20 MG/1
20 TABLET, FILM COATED ORAL EVERY MORNING
Status: DISCONTINUED | OUTPATIENT
Start: 2022-05-19 | End: 2022-05-21 | Stop reason: HOSPADM

## 2022-05-18 RX ORDER — FUROSEMIDE 20 MG/1
20 TABLET ORAL DAILY
Status: DISCONTINUED | OUTPATIENT
Start: 2022-05-18 | End: 2022-05-21 | Stop reason: HOSPADM

## 2022-05-18 RX ORDER — SODIUM CHLORIDE 9 MG/ML
INJECTION, SOLUTION INTRAVENOUS PRN
Status: DISCONTINUED | OUTPATIENT
Start: 2022-05-18 | End: 2022-05-21 | Stop reason: HOSPADM

## 2022-05-18 RX ORDER — IPRATROPIUM BROMIDE AND ALBUTEROL SULFATE 2.5; .5 MG/3ML; MG/3ML
1 SOLUTION RESPIRATORY (INHALATION)
Status: DISCONTINUED | OUTPATIENT
Start: 2022-05-19 | End: 2022-05-19

## 2022-05-18 RX ORDER — POTASSIUM CHLORIDE 20 MEQ/1
40 TABLET, EXTENDED RELEASE ORAL PRN
Status: DISCONTINUED | OUTPATIENT
Start: 2022-05-18 | End: 2022-05-21 | Stop reason: HOSPADM

## 2022-05-18 RX ORDER — IPRATROPIUM BROMIDE AND ALBUTEROL SULFATE 2.5; .5 MG/3ML; MG/3ML
1 SOLUTION RESPIRATORY (INHALATION) ONCE
Status: COMPLETED | OUTPATIENT
Start: 2022-05-18 | End: 2022-05-18

## 2022-05-18 RX ORDER — DEXAMETHASONE SODIUM PHOSPHATE 4 MG/ML
6 INJECTION, SOLUTION INTRA-ARTICULAR; INTRALESIONAL; INTRAMUSCULAR; INTRAVENOUS; SOFT TISSUE EVERY 24 HOURS
Status: DISCONTINUED | OUTPATIENT
Start: 2022-05-19 | End: 2022-05-21

## 2022-05-18 RX ORDER — AMPICILLIN TRIHYDRATE 250 MG
1000 CAPSULE ORAL DAILY
Status: DISCONTINUED | OUTPATIENT
Start: 2022-05-18 | End: 2022-05-18 | Stop reason: RX

## 2022-05-18 RX ORDER — SODIUM CHLORIDE 0.9 % (FLUSH) 0.9 %
10 SYRINGE (ML) INJECTION EVERY 12 HOURS SCHEDULED
Status: DISCONTINUED | OUTPATIENT
Start: 2022-05-18 | End: 2022-05-21 | Stop reason: HOSPADM

## 2022-05-18 RX ORDER — MAGNESIUM SULFATE IN WATER 40 MG/ML
2000 INJECTION, SOLUTION INTRAVENOUS PRN
Status: DISCONTINUED | OUTPATIENT
Start: 2022-05-18 | End: 2022-05-21 | Stop reason: HOSPADM

## 2022-05-18 RX ORDER — ACETAMINOPHEN 325 MG/1
650 TABLET ORAL EVERY 6 HOURS PRN
Status: DISCONTINUED | OUTPATIENT
Start: 2022-05-18 | End: 2022-05-21 | Stop reason: HOSPADM

## 2022-05-18 RX ORDER — AMLODIPINE BESYLATE 5 MG/1
5 TABLET ORAL DAILY
Status: DISCONTINUED | OUTPATIENT
Start: 2022-05-19 | End: 2022-05-21 | Stop reason: HOSPADM

## 2022-05-18 RX ORDER — SODIUM CHLORIDE 0.9 % (FLUSH) 0.9 %
10 SYRINGE (ML) INJECTION PRN
Status: DISCONTINUED | OUTPATIENT
Start: 2022-05-18 | End: 2022-05-21 | Stop reason: HOSPADM

## 2022-05-18 RX ORDER — ATORVASTATIN CALCIUM 40 MG/1
40 TABLET, FILM COATED ORAL NIGHTLY
Status: DISCONTINUED | OUTPATIENT
Start: 2022-05-18 | End: 2022-05-21 | Stop reason: HOSPADM

## 2022-05-18 RX ORDER — ONDANSETRON 4 MG/1
4 TABLET, ORALLY DISINTEGRATING ORAL EVERY 8 HOURS PRN
Status: DISCONTINUED | OUTPATIENT
Start: 2022-05-18 | End: 2022-05-21 | Stop reason: HOSPADM

## 2022-05-18 RX ORDER — DEXAMETHASONE SODIUM PHOSPHATE 4 MG/ML
6 INJECTION, SOLUTION INTRA-ARTICULAR; INTRALESIONAL; INTRAMUSCULAR; INTRAVENOUS; SOFT TISSUE ONCE
Status: COMPLETED | OUTPATIENT
Start: 2022-05-18 | End: 2022-05-18

## 2022-05-18 RX ADMIN — Medication 50 MG: at 22:14

## 2022-05-18 RX ADMIN — IPRATROPIUM BROMIDE AND ALBUTEROL SULFATE 1 AMPULE: .5; 3 SOLUTION RESPIRATORY (INHALATION) at 12:48

## 2022-05-18 RX ADMIN — APIXABAN 2.5 MG: 2.5 TABLET, FILM COATED ORAL at 22:14

## 2022-05-18 RX ADMIN — SODIUM CHLORIDE, PRESERVATIVE FREE 10 ML: 5 INJECTION INTRAVENOUS at 22:15

## 2022-05-18 RX ADMIN — OXYCODONE HYDROCHLORIDE AND ACETAMINOPHEN 1000 MG: 500 TABLET ORAL at 22:14

## 2022-05-18 RX ADMIN — DEXAMETHASONE SODIUM PHOSPHATE 6 MG: 4 INJECTION, SOLUTION INTRA-ARTICULAR; INTRALESIONAL; INTRAMUSCULAR; INTRAVENOUS; SOFT TISSUE at 12:42

## 2022-05-18 RX ADMIN — Medication 5000 UNITS: at 22:14

## 2022-05-18 RX ADMIN — AZITHROMYCIN DIHYDRATE 500 MG: 500 INJECTION, POWDER, LYOPHILIZED, FOR SOLUTION INTRAVENOUS at 22:50

## 2022-05-18 RX ADMIN — METOPROLOL TARTRATE 12.5 MG: 25 TABLET, FILM COATED ORAL at 22:14

## 2022-05-18 RX ADMIN — CEFTRIAXONE SODIUM 1000 MG: 10 INJECTION, POWDER, FOR SOLUTION INTRAVENOUS at 22:11

## 2022-05-18 RX ADMIN — GUAIFENESIN 600 MG: 600 TABLET, EXTENDED RELEASE ORAL at 22:14

## 2022-05-18 RX ADMIN — ATORVASTATIN CALCIUM 40 MG: 40 TABLET, FILM COATED ORAL at 22:14

## 2022-05-18 ASSESSMENT — ENCOUNTER SYMPTOMS
RHINORRHEA: 0
CHEST TIGHTNESS: 0
CONSTIPATION: 0
SHORTNESS OF BREATH: 1
COUGH: 1
SORE THROAT: 0
NAUSEA: 0
WHEEZING: 0
ABDOMINAL DISTENTION: 0
ABDOMINAL PAIN: 0
VOMITING: 0
BACK PAIN: 1
DIARRHEA: 0

## 2022-05-18 ASSESSMENT — PAIN - FUNCTIONAL ASSESSMENT
PAIN_FUNCTIONAL_ASSESSMENT: NONE - DENIES PAIN

## 2022-05-18 ASSESSMENT — LIFESTYLE VARIABLES: HOW OFTEN DO YOU HAVE A DRINK CONTAINING ALCOHOL: NEVER

## 2022-05-18 NOTE — ED NOTES
In for hourly rounding. Pt resting on cot in position of comfort. Pt remains A&Ox4, resps easy and unlabored. Pt continues on 5LPM via nc. [employer] sent to admitting provider regarding covid test orders. Questioned if testing was necessary today d/t recent dx at another facility on Sunday. Received message to re-test pt. IV shows no s/s of infection or infiltration. Pt pain remains unchanged at this time. Monitor remains in place. Updated pt on POC. Will monitor.        Delia Taylor, RN  05/18/22 1969

## 2022-05-18 NOTE — ED NOTES
In for hourly rounding. Pt resting on cot in position of comfort. Pt remains A&Ox4, resps easy and unlabored, continues on 5LPM via nc. IV shows no s/s of infection or infiltration. Pt continues to deny pain at this time. Monitor remains in place. Updated pt on POC. Will monitor.      Tato Perdue RN  05/18/22 9560

## 2022-05-18 NOTE — ED NOTES
In for hourly rounding. Pt resting on cot in position of comfort. Pt remains A&Ox4, resps easy and unlabored, continues on 5LPM via nc. IV shows no s/s of infection or infiltration. Pt pain remains unchanged at this time. Dinner tray ordered for pt at this time. Monitor remains in place. Updated pt on POC. Will monitor.      Pita Jones, RN  05/18/22 9656

## 2022-05-18 NOTE — ED NOTES
Pt presents to ED via triage from home for c/o shortness of breath. Pt reports testing positive for covid on Sunday (5/15/22). Wife at bedside states pt started to become short of breath last evening and progressively got worse. Upon initial assessment, pt is A&Ox4, resps labored and shallow. Pt presents to ED on prescribed 3LPM via nc, sats 82-85%. Pt oxygen increased to 6LPM via nc and oxygen sats up to 95%. RT and provider called to bedside. EKG completed upon arrival. IV initiated with blood drawn and sent to lab. Pt medicated per MAR. Monitor applied and VS as noted. Will monitor.      Siena Naidu RN  05/18/22 5356

## 2022-05-18 NOTE — ED NOTES
ED to inpatient nurses report    Chief Complaint   Patient presents with    Shortness of Breath    Positive For Covid-19      Present to ED from home  LOC: alert and orientated to name, place, date  Vital signs   Vitals:    05/18/22 1547 05/18/22 1647 05/18/22 1747 05/18/22 1830   BP: 125/86 126/61 (!) 142/75 (!) 135/59   Pulse: 81 77 69 78   Resp: 19 22 24 24   Temp:       TempSrc:       SpO2: 91% 93% 92% 92%   Weight:          Oxygen Baseline 3LPM via nc    Current needs required 5LPM via nc Bipap/Cpap No  LDAs:   Peripheral IV 05/18/22 Right Antecubital (Active)   Site Assessment Clean, dry & intact 05/18/22 1830   Line Status Normal saline locked 05/18/22 1830   Phlebitis Assessment No symptoms 05/18/22 1830   Infiltration Assessment 0 05/18/22 1830     Mobility: Requires assistance * 1  Pending ED orders: NA  Present condition: Pt resting on cot in position of comfort. Pt remains A&Ox4, resps easy and unlabored. Pt continues on 5lpm via nc. IV shows no s/s of infection or infiltration. Pt continues to deny pain.   Person of contract, phone number   Our promise was given to patient    Electronically signed by Aldo Rogel RN on 5/18/2022 at 6:46 PM       Aldo Rogel RN  05/18/22 6194

## 2022-05-18 NOTE — ED NOTES
In for hourly rounding. Pt resting on cot in position of comfort. Pt remains A&Ox4, resps easy and unlabored, continues on 5LPM via nc. IV shows no s/s of infection or infiltration. Pt pain remains unchanged at this time. Pt taken to restroom and back via wheelchair, continues on oxygen. Tolerated well. Monitor remains in place. Updated pt on POC. Will monitor.      Delia Taylor, RN  05/18/22 8897

## 2022-05-18 NOTE — H&P
Hospitalist - History & Physical      Patient: Lizy Norris    Unit/Bed:09/009A  YOB: 1940  MRN: [id number]   Acct: [de-identified]   PCP: Cinthia Amato, MARTIN - CNP    Date of Service: Pt seen/examined on 05/18/22  and Admitted to Inpatient with expected LOS greater than two midnights due to medical therapy. Chief Complaint:  Shortness of breath    Assessment and Plan:  1. Acute on chronic hypoxic respiratory failure, secondary to COVID-19 PNA:    Pt requiring 4-5L O2 NC, up from baseline of 3L. Shantal Kennedy Pt reports symptom onset Thursday (5/12), and tested positive at an outside facility on Sunday (5/15). Labs remarkable for CRP 7.83, Pro Loc 1.46, Ferritin 275, elevated BNP, WBC 14.8 with significant bandemia. LA 1.5. CXR today reveasl cardiomegaly and mild pulmonary vasculature congestion; patchy bilateral lower lobe opacities, R>L. Given Decadron 6 mg and duonebs in ED. Continue decadron 6mg QD x 10 days, duonebs, IS/AC, Vit C, D, zinc, mucinex PRN. Start azithromycin and rocephin. Contemplating baricitinib, as patient is requiring more O2 from baseline and elevation in inflammatory markers. Repeat BMP and CBC in the AM. Home O2 eval ordered. 2. Persistent atrial Fibrillation:    Pt has been rate controlled. Continue Eliquis and metoprolol. Telemetry. 3. Chronic HFpEF:    Last ECHO 5/17/2021 reveals EF 55-60%. Pt does not appear overtly overloaded today. However BNP is elevated today. CXR remarkable for mild pulmonary vascular congestion with patchy bilateral lower lobe opacities. 4. Essential HTN:    BP has been stable and well-controlled. Resume home amlodipine, metoprolol in the AM. Continue to monitor closely. 5. CAD, S/P PCI 11/2019:    Stable. Pt does not endorse chest pain at this time. Follows with Dr. Magy Hoyos. Troponin negative today. EKG shows a. Fib with competing junctional pacemaker, RBBB. Continue to monitor with telemetry.  Continue home ASA, Plavix, statin, BB. 6. AAA:    Noted. CTA chest 5/11/2022 reveals 4.7 cm distal descending thoracic aortic aneurysm, slightly enlarged from previous imaging about a year ago. No evidence of dissection. 7. Ventral hernia:    Pt reports chronic ventral hernia since his AAA repair in 2016. No pain or tenderness on exam.     8. Hx of DVT:    Stable. On eliquis. 9. Hx of CVA:   Stable. Continue home statin, ASA, Plavix. 10. Code status:    Discussed code status in-depth with patient and patient's wife. Patient desires to never be intubated, therefore patient affirms Limited Code status with no intubation at this time. Palliative care consulted. History Of Present Illness:    Kyleigh Smith" is a 80 y/o  male with a PMHx of CAD, HTN, AAA, traumatic fall 2 years ago with chronic back pain and chronically on 3L O2 NC who presents to [facility] ED today for the evaluation of worsening shortness of breath and cough. Patient reports symptom onset Thursday or Friday, and  tested positive for COVID at [facility]. PT reports he was found to have SpO2 less than 90% on his home pulse ox. Pt states he has an intermittent, unproductive associated cough. He denies chest pain, fever, chills, nausea, vomiting, change in bowel or urinary habits, lightheadedness, dizziness. Pt reports that he has been on 3L O2 NC for 2-3 years now, following a  Traumatic fall for which he required chest tubes for a time.        Past Medical History:        Diagnosis Date    [facility]ab[facility]) [facility]     CAD (coronary artery disease)     Cancer (HCC)     cheek and nose     Cancer of kidney ([facility]     Heart attack ([facility]     History of placement of chest tube     Due to pt falling and breaking 4 ribs and puncturing his lung    Hx of blood clots     Hyperlipidemia     Hypertension     Kidney stones     Obesity     Osteoarthritis     Stroke [facility])        Past Surgical History:        Procedure Laterality Date    ABDOMEN SURGERY      AAA    ABDOMINAL AORTIC ANEURYSM REPAIR  2005   [facility] CARDIAC SURGERY  2008, [facility]  2005    AAA repair    CAROTID ENDARTERECTOMY  2006    FINGER AMPUTATION      JOINT REPLACEMENT  2007    knees bilateral    KNEE SURGERY Left 6-2013    PARTIAL NEPHRECTOMY  2010    Right    PTCA      SHOULDER SURGERY  1997    TONSILLECTOMY  1948    VASCULAR SURGERY         Home Medications:   No current facility-administered medications on file prior to encounter.      Current Outpatient Medications on File Prior to Encounter   Medication Sig Dispense Refill    furosemide (LASIX) 20 MG tablet TAKE 1 TABLET BY MOUTH  DAILY 90 tablet 0    ELIQUIS 2.5 MG TABS tablet TAKE 1 TABLET BY MOUTH  TWICE DAILY 180 tablet 2    amLODIPine (NORVASC) 5 MG tablet TAKE 1 TABLET BY MOUTH  DAILY 90 tablet 1    atorvastatin (LIPITOR) 40 MG tablet TAKE 1 TABLET BY MOUTH AT  NIGHT 90 tablet 2    NONFORMULARY 500 mg 2 times daily Querectin      NONFORMULARY 4 times daily [facility]      metoprolol tartrate (LOPRESSOR) 25 MG tablet Take 12.5 mg by mouth 2 times daily       Zinc Sulfate (ZINC 15 PO) Take by mouth      Turmeric Curcumin 500 MG CAPS Take by mouth      Omega-3 Fatty Acids (FISH OIL PO) Take by mouth      clopidogrel (PLAVIX) 75 MG tablet TAKE 1 TABLET BY MOUTH  DAILY 90 tablet 3    OXYGEN Inhale 3 L into the lungs nightly      acetaminophen (TYLENOL) 500 MG tablet Take 1,000 mg by mouth every 6 hours as needed for Pain      Coenzyme Q10 (CO Q-10) 100 MG CAPS Take by mouth      Cholecalciferol (VITAMIN D3) 125 MCG (5000 UT) TABS Take by mouth      APPLE CIDER VINEGAR PO Take 450 mg by mouth      pantoprazole (PROTONIX) 40 MG tablet TAKE 1 TABLET BY MOUTH  DAILY 90 tablet 3    PARoxetine (PAXIL) 20 MG tablet Take 20 mg by mouth every morning      Magnesium 500 MG TABS Take by mouth daily       CINNAMON PO Take 1,000 mg by mouth daily      Probiotic Product (PROBIOTIC DAILY PO) Take by mouth daily      Ascorbic Acid (VITAMIN C) 1000 MG tablet Take 1,000 mg by mouth daily          Allergies:    Patient has no known allergies. Social History:    reports that he quit smoking about 52 years ago. His smoking use included pipe. He started smoking about 67 years ago. He has a 15.00 pack-year smoking history. He has never used smokeless tobacco. He reports that he does not drink alcohol and does not use drugs. Family History:       Problem Relation Age of Onset    Alzheimer's Disease Mother     Heart Disease Father        Diet:  No diet orders on file    Review of systems:     Review of Systems   Constitutional: Positive for activity change and fatigue. Negative for appetite change, chills, fever and unexpected weight change. HENT: Negative for congestion, rhinorrhea and sore throat. Respiratory: Positive for cough and shortness of breath. Negative for chest tightness and wheezing. Cardiovascular: Positive for leg swelling. Negative for chest pain and palpitations. Gastrointestinal: Negative for abdominal distention, abdominal pain, constipation, diarrhea, nausea and vomiting. Genitourinary: Negative for difficulty urinating, frequency and urgency. Musculoskeletal: Positive for back pain. Negative for arthralgias and gait problem. Neurological: Negative for dizziness, weakness, light-headedness and headaches. PHYSICAL EXAM:  /60   Pulse 78   Temp 98.2 °F (36.8 °C) (Oral)   Resp 18   Wt 255 lb (115.7 kg)   SpO2 94%   BMI 38.77 kg/m²   General appearance: Chronically ill-appearing. No apparent distress. Appears stated age and cooperative. Skin: Skin color, texture, turgor normal.  No rashes or lesions. HEENT: Normal cephalic, atraumatic without obvious deformity. Pupils equal, round, and reactive to light. Extra-ocular muscles intact. Conjunctivae/corneas clear. Neck: Trachea midline. Supple, with full range of motion.  No jugular venous distention. Cardiovascular: Diminished heart sounds. Regular rate and rhythm with normal S1/S2. No murmurs, rubs or gallops. Respiratory:  Expiratory rhonchi heard in Upper lung fields bilaterally. Normal respiratory effort. Abdomen: large Ventral hernia noted in right upper quadrant with no tenderness to palpation. Soft, non-tender, non-distended. Normal bowel sounds. Musculoskeletal:  Pitting edema noted bilaterally in LE. No weakness or instability noted. No erythema, or gross deformity noted. Vascular:  Pulses +2 palpable, equal bilaterally. Neurologic:  Neurovascularly intact without any focal sensory/motor deficits. Cranial nerves: II-XII grossly intact. Psychiatric: Alert and oriented, thought content appropriate, normal insight      Labs:   Recent Labs     05/18/22  1235   WBC 14.8*   HGB 14.1   HCT 41.6*   *     Recent Labs     05/18/22  1235      K 4.3      CO2 25   BUN 35*   CREATININE 1.5*   CALCIUM 9.1     Recent Labs     05/18/22  1235   AST 49*   ALT 81*   BILITOT 1.4*   ALKPHOS 67     No results for input(s): INR in the last 72 hours. No results for input(s): René Lucas in the last 72 hours. Urinalysis:    Lab Results   Component Value Date    NITRU NEGATIVE 05/06/2021    WBCUA 0-2 05/06/2021    BACTERIA NONE SEEN 05/06/2021    RBCUA 3-5 05/06/2021    BLOODU NEGATIVE 05/06/2021    GLUCOSEU NEGATIVE 05/06/2021       Radiology:   XR CHEST PORTABLE   Final Result   Cardiomegaly and mild pulmonary vascular congestion is observed. Evaluation of the lung bases is limited due to low lung volumes and patient body habitus. Patchy bilateral lower lobe airspace opacities, right greater than left, are    nonspecific and could indicate atelectasis, chronic lung disease, versus pneumonia in the appropriate clinical setting. **This report has been created using voice recognition software.   It may contain minor errors which are inherent in voice recognition

## 2022-05-18 NOTE — ED PROVIDER NOTES
[facility] COMPLAINT       Chief Complaint   Patient presents with    Shortness of Breath    Positive For Covid-19       Nurses Notes reviewed and I agree except as noted in the HPI. HISTORY OF PRESENT ILLNESS    Reed Andrea is a 79 y.o. male. Patient was diagnosed with COVID at another facility this past Sunday and has been doing all right at home until today when he got significantly more dyspneic. And was advised to come in. He is noted to be quite hypoxemic. REVIEW OF SYSTEMS         He has had coughing and sputum, no chest pain, no abdominal pain. Remainder of review of systems is otherwise reviewed as negative. PAST MEDICAL HISTORY    has a past medical history of AAA (abdominal aortic aneurysm) ([facility] [address] CAD (coronary artery disease), Cancer ([facility] [address] Cancer of kidney ([facility] [address] Heart attack ([facility] [address] History of placement of chest tube, Hx of blood clots, Hyperlipidemia, Hypertension, Kidney stones, Obesity, Osteoarthritis, and Stroke ([facility] [address] SURGICAL HISTORY      has a past surgical history that includes joint replacement (2007); Carotid endarterectomy (2006); Abdominal aortic aneurysm repair (2005); shoulder surgery (1997); Tonsillectomy (1948); partial nephrectomy (2010); Cardiac surgery (2008, 1996); Cardiac surgery (2005); Abdomen surgery; vascular surgery; knee surgery (Left, 6-2013); Finger amputation; and Percutaneous Transluminal Coronary Angio.     CURRENT MEDICATIONS       Previous Medications    ACETAMINOPHEN (TYLENOL) 500 MG TABLET    Take 1,000 mg by mouth every 6 hours as needed for Pain    AMLODIPINE (NORVASC) 5 MG TABLET    TAKE 1 TABLET BY MOUTH  DAILY    APPLE CIDER VINEGAR PO    Take 450 mg by mouth    ASCORBIC ACID (VITAMIN C) 1000 MG TABLET    Take 1,000 mg by mouth daily     ATORVASTATIN (LIPITOR) 40 MG TABLET    TAKE 1 TABLET BY MOUTH AT  NIGHT    CHOLECALCIFEROL (VITAMIN D3) 125 MCG (5000 UT) TABS    Take by mouth    CINNAMON PO    Take 1,000 mg by mouth daily    CLOPIDOGREL (PLAVIX) 75 MG TABLET    TAKE 1 TABLET BY MOUTH  DAILY    COENZYME Q10 (CO Q-10) 100 MG CAPS    Take by mouth    ELIQUIS 2.5 MG TABS TABLET    TAKE 1 TABLET BY MOUTH  TWICE DAILY    FUROSEMIDE (LASIX) 20 MG TABLET    TAKE 1 TABLET BY MOUTH  DAILY    MAGNESIUM 500 MG TABS    Take by mouth daily     METOPROLOL TARTRATE (LOPRESSOR) 25 MG TABLET    Take 12.5 mg by mouth 2 times daily     NONFORMULARY    500 mg 2 times daily Querectin    NONFORMULARY    4 times daily [facility]    OMEGA-3 FATTY ACIDS (FISH OIL PO)    Take by mouth    OXYGEN    Inhale 3 L into the lungs nightly    PANTOPRAZOLE (PROTONIX) 40 MG TABLET    TAKE 1 TABLET BY MOUTH  DAILY    PAROXETINE (PAXIL) 20 MG TABLET    Take 20 mg by mouth every morning    PROBIOTIC PRODUCT (PROBIOTIC DAILY PO)    Take by mouth daily    TURMERIC CURCUMIN 500 MG CAPS    Take by mouth    ZINC SULFATE (ZINC 15 PO)    Take by mouth       ALLERGIES     has No Known Allergies. FAMILY HISTORY     He indicated that his mother is . He indicated that his father is . family history includes Alzheimer's Disease in his mother; Heart Disease in his father. SOCIAL HISTORY      reports that he quit smoking about 52 years ago. His smoking use included pipe. He started smoking about 67 years ago. He has a 15.00 pack-year smoking history. He has never used smokeless tobacco. He reports that he does not drink alcohol and does not use drugs. PHYSICAL EXAM     INITIAL VITALS:  weight is 255 lb (115.7 kg). His oral temperature is 98.2 °F (36.8 °C). His blood pressure is 117/60 and his pulse is 78. His respiration is 18 and oxygen saturation is 94%. Constitutional: ill appearing   Eyes:  Pupils are equal and reactive, extraocular muscles intact   HENT:  Atraumatic appearing  oropharynx moist, no pharyngeal exudates.   Neck- normal range of motion, supple   Respiratory: Rhonchorous breath sounds  Cardiovascular: regular  GI:  Non tender, no rigidity, rebound or guarding  Musculoskeletal:  2/4 distal pulses, no pitting edema  Integument: warm and dry  Neurologic:  Alert & oriented x 3  Psychiatric:  Speech and behavior appropriate          DIAGNOSTIC RESULTS     EKG: All EKG's are interpreted by the Emergency Department Physician who either signs or Co-signs this chart in the absence of a cardiologist.  EKG interpreted by me showing atrial fibrillation at a rate of approximately 75, QRS of 132, QTc of 4-51, axis of 51 with right bundle branch pattern. RADIOLOGY: non-plain film images(s) such as CT, Ultrasound and MRI are read by the radiologist.  Chest x-ray interpreted by the radiologist showing cardiomegaly and pulmonary vascular congestion. Bilateral lower lobe opacities.     LABS:   Labs Reviewed   CBC WITH AUTO DIFFERENTIAL - Abnormal; Notable for the following components:       Result Value    WBC 14.8 (*)     RBC 4.16 (*)     Hematocrit 41.6 (*)     .0 (*)     MCH 33.9 (*)     RDW-SD 49.8 (*)     Platelets 988 (*)     Segs Absolute 13.5 (*)     Lymphocytes Absolute 0.4 (*)     Immature Grans (Abs) 0.20 (*)     All other components within normal limits   COMPREHENSIVE METABOLIC PANEL W/ REFLEX TO MG FOR LOW K - Abnormal; Notable for the following components:    Glucose 159 (*)     CREATININE 1.5 (*)     BUN 35 (*)     AST 49 (*)     Total Bilirubin 1.4 (*)     ALT 81 (*)     All other components within normal limits   BRAIN NATRIURETIC PEPTIDE - Abnormal; Notable for the following components:    Pro-BNP 5066.0 (*)     All other components within normal limits   GLOMERULAR FILTRATION RATE, ESTIMATED - Abnormal; Notable for the following components:    Est, Glom Filt Rate 45 (*)     All other components within normal limits   TROPONIN   ANION GAP   OSMOLALITY   SCAN OF BLOOD SMEAR   LACTIC ACID   COVID-19       EMERGENCY DEPARTMENT COURSE:   Vitals:    Vitals:    05/18/22 1227 05/18/22 1230 05/18/22 1417   BP:  126/67 117/60   Pulse: 80 79 78   Resp: 20 20 18   Temp:  98.2 °F (36.8 °C)    TempSrc: Oral Oral    SpO2: (!) 82% 92% 94%   Weight: 255 lb (115.7 kg)       Patient has a previously positive COVID test this past Sunday. Initially down to 82% on his pulse ox. We have applied supplemental oxygen. He also has some evidence of CHF on his imaging. Troponin is negative but BNP is elevated. Case was discussed with the hospital service to arrange for admission      CRITICAL CARE:   CRITICAL CARE: There was a high probability of clinically significant/life threatening deterioration in this patient's condition which required my urgent intervention. Total critical care time was 30  minutes. This excludes any time for separately reportable procedures.            FINAL IMPRESSION    COVID-related pneumonia with hypoxemic respiratory failure    DISPOSITION/PLAN   admitted    DISCHARGE MEDICATIONS:  New Prescriptions    No medications on file       (Please note that portions of this note were completed with a voice recognition program.  Efforts were made to edit the dictations but occasionally words are mis-transcribed.)    Kingston Garcia, [address] [place] [address],   05/18/22 5879

## 2022-05-19 LAB
ANION GAP SERPL CALCULATED.3IONS-SCNC: 15 MEQ/L (ref 8–16)
BASOPHILS # BLD: 0.1 %
BASOPHILS ABSOLUTE: 0 THOU/MM3 (ref 0–0.1)
BUN BLDV-MCNC: 39 MG/DL (ref 7–22)
CALCIUM SERPL-MCNC: 8.9 MG/DL (ref 8.5–10.5)
CHLORIDE BLD-SCNC: 101 MEQ/L (ref 98–111)
CO2: 21 MEQ/L (ref 23–33)
CREAT SERPL-MCNC: 1.3 MG/DL (ref 0.4–1.2)
EOSINOPHIL # BLD: 0 %
EOSINOPHILS ABSOLUTE: 0 THOU/MM3 (ref 0–0.4)
ERYTHROCYTE [DISTWIDTH] IN BLOOD BY AUTOMATED COUNT: 13.5 % (ref 11.5–14.5)
ERYTHROCYTE [DISTWIDTH] IN BLOOD BY AUTOMATED COUNT: 49.4 FL (ref 35–45)
GFR SERPL CREATININE-BSD FRML MDRD: 53 ML/MIN/1.73M2
GLUCOSE BLD-MCNC: 162 MG/DL (ref 70–108)
GLUCOSE BLD-MCNC: 193 MG/DL (ref 70–108)
GLUCOSE BLD-MCNC: 231 MG/DL (ref 70–108)
GLUCOSE BLD-MCNC: 266 MG/DL (ref 70–108)
GLUCOSE BLD-MCNC: 266 MG/DL (ref 70–108)
HCT VFR BLD CALC: 43.2 % (ref 42–52)
HEMOGLOBIN: 14.5 GM/DL (ref 14–18)
IMMATURE GRANS (ABS): 0.24 THOU/MM3 (ref 0–0.07)
IMMATURE GRANULOCYTES: 1.7 %
LYMPHOCYTES # BLD: 3.7 %
LYMPHOCYTES ABSOLUTE: 0.5 THOU/MM3 (ref 1–4.8)
MCH RBC QN AUTO: 33.6 PG (ref 26–33)
MCHC RBC AUTO-ENTMCNC: 33.6 GM/DL (ref 32.2–35.5)
MCV RBC AUTO: 100 FL (ref 80–94)
MONOCYTES # BLD: 1.9 %
MONOCYTES ABSOLUTE: 0.3 THOU/MM3 (ref 0.4–1.3)
NUCLEATED RED BLOOD CELLS: 0 /100 WBC
PLATELET # BLD: 138 THOU/MM3 (ref 130–400)
PLATELET ESTIMATE: ADEQUATE
PMV BLD AUTO: 9.8 FL (ref 9.4–12.4)
POTASSIUM REFLEX MAGNESIUM: 4.5 MEQ/L (ref 3.5–5.2)
RBC # BLD: 4.32 MILL/MM3 (ref 4.7–6.1)
SCAN OF BLOOD SMEAR: NORMAL
SEG NEUTROPHILS: 92.6 %
SEGMENTED NEUTROPHILS ABSOLUTE COUNT: 13.3 THOU/MM3 (ref 1.8–7.7)
SODIUM BLD-SCNC: 137 MEQ/L (ref 135–145)
WBC # BLD: 14.4 THOU/MM3 (ref 4.8–10.8)

## 2022-05-19 PROCEDURE — 1200000003 HC TELEMETRY R&B

## 2022-05-19 PROCEDURE — XW0DXM6 INTRODUCTION OF BARICITINIB INTO MOUTH AND PHARYNX, EXTERNAL APPROACH, NEW TECHNOLOGY GROUP 6: ICD-10-PCS | Performed by: INTERNAL MEDICINE

## 2022-05-19 PROCEDURE — 6360000002 HC RX W HCPCS

## 2022-05-19 PROCEDURE — 94640 AIRWAY INHALATION TREATMENT: CPT

## 2022-05-19 PROCEDURE — 94760 N-INVAS EAR/PLS OXIMETRY 1: CPT

## 2022-05-19 PROCEDURE — 85025 COMPLETE CBC W/AUTO DIFF WBC: CPT

## 2022-05-19 PROCEDURE — 2700000000 HC OXYGEN THERAPY PER DAY

## 2022-05-19 PROCEDURE — 6370000000 HC RX 637 (ALT 250 FOR IP)

## 2022-05-19 PROCEDURE — 99232 SBSQ HOSP IP/OBS MODERATE 35: CPT

## 2022-05-19 PROCEDURE — 82948 REAGENT STRIP/BLOOD GLUCOSE: CPT

## 2022-05-19 PROCEDURE — 36415 COLL VENOUS BLD VENIPUNCTURE: CPT

## 2022-05-19 PROCEDURE — 2500000003 HC RX 250 WO HCPCS

## 2022-05-19 PROCEDURE — 94669 MECHANICAL CHEST WALL OSCILL: CPT

## 2022-05-19 PROCEDURE — 80048 BASIC METABOLIC PNL TOTAL CA: CPT

## 2022-05-19 PROCEDURE — 2580000003 HC RX 258

## 2022-05-19 RX ORDER — IPRATROPIUM BROMIDE AND ALBUTEROL SULFATE 2.5; .5 MG/3ML; MG/3ML
1 SOLUTION RESPIRATORY (INHALATION) 2 TIMES DAILY
Status: DISCONTINUED | OUTPATIENT
Start: 2022-05-19 | End: 2022-05-20

## 2022-05-19 RX ADMIN — METOPROLOL TARTRATE 12.5 MG: 25 TABLET, FILM COATED ORAL at 08:02

## 2022-05-19 RX ADMIN — INSULIN LISPRO 3 UNITS: 100 INJECTION, SOLUTION INTRAVENOUS; SUBCUTANEOUS at 11:39

## 2022-05-19 RX ADMIN — FUROSEMIDE 20 MG: 20 TABLET ORAL at 07:59

## 2022-05-19 RX ADMIN — CEFTRIAXONE SODIUM 1000 MG: 10 INJECTION, POWDER, FOR SOLUTION INTRAVENOUS at 22:26

## 2022-05-19 RX ADMIN — IPRATROPIUM BROMIDE AND ALBUTEROL SULFATE 1 AMPULE: .5; 3 SOLUTION RESPIRATORY (INHALATION) at 08:35

## 2022-05-19 RX ADMIN — Medication 5000 UNITS: at 07:59

## 2022-05-19 RX ADMIN — AMLODIPINE BESYLATE 5 MG: 5 TABLET ORAL at 08:00

## 2022-05-19 RX ADMIN — OXYCODONE HYDROCHLORIDE AND ACETAMINOPHEN 1000 MG: 500 TABLET ORAL at 07:58

## 2022-05-19 RX ADMIN — ATORVASTATIN CALCIUM 40 MG: 40 TABLET, FILM COATED ORAL at 22:23

## 2022-05-19 RX ADMIN — APIXABAN 2.5 MG: 2.5 TABLET, FILM COATED ORAL at 22:23

## 2022-05-19 RX ADMIN — AZITHROMYCIN DIHYDRATE 500 MG: 500 INJECTION, POWDER, LYOPHILIZED, FOR SOLUTION INTRAVENOUS at 22:42

## 2022-05-19 RX ADMIN — CLOPIDOGREL BISULFATE 75 MG: 75 TABLET ORAL at 08:13

## 2022-05-19 RX ADMIN — INSULIN LISPRO 2 UNITS: 100 INJECTION, SOLUTION INTRAVENOUS; SUBCUTANEOUS at 21:29

## 2022-05-19 RX ADMIN — GUAIFENESIN 600 MG: 600 TABLET, EXTENDED RELEASE ORAL at 07:59

## 2022-05-19 RX ADMIN — GUAIFENESIN 600 MG: 600 TABLET, EXTENDED RELEASE ORAL at 22:23

## 2022-05-19 RX ADMIN — IPRATROPIUM BROMIDE AND ALBUTEROL SULFATE 1 AMPULE: .5; 3 SOLUTION RESPIRATORY (INHALATION) at 21:12

## 2022-05-19 RX ADMIN — DEXAMETHASONE SODIUM PHOSPHATE 6 MG: 4 INJECTION, SOLUTION INTRA-ARTICULAR; INTRALESIONAL; INTRAMUSCULAR; INTRAVENOUS; SOFT TISSUE at 11:38

## 2022-05-19 RX ADMIN — METOPROLOL TARTRATE 12.5 MG: 25 TABLET, FILM COATED ORAL at 22:24

## 2022-05-19 RX ADMIN — APIXABAN 2.5 MG: 2.5 TABLET, FILM COATED ORAL at 08:00

## 2022-05-19 RX ADMIN — Medication 50 MG: at 08:00

## 2022-05-19 RX ADMIN — SODIUM CHLORIDE, PRESERVATIVE FREE 10 ML: 5 INJECTION INTRAVENOUS at 08:03

## 2022-05-19 RX ADMIN — INSULIN LISPRO 1 UNITS: 100 INJECTION, SOLUTION INTRAVENOUS; SUBCUTANEOUS at 17:42

## 2022-05-19 RX ADMIN — BARICITINIB 2 MG: 2 TABLET, FILM COATED ORAL at 11:49

## 2022-05-19 RX ADMIN — PAROXETINE HYDROCHLORIDE 20 MG: 20 TABLET, FILM COATED ORAL at 08:02

## 2022-05-19 RX ADMIN — SODIUM CHLORIDE, PRESERVATIVE FREE 10 ML: 5 INJECTION INTRAVENOUS at 22:26

## 2022-05-19 RX ADMIN — PANTOPRAZOLE SODIUM 40 MG: 40 TABLET, DELAYED RELEASE ORAL at 05:09

## 2022-05-19 NOTE — PROGRESS NOTES
Initial Evaluation          Patient:   Ariel Velazquez  YOB: 1940  Age:  79 y.o. Room:  [address] [place] [address]  MRN:  [id number]   Acct: [de-identified]    Date of Admission:  5/18/2022 12:18 PM  Date of Service:  5/19/2022  Completed By:  Brannon Mendoza RN                 Reason for Palliative Care Evaluation:-             [x] Code Status Discussion              [x] Goals of Care              [] Pain/Symptom Management               [] Emotional Support              [] Other:                   Current Issues:-  []  Pain  []  Fatigue  []  Nausea  []  Anxiety  []  Depression  [x]  Shortness of Breath  []  Constipation  []  Appetite  [x]  Other:             Advance Directives:-  [] [place] DNR Form  [x] Living Will  [x] Medical POA             Current Code Status:-  [] Full Resuscitation  [] DNR-Comfort Care-Arrest  [] DNR-Comfort Care       [x] Limited Resuscitation             [] No CPR            [] No shock            [x] No ET intubation/reintubation            [] No resuscitative medications            [] Other limitation:              Palliative Performance Status:          [x] 60%  Ambulation reduced; Significant disease;Can't do hobbies/housework; intake normal or reduced; occasional assist; LOC full/confusion        [] 50%  Mainly sit/lie; Extensive disease; Can't do any work; Considerable assist; intake normal or reduced; LOC full/confusion        [] 40%  Mainly in bed; Extensive disease; Mainly assist; intake normal or reduced; LOC full/confusion         [] 30%  Bed Bound; Extensive disease; Total care; intake reduced; LOC full/confusion        [] 20%  Bed Bound; Extensive disease; Total care; intake minimal; Drowsy/coma        [] 10%  Bed Bound; Extensive disease;  Total care; Mouth care only; Drowsy/coma        [] 0  Death        Goals of care evaluation:-        The patient goals of care are to provide comfort care/supportive services/palliation & relieve suffering:  Goals of care discussed with:  [x] Patient independently  [] Patient and Family  [x] Family or Healthcare DPOA independently  [] Unable to discuss with patient, family/DPOA not present         Family/Patient Discussion:  Palliative Care consult for goals of care, code status discussion. Patient admitted for shortness of breath and was COVID+ on 5/15 confirmed at a previous facility prior to this admission. Patient reclined in chair in room, his wife Kelli Coronado visiting at the bedside. Palliative Care concepts introduced. Patient and wife were very well informed by the attending physician about code status and what these levels of care involve. Patient is currently a Limited Code no to intubation. Patient is in agreement that if his condition would suddenly require life sustaining measure such as CPR (chest compressions), rescue medications and cardioversion/defibrilation that these measures would be Ok to sustain his life. Patient does not want to rely on ventilator or be intubated even if this would sustain life. Expressed concerns regarding the heroic measures such as CPR, rescue medications, chest compressions & cardioversion/defibrilation tend to lead toward requiring intubation. Patient continues to accept these heroic measures at this time however does not want intubated even if his condition warrants this to sustain life. Patients spouse is in full agreement with this as well. Advance Directives completed. Living Will completed. Patient is willing to seek urgent care or ER if he is discharged home and symptoms return. Patient has home oxygen usually around 2-5L/NC. Patient denies any pain at this time. Patient denies any chest discomfort. Does complain of an intermittent non productive cough, which he feels has been occurring more often and for longer periods of time. Patient states that when he was at home his ability to do usually daily activities was limited and more difficult.  Patient is able to speak to his plan of care and anticipates being discharged home tomorrow or within the next 48 hours. No further questions voiced. Plan/Follow-Up:  Palliative Care will revisit if needed or patient condition changes. Please call palliative care if needed.        Electronically signed by Adam Burch RN on 5/19/2022 at 1:35 PM           Palliative Care Office: [phone]

## 2022-05-19 NOTE — PLAN OF CARE
Problem: Discharge Planning  Goal: Discharge to home or other facility with appropriate resources  5/19/2022 1249 by Min Lewis RN  Outcome: Progressing     Problem: ABCDS Injury Assessment  Goal: Absence of physical injury  5/19/2022 1249 by Min Lewis RN  Outcome: Progressing     Problem: Respiratory - Adult  Goal: Achieves optimal ventilation and oxygenation  5/19/2022 1249 by Min Lewis RN  Outcome: Progressing     Problem: Chronic Conditions and Co-morbidities  Goal: Patient's chronic conditions and co-morbidity symptoms are monitored and maintained or improved  5/19/2022 1249 by Min Lewis RN  Outcome: Progressing     Problem: Skin/Tissue Integrity  Goal: Absence of new skin breakdown  Description: 1. Monitor for areas of redness and/or skin breakdown  2. Assess vascular access sites hourly  3. Every 4-6 hours minimum:  Change oxygen saturation probe site  4. Every 4-6 hours:  If on nasal continuous positive airway pressure, respiratory therapy assess nares and determine need for appliance change or resting period.   Outcome: Progressing

## 2022-05-19 NOTE — PROGRESS NOTES
Baricitinib Initiation    This patient meets criteria for baricitinib. Criteria:  Age 2 years or greater  COVID-19 positive & hospitalized  Requiring supplemental oxygen, high flow nasal cannula, invasive or non-invasive ventilation, or ECMO  Any elevation in any of the following: CRP, D-dimer, ferritin, or LDH  Must also be on dexamethasone     Exclusions: If patient already received tocilizumab (due to long half-life)  Patients with active TB    Special Considerations (that warrant provider discussion): Active DVT or PE  Pregnancy  Severe hepatic impairment  Chronic/recurrent infections  Hemoglobin < 8.0  Chronically immunosuppressed patients (drug or disease etiology)    eGFR:  Recent Labs     05/18/22  1235   LABGLOM 45*     Plan:  Start baricitinib 2 mg once daily. EUA Adult Dosing Reference for COVID-19 (FDA 2021):  eGFR ?60: No dosage adjustment necessary. eGFR 30 to <60: 2 mg once daily. eGFR 15 to <30: 1 mg once daily. eGFR <15: Use is not recommended. *Dosing is different for ages 3to 5years old.  (see Angella)*

## 2022-05-19 NOTE — PLAN OF CARE
Problem: Discharge Planning  Goal: Discharge to home or other facility with appropriate resources  Outcome: Progressing  Flowsheets (Taken 5/18/2022 2029 by Leonid Elmore RN)  Discharge to home or other facility with appropriate resources:   Identify barriers to discharge with patient and caregiver   Identify discharge learning needs (meds, wound care, etc)     Problem: ABCDS Injury Assessment  Goal: Absence of physical injury  Outcome: Progressing     Problem: Respiratory - Adult  Goal: Achieves optimal ventilation and oxygenation  Outcome: Progressing     Problem: Chronic Conditions and Co-morbidities  Goal: Patient's chronic conditions and co-morbidity symptoms are monitored and maintained or improved  Outcome: Progressing  Note: Patient diabetic. Will monitor blood glucose levels ACHS and PRN.

## 2022-05-19 NOTE — PROGRESS NOTES
Hospitalist Progress Note    Patient:  Yolette Colón      Unit/Bed:6K-10/010-A    YOB: 1940    MRN: [id number]       Acct: [de-identified]     PCP: MARTIN Davila - CNP    Date of Admission: 5/18/2022    Assessment/Plan:    1. Acute on chronic hypoxic respiratory failure, secondary to COVID-19 PNA:               -Satting 94% on 4 L nasal cannula. Normally wears 3 L at baseline. Continue to wean oxygen back to baseline requirements.   -Pt reports symptom onset Thursday (5/12), and tested positive at an outside facility on Sunday (5/15). - PCT 1.46, CRP elevated 7.83 -> will start baricitinib (initiated 5/9)   - Leukocytosis present but slowly down trending   - CXR (POA) notable for cardiomegaly with mild pulmonary vascular congestion, patchy bilater LL opacities R>L   - Continue IV azithromycin (initiated 5/18)   - Continue IV ceftriaxone (initiated 5/18)   - Continue Decadron 6 mg x's 10 days (initiated 5/18)   - Continue scheduled and as needed bronchodilators   - Continue as needed mucolytics   - Continue vitamin D, zinc, vitamin C    -Pulmonary toilet: Incentive spirometer, Acapella, cough, deep breathe   -Patient will need home oxygen evaluation prior to discharge.     2. Persistent atrial Fibrillation:    - Rate controlled on metoprolol.    - Continue Eliquis    -Continuous telemetry.      3. Chronic HFpEF:    - ECHO 5/17/2021 reveals EF 55-60%. Pt does not appear overtly overloaded today. However BNP is elevated today. CXR remarkable for mild pulmonary vascular congestion with patchy bilateral lower lobe opacities. Will obtain repeat echo. - Continue home lasix   - Strict I&O's   - Daily weights   - 2g Na restriction and  2 L fluid restriction    4. Essential HTN, controlled    - Continue amlodipine, metoprolol     5.  CAD, S/P PCI 11/2019:    - Follows with Dr. Luna Araujo, OP cardiology   - Troponin negative    - ECG (POA) shows atrial fibrillation with competing junctional pacemaker and RBBB. - Continue DAPT, statin, BB   - Continuous telemetry.     6. AAA:    - Noted. CTA chest 5/11/2022 reveals 4.7 cm distal descending thoracic aortic aneurysm, slightly enlarged from previous imaging about a year ago. No evidence of dissection.   - Strict BP control   - Recommend f/u CTS as OP within 2 wks for further management and evaluation       7. Hyperlipidemia   - Statin    8. Ventral hernia:               - Pt reports chronic ventral hernia since his AAA repair in 2016. No pain or tenderness on exam.      8. Hx of DVT:              - Stable. Continue eliquis.      9. Hx of CVA:    - Continue home statin, ASA, Plavix.       10. Obesity   -BMI 38.77 kg/m²   -Discussed and educated on lifestyle modifications    11. Code status:              - Discussed code status in-depth with patient and patient's wife. Patient desires to never be intubated, therefore patient affirms Limited Code status with no intubation at this time. - Palliative care consulted. Expected discharge date:  Pending clinical course    Disposition:    [x] Home       [] TCU       [] Rehab       [] Psych       [] SNF       [] [place]       [] Other-    Chief Complaint: Shortness of breath    Hospital Course: Per HPI documented 5/18/22: Vamshi Alas, \"Tylor,\" is a 78 y/o  male with a PMHx of CAD, HTN, AAA, traumatic fall 2 years ago with chronic back pain and chronically on 3L O2 NC who presents to [address] [place] [address] ED today for the evaluation of worsening shortness of breath and cough. Patient reports symptom onset Thursday or Friday, and  tested positive for COVID at [facility]. PT reports he was found to have SpO2 less than 90% on his home pulse ox. Pt states he has an intermittent, unproductive associated cough. He denies chest pain, fever, chills, nausea, vomiting, change in bowel or urinary habits, lightheadedness, dizziness.  Pt reports that he has been on 3L O2 NC for 2-3 years now, following a  Traumatic fall for which he required chest tubes for a time. \"    5/19/22: Patient satting 94% on 5 L nasal cannula. Remains afebrile, with hemodynamically stable vital signs. No acute events overnight. Patient states that he is feeling [place]. \"  This morning. Denies worsening shortness of breath at rest or with exertion. Continue to wean oxygen back to baseline requirement of 3 L. Continue Decadron. Continue current therapies as noted above in assessment and plan. Subjective (past 24 hours):      Denies chest pain, worsening shortness of breath at rest, worsening dyspnea on exertion, abdominal pain, nausea, vomiting, diarrhea, constipation, fever, chills, palpitations, dizziness. States that he feels [place]. \"    Medications:  Reviewed    Infusion Medications    sodium chloride      dextrose       Scheduled Medications    baricitinib  2 mg Oral Daily    ipratropium-albuterol  1 ampule Inhalation BID    amLODIPine  5 mg Oral Daily    vitamin C  1,000 mg Oral Daily    atorvastatin  40 mg Oral Nightly    Vitamin D  5,000 Units Oral Daily    clopidogrel  75 mg Oral Daily    apixaban  2.5 mg Oral BID    furosemide  20 mg Oral Daily    metoprolol tartrate  12.5 mg Oral BID    pantoprazole  40 mg Oral QAM AC    PARoxetine  20 mg Oral QAM    zinc sulfate  50 mg Oral Daily    sodium chloride flush  10 mL IntraVENous 2 times per day    insulin lispro  0-6 Units SubCUTAneous TID WC    insulin lispro  0-3 Units SubCUTAneous Nightly    guaiFENesin  600 mg Oral BID    dexamethasone  6 mg IntraVENous Q24H    azithromycin  500 mg IntraVENous Q24H    cefTRIAXone (ROCEPHIN) IV  1,000 mg IntraVENous Q24H     PRN Meds: sodium chloride flush, sodium chloride, ondansetron **OR** ondansetron, polyethylene glycol, acetaminophen **OR** acetaminophen, potassium chloride **OR** potassium alternative oral replacement **OR** potassium chloride, magnesium sulfate, glucose, dextrose bolus **OR** dextrose bolus, glucagon (rDNA), dextrose      Intake/Output Summary (Last 24 hours) at 5/19/2022 2023  Last data filed at 5/19/2022 0536  Gross per 24 hour   Intake 850.32 ml   Output --   Net 850.32 ml       Diet:  ADULT DIET; Regular; Low Sodium (2 gm); 2000 ml    Exam:  BP (!) 119/105   Pulse 83   Temp 97.2 °F (36.2 °C) (Axillary)   Resp 16   Ht 5' 8\" (1.727 m)   Wt 255 lb (115.7 kg)   SpO2 98%   BMI 38.77 kg/m²     General appearance: No apparent distress, appears older than stated age and cooperative. Chronically ill-appearing. Obese. HEENT: Pupils equal, round, and reactive to light. Conjunctivae/corneas clear. Neck: Supple, with full range of motion. No jugular venous distention. Trachea midline. Respiratory:  Normal respiratory effort, able to speak full clear sentences. Clear to auscultation, with expiratory wheezes scattered throughout all lobes. No rales/rhonchi. Cardiovascular: Regular rate and rhythm with normal S1/S2 without murmurs, rubs or gallops. No lower extremity swelling appreciated. Abdomen: Soft, non-tender, non-distended with normal bowel sounds. Musculoskeletal: passive and active ROM x 4 extremities. Skin: Skin color, texture, turgor normal.  No rashes or lesions. Neurologic:  Neurovascularly intact without any focal sensory/motor deficits. Cranial nerves: II-XII intact, grossly non-focal.  Psychiatric: Alert and oriented, thought content appropriate, normal insight  Capillary Refill: Brisk,< 3 seconds   Peripheral Pulses: +2 palpable, equal bilaterally       Labs:   Recent Labs     05/18/22  1235 05/19/22  0843   WBC 14.8* 14.4*   HGB 14.1 14.5   HCT 41.6* 43.2   * 138     Recent Labs     05/18/22  1235 05/19/22  0843    137   K 4.3 4.5    101   CO2 25 21*   BUN 35* 39*   CREATININE 1.5* 1.3*   CALCIUM 9.1 8.9     Recent Labs     05/18/22  1235   AST 49*   ALT 81*   BILITOT 1.4*   ALKPHOS 67     No results for input(s): INR in the last 72 hours.   No results for input(s): Kurt Celestin in the last 72 hours. Microbiology:      Urinalysis:      Lab Results   Component Value Date    NITRU NEGATIVE 05/06/2021    WBCUA 0-2 05/06/2021    BACTERIA NONE SEEN 05/06/2021    RBCUA 3-5 05/06/2021    BLOODU NEGATIVE 05/06/2021    GLUCOSEU NEGATIVE 05/06/2021       Radiology:  XR CHEST PORTABLE    Result Date: 5/18/2022  PROCEDURE: XR CHEST PORTABLE CLINICAL INFORMATION: Shortness of breath. COMPARISON: Chest x-ray 5/6/2021. TECHNIQUE: AP portable chest radiograph performed. FINDINGS: Lines/tubes: None. Heart/mediastinum: Cardiomegaly. The pulmonary vascular markings are prominent. Lungs: Low lung volumes limit assessment of the lung bases. Patchy bilateral lower lobe airspace opacities are observed. No pleural effusion or pneumothorax is identified. Bones: The visualized skeletal structures appear intact. Postoperative changes in the shoulder appear stable were visualized. Cardiomegaly and mild pulmonary vascular congestion is observed. Evaluation of the lung bases is limited due to low lung volumes and patient body habitus. Patchy bilateral lower lobe airspace opacities, right greater than left, are nonspecific and could indicate atelectasis, chronic lung disease, versus pneumonia in the appropriate clinical setting. **This report has been created using voice recognition software. It may contain minor errors which are inherent in voice recognition technology. ** Final report electronically signed by Dr Rober Conde on 5/18/2022 12:57 PM      DVT prophylaxis: [] Lovenox                                 [] SCDs                                 [] SQ Heparin                                 [] Encourage ambulation           [x] Already on Anticoagulation     Code Status: Limited    PT/OT Eval Status: None    Tele:   [x] yes             [] no    Atrial fibrillation with rate controlled, RBBB    Active Hospital Problems    Diagnosis Date Noted    [facility] [J96.01] 05/18/2022     Priority: Medium       Electronically signed by MARTIN Milner CNP on 5/19/2022 at 8:23 PM

## 2022-05-19 NOTE — RT PROTOCOL NOTE
RT Inhaler-Nebulizer Bronchodilator Protocol Note    There is a bronchodilator order in the chart from a provider indicating to follow the RT Bronchodilator Protocol and there is an Initiate RT Inhaler-Nebulizer Bronchodilator Protocol order as well (see protocol at bottom of note). CXR Findings:  XR CHEST PORTABLE    Result Date: 5/18/2022  Cardiomegaly and mild pulmonary vascular congestion is observed. Evaluation of the lung bases is limited due to low lung volumes and patient body habitus. Patchy bilateral lower lobe airspace opacities, right greater than left, are nonspecific and could indicate atelectasis, chronic lung disease, versus pneumonia in the appropriate clinical setting. **This report has been created using voice recognition software. It may contain minor errors which are inherent in voice recognition technology. ** Final report electronically signed by Dr Rannie Shone on 5/18/2022 12:57 PM      The findings from the last RT Protocol Assessment were as follows:   History Pulmonary Disease: None or smoker <15 pack years  Respiratory Pattern: Dyspnea on exertion or RR 21-25 bpm  Breath Sounds: Slightly diminished and/or crackles  Cough: Strong, spontaneous, non-productive  Indication for Bronchodilator Therapy: Decreased or absent breath sounds  Bronchodilator Assessment Score: 4    Aerosolized bronchodilator medication orders have been revised according to the RT Inhaler-Nebulizer Bronchodilator Protocol below. Respiratory Therapist to perform RT Therapy Protocol Assessment initially then follow the protocol. Repeat RT Therapy Protocol Assessment PRN for score 0-3 or on second treatment, BID, and PRN for scores above 3. No Indications - adjust the frequency to every 6 hours PRN wheezing or bronchospasm, if no treatments needed after 48 hours then discontinue using Per Protocol order mode.      If indication present, adjust the RT bronchodilator orders based on the Bronchodilator Assessment Score as indicated below. Use Inhaler orders unless patient has one or more of the following: on home nebulizer, not able to hold breath for 10 seconds, is not alert and oriented, cannot activate and use MDI correctly, or respiratory rate 25 breaths per minute or more, then use the equivalent nebulizer order(s) with same Frequency and PRN reasons based on the score. If a patient is on this medication at home then do not decrease Frequency below that used at home. 0-3 - enter or revise RT bronchodilator order(s) to equivalent RT Bronchodilator order with Frequency of every 4 hours PRN for wheezing or increased work of breathing using Per Protocol order mode. 4-6 - enter or revise RT Bronchodilator order(s) to two equivalent RT bronchodilator orders with one order with BID Frequency and one order with Frequency of every 4 hours PRN wheezing or increased work of breathing using Per Protocol order mode. 7-10 - enter or revise RT Bronchodilator order(s) to two equivalent RT bronchodilator orders with one order with TID Frequency and one order with Frequency of every 4 hours PRN wheezing or increased work of breathing using Per Protocol order mode. 11-13 - enter or revise RT Bronchodilator order(s) to one equivalent RT bronchodilator order with QID Frequency and an Albuterol order with Frequency of every 4 hours PRN wheezing or increased work of breathing using Per Protocol order mode. Greater than 13 - enter or revise RT Bronchodilator order(s) to one equivalent RT bronchodilator order with every 4 hours Frequency and an Albuterol order with Frequency of every 2 hours PRN wheezing or increased work of breathing using Per Protocol order mode. RT to enter RT Home Evaluation for COPD & MDI Assessment order using Per Protocol order mode.     Electronically signed by Cooper Amato RCP on 5/19/2022 at 8:35 AM

## 2022-05-19 NOTE — PLAN OF CARE
Problem: Respiratory - Adult  Goal: Achieves optimal ventilation and oxygenation    Outcome: Progressing

## 2022-05-19 NOTE — CARE COORDINATION
5/19/22, 7:47 AM EDT  DISCHARGE PLANNING EVALUATION:    Laney Zamudio       Admitted: 5/18/2022/ [address] day: 1   Location: [facility]10/010- Reason for admit: Acute hypoxemic respiratory failure ([facility] [J96.01]   PMH:  has a past medical history of AAA (abdominal aortic aneurysm) ([facility] CAD (coronary artery disease), Cancer ([facility] Cancer of kidney ([facility] Heart attack ([facility] History of placement of chest tube, Hx of blood clots, Hyperlipidemia, Hypertension, Kidney stones, Obesity, Osteoarthritis, and Stroke ([facility] Procedure: none  Barriers to Discharge:  Creat 1.5, PCT 1.46, CRP 7.83, BNP 5066, bili 1.4, WBC 14.8, +covid. Using 5L of oxygen. IV zithromax, IV rocephin, IV decadron, duonebs. Acapella. PCP: MARTIN Olivo CNP  Readmission Risk Score: 19 ( )%  Patient's Healthcare Decision Maker: Named in Scanned ACP Document    Patient Goals/Plan/Treatment Preferences: Met with Margot Hyatt and his wife. They plan to return home together at discharge. Vasiliy states using 3L oxygen ATC from [facility].  He amb independently. He denies need for [facility] or [facility]. He has PCP and insurance. Transportation/Food Security/Housekeeping Addressed:  No issues identified.

## 2022-05-20 LAB
ANION GAP SERPL CALCULATED.3IONS-SCNC: 11 MEQ/L (ref 8–16)
BASOPHILS # BLD: 0.3 %
BASOPHILS ABSOLUTE: 0 THOU/MM3 (ref 0–0.1)
BUN BLDV-MCNC: 41 MG/DL (ref 7–22)
CALCIUM IONIZED: 1.07 MMOL/L (ref 1.12–1.32)
CALCIUM SERPL-MCNC: 8.5 MG/DL (ref 8.5–10.5)
CHLORIDE BLD-SCNC: 104 MEQ/L (ref 98–111)
CO2: 22 MEQ/L (ref 23–33)
CREAT SERPL-MCNC: 1.3 MG/DL (ref 0.4–1.2)
EOSINOPHIL # BLD: 0 %
EOSINOPHILS ABSOLUTE: 0 THOU/MM3 (ref 0–0.4)
ERYTHROCYTE [DISTWIDTH] IN BLOOD BY AUTOMATED COUNT: 13.5 % (ref 11.5–14.5)
ERYTHROCYTE [DISTWIDTH] IN BLOOD BY AUTOMATED COUNT: 51.2 FL (ref 35–45)
GFR SERPL CREATININE-BSD FRML MDRD: 53 ML/MIN/1.73M2
GLUCOSE BLD-MCNC: 183 MG/DL (ref 70–108)
GLUCOSE BLD-MCNC: 189 MG/DL (ref 70–108)
GLUCOSE BLD-MCNC: 194 MG/DL (ref 70–108)
GLUCOSE BLD-MCNC: 253 MG/DL (ref 70–108)
GLUCOSE BLD-MCNC: 279 MG/DL (ref 70–108)
HCT VFR BLD CALC: 40.5 % (ref 42–52)
HEMOGLOBIN: 13.3 GM/DL (ref 14–18)
IMMATURE GRANS (ABS): 0.18 THOU/MM3 (ref 0–0.07)
IMMATURE GRANULOCYTES: 1.6 %
LV EF: 55 %
LVEF MODALITY: NORMAL
LYMPHOCYTES # BLD: 4.1 %
LYMPHOCYTES ABSOLUTE: 0.5 THOU/MM3 (ref 1–4.8)
MAGNESIUM: 2.3 MG/DL (ref 1.6–2.4)
MCH RBC QN AUTO: 33.8 PG (ref 26–33)
MCHC RBC AUTO-ENTMCNC: 32.8 GM/DL (ref 32.2–35.5)
MCV RBC AUTO: 102.8 FL (ref 80–94)
MONOCYTES # BLD: 3.3 %
MONOCYTES ABSOLUTE: 0.4 THOU/MM3 (ref 0.4–1.3)
NUCLEATED RED BLOOD CELLS: 0 /100 WBC
PHOSPHORUS: 3 MG/DL (ref 2.4–4.7)
PLATELET # BLD: 116 THOU/MM3 (ref 130–400)
PMV BLD AUTO: 10.3 FL (ref 9.4–12.4)
POTASSIUM SERPL-SCNC: 4.5 MEQ/L (ref 3.5–5.2)
RBC # BLD: 3.94 MILL/MM3 (ref 4.7–6.1)
SEG NEUTROPHILS: 90.7 %
SEGMENTED NEUTROPHILS ABSOLUTE COUNT: 10.2 THOU/MM3 (ref 1.8–7.7)
SODIUM BLD-SCNC: 137 MEQ/L (ref 135–145)
TSH SERPL DL<=0.05 MIU/L-ACNC: 0.62 UIU/ML (ref 0.4–4.2)
WBC # BLD: 11.3 THOU/MM3 (ref 4.8–10.8)

## 2022-05-20 PROCEDURE — 6370000000 HC RX 637 (ALT 250 FOR IP)

## 2022-05-20 PROCEDURE — 36415 COLL VENOUS BLD VENIPUNCTURE: CPT

## 2022-05-20 PROCEDURE — 99233 SBSQ HOSP IP/OBS HIGH 50: CPT

## 2022-05-20 PROCEDURE — 84100 ASSAY OF PHOSPHORUS: CPT

## 2022-05-20 PROCEDURE — 82330 ASSAY OF CALCIUM: CPT

## 2022-05-20 PROCEDURE — 93306 TTE W/DOPPLER COMPLETE: CPT

## 2022-05-20 PROCEDURE — 84443 ASSAY THYROID STIM HORMONE: CPT

## 2022-05-20 PROCEDURE — 93005 ELECTROCARDIOGRAM TRACING: CPT

## 2022-05-20 PROCEDURE — 2580000003 HC RX 258

## 2022-05-20 PROCEDURE — 82948 REAGENT STRIP/BLOOD GLUCOSE: CPT

## 2022-05-20 PROCEDURE — 80048 BASIC METABOLIC PNL TOTAL CA: CPT

## 2022-05-20 PROCEDURE — 94760 N-INVAS EAR/PLS OXIMETRY 1: CPT

## 2022-05-20 PROCEDURE — 94640 AIRWAY INHALATION TREATMENT: CPT

## 2022-05-20 PROCEDURE — 1200000003 HC TELEMETRY R&B

## 2022-05-20 PROCEDURE — 6360000002 HC RX W HCPCS

## 2022-05-20 PROCEDURE — 85025 COMPLETE CBC W/AUTO DIFF WBC: CPT

## 2022-05-20 PROCEDURE — 94669 MECHANICAL CHEST WALL OSCILL: CPT

## 2022-05-20 PROCEDURE — 83735 ASSAY OF MAGNESIUM: CPT

## 2022-05-20 RX ORDER — BENZONATATE 100 MG/1
100 CAPSULE ORAL 3 TIMES DAILY PRN
Status: DISCONTINUED | OUTPATIENT
Start: 2022-05-20 | End: 2022-05-21 | Stop reason: HOSPADM

## 2022-05-20 RX ORDER — IPRATROPIUM BROMIDE AND ALBUTEROL SULFATE 2.5; .5 MG/3ML; MG/3ML
1 SOLUTION RESPIRATORY (INHALATION) EVERY 4 HOURS PRN
Status: DISCONTINUED | OUTPATIENT
Start: 2022-05-20 | End: 2022-05-21 | Stop reason: HOSPADM

## 2022-05-20 RX ORDER — IPRATROPIUM BROMIDE AND ALBUTEROL SULFATE 2.5; .5 MG/3ML; MG/3ML
1 SOLUTION RESPIRATORY (INHALATION)
Status: DISCONTINUED | OUTPATIENT
Start: 2022-05-20 | End: 2022-05-20

## 2022-05-20 RX ADMIN — DEXAMETHASONE SODIUM PHOSPHATE 6 MG: 4 INJECTION, SOLUTION INTRA-ARTICULAR; INTRALESIONAL; INTRAMUSCULAR; INTRAVENOUS; SOFT TISSUE at 11:52

## 2022-05-20 RX ADMIN — AMLODIPINE BESYLATE 5 MG: 5 TABLET ORAL at 08:50

## 2022-05-20 RX ADMIN — CLOPIDOGREL BISULFATE 75 MG: 75 TABLET ORAL at 08:49

## 2022-05-20 RX ADMIN — IPRATROPIUM BROMIDE AND ALBUTEROL SULFATE 1 AMPULE: .5; 3 SOLUTION RESPIRATORY (INHALATION) at 08:21

## 2022-05-20 RX ADMIN — Medication 5000 UNITS: at 08:48

## 2022-05-20 RX ADMIN — ATORVASTATIN CALCIUM 40 MG: 40 TABLET, FILM COATED ORAL at 21:54

## 2022-05-20 RX ADMIN — APIXABAN 2.5 MG: 2.5 TABLET, FILM COATED ORAL at 08:49

## 2022-05-20 RX ADMIN — CEFTRIAXONE SODIUM 1000 MG: 10 INJECTION, POWDER, FOR SOLUTION INTRAVENOUS at 21:55

## 2022-05-20 RX ADMIN — Medication 50 MG: at 08:48

## 2022-05-20 RX ADMIN — SODIUM CHLORIDE, PRESERVATIVE FREE 10 ML: 5 INJECTION INTRAVENOUS at 08:50

## 2022-05-20 RX ADMIN — FUROSEMIDE 20 MG: 20 TABLET ORAL at 08:49

## 2022-05-20 RX ADMIN — METOPROLOL TARTRATE 12.5 MG: 25 TABLET, FILM COATED ORAL at 21:54

## 2022-05-20 RX ADMIN — APIXABAN 2.5 MG: 2.5 TABLET, FILM COATED ORAL at 21:54

## 2022-05-20 RX ADMIN — INSULIN LISPRO 1 UNITS: 100 INJECTION, SOLUTION INTRAVENOUS; SUBCUTANEOUS at 21:49

## 2022-05-20 RX ADMIN — BARICITINIB 2 MG: 2 TABLET, FILM COATED ORAL at 08:49

## 2022-05-20 RX ADMIN — SODIUM CHLORIDE, PRESERVATIVE FREE 10 ML: 5 INJECTION INTRAVENOUS at 21:55

## 2022-05-20 RX ADMIN — PAROXETINE HYDROCHLORIDE 20 MG: 20 TABLET, FILM COATED ORAL at 08:50

## 2022-05-20 RX ADMIN — OXYCODONE HYDROCHLORIDE AND ACETAMINOPHEN 1000 MG: 500 TABLET ORAL at 08:49

## 2022-05-20 RX ADMIN — INSULIN LISPRO 3 UNITS: 100 INJECTION, SOLUTION INTRAVENOUS; SUBCUTANEOUS at 11:52

## 2022-05-20 RX ADMIN — GUAIFENESIN 600 MG: 600 TABLET, EXTENDED RELEASE ORAL at 08:49

## 2022-05-20 RX ADMIN — AZITHROMYCIN DIHYDRATE 500 MG: 500 INJECTION, POWDER, LYOPHILIZED, FOR SOLUTION INTRAVENOUS at 23:00

## 2022-05-20 RX ADMIN — INSULIN LISPRO 1 UNITS: 100 INJECTION, SOLUTION INTRAVENOUS; SUBCUTANEOUS at 16:22

## 2022-05-20 RX ADMIN — PANTOPRAZOLE SODIUM 40 MG: 40 TABLET, DELAYED RELEASE ORAL at 08:59

## 2022-05-20 RX ADMIN — GUAIFENESIN 600 MG: 600 TABLET, EXTENDED RELEASE ORAL at 21:54

## 2022-05-20 NOTE — PLAN OF CARE
Problem: Discharge Planning  Goal: Discharge to home or other facility with appropriate resources  Outcome: Progressing  Note: Plans to return home with wife when medically stable. Problem: ABCDS Injury Assessment  Goal: Absence of physical injury  Outcome: Progressing  Note: No physical injury noted this shift. Problem: Respiratory - Adult  Goal: Achieves optimal ventilation and oxygenation  5/20/2022 0512 by Suraj Perkins  Outcome: Progressing  Note: Patient sating above 90% on 5L respiratory weaned down to 4L Nasal cannula. 5/19/2022 2116 by Kenia Castanon RCP  Outcome: Progressing  Goal: Lung sounds clear or within normal limits for patient  5/20/2022 6070 by Suraj Perkins  Outcome: Progressing  5/19/2022 2116 by Kenia Castanon RCP  Outcome: Progressing     Problem: Chronic Conditions and Co-morbidities  Goal: Patient's chronic conditions and co-morbidity symptoms are monitored and maintained or improved  Outcome: Progressing     Problem: Skin/Tissue Integrity  Goal: Absence of new skin breakdown  Description: 1. Monitor for areas of redness and/or skin breakdown  2. Assess vascular access sites hourly  3. Every 4-6 hours minimum:  Change oxygen saturation probe site  4. Every 4-6 hours:  If on nasal continuous positive airway pressure, respiratory therapy assess nares and determine need for appliance change or resting period. Outcome: Progressing  Note: No new signs or symptoms of skin breakdown noted this shift, encouraging patient to turn and reposition self in bed q2h       Problem: Safety - Adult  Goal: Free from fall injury  Outcome: Progressing  Note: No falls noted this shift. Call light and personal belongings in reach. Patient uses call light appropriately. Care plan reviewed with patient. Patient verbalize understanding of the plan of care and contribute to goal setting.

## 2022-05-20 NOTE — CARE COORDINATION
5/20/22, 8:24 AM EDT    DISCHARGE ON GOING EVALUATION    Shailesh Smith day: 2  Location: [facility]/010-A Reason for admit: Acute hypoxemic respiratory failure ([facility] [J96.01]   Procedure: none  Barriers to Discharge: Creat 1.3, ical 1.07. Oxygen weaned to 4L (baseline is 3L). Baricitinib, IV zithromax, IV rocephin, IV decadron, duonebs in use. Echo ordered. PCP: MARTIN Boyd CNP  Readmission Risk Score: 19.5 ( )%  Patient Goals/Plan/Treatment Preferences: Home with wife. [place] [facility] [place] [address]. Denies all needs.

## 2022-05-20 NOTE — FLOWSHEET NOTE
05/20/22 0450   Treatment Team Notification   Reason for Communication Evaluate   Team Member Name Micaela Gerardo   Treatment Team Role Advanced Practice Nurse   Method of Communication Secure Message   Response See orders     Notified due to patients heart rate dropping into 40's intermittently. Bumping right back up into 60's. Denies chest pain. EKG and labs ordered.

## 2022-05-20 NOTE — PLAN OF CARE
Problem: Respiratory - Adult  Goal: Achieves optimal ventilation and oxygenation  5/19/2022 2116 by Wily Salinas RCP  Outcome: Progressing     Problem: Respiratory - Adult  Goal: Lung sounds clear or within normal limits for patient  Outcome: Progressing     Continue aerosol tx's for lung aeration. Patient's supplemental O2 weaned to [place] at this time. Patient's baseline requirement is 3LNC.

## 2022-05-20 NOTE — PLAN OF CARE
Problem: Respiratory - Adult  Goal: Achieves optimal ventilation and oxygenation  5/20/2022 0855 by Chay oPsada RCP  Outcome: Progressing   Patient wears 3lpm at home, currently on 4lpm, will continue to wean as tolerated. Problem: Respiratory - Adult  Goal: Lung sounds clear or within normal limits for patient  5/20/2022 0855 by Chay Posada RCP  Outcome: Progressing   Pt has diminished lung bases, but clear in upper lobes. No distress at rest, very minimal with exertion. Patient states this is close to baseline.

## 2022-05-20 NOTE — RT PROTOCOL NOTE
RT Inhaler-Nebulizer Bronchodilator Protocol Note    There is a bronchodilator order in the chart from a provider indicating to follow the RT Bronchodilator Protocol and there is an Initiate RT Inhaler-Nebulizer Bronchodilator Protocol order as well (see protocol at bottom of note). CXR Findings:  XR CHEST PORTABLE    Result Date: 5/18/2022  Cardiomegaly and mild pulmonary vascular congestion is observed. Evaluation of the lung bases is limited due to low lung volumes and patient body habitus. Patchy bilateral lower lobe airspace opacities, right greater than left, are nonspecific and could indicate atelectasis, chronic lung disease, versus pneumonia in the appropriate clinical setting. **This report has been created using voice recognition software. It may contain minor errors which are inherent in voice recognition technology. ** Final report electronically signed by Dr Sherin Fox on 5/18/2022 12:57 PM      The findings from the last RT Protocol Assessment were as follows:   History Pulmonary Disease: Smoker 15 pack years or more  Respiratory Pattern: Regular pattern and RR 12-20 bpm  Breath Sounds: Slightly diminished and/or crackles  Cough: Strong, spontaneous, non-productive  Indication for Bronchodilator Therapy: Decreased or absent breath sounds  Bronchodilator Assessment Score: 3    Aerosolized bronchodilator medication orders have been revised according to the RT Inhaler-Nebulizer Bronchodilator Protocol below. Respiratory Therapist to perform RT Therapy Protocol Assessment initially then follow the protocol. Repeat RT Therapy Protocol Assessment PRN for score 0-3 or on second treatment, BID, and PRN for scores above 3. No Indications - adjust the frequency to every 6 hours PRN wheezing or bronchospasm, if no treatments needed after 48 hours then discontinue using Per Protocol order mode.      If indication present, adjust the RT bronchodilator orders based on the Bronchodilator Assessment Score as indicated below. Use Inhaler orders unless patient has one or more of the following: on home nebulizer, not able to hold breath for 10 seconds, is not alert and oriented, cannot activate and use MDI correctly, or respiratory rate 25 breaths per minute or more, then use the equivalent nebulizer order(s) with same Frequency and PRN reasons based on the score. If a patient is on this medication at home then do not decrease Frequency below that used at home. 0-3 - enter or revise RT bronchodilator order(s) to equivalent RT Bronchodilator order with Frequency of every 4 hours PRN for wheezing or increased work of breathing using Per Protocol order mode. 4-6 - enter or revise RT Bronchodilator order(s) to two equivalent RT bronchodilator orders with one order with BID Frequency and one order with Frequency of every 4 hours PRN wheezing or increased work of breathing using Per Protocol order mode. 7-10 - enter or revise RT Bronchodilator order(s) to two equivalent RT bronchodilator orders with one order with TID Frequency and one order with Frequency of every 4 hours PRN wheezing or increased work of breathing using Per Protocol order mode. 11-13 - enter or revise RT Bronchodilator order(s) to one equivalent RT bronchodilator order with QID Frequency and an Albuterol order with Frequency of every 4 hours PRN wheezing or increased work of breathing using Per Protocol order mode. Greater than 13 - enter or revise RT Bronchodilator order(s) to one equivalent RT bronchodilator order with every 4 hours Frequency and an Albuterol order with Frequency of every 2 hours PRN wheezing or increased work of breathing using Per Protocol order mode. RT to enter RT Home Evaluation for COPD & MDI Assessment order using Per Protocol order mode.     Electronically signed by Ozzie Hernández RCP on 5/20/2022 at 8:46 AM

## 2022-05-20 NOTE — PLAN OF CARE
Problem: Discharge Planning  Goal: Discharge to home or other facility with appropriate resources  5/20/2022 1349 by Shanelle Archer RN  Outcome: Progressing     Problem: ABCDS Injury Assessment  Goal: Absence of physical injury  5/20/2022 1349 by Shanelle Archer RN  Outcome: Progressing     Problem: Respiratory - Adult  Goal: Achieves optimal ventilation and oxygenation  5/20/2022 1349 by Shanelle Archer RN  Outcome: Progressing     Problem: Chronic Conditions and Co-morbidities  Goal: Patient's chronic conditions and co-morbidity symptoms are monitored and maintained or improved  5/20/2022 1349 by Shanelle Archer RN  Outcome: Progressing     Problem: Skin/Tissue Integrity  Goal: Absence of new skin breakdown  Description: 1. Monitor for areas of redness and/or skin breakdown  2. Assess vascular access sites hourly  3. Every 4-6 hours minimum:  Change oxygen saturation probe site  4. Every 4-6 hours:  If on nasal continuous positive airway pressure, respiratory therapy assess nares and determine need for appliance change or resting period.   5/20/2022 1349 by Shanelle Archer RN  Outcome: Progressing     Problem: Safety - Adult  Goal: Free from fall injury  5/20/2022 1349 by Shanelle Archer RN  Outcome: Progressing

## 2022-05-21 VITALS
SYSTOLIC BLOOD PRESSURE: 126 MMHG | RESPIRATION RATE: 20 BRPM | HEART RATE: 75 BPM | OXYGEN SATURATION: 94 % | HEIGHT: 68 IN | DIASTOLIC BLOOD PRESSURE: 77 MMHG | BODY MASS INDEX: 38.65 KG/M2 | TEMPERATURE: 97.9 F | WEIGHT: 255 LBS

## 2022-05-21 LAB
ANION GAP SERPL CALCULATED.3IONS-SCNC: 10 MEQ/L (ref 8–16)
BASOPHILS # BLD: 0.2 %
BASOPHILS ABSOLUTE: 0 THOU/MM3 (ref 0–0.1)
BUN BLDV-MCNC: 35 MG/DL (ref 7–22)
CALCIUM SERPL-MCNC: 8.6 MG/DL (ref 8.5–10.5)
CHLORIDE BLD-SCNC: 103 MEQ/L (ref 98–111)
CO2: 25 MEQ/L (ref 23–33)
CREAT SERPL-MCNC: 1.2 MG/DL (ref 0.4–1.2)
EKG Q-T INTERVAL: 474 MS
EKG QRS DURATION: 128 MS
EKG QTC CALCULATION (BAZETT): 461 MS
EKG R AXIS: 32 DEGREES
EKG T AXIS: 3 DEGREES
EKG VENTRICULAR RATE: 57 BPM
EOSINOPHIL # BLD: 0 %
EOSINOPHILS ABSOLUTE: 0 THOU/MM3 (ref 0–0.4)
ERYTHROCYTE [DISTWIDTH] IN BLOOD BY AUTOMATED COUNT: 13.3 % (ref 11.5–14.5)
ERYTHROCYTE [DISTWIDTH] IN BLOOD BY AUTOMATED COUNT: 49 FL (ref 35–45)
FOLATE: 10.7 NG/ML (ref 4.8–24.2)
GFR SERPL CREATININE-BSD FRML MDRD: 58 ML/MIN/1.73M2
GLUCOSE BLD-MCNC: 125 MG/DL (ref 70–108)
GLUCOSE BLD-MCNC: 134 MG/DL (ref 70–108)
GLUCOSE BLD-MCNC: 153 MG/DL (ref 70–108)
HCT VFR BLD CALC: 42.5 % (ref 42–52)
HEMOGLOBIN: 14.1 GM/DL (ref 14–18)
IMMATURE GRANS (ABS): 0.24 THOU/MM3 (ref 0–0.07)
IMMATURE GRANULOCYTES: 1.9 %
IRON SATURATION: 32 % (ref 20–50)
IRON: 79 UG/DL (ref 65–195)
LYMPHOCYTES # BLD: 4.2 %
LYMPHOCYTES ABSOLUTE: 0.5 THOU/MM3 (ref 1–4.8)
MCH RBC QN AUTO: 33.1 PG (ref 26–33)
MCHC RBC AUTO-ENTMCNC: 33.2 GM/DL (ref 32.2–35.5)
MCV RBC AUTO: 99.8 FL (ref 80–94)
MONOCYTES # BLD: 3.6 %
MONOCYTES ABSOLUTE: 0.5 THOU/MM3 (ref 0.4–1.3)
NUCLEATED RED BLOOD CELLS: 0 /100 WBC
PLATELET # BLD: 130 THOU/MM3 (ref 130–400)
PMV BLD AUTO: 10.5 FL (ref 9.4–12.4)
POTASSIUM REFLEX MAGNESIUM: 4.6 MEQ/L (ref 3.5–5.2)
RBC # BLD: 4.26 MILL/MM3 (ref 4.7–6.1)
SEG NEUTROPHILS: 90.1 %
SEGMENTED NEUTROPHILS ABSOLUTE COUNT: 11.4 THOU/MM3 (ref 1.8–7.7)
SODIUM BLD-SCNC: 138 MEQ/L (ref 135–145)
TOTAL IRON BINDING CAPACITY: 247 UG/DL (ref 171–450)
VITAMIN B-12: 649 PG/ML (ref 211–911)
WBC # BLD: 12.7 THOU/MM3 (ref 4.8–10.8)

## 2022-05-21 PROCEDURE — 2580000003 HC RX 258

## 2022-05-21 PROCEDURE — 83550 IRON BINDING TEST: CPT

## 2022-05-21 PROCEDURE — 6370000000 HC RX 637 (ALT 250 FOR IP)

## 2022-05-21 PROCEDURE — 82746 ASSAY OF FOLIC ACID SERUM: CPT

## 2022-05-21 PROCEDURE — 36415 COLL VENOUS BLD VENIPUNCTURE: CPT

## 2022-05-21 PROCEDURE — 85025 COMPLETE CBC W/AUTO DIFF WBC: CPT

## 2022-05-21 PROCEDURE — 80048 BASIC METABOLIC PNL TOTAL CA: CPT

## 2022-05-21 PROCEDURE — 83540 ASSAY OF IRON: CPT

## 2022-05-21 PROCEDURE — 94761 N-INVAS EAR/PLS OXIMETRY MLT: CPT

## 2022-05-21 PROCEDURE — 6360000002 HC RX W HCPCS

## 2022-05-21 PROCEDURE — 93010 ELECTROCARDIOGRAM REPORT: CPT | Performed by: INTERNAL MEDICINE

## 2022-05-21 PROCEDURE — 82948 REAGENT STRIP/BLOOD GLUCOSE: CPT

## 2022-05-21 PROCEDURE — 99239 HOSP IP/OBS DSCHRG MGMT >30: CPT

## 2022-05-21 PROCEDURE — 82607 VITAMIN B-12: CPT

## 2022-05-21 RX ORDER — DEXAMETHASONE 4 MG/1
6 TABLET ORAL DAILY
Status: DISCONTINUED | OUTPATIENT
Start: 2022-05-21 | End: 2022-05-21 | Stop reason: HOSPADM

## 2022-05-21 RX ORDER — INSULIN LISPRO 100 [IU]/ML
INJECTION, SOLUTION INTRAVENOUS; SUBCUTANEOUS
Qty: 1 PEN | Refills: 1 | Status: SHIPPED | OUTPATIENT
Start: 2022-05-21 | End: 2022-08-30

## 2022-05-21 RX ORDER — INSULIN LISPRO 100 [IU]/ML
0-6 INJECTION, SOLUTION INTRAVENOUS; SUBCUTANEOUS NIGHTLY
Status: DISCONTINUED | OUTPATIENT
Start: 2022-05-21 | End: 2022-05-21 | Stop reason: HOSPADM

## 2022-05-21 RX ORDER — DEXTROSE MONOHYDRATE 50 MG/ML
100 INJECTION, SOLUTION INTRAVENOUS PRN
Status: DISCONTINUED | OUTPATIENT
Start: 2022-05-21 | End: 2022-05-21 | Stop reason: HOSPADM

## 2022-05-21 RX ORDER — BENZONATATE 100 MG/1
100 CAPSULE ORAL 3 TIMES DAILY PRN
Qty: 30 CAPSULE | Refills: 0 | Status: ON HOLD | OUTPATIENT
Start: 2022-05-21 | End: 2022-06-01

## 2022-05-21 RX ORDER — DEXAMETHASONE 6 MG/1
6 TABLET ORAL DAILY
Qty: 6 TABLET | Refills: 0 | Status: SHIPPED | OUTPATIENT
Start: 2022-05-21 | End: 2022-05-27

## 2022-05-21 RX ORDER — INSULIN LISPRO 100 [IU]/ML
INJECTION, SOLUTION INTRAVENOUS; SUBCUTANEOUS
Qty: 1 PEN | Refills: 1 | Status: SHIPPED | OUTPATIENT
Start: 2022-05-21 | End: 2022-05-21 | Stop reason: SDUPTHER

## 2022-05-21 RX ORDER — AZITHROMYCIN 500 MG/1
500 TABLET, FILM COATED ORAL DAILY
Qty: 2 TABLET | Refills: 0 | Status: SHIPPED | OUTPATIENT
Start: 2022-05-21 | End: 2022-05-23

## 2022-05-21 RX ORDER — GLUCOSAMINE HCL/CHONDROITIN SU 500-400 MG
CAPSULE ORAL
Qty: 300 STRIP | Refills: 0 | Status: SHIPPED | OUTPATIENT
Start: 2022-05-21

## 2022-05-21 RX ORDER — PEN NEEDLE, DIABETIC 29 GAUGE
1 NEEDLE, DISPOSABLE MISCELLANEOUS DAILY
Qty: 100 EACH | Refills: 3 | Status: SHIPPED | OUTPATIENT
Start: 2022-05-21 | End: 2022-08-30

## 2022-05-21 RX ORDER — DEXAMETHASONE 6 MG/1
6 TABLET ORAL DAILY
Qty: 7 TABLET | Refills: 0 | Status: SHIPPED | OUTPATIENT
Start: 2022-05-21 | End: 2022-05-21

## 2022-05-21 RX ORDER — INSULIN LISPRO 100 [IU]/ML
0-12 INJECTION, SOLUTION INTRAVENOUS; SUBCUTANEOUS
Status: DISCONTINUED | OUTPATIENT
Start: 2022-05-21 | End: 2022-05-21 | Stop reason: HOSPADM

## 2022-05-21 RX ORDER — ALBUTEROL SULFATE 90 UG/1
2 AEROSOL, METERED RESPIRATORY (INHALATION) EVERY 4 HOURS PRN
Qty: 54 G | Refills: 1 | Status: SHIPPED | OUTPATIENT
Start: 2022-05-21 | End: 2022-08-17

## 2022-05-21 RX ORDER — BLOOD-GLUCOSE METER
1 KIT MISCELLANEOUS DAILY
Qty: 1 KIT | Refills: 0 | Status: SHIPPED | OUTPATIENT
Start: 2022-05-21

## 2022-05-21 RX ORDER — LANCETS 30 GAUGE
1 EACH MISCELLANEOUS 4 TIMES DAILY
Qty: 200 EACH | Refills: 0 | Status: SHIPPED | OUTPATIENT
Start: 2022-05-21

## 2022-05-21 RX ORDER — GUAIFENESIN 600 MG/1
600 TABLET, EXTENDED RELEASE ORAL 2 TIMES DAILY
COMMUNITY
Start: 2022-05-21 | End: 2022-08-30

## 2022-05-21 RX ORDER — PEN NEEDLE, DIABETIC 30 GX5/16"
1 NEEDLE, DISPOSABLE MISCELLANEOUS DAILY
Qty: 100 EACH | Refills: 3 | Status: SHIPPED
Start: 2022-05-21 | End: 2022-05-21 | Stop reason: HOSPADM

## 2022-05-21 RX ADMIN — BARICITINIB 2 MG: 2 TABLET, FILM COATED ORAL at 09:48

## 2022-05-21 RX ADMIN — AMLODIPINE BESYLATE 5 MG: 5 TABLET ORAL at 09:49

## 2022-05-21 RX ADMIN — APIXABAN 2.5 MG: 2.5 TABLET, FILM COATED ORAL at 09:47

## 2022-05-21 RX ADMIN — BENZONATATE 100 MG: 100 CAPSULE ORAL at 09:48

## 2022-05-21 RX ADMIN — PANTOPRAZOLE SODIUM 40 MG: 40 TABLET, DELAYED RELEASE ORAL at 09:48

## 2022-05-21 RX ADMIN — OXYCODONE HYDROCHLORIDE AND ACETAMINOPHEN 1000 MG: 500 TABLET ORAL at 09:48

## 2022-05-21 RX ADMIN — Medication 50 MG: at 09:48

## 2022-05-21 RX ADMIN — PAROXETINE HYDROCHLORIDE 20 MG: 20 TABLET, FILM COATED ORAL at 09:48

## 2022-05-21 RX ADMIN — GUAIFENESIN 600 MG: 600 TABLET, EXTENDED RELEASE ORAL at 09:48

## 2022-05-21 RX ADMIN — METOPROLOL TARTRATE 12.5 MG: 25 TABLET, FILM COATED ORAL at 09:48

## 2022-05-21 RX ADMIN — DEXAMETHASONE 6 MG: 4 TABLET ORAL at 14:34

## 2022-05-21 RX ADMIN — SODIUM CHLORIDE, PRESERVATIVE FREE 10 ML: 5 INJECTION INTRAVENOUS at 09:50

## 2022-05-21 RX ADMIN — Medication 5000 UNITS: at 09:47

## 2022-05-21 RX ADMIN — CLOPIDOGREL BISULFATE 75 MG: 75 TABLET ORAL at 09:48

## 2022-05-21 RX ADMIN — INSULIN LISPRO 2 UNITS: 100 INJECTION, SOLUTION INTRAVENOUS; SUBCUTANEOUS at 09:40

## 2022-05-21 RX ADMIN — FUROSEMIDE 20 MG: 20 TABLET ORAL at 09:47

## 2022-05-21 NOTE — PROGRESS NOTES
Patient being discharged home on new insulin orders. This RN educated patient on checking blood glucose levels, and injecting insulin if coverage is needed. This RN also educated on hypoglycemia to patient and wife. Patient and wife verbalized understanding. Paper education sent with patient and family.  Patient to schedule follow-up appointment with PCP within the week to address hyperglycemia due to steroid therapy per ye Bella.

## 2022-05-21 NOTE — DISCHARGE SUMMARY
Hospital Medicine Discharge Summary      Patient Identification:   Cherl Bence   : 1940  MRN: [id number]   Account: [de-identified]      Patient's PCP: MARTIN Small CNP    Admit Date: 2022     Discharge Date:   22     Admitting Physician: Rolando Martinez MD     Discharging Nurse Practitioner: MARTIN Troy CNP     Discharge Diagnoses with Assessment/Plan:    1. Acute on chronic hypoxic respiratory failure, secondary to COVID-19 PNA:               -Pt reports symptom onset Thursday (), and tested positive at an outside facility on  (5/15). -Satting 94% on 3 L nasal cannula. Normally wears 3 L at baseline.    - Home O2 eval notable that patient does not require any supplemental oxygen at rest and only 2L with activity. - PCT 1.46, CRP elevated 7.83 -> received baricitinib  - . Discontinued upon discharge. - Leukocytosis present but likely secondary to steroid administration              - CXR (POA) notable for cardiomegaly with mild pulmonary vascular congestion, patchy bilater LL opacities R>L              - Received azithromycin  - . Rx sent to complete remaining doses of abx therapy for total 5 day course. - Received IV ceftriaxone  -              - Received Decadron 6 mg  - . Rx sent for remaining doses to complete 10 day course. - Continue scheduled and as needed bronchodilators - rx sent for albuterol rescue inhaler              - Continue mucolytic's and antitussives              - Continue vitamin D, zinc, vitamin C               -Pulmonary toilet: Incentive spirometer, Acapella, cough, deep breathe       2. Persistent atrial Fibrillation:               - Rate controlled on metoprolol.               - Continue Eliquis               -Continuous telemetry while in hospital -> d/c upon discharge.     3. Chronic HFpEF:    - BNP elevated (POA).  CXR (POA) remarkable for mild pulmonary vascular congestion with patchy bilateral lower lobe opacities. Patient does not appear to be fluid overloaded AEB negative BLE edema, no rales/rhonchi in lungs. - Echo (5/20) notable for EF estimated 55%, mild concentric LVH, no WMA's, mild MR, mild TR              - Continue home lasix              - Strict I&O's              - Daily weights              - 2g Na restriction and  2 L fluid restriction     4. CKD stage III              - Baseline Cr range ~ 1.3 - 1.5. Creatinine stable and within baseline range. - Trend and monitor     5. Essential HTN, controlled               - Continue amlodipine, metoprolol     5. CAD, S/P PCI 11/2019:               - Follows with Dr. Hawk Sanchez, OP cardiology              - Troponin negative               - ECG (POA) shows atrial fibrillation with competing junctional pacemaker and RBBB. - Continue DAPT, statin, BB              - Continuous telemetry while in hospital -> d/c upon discharge     6. AAA:               - Noted. CTA chest 5/11/2022 reveals 4.7 cm distal descending thoracic aortic aneurysm, slightly enlarged from previous imaging about a year ago. No evidence of dissection.              - Strict BP control              - Recommend f/u CTS as OP within 2 wks for further management and evaluation       7. Hyperlipidemia              - Statin     8. Ventral hernia:               - Pt reports chronic ventral hernia since his AAA repair in 2016. No pain or tenderness on exam.      8. Hx of DVT:              - Stable. Continue eliquis.      9. Hx of CVA:               - Continue home statin, ASA, Plavix.       10. Acute on chronic macrocytic anemia              - Baseline Hgb range ~ 13 - 17              - H&H stable. Transfuse for Hbg < 7 g/dL              - Iron studies unremarkable. - Trend and monitor      11.  Steroid induced hyperglycemia   -Patient noted to be hyperglycemic with fasting blood glucoses 160s-230s range during hospital admission. This is likely secondary to Decadron administration. He has had POC glucoses as high as 190 -280s. -Continue low-dose sliding scale insulin    - Accu checks   - Hypoglycemia protocol -> rx for emergency glucagon kit ordered. - Diabetic diet   - F/u with PCP within 1 week for further evaluation of steroid induced hyperglycemia. 12. Obesity              -BMI 38.77 kg/m²              -Discussed and educated on lifestyle modifications     12. Code status:              - Discussed code status in-depth with patient and patient's wife. Patient desires to never be intubated, therefore patient affirms Limited Code status with no intubation at this time. - Palliative care consulted -> pt still does not want to be intubated and is a limited code X 1       The patient was seen and examined on day of discharge and this discharge summary is in conjunction with any daily progress note from day of discharge. Hospital Course:   Per HPI documented 5/18/22: Gonzalez Cevallos" is a 78 y/o  male with a PMHx of CAD, HTN, AAA, traumatic fall 2 years ago with chronic back pain and chronically on 3L O2 NC who presents to [facility] ED today for the evaluation of worsening shortness of breath and cough. Patient reports symptom onset Thursday or Friday, and  tested positive for COVID at [facility]. PT reports he was found to have SpO2 less than 90% on his home pulse ox. Pt states he has an intermittent, unproductive associated cough. He denies chest pain, fever, chills, nausea, vomiting, change in bowel or urinary habits, lightheadedness, dizziness. Pt reports that he has been on 3L O2 NC for 2-3 years now, following a  Traumatic fall for which he required chest tubes for a time. \"     5/19/22: Patient satting 94% on 5 L nasal cannula. Remains afebrile, with hemodynamically stable vital signs. No acute events overnight.   Patient states that he is feeling \"okay. \"  This morning. Denies worsening shortness of breath at rest or with exertion. Continue to wean oxygen back to baseline requirement of 3 L. Continue Decadron. Continue current therapies as noted above in assessment and plan.     5/20/22: Patient satting 96% on 4 L nasal cannula. Remains afebrile with hemodynamically stable vital signs. No acute events overnight. Patient nontoxic in appearance upon examination. Patient states that he continues to have a cough that is productive with mucus. States that the cough is now hurting his belly. Tessalon Perles ordered. Patient states that he is continuing to do his breathing exercises using his incentive spirometer and Acapella device a minimum of 10 times every hour. Patient states that he feels like his shortness of breath is improving. We will perform home oxygen evaluation today. Will obtain 2D echo. Worsening heart failure. Leukocytosis slowly downtrending. We will continue IV antibiotics for now. Patient noted to continue to have bilateral expiratory wheezes in the bases. Duo nebs ordered.     5/21/22: Patient satting 94% on 3 L nasal cannula. He remains afebrile, with hemodynamically stable vital signs. Upon presentation this morning patient is nontoxic in appearance, sitting up in the bedside chair in no apparent distress. His wife is with him at bedside. Patient states that he \"feels really good this morning. \"  States that he is hoping he can go home today. He denies chest pain, worsening shortness of breath at rest, dyspnea on exertion, inspiratory chest pain, palpitations, dizziness, lightheadedness, abdominal pain, nausea, vomiting, fever, chills. States that he still has a cough however it is not as productive as days prior. States that he is using his incentive spirometer and Acapella device as instructed. Repeat echo was obtained and not significantly changed from prior.   Patient has no lower extremity edema present, his lung sounds are clear with some scattered wheezing in the bases on expiration. Overall appears to be improving. Home O2 eval notable that patient does not require any supplemental oxygen at rest and only 2L with activity. Informed patient to continue to wear oxygen at his baseline requirements and to follow-up with his PCP or pulmonologist for evaluation of titration parameters within 1 week. Patient will be discharged home with remaining doses of Decadron to complete 10-day course. Patient has been treated for pneumonia while here in the hospital.  He will be discharged home with remaining doses of azithromycin to complete 5-day course. Additionally patient will be sent home with albuterol rescue inhaler for any assistance in management of shortness of breath. Instructed patient to continue to take his zinc, vitamin C, vitamin D. Instructed that he can continue to take Mucinex over-the-counter. Continue Tessalon Perles up to 3 times daily to help with his cough. Instructed patient to continue to use his incentive spirometer and Acapella device a minimum of 10 times every hour to help exercise his lungs and prevent worsening pneumonia. Throughout hospital admission patient was noted to be hyperglycemic, most notably after starting steroid therapy. Patient noted to be hyperglycemic with fasting blood glucoses 160s-230s range during hospital admission and has had POC glucoses in 190s- 280s range. Over 2 hours were spent at the bedside with patient and his wife, discussing the need for continuing sliding scale insulin while he continues to complete his Decadron therapy.   Much education was provided to patient and his wife on how to use a glucometer, check a blood sugar, interpret results, interpret sliding scale measures, how to use a Humalog pen and insert pen needles, how to dial dosage for Humalog pen, how to administer subcutaneous injection, signs and symptoms of hypoglycemia and hyperglycemia, as well as diabetic emergencies. By the end of my teaching, patient and his wife verbalized that they feel \"very comfortable\" administering insulin temporarily to cover his high blood sugars for steroid use. Patient and his wife stated that patient had received Lovenox injections after having a orthopedic procedure in the past and states that he could self administer his insulin. Prior to discharge, nursing staff notified me that they walked patient through how to take a blood sugar and simulated by using hospital glucometer, as well as allowed patient to self administer his own insulin injection before his dinner this evening. Prior to discharge patient was provided opportunity to ask questions. Instructed him that he needs to follow-up with his PCP within 1 week for further evaluation of blood sugars given steroid therapy. Instructed patient that if he were to experience any new or worsening symptoms he needs to return to the emergency department immediately. At this time patient is stable for discharge. Exam:     Vitals:  Vitals:    05/21/22 0412 05/21/22 0922 05/21/22 0930 05/21/22 1455   BP: 133/71  135/72 126/77   Pulse: 66  64 75   Resp: 22  20 20   Temp: 97.5 °F (36.4 °C)  97.6 °F (36.4 °C) 97.9 °F (36.6 °C)   TempSrc: Oral  Oral Oral   SpO2: 97% 91% 94% 94%   Weight:       Height:         Weight: Weight: 255 lb (115.7 kg)     24 hour intake/output:  No intake or output data in the 24 hours ending 05/21/22 1653      General appearance: No apparent distress, appears older than stated age and cooperative. Chronically ill-appearing. Obese. HEENT: Pupils equal, round, and reactive to light. Conjunctivae/corneas clear. Neck: Supple, with full range of motion. No jugular venous distention. Trachea midline. Respiratory:  Normal respiratory effort, able to speak full clear sentences. Clear to auscultation, with expiratory wheezes scattered in bases of lungs bilaterally.   No rales/rhonchi. Cardiovascular: Regular rate and irregular rhythm with normal S1/S2 without murmurs, rubs or gallops. No lower extremity swelling appreciated. Abdomen: Soft, non-tender, obese with normal bowel sounds. Musculoskeletal: passive and active ROM x 4 extremities. Skin: Skin color, texture, turgor normal.  No rashes or lesions. Neurologic:  Neurovascularly intact without any focal sensory/motor deficits. Cranial nerves: II-XII intact, grossly non-focal.  Psychiatric: Alert and oriented, thought content appropriate, normal insight  Capillary Refill: Brisk,< 3 seconds   Peripheral Pulses: +2 palpable, equal bilaterally       Labs: For convenience and continuity at follow-up the following most recent labs are provided:      CBC:    Lab Results   Component Value Date    WBC 12.7 05/21/2022    HGB 14.1 05/21/2022    HCT 42.5 05/21/2022     05/21/2022       Renal:    Lab Results   Component Value Date     05/21/2022    K 4.6 05/21/2022     05/21/2022    CO2 25 05/21/2022    BUN 35 05/21/2022    CREATININE 1.2 05/21/2022    CALCIUM 8.6 05/21/2022    PHOS 3.0 05/20/2022       Cardiac: No results for input(s): Lori Fernandes in the last 72 hours. Significant Diagnostic Studies    Radiology:   XR CHEST PORTABLE   Final Result   Cardiomegaly and mild pulmonary vascular congestion is observed. Evaluation of the lung bases is limited due to low lung volumes and patient body habitus. Patchy bilateral lower lobe airspace opacities, right greater than left, are    nonspecific and could indicate atelectasis, chronic lung disease, versus pneumonia in the appropriate clinical setting. **This report has been created using voice recognition software. It may contain minor errors which are inherent in voice recognition technology. **      Final report electronically signed by Dr Gifty Cruz on 5/18/2022 12:57 PM             Consults:     PALLIATIVE CARE EVAL    Disposition:    [x] Home       [] TCU       [] Rehab       [] Psych       [] SNF       [] [facility]       [] Other-    Condition at Discharge: Stable    Code Status:  Limited     Pending tests at discharge: None    Patient Instructions:    Discharge lab work: None  Activity: activity as tolerated  Diet: ADULT DIET; Regular; Low Sodium (2 gm); 2000 ml      Follow-up visits:   MARTIN Dueñas - CNP  [address] [place] [address]  [phone]    In 1 week  For re-evaluation of high blood sugars while taking steroids. Discharge Medications:        Medication List      START taking these medications    albuterol sulfate  (90 Base) MCG/ACT inhaler  Commonly known as: Ventolin HFA  Inhale 2 puffs into the lungs every 4 hours as needed for Wheezing or Shortness of Breath     azithromycin 500 MG tablet  Commonly known as: ZITHROMAX  Take 1 tablet by mouth daily for 2 days     benzonatate 100 MG capsule  Commonly known as: TESSALON  Take 1 capsule by mouth 3 times daily as needed for Cough     blood glucose test strips  Test 4 times a day & as needed for symptoms of irregular blood glucose. Dispense sufficient amount for indicated testing frequency plus additional to accommodate PRN testing needs. dexamethasone 6 MG tablet  Commonly known as: DECADRON  Take 1 tablet by mouth daily for 6 days     Gauze Pads & Dressings 2\"X2\" Pads  1 each by Does not apply route daily as needed (finger sticks)     glucagon 1 MG injection  Inject 1 mg into the muscle See Admin Instructions Follow package directions for low blood sugar.      glucose monitoring kit  1 kit by Does not apply route daily     guaiFENesin 600 MG extended release tablet  Commonly known as: MUCINEX  Take 1 tablet by mouth 2 times daily     insulin lispro (1 Unit Dial) 100 UNIT/ML Sopn  Commonly known as: HumaLOG KwikPen  Sliding scale     [employer] Pen Needles 29G 29G X 12MM Misc  Generic drug: Insulin Pen Needle  1 each by Does not apply route daily     Lancets Misc  1 each by Does not apply route 4 times daily        CONTINUE taking these medications    acetaminophen 500 MG tablet  Commonly known as: TYLENOL     amLODIPine 5 MG tablet  Commonly known as: NORVASC  TAKE 1 TABLET BY MOUTH  DAILY     APPLE CIDER VINEGAR PO     atorvastatin 40 MG tablet  Commonly known as: LIPITOR  TAKE 1 TABLET BY MOUTH AT  NIGHT     CINNAMON PO     clopidogrel 75 MG tablet  Commonly known as: PLAVIX  TAKE 1 TABLET BY MOUTH  DAILY     Co Q-10 100 MG Caps     Eliquis 2.5 MG Tabs tablet  Generic drug: apixaban  TAKE 1 TABLET BY MOUTH  TWICE DAILY     FISH OIL PO     furosemide 20 MG tablet  Commonly known as: LASIX  TAKE 1 TABLET BY MOUTH  DAILY     Magnesium 500 MG Tabs     metoprolol tartrate 25 MG tablet  Commonly known as: LOPRESSOR     OXYGEN     pantoprazole 40 MG tablet  Commonly known as: PROTONIX  TAKE 1 TABLET BY MOUTH  DAILY     PARoxetine 20 MG tablet  Commonly known as: PAXIL     PROBIOTIC DAILY PO     Turmeric Curcumin 500 MG Caps     vitamin C 1000 MG tablet     Vitamin D3 125 MCG (5000 UT) Tabs     ZINC 15 PO           Where to Get Your Medications      These medications were sent to [facility] [id number] - [address] [address] [address] [place] [address] [phone] [facility] [phone]  [address] [place] [id number]    Phone: [phone]   · albuterol sulfate  (90 Base) MCG/ACT inhaler  · azithromycin 500 MG tablet  · benzonatate 100 MG capsule  · blood glucose test strips  · dexamethasone 6 MG tablet  · Gauze Pads & Dressings 2\"X2\" Pads  · glucagon 1 MG injection  · glucose monitoring kit  · [employer] Pen Needles 29G 29G X 12MM Misc  · Lancets Misc     You can get these medications from any pharmacy    Bring a paper prescription for each of these medications  · insulin lispro (1 Unit Dial) 100 UNIT/ML Sopn  You don't need a prescription for these medications  · guaiFENesin 600 MG extended release tablet         Time Spent on discharge is more than 2.5 hours in the examination, evaluation, counseling and review of medications and discharge plan. Signed: Thank you MARTIN Zhang CNP for the opportunity to be involved in this patient's care.     Electronically signed by MARTIN Fernandez CNP on 5/21/2022 at 4:53 PM

## 2022-05-21 NOTE — PLAN OF CARE
Problem: Discharge Planning  Goal: Discharge to home or other facility with appropriate resources  5/21/2022 0304 by Malika Grant  Outcome: Progressing     Problem: ABCDS Injury Assessment  Goal: Absence of physical injury  5/21/2022 0304 by Malika Grant  Outcome: Progressing     Problem: Respiratory - Adult  Goal: Achieves optimal ventilation and oxygenation  5/21/2022 0304 by Malika Grant  Outcome: Progressing     Problem: Respiratory - Adult  Goal: Lung sounds clear or within normal limits for patient  Outcome: Progressing     Problem: Chronic Conditions and Co-morbidities  Goal: Patient's chronic conditions and co-morbidity symptoms are monitored and maintained or improved  5/21/2022 0304 by Malika Grant  Outcome: Progressing     Problem: Skin/Tissue Integrity  Goal: Absence of new skin breakdown  Description: 1. Monitor for areas of redness and/or skin breakdown  2. Assess vascular access sites hourly  3. Every 4-6 hours minimum:  Change oxygen saturation probe site  4. Every 4-6 hours:  If on nasal continuous positive airway pressure, respiratory therapy assess nares and determine need for appliance change or resting period. 5/21/2022 0304 by Malika Grant  Outcome: Progressing     Problem: Safety - Adult  Goal: Free from fall injury  5/21/2022 0304 by Malika Grant  Outcome: Progressing     Care plan reviewed with patient. Patient verbalize understanding of the plan of care and contribute to goal setting.

## 2022-05-21 NOTE — PROGRESS NOTES
A home oxygen evaluation has been completed. [x]Patient is an inpatient. It is expected that the patient will be discharged within the next 48 hours. Qualified provider to write order for home prescription if patient qualifies. Social service/care managers will arrange for home oxygen. If patient is active, arrange for Home Medical supplier to assess for Oxygen Conserving Device per pulse oximetry. []Patient is an outpatient. Results will be faxed to the ordering provider. Qualified provider to write order for home prescription if patient qualifies and arranges for home oxygen. Patient was placed on room air for 20 minutes. SpO2 was 91 % on room air at rest. Patients SpO2 was 89% or above and did not qualify for home oxygen. Patient was walked for 4 minutes. SpO2 was 88 % during walking. Patients SpO2 was below 89% and qualified for home oxygen. Oxygen was applied at 2 lpm via nasal cannula to maintain a SpO2 between 90-92% while walking. Actual SpO2 was 91 %. Note: For any SpO2 at 99% see policy and procedure for possible qualifications.

## 2022-05-23 ENCOUNTER — CARE COORDINATION (OUTPATIENT)
Dept: CASE MANAGEMENT | Age: 82
End: 2022-05-23

## 2022-05-23 NOTE — CARE COORDINATION
Tracy 45 Transitions Initial Follow Up Call    Call within 2 business days of discharge: Yes    Patient: Haleigh Haines Patient : 1940   MRN: [id number]  Reason for Admission:  Acute hypoxemic respiratory failure, SOB, COVID positive, Hyperglycemia  Discharge Date: 22 RARS: Readmission Risk Score: 19.1 ( )      Last Discharge [facility]       Complaint Diagnosis Description Type Department Provider    22 Shortness of Breath; Positive For Covid-19 Steroid-induced hyperglycemia . .. ED to Hosp-Admission (Discharged) (ADMITTED) JANET Willis, MARTIN - CNP; Sowmya Bradford. .. Spoke with: Mainor Mayorga, patient's spouse    Scheduled appointment with PCP-Has appointment on 22  Obtained and reviewed discharge summary and/or continuity of care documents  Education of patient/family/caregiver/guardian to support self-management-when to contact provider     Contacted patient for initial [facility] transition follow up. Spoke with patient's spouse Mainor Mayorga. She states that Olimpia Gilbetr seems to be doing pretty good so far. Reports breathing back to baseline. He is back on 3 L of oxygen. SpO2 92% with oxygen on. Denies having any c/o chest pain/discomfort, pressure, tightness, nausea/vomiting, fever, chills. Continues to have \"a little\" cough. He is taking the Tessalon which have been effective. She denies Olimpia Gilbert having any swelling or weight gain. Appetite is returning. Continue to check his blood sugars. She reports his blood sugar not too long ago was 138 and did not receive any insulin according to sliding scale. Patient currently on regular diet, low sodium (2 gm), 2000 ml per day. Mainor Mayorga states she vaguely remembers his doctor putting him on the low sodium diet. Declines referral to dietician at this time. Reviewed medication list.  No 1111F completed d/t non [facility] PCP. She confirmed they have all of his new medications. Patient has an appointment with his PCP on 22. Allison Umaña states that Nathalia Coreas will not be his PCP but will be seeing him because she sees all of the hospital follow ups. Discussed when to contact providers with any new or worsening symptoms. She verbalized understanding. No questions or needs at this time. Patient contacted regarding COVID-19 diagnosis. Discussed COVID-19 related testing which was available at this time. Test results were positive. Patient informed of results, if available? Yes. Care Transition Nurse contacted the family by telephone to perform post discharge assessment. Call within 2 business days of discharge: Yes. Verified name and  with family as identifiers. Provided introduction to self, and explanation of the CTN/ACM role, and reason for call due to risk factors for infection and/or exposure to COVID-19. Symptoms reviewed with family who verbalized the following symptoms: cough  shortness of breath  no new symptoms  no worsening symptoms. Due to no new or worsening symptoms encounter was not routed to provider for escalation. Discussed follow-up appointments. If no appointment was previously scheduled, appointment scheduling offered: Yes. [address] follow up appointment(s):   Future Appointments   Date Time Provider Micki Doshi   2022 12:00 PM Fish Gaviria MD N [facility] follow up appointment(s): 22    Educated patient about risk for severe COVID-19 due to risk factors according to [facility] guidelines. CTN reviewed discharge instructions, medical action plan and red flag symptoms with the family who verbalized understanding. Discussed COVID vaccination status: No. Education provided on COVID-19 vaccination as appropriate. Discussed exposure protocols and quarantine with CDC Guidelines. Family was given an opportunity to verbalize any questions and concerns and agrees to contact CTN or health care provider for questions related to their healthcare.     Reviewed and educated family on any new and changed medications related to discharge diagnosis     Was patient discharged with a pulse oximeter? No Discussed and confirmed pulse oximeter discharge instructions and when to notify provider or seek emergency care. CTN provided contact information. Plan for follow up in 7-10 days based on severity of symptoms and risk factors.       Care Transitions 24 Hour Call    Do you have a copy of your discharge instructions?: Yes  Do you have all of your prescriptions and are they filled?: Yes (Comment: N/A )  Have you been contacted by a TR [address]?: No  Have you scheduled your follow up appointment?: Yes (Comment: PCP on 5/25/22)  How are you going to get to your appointment?: Car - family or friend to transport  Do you feel like you have everything you need to keep you well at home?: Yes  Care Transitions Interventions         Follow Up  Future Appointments   Date Time Provider Micki Doshi   8/16/2022 12:00 PM MD Óscar Valentin RN

## 2022-05-29 ENCOUNTER — APPOINTMENT (OUTPATIENT)
Dept: CT IMAGING | Age: 82
DRG: 871 | End: 2022-05-29
Payer: MEDICARE

## 2022-05-29 ENCOUNTER — HOSPITAL ENCOUNTER (INPATIENT)
Age: 82
LOS: 3 days | Discharge: NURSING, REHAB OR LONG-TERM CARE | DRG: 871 | End: 2022-06-01
Attending: EMERGENCY MEDICINE | Admitting: FAMILY MEDICINE
Payer: MEDICARE

## 2022-05-29 ENCOUNTER — APPOINTMENT (OUTPATIENT)
Dept: GENERAL RADIOLOGY | Age: 82
DRG: 871 | End: 2022-05-29
Payer: MEDICARE

## 2022-05-29 DIAGNOSIS — J18.9 PNEUMONIA DUE TO INFECTIOUS ORGANISM, UNSPECIFIED LATERALITY, UNSPECIFIED PART OF LUNG: ICD-10-CM

## 2022-05-29 DIAGNOSIS — J96.00 ACUTE RESPIRATORY FAILURE DUE TO COVID-19 (HCC): Primary | ICD-10-CM

## 2022-05-29 DIAGNOSIS — U07.1 ACUTE RESPIRATORY FAILURE DUE TO COVID-19 (HCC): Primary | ICD-10-CM

## 2022-05-29 LAB
ANION GAP SERPL CALCULATED.3IONS-SCNC: 11 MEQ/L (ref 8–16)
BASOPHILS # BLD: 0.3 %
BASOPHILS ABSOLUTE: 0.1 THOU/MM3 (ref 0–0.1)
BUN BLDV-MCNC: 33 MG/DL (ref 7–22)
C-REACTIVE PROTEIN: 5.91 MG/DL (ref 0–1)
CALCIUM SERPL-MCNC: 8.7 MG/DL (ref 8.5–10.5)
CHLORIDE BLD-SCNC: 95 MEQ/L (ref 98–111)
CO2: 27 MEQ/L (ref 23–33)
CREAT SERPL-MCNC: 1.1 MG/DL (ref 0.4–1.2)
EKG Q-T INTERVAL: 414 MS
EKG QRS DURATION: 124 MS
EKG QTC CALCULATION (BAZETT): 449 MS
EKG R AXIS: 30 DEGREES
EKG T AXIS: 32 DEGREES
EKG VENTRICULAR RATE: 71 BPM
EOSINOPHIL # BLD: 0 %
EOSINOPHILS ABSOLUTE: 0 THOU/MM3 (ref 0–0.4)
ERYTHROCYTE [DISTWIDTH] IN BLOOD BY AUTOMATED COUNT: 13.2 % (ref 11.5–14.5)
ERYTHROCYTE [DISTWIDTH] IN BLOOD BY AUTOMATED COUNT: 50.4 FL (ref 35–45)
FERRITIN: 1638 NG/ML (ref 22–322)
GFR SERPL CREATININE-BSD FRML MDRD: 64 ML/MIN/1.73M2
GLUCOSE BLD-MCNC: 221 MG/DL (ref 70–108)
GLUCOSE BLD-MCNC: 231 MG/DL (ref 70–108)
HCT VFR BLD CALC: 49 % (ref 42–52)
HEMOGLOBIN: 16 GM/DL (ref 14–18)
IMMATURE GRANS (ABS): 0.42 THOU/MM3 (ref 0–0.07)
IMMATURE GRANULOCYTES: 2 %
LACTIC ACID, SEPSIS: 2.5 MMOL/L (ref 0.5–1.9)
LD: 406 U/L (ref 100–190)
LYMPHOCYTES # BLD: 1.9 %
LYMPHOCYTES ABSOLUTE: 0.4 THOU/MM3 (ref 1–4.8)
MCH RBC QN AUTO: 33.3 PG (ref 26–33)
MCHC RBC AUTO-ENTMCNC: 32.7 GM/DL (ref 32.2–35.5)
MCV RBC AUTO: 102.1 FL (ref 80–94)
MONOCYTES # BLD: 3.7 %
MONOCYTES ABSOLUTE: 0.8 THOU/MM3 (ref 0.4–1.3)
NUCLEATED RED BLOOD CELLS: 0 /100 WBC
OSMOLALITY CALCULATION: 281 MOSMOL/KG (ref 275–300)
PLATELET # BLD: 143 THOU/MM3 (ref 130–400)
PMV BLD AUTO: 10.3 FL (ref 9.4–12.4)
POTASSIUM REFLEX MAGNESIUM: 4.5 MEQ/L (ref 3.5–5.2)
PRO-BNP: 1524 PG/ML (ref 0–1800)
PROCALCITONIN: 0.09 NG/ML (ref 0.01–0.09)
RBC # BLD: 4.8 MILL/MM3 (ref 4.7–6.1)
SEG NEUTROPHILS: 92.1 %
SEGMENTED NEUTROPHILS ABSOLUTE COUNT: 19.7 THOU/MM3 (ref 1.8–7.7)
SODIUM BLD-SCNC: 133 MEQ/L (ref 135–145)
TROPONIN T: < 0.01 NG/ML
WBC # BLD: 21.4 THOU/MM3 (ref 4.8–10.8)

## 2022-05-29 PROCEDURE — 84145 PROCALCITONIN (PCT): CPT

## 2022-05-29 PROCEDURE — 83880 ASSAY OF NATRIURETIC PEPTIDE: CPT

## 2022-05-29 PROCEDURE — 82948 REAGENT STRIP/BLOOD GLUCOSE: CPT

## 2022-05-29 PROCEDURE — 6370000000 HC RX 637 (ALT 250 FOR IP): Performed by: FAMILY MEDICINE

## 2022-05-29 PROCEDURE — 2580000003 HC RX 258: Performed by: FAMILY MEDICINE

## 2022-05-29 PROCEDURE — 2580000003 HC RX 258: Performed by: EMERGENCY MEDICINE

## 2022-05-29 PROCEDURE — 84484 ASSAY OF TROPONIN QUANT: CPT

## 2022-05-29 PROCEDURE — 87449 NOS EACH ORGANISM AG IA: CPT

## 2022-05-29 PROCEDURE — 81001 URINALYSIS AUTO W/SCOPE: CPT

## 2022-05-29 PROCEDURE — 93005 ELECTROCARDIOGRAM TRACING: CPT | Performed by: EMERGENCY MEDICINE

## 2022-05-29 PROCEDURE — 6360000002 HC RX W HCPCS: Performed by: EMERGENCY MEDICINE

## 2022-05-29 PROCEDURE — 83615 LACTATE (LD) (LDH) ENZYME: CPT

## 2022-05-29 PROCEDURE — 6360000004 HC RX CONTRAST MEDICATION: Performed by: EMERGENCY MEDICINE

## 2022-05-29 PROCEDURE — 87899 AGENT NOS ASSAY W/OPTIC: CPT

## 2022-05-29 PROCEDURE — 99285 EMERGENCY DEPT VISIT HI MDM: CPT

## 2022-05-29 PROCEDURE — 82728 ASSAY OF FERRITIN: CPT

## 2022-05-29 PROCEDURE — 86140 C-REACTIVE PROTEIN: CPT

## 2022-05-29 PROCEDURE — 6360000002 HC RX W HCPCS: Performed by: FAMILY MEDICINE

## 2022-05-29 PROCEDURE — 99223 1ST HOSP IP/OBS HIGH 75: CPT | Performed by: FAMILY MEDICINE

## 2022-05-29 PROCEDURE — 71045 X-RAY EXAM CHEST 1 VIEW: CPT

## 2022-05-29 PROCEDURE — 36415 COLL VENOUS BLD VENIPUNCTURE: CPT

## 2022-05-29 PROCEDURE — 71275 CT ANGIOGRAPHY CHEST: CPT

## 2022-05-29 PROCEDURE — 83605 ASSAY OF LACTIC ACID: CPT

## 2022-05-29 PROCEDURE — 80048 BASIC METABOLIC PNL TOTAL CA: CPT

## 2022-05-29 PROCEDURE — 87040 BLOOD CULTURE FOR BACTERIA: CPT

## 2022-05-29 PROCEDURE — 93010 ELECTROCARDIOGRAM REPORT: CPT | Performed by: INTERNAL MEDICINE

## 2022-05-29 PROCEDURE — XW0DXM6 INTRODUCTION OF BARICITINIB INTO MOUTH AND PHARYNX, EXTERNAL APPROACH, NEW TECHNOLOGY GROUP 6: ICD-10-PCS | Performed by: FAMILY MEDICINE

## 2022-05-29 PROCEDURE — 85025 COMPLETE CBC W/AUTO DIFF WBC: CPT

## 2022-05-29 PROCEDURE — 2060000000 HC ICU INTERMEDIATE R&B

## 2022-05-29 PROCEDURE — 87205 SMEAR GRAM STAIN: CPT

## 2022-05-29 RX ORDER — ASCORBIC ACID 500 MG
1000 TABLET ORAL DAILY
Status: DISCONTINUED | OUTPATIENT
Start: 2022-05-30 | End: 2022-06-01 | Stop reason: HOSPADM

## 2022-05-29 RX ORDER — ACETAMINOPHEN 650 MG/1
650 SUPPOSITORY RECTAL EVERY 6 HOURS PRN
Status: DISCONTINUED | OUTPATIENT
Start: 2022-05-29 | End: 2022-06-01 | Stop reason: HOSPADM

## 2022-05-29 RX ORDER — AMLODIPINE BESYLATE 5 MG/1
5 TABLET ORAL DAILY
Status: DISCONTINUED | OUTPATIENT
Start: 2022-05-30 | End: 2022-06-01 | Stop reason: HOSPADM

## 2022-05-29 RX ORDER — FUROSEMIDE 20 MG/1
20 TABLET ORAL DAILY
Status: DISCONTINUED | OUTPATIENT
Start: 2022-05-29 | End: 2022-06-01 | Stop reason: HOSPADM

## 2022-05-29 RX ORDER — ACETAMINOPHEN 325 MG/1
650 TABLET ORAL EVERY 6 HOURS PRN
Status: DISCONTINUED | OUTPATIENT
Start: 2022-05-29 | End: 2022-06-01 | Stop reason: HOSPADM

## 2022-05-29 RX ORDER — SODIUM CHLORIDE 0.9 % (FLUSH) 0.9 %
5-40 SYRINGE (ML) INJECTION EVERY 12 HOURS SCHEDULED
Status: DISCONTINUED | OUTPATIENT
Start: 2022-05-29 | End: 2022-06-01 | Stop reason: HOSPADM

## 2022-05-29 RX ORDER — SODIUM CHLORIDE 0.9 % (FLUSH) 0.9 %
5-40 SYRINGE (ML) INJECTION PRN
Status: DISCONTINUED | OUTPATIENT
Start: 2022-05-29 | End: 2022-06-01 | Stop reason: HOSPADM

## 2022-05-29 RX ORDER — PANTOPRAZOLE SODIUM 40 MG/1
40 TABLET, DELAYED RELEASE ORAL
Status: DISCONTINUED | OUTPATIENT
Start: 2022-05-30 | End: 2022-06-01 | Stop reason: HOSPADM

## 2022-05-29 RX ORDER — BENZONATATE 100 MG/1
100 CAPSULE ORAL 3 TIMES DAILY PRN
Status: DISCONTINUED | OUTPATIENT
Start: 2022-05-29 | End: 2022-06-01 | Stop reason: HOSPADM

## 2022-05-29 RX ORDER — ATORVASTATIN CALCIUM 40 MG/1
40 TABLET, FILM COATED ORAL NIGHTLY
Status: DISCONTINUED | OUTPATIENT
Start: 2022-05-30 | End: 2022-06-01 | Stop reason: HOSPADM

## 2022-05-29 RX ORDER — CLOPIDOGREL BISULFATE 75 MG/1
75 TABLET ORAL DAILY
Status: DISCONTINUED | OUTPATIENT
Start: 2022-05-30 | End: 2022-06-01 | Stop reason: HOSPADM

## 2022-05-29 RX ORDER — ONDANSETRON 4 MG/1
4 TABLET, ORALLY DISINTEGRATING ORAL EVERY 8 HOURS PRN
Status: DISCONTINUED | OUTPATIENT
Start: 2022-05-29 | End: 2022-06-01 | Stop reason: HOSPADM

## 2022-05-29 RX ORDER — SODIUM CHLORIDE 9 MG/ML
INJECTION, SOLUTION INTRAVENOUS PRN
Status: DISCONTINUED | OUTPATIENT
Start: 2022-05-29 | End: 2022-06-01 | Stop reason: HOSPADM

## 2022-05-29 RX ORDER — DEXTROSE MONOHYDRATE 50 MG/ML
100 INJECTION, SOLUTION INTRAVENOUS PRN
Status: DISCONTINUED | OUTPATIENT
Start: 2022-05-29 | End: 2022-06-01 | Stop reason: HOSPADM

## 2022-05-29 RX ORDER — VITAMIN B COMPLEX
1000 TABLET ORAL DAILY
Status: DISCONTINUED | OUTPATIENT
Start: 2022-05-29 | End: 2022-06-01 | Stop reason: HOSPADM

## 2022-05-29 RX ORDER — PAROXETINE HYDROCHLORIDE 20 MG/1
20 TABLET, FILM COATED ORAL EVERY MORNING
Status: DISCONTINUED | OUTPATIENT
Start: 2022-05-30 | End: 2022-06-01 | Stop reason: HOSPADM

## 2022-05-29 RX ORDER — INSULIN LISPRO 100 [IU]/ML
0-6 INJECTION, SOLUTION INTRAVENOUS; SUBCUTANEOUS
Status: DISCONTINUED | OUTPATIENT
Start: 2022-05-29 | End: 2022-05-31

## 2022-05-29 RX ORDER — INSULIN LISPRO 100 [IU]/ML
0-3 INJECTION, SOLUTION INTRAVENOUS; SUBCUTANEOUS NIGHTLY
Status: DISCONTINUED | OUTPATIENT
Start: 2022-05-29 | End: 2022-05-31

## 2022-05-29 RX ORDER — ONDANSETRON 2 MG/ML
4 INJECTION INTRAMUSCULAR; INTRAVENOUS EVERY 6 HOURS PRN
Status: DISCONTINUED | OUTPATIENT
Start: 2022-05-29 | End: 2022-06-01 | Stop reason: HOSPADM

## 2022-05-29 RX ORDER — ZINC SULFATE 50(220)MG
50 CAPSULE ORAL DAILY
Status: DISCONTINUED | OUTPATIENT
Start: 2022-05-29 | End: 2022-06-01 | Stop reason: HOSPADM

## 2022-05-29 RX ORDER — POLYETHYLENE GLYCOL 3350 17 G/17G
17 POWDER, FOR SOLUTION ORAL DAILY PRN
Status: DISCONTINUED | OUTPATIENT
Start: 2022-05-29 | End: 2022-06-01 | Stop reason: HOSPADM

## 2022-05-29 RX ORDER — ALBUTEROL SULFATE 2.5 MG/3ML
2.5 SOLUTION RESPIRATORY (INHALATION) EVERY 6 HOURS PRN
Status: ACTIVE | OUTPATIENT
Start: 2022-05-29 | End: 2022-05-31

## 2022-05-29 RX ORDER — CODEINE PHOSPHATE AND GUAIFENESIN 10; 100 MG/5ML; MG/5ML
5 SOLUTION ORAL EVERY 4 HOURS PRN
Status: DISCONTINUED | OUTPATIENT
Start: 2022-05-29 | End: 2022-06-01 | Stop reason: HOSPADM

## 2022-05-29 RX ORDER — LACTOBACILLUS RHAMNOSUS GG 10B CELL
1 CAPSULE ORAL
Status: DISCONTINUED | OUTPATIENT
Start: 2022-05-30 | End: 2022-06-01 | Stop reason: HOSPADM

## 2022-05-29 RX ORDER — DEXAMETHASONE SODIUM PHOSPHATE 4 MG/ML
10 INJECTION, SOLUTION INTRA-ARTICULAR; INTRALESIONAL; INTRAMUSCULAR; INTRAVENOUS; SOFT TISSUE DAILY
Status: DISCONTINUED | OUTPATIENT
Start: 2022-05-29 | End: 2022-06-01 | Stop reason: HOSPADM

## 2022-05-29 RX ADMIN — CEFEPIME HYDROCHLORIDE 2000 MG: 2 INJECTION, POWDER, FOR SOLUTION INTRAVENOUS at 15:21

## 2022-05-29 RX ADMIN — DEXAMETHASONE SODIUM PHOSPHATE 10 MG: 4 INJECTION, SOLUTION INTRA-ARTICULAR; INTRALESIONAL; INTRAMUSCULAR; INTRAVENOUS; SOFT TISSUE at 21:00

## 2022-05-29 RX ADMIN — SODIUM CHLORIDE, PRESERVATIVE FREE 10 ML: 5 INJECTION INTRAVENOUS at 21:00

## 2022-05-29 RX ADMIN — Medication 1250 MG: at 21:03

## 2022-05-29 RX ADMIN — APIXABAN 2.5 MG: 2.5 TABLET, FILM COATED ORAL at 20:43

## 2022-05-29 RX ADMIN — Medication 50 MG: at 21:10

## 2022-05-29 RX ADMIN — Medication 1000 UNITS: at 21:09

## 2022-05-29 RX ADMIN — IOPAMIDOL 80 ML: 755 INJECTION, SOLUTION INTRAVENOUS at 15:04

## 2022-05-29 RX ADMIN — INSULIN LISPRO 1 UNITS: 100 INJECTION, SOLUTION INTRAVENOUS; SUBCUTANEOUS at 21:16

## 2022-05-29 RX ADMIN — FUROSEMIDE 20 MG: 20 TABLET ORAL at 21:09

## 2022-05-29 RX ADMIN — METOPROLOL TARTRATE 12.5 MG: 25 TABLET, FILM COATED ORAL at 20:50

## 2022-05-29 RX ADMIN — BARICITINIB 4 MG: 2 TABLET, FILM COATED ORAL at 21:08

## 2022-05-29 ASSESSMENT — LIFESTYLE VARIABLES: HOW OFTEN DO YOU HAVE A DRINK CONTAINING ALCOHOL: NEVER

## 2022-05-29 ASSESSMENT — ENCOUNTER SYMPTOMS
EYE REDNESS: 0
SORE THROAT: 0
SHORTNESS OF BREATH: 1
COLOR CHANGE: 0
CHEST TIGHTNESS: 1
NAUSEA: 1
VOMITING: 0
ABDOMINAL PAIN: 0
PHOTOPHOBIA: 0
COUGH: 1
SINUS PRESSURE: 0
BACK PAIN: 0

## 2022-05-29 ASSESSMENT — PAIN - FUNCTIONAL ASSESSMENT
PAIN_FUNCTIONAL_ASSESSMENT: NONE - DENIES PAIN

## 2022-05-29 NOTE — ED PROVIDER NOTES
Negative for confusion and self-injury.          PAST MEDICAL AND SURGICAL HISTORY     Past Medical History:   Diagnosis Date    [facility] [address]     CAD (coronary artery disease)     Cancer (HCC)     cheek and nose     Cancer of kidney ([facility] [address]     Heart attack ([facility] [address]     History of placement of chest tube     Due to pt falling and breaking 4 ribs and puncturing his lung    Hx of blood clots     Hyperlipidemia     Hypertension     Kidney stones     Obesity     Osteoarthritis     Stroke [facility])      Past Surgical History:   Procedure Laterality Date    ABDOMEN SURGERY      AAA    ABDOMINAL AORTIC ANEURYSM REPAIR  2005   Emilia Solum CARDIAC SURGERY  2008, 1996    stents   [address]  2005    AAA repair    CAROTID ENDARTERECTOMY  2006    FINGER AMPUTATION      JOINT REPLACEMENT  2007    knees bilateral    KNEE SURGERY Left 6-2013    PARTIAL NEPHRECTOMY  2010    Right    PTCA      SHOULDER SURGERY  1997    TONSILLECTOMY  1948    VASCULAR SURGERY           MEDICATIONS     Current Facility-Administered Medications:     azithromycin (ZITHROMAX) 500 mg in sodium chloride 0.9 % 250 mL IVPB ([id number]), 500 mg, IntraVENous, Once, Reshma Hawkins,     sodium chloride flush 0.9 % injection 5-40 mL, 5-40 mL, IntraVENous, 2 times per day, Rosita Salmon MD    sodium chloride flush 0.9 % injection 5-40 mL, 5-40 mL, IntraVENous, PRN, Rosita Salmon MD    0.9 % sodium chloride infusion, , IntraVENous, PRN, Rosita Salmon MD    ondansetron (ZOFRAN-ODT) disintegrating tablet 4 mg, 4 mg, Oral, Q8H PRN **OR** ondansetron (ZOFRAN) injection 4 mg, 4 mg, IntraVENous, Q6H PRN, Rosita Salmon MD    polyethylene glycol (GLYCOLAX) packet 17 g, 17 g, Oral, Daily PRN, Rosita Salmon MD    acetaminophen (TYLENOL) tablet 650 mg, 650 mg, Oral, Q6H PRN **OR** acetaminophen (TYLENOL) suppository 650 mg, 650 mg, Rectal, Q6H PRN, Rosita Salmon MD    baricitinib (OLUMIANT) tablet 4 mg, 4 mg, Oral, Daily, Gopal Willis MD    dexamethasone (DECADRON) injection 10 mg, 10 mg, IntraVENous, Daily, Gopal Willis MD    [START ON 5/30/2022] cefepime (MAXIPIME) 2000 mg IVPB minibag, 2,000 mg, IntraVENous, Q12H, Gopal Willis MD    [START ON 5/30/2022] amLODIPine (NORVASC) tablet 5 mg, 5 mg, Oral, Daily, MD Maegan Boss ON 5/30/2022] ascorbic acid (VITAMIN C) tablet 1,000 mg, 1,000 mg, Oral, Daily, MD Maegan Boss ON 5/30/2022] atorvastatin (LIPITOR) tablet 40 mg, 40 mg, Oral, Nightly, Gopal Willis MD    Vitamin D (CHOLECALCIFEROL) tablet 1,000 Units, 1,000 Units, Oral, Daily, MD Noble Bossr  Saloni Villegas ON 5/30/2022] clopidogrel (PLAVIX) tablet 75 mg, 75 mg, Oral, Daily, Gopal Willis MD    apixaban [facility]) tablet 2.5 mg, 2.5 mg, Oral, BID, Gopal Willis MD    furosemide (LASIX) tablet 20 mg, 20 mg, Oral, Daily, Gopal Willis MD    metoprolol tartrate (LOPRESSOR) tablet 12.5 mg, 12.5 mg, Oral, BID, MD Maegan Boss ON 5/30/2022] pantoprazole (PROTONIX) tablet 40 mg, 40 mg, Oral, QAM AC, MD Maegan Boss ON 5/30/2022] PARoxetine (PAXIL) tablet 20 mg, 20 mg, Oral, QAM, Gopal Willis MD    zinc sulfate (ZINCATE) capsule 50 mg, 50 mg, Oral, Daily, MD Maegan Boss ON 5/30/2022] lactobacillus (CULTURELLE) capsule 1 capsule, 1 capsule, Oral, Daily with breakfast, Gopal Willis MD    insulin lispro (HUMALOG) injection vial 0-6 Units, 0-6 Units, SubCUTAneous, TID WC, Gopal Willis MD    insulin lispro (HUMALOG) injection vial 0-3 Units, 0-3 Units, SubCUTAneous, Nightly, Gopal Willis MD    glucose chewable tablet 16 g, 4 tablet, Oral, PRN, Gopal Willis MD    dextrose bolus 10% 125 mL, 125 mL, IntraVENous, PRN **OR** dextrose bolus 10% 250 mL, 250 mL, IntraVENous, PRN, Gopal Willis MD    glucagon (rDNA) injection 1 mg, 1 mg, IntraMUSCular, PRN, MD Noble Boss dextrose 5 % solution, 100 mL/hr, IntraVENous, PRN, Gracie Sanchez MD    albuterol (PROVENTIL) nebulizer solution 2.5 mg, 2.5 mg, Nebulization, Q6H PRN, Gracie Sanchez MD    vancomycin Baron José) intermittent dosing (placeholder), , Other, RX Placeholder, Gracie Sanchez MD    vancomycin (VANCOCIN) 1250 mg in dextrose 5 % 250 mL IVPB, 1,250 mg, IntraVENous, Q18H, Gracie Sanchez MD    guaiFENesin-codeine (GUAIFENESIN AC) 100-10 MG/5ML liquid 5 mL, 5 mL, Oral, Q4H PRN, Gracie Sanchez MD    benzonatate (TESSALON) capsule 100 mg, 100 mg, Oral, TID PRN, Gracie Sanchez MD      SOCIAL HISTORY     Social History     Social History Narrative    Not on file     Social History     Tobacco Use    Smoking status: Former Smoker     Packs/day: 1.00     Years: 15.00     Pack years: 15.00     Types: Pipe     Start date:      Quit date: 1970     Years since quittin.0    Smokeless tobacco: Never Used   Vaping Use    Vaping Use: Never used   Substance Use Topics    Alcohol use: No     Alcohol/week: 0.0 standard drinks    Drug use: No         ALLERGIES   No Known Allergies      FAMILY HISTORY     Family History   Problem Relation Age of Onset    Alzheimer's Disease Mother     Heart Disease Father          PHYSICAL EXAM     ED Triage Vitals [22 1020]   BP Temp Temp Source Heart Rate Resp SpO2 Height Weight   (!) 140/72 98.1 °F (36.7 °C) Oral 81 28 (!) 78 % 5' 8\" (1.727 m) 255 lb (115.7 kg)         Additional Vital Signs:  Vitals:    22 1700   BP:    Pulse:    Resp:    Temp:    SpO2: 94%       Physical Exam  Vitals and nursing note reviewed. Constitutional:       General: He is not in acute distress. Appearance: He is well-developed. He is not diaphoretic. HENT:      Head: Normocephalic. Eyes:      Pupils: Pupils are equal, round, and reactive to light. Neck:      Vascular: No JVD. Cardiovascular:      Rate and Rhythm: Normal rate and regular rhythm.       Heart sounds: Normal heart sounds. Pulmonary:      Effort: Pulmonary effort is normal. Tachypnea present. No respiratory distress. Breath sounds: Examination of the right-upper field reveals rales. Examination of the right-lower field reveals rales. Examination of the left-lower field reveals rales. Rales present. Abdominal:      General: Bowel sounds are normal. There is no distension. Palpations: Abdomen is soft. Tenderness: There is no abdominal tenderness. Musculoskeletal:         General: No tenderness or deformity. Normal range of motion. Cervical back: Normal range of motion and neck supple. Right lower leg: No tenderness. No edema. Left lower leg: No tenderness. No edema. Skin:     General: Skin is warm and dry. Capillary Refill: Capillary refill takes less than 2 seconds. Neurological:      Mental Status: He is alert and oriented to person, place, and time. Comments: Non-focal. Moves all extremities. Initial vital signs and nursing assessment reviewed and abnormal from Tachypnea. Pulsoximetry is abnormal per my interpretation. MEDICAL DECISION MAKING   Initial Assessment: Given the patient's above chief complaint and findings on history and physical examination, I thought it was appropriate to consider the following emergency medical conditions. Pneumonia, COVID-19, ACS, CHF, anemia, pulmonary embolus, chronic respiratory failure, although some of these diagnoses are unlikely they were considered in my medical decision making.     Plan: CBC, BMP, ECG, procalcitonin, BNP, chest x-ray, chest symptomatic treatment with nasal cannula O2 and reassess         ED RESULTS   Laboratory results:  Labs Reviewed   CBC WITH AUTO DIFFERENTIAL - Abnormal; Notable for the following components:       Result Value    WBC 21.4 (*)     .1 (*)     MCH 33.3 (*)     RDW-SD 50.4 (*)     Segs Absolute 19.7 (*)     Lymphocytes Absolute 0.4 (*)     Immature Grans (Abs) 0.42 (*) All other components within normal limits   BASIC METABOLIC PANEL W/ REFLEX TO MG FOR LOW K - Abnormal; Notable for the following components:    Sodium 133 (*)     Chloride 95 (*)     Glucose 231 (*)     BUN 33 (*)     All other components within normal limits   GLOMERULAR FILTRATION RATE, ESTIMATED - Abnormal; Notable for the following components:    Est, Glom Filt Rate 64 (*)     All other components within normal limits   LACTATE, SEPSIS - Abnormal; Notable for the following components:    Lactic Acid, Sepsis 2.5 (*)     All other components within normal limits   C-REACTIVE PROTEIN - Abnormal; Notable for the following components:    CRP 5.91 (*)     All other components within normal limits   FERRITIN - Abnormal; Notable for the following components:    Ferritin 1,638 (*)     All other components within normal limits   LACTATE DEHYDROGENASE - Abnormal; Notable for the following components:     (*)     All other components within normal limits   CULTURE, BLOOD 1   CULTURE, BLOOD 2   VRE SCREEN BY PCR   STREP PNEUMONIAE ANTIGEN   LEGIONELLA ANTIGEN, URINE   CULTURE, RESPIRATORY   TROPONIN   BRAIN NATRIURETIC PEPTIDE   PROCALCITONIN   ANION GAP   OSMOLALITY   LACTATE, SEPSIS   MRSA BY PCR   URINALYSIS WITH MICROSCOPIC   BASIC METABOLIC PANEL W/ REFLEX TO MG FOR LOW K   CBC WITH AUTO DIFFERENTIAL   C-REACTIVE PROTEIN   POCT GLUCOSE       Radiologic studies results:  CTA Chest W WO Contrast   Final Result   1. No pulmonary embolism. 2. Bilateral pneumonia. 3. 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 Final report electronically signed by Dr. Debo Duarte on 5/29/2022 3:30 PM      XR CHEST PORTABLE   Final Result   1. Cardiomegaly and possible congestive heart failure. 2. Slightly increased density throughout both lung fields. 3. Atherosclerotic calcification aortic arch. 4. Bilateral shoulder replacements. **This report has been created using voice recognition software.  It may contain minor errors which are inherent in voice recognition technology. **      Final report electronically signed by DR Casandra Quintana on 5/29/2022 10:40 AM          ED Medications administered this visit:   Medications   azithromycin (ZITHROMAX) 500 mg in sodium chloride 0.9 % 250 mL IVPB ([id number]) (has no administration in time range)   sodium chloride flush 0.9 % injection 5-40 mL (has no administration in time range)   sodium chloride flush 0.9 % injection 5-40 mL (has no administration in time range)   0.9 % sodium chloride infusion (has no administration in time range)   ondansetron (ZOFRAN-ODT) disintegrating tablet 4 mg (has no administration in time range)     Or   ondansetron (ZOFRAN) injection 4 mg (has no administration in time range)   polyethylene glycol (GLYCOLAX) packet 17 g (has no administration in time range)   acetaminophen (TYLENOL) tablet 650 mg (has no administration in time range)     Or   acetaminophen (TYLENOL) suppository 650 mg (has no administration in time range)   baricitinib (OLUMIANT) tablet 4 mg (has no administration in time range)   dexamethasone (DECADRON) injection 10 mg (has no administration in time range)   cefepime (MAXIPIME) 2000 mg IVPB minibag (has no administration in time range)   amLODIPine (NORVASC) tablet 5 mg (has no administration in time range)   ascorbic acid (VITAMIN C) tablet 1,000 mg (has no administration in time range)   atorvastatin (LIPITOR) tablet 40 mg (has no administration in time range)   Vitamin D (CHOLECALCIFEROL) tablet 1,000 Units (has no administration in time range)   clopidogrel (PLAVIX) tablet 75 mg (has no administration in time range)   apixaban (ELIQUIS) tablet 2.5 mg (has no administration in time range)   furosemide (LASIX) tablet 20 mg (has no administration in time range)   metoprolol tartrate (LOPRESSOR) tablet 12.5 mg (has no administration in time range)   pantoprazole (PROTONIX) tablet 40 mg (has no administration in time range) Discharge Medication List               FINAL DISPOSITION     Final diagnoses:   Acute respiratory failure due to COVID-19 [facility])   Pneumonia due to infectious organism, unspecified laterality, unspecified part of lung     Condition: condition: stable  Dispo: Admit to telemetry    PATIENT REFERRED TO:  No follow-up provider specified. Critical Care Time   There was a high probability of clinically significant/life threatening deterioration in this patient's condition which required my urgent intervention. Total critical care time was 30 minutes. Patient with acute on chronic respiratory failure. He required titration of oxygen. Frequent rechecks. Airway monitoring and respiratory status monitoring. This excludes any time for separately reportable procedures. This transcription was electronically signed. Parts of this transcriptions may have been dictated by use of voice recognition software and electronically transcribed, and parts may have been transcribed with the assistance of an ED scribe. The transcription may contain errors not detected in proofreading.     Electronically Signed: Patricia Schafer DO, 05/29/22, 6:58 PM      Patricia Schafer DO  05/29/22 0683

## 2022-05-29 NOTE — PROGRESS NOTES
Pt admitted to  4K13 from ED. Complaints: Shortness of breath. IV none infusing into the forearm right, condition patent and no redness at a rate of 0 mls/ hour with about 0 mls in the bag still. IV site free of s/s of infection or infiltration. Vital signs obtained. Assessment and data collection initiated. Two nurse skin assessment performed by [facility] RN and Cole Gomez. Oriented to room. Policies and procedures for 4K explained. All questions answered with no further questions at this time. Fall prevention and safety brochure discussed with patient. Bed alarm on. Call light in reach. Would you like your Primary Care Physician notified? No  The best day to schedule a follow up Dr appointment is:  Monday a.m. No skin issues, bilateral heels stephanie. Dr. Rukhsana Kramer in room to see patient. See flowsheet. Patient transitioned to non-re breather at 15L due to low oxygen saturation. Patient will be transferred to [phone] when room is available due to need for HFNC and negative pressure room. Patient currently 94% and left in stable condition.

## 2022-05-29 NOTE — ED NOTES
ED to inpatient nurses report    Chief Complaint   Patient presents with    Shortness of Breath      Present to ED from home  LOC: alert and orientated to name, place, date  Vital signs   Vitals:    05/29/22 1209 05/29/22 1328 05/29/22 1435 05/29/22 1523   BP: (!) 153/82 (!) 138/91 128/63 (!) 165/74   Pulse: 64 76 78 80   Resp: 30 28 28 26   Temp:       TempSrc:       SpO2: 93% 92% 92% 98%   Weight:       Height:          Oxygen Baseline 3L NC    Current needs required 6L NC Bipap/Cpap No  LDAs:   Peripheral IV 05/29/22 Arm (Active)   Site Assessment Clean, dry & intact 05/29/22 1209   Line Status Normal saline locked 05/29/22 1209   Dressing Status Clean, dry & intact 05/29/22 1209     Mobility: Requires assistance * 1  Pending ED orders: antibiotics  Present condition: stable      Electronically signed by Sidney Driver RN on 5/29/2022 at 3:38 PM       Sidney Driver, [address] [place] [address]  05/29/22 [id number]

## 2022-05-29 NOTE — H&P
Hospitalist History and Physical          Patient: Shikha Clifford  : 1940  MRN: [id number]     Acct: [de-identified]    PCP: MARTIN Lundy CNP  Date of Admission: 2022  Date of Service: Pt seen/examined on 22  and Admitted to Inpatient with expected LOS greater than two midnights due to medical therapy. Hospital Problems           Last Modified POA    * (Principal) Acute hypoxemic respiratory failure ([address] 2022 Yes          Assessment and Plan:    Acute on chronic hypoxic respiratory failure secondary to COVID-19 PNA  Pateint has progressively gotten worse since discharge  CXR showed Slightly increased density throughout both lung fields. CTA chest No pulmonary embolism. Bilateral pneumonia. Patient finished a 5 day course of azith, also received Rocephin x3 days, given a script of decadron x10 days as well  Will increase coverage to HAP, started Cefepime and VAnc  Will start Baricitinib  Increase decadron to 10mg/IV  Guaifenesin + codeine prn for cough    Continue vitamin D, zinc, vitamin C  Incentive spirometer, Acapella  Start HHF     Persistent atrial Fibrillation:   Rate controlled on metoprolol. Continue Eliquis     Essential HTN  Hyperlipidemia  CAD, S/P PCI 2019  Continue amlodipine, metoprolol, statin  Continue DAPT, statin, BB     Chronic HFpEF  Echo () notable for EF estimated 55%              Continue home lasix  Strict I&O's and Daily weights             CKD stage III  monitor     Hx of DVT:   Continue eliquis.      Hx of CVA:  Continue home statin, ASA, Plavix.         Hyperglycemia from steroids use  HSS    Obesity  BMI 38.77 kg/m²  Diet and lifestyle modifications       =======================================================================      Chief Complaint:    Shortness of breath    History Of Present Illness:  Shikha Clifford is a 79 y.o. male with PMHx of CAD, HTN, hyperlipidemia, A fib HFpEF, hx of DVT and CVA, who presents to [facility] [address] with progressive shortness of breath even at rest, since he was discharged from the hospital a week ago from COVID pneumonia. He received baricitinib, Rocpehin, azith and decadron. He went home with 3 lpm O2, which is his baseline. He was having some fever, nausea, decreased appetite. Patient was admitted under the hospitalist service. Past Medical History:        Diagnosis Date    AAA [facility]ab[facility]) [facility]     CAD (coronary artery disease)     Cancer (HCC)     cheek and nose     Cancer of kidney ([facility]     Heart attack ([facility]     History of placement of chest tube     Due to pt falling and breaking 4 ribs and puncturing his lung    Hx of blood clots     Hyperlipidemia     Hypertension     Kidney stones     Obesity     Osteoarthritis     Stroke [facility])        Past Surgical History:        Procedure Laterality Date    ABDOMEN SURGERY      AAA    ABDOMINAL AORTIC ANEURYSM REPAIR  2005    CARDIAC SURGERY  2008, [address] [facility] SURGERY  2005    AAA repair    CAROTID ENDARTERECTOMY  2006    FINGER AMPUTATION      JOINT REPLACEMENT  2007    knees bilateral    KNEE SURGERY Left 6-2013    PARTIAL NEPHRECTOMY  2010    Right    PTCA      SHOULDER SURGERY  1997    TONSILLECTOMY  1948    VASCULAR SURGERY         Medications Prior to Admission:   Prior to Admission medications    Medication Sig Start Date End Date Taking?  Authorizing Provider   benzonatate (TESSALON) 100 MG capsule Take 1 capsule by mouth 3 times daily as needed for Cough 5/21/22 5/31/22  MARTIN Webb CNP   guaiFENesin [facility]) 600 MG extended release tablet Take 1 tablet by mouth 2 times daily 5/21/22   MARTIN Webb CNP   glucose monitoring (FREESTYLE FREEDOM) kit 1 kit by Does not apply route daily  Patient not taking: Reported on 5/29/2022 5/21/22   MARTIN Webb CNP   Lancets MISC 1 each by Does not apply route 4 times daily  Patient not taking: Reported on 5/29/2022 5/21/22   MARTIN Zhang CNP   blood glucose monitor strips Test 4 times a day & as needed for symptoms of irregular blood glucose. Dispense sufficient amount for indicated testing frequency plus additional to accommodate PRN testing needs. Patient not taking: Reported on 5/29/2022 5/21/22   MARTIN Zhang CNP   Gauze Pads & Dressings 2\"X2\" PADS 1 each by Does not apply route daily as needed (finger sticks)  Patient not taking: Reported on 5/29/2022 5/21/22   MARTIN Zhang CNP   Insulin Pen Needle ([employer] PEN NEEDLES 29G) 29G X 12MM MISC 1 each by Does not apply route daily  Patient not taking: Reported on 5/29/2022 5/21/22   MARTIN Zhang CNP   glucagon 1 MG injection Inject 1 mg into the muscle See Admin Instructions Follow package directions for low blood sugar.   Patient not taking: Reported on 5/29/2022 5/21/22   MARTIN Zhang CNP   albuterol sulfate HFA (VENTOLIN HFA) 108 (90 Base) MCG/ACT inhaler Inhale 2 puffs into the lungs every 4 hours as needed for Wheezing or Shortness of Breath 5/21/22   MARTIN Zhang CNP   insulin lispro, 1 Unit Dial, ([facility]) 100 UNIT/ML SOPN Sliding scale  Patient not taking: Reported on 5/29/2022 5/21/22   MARTIN Zhang CNP   furosemide (LASIX) 20 MG tablet TAKE 1 TABLET BY MOUTH  DAILY 5/16/22   Lori Valdez MD   ELIQUIS 2.5 MG TABS tablet TAKE 1 TABLET BY MOUTH  TWICE DAILY 5/6/22   Lori Valdez MD   amLODIPine (NORVASC) 5 MG tablet TAKE 1 TABLET BY MOUTH  DAILY 2/24/22   Lori Valdez MD   atorvastatin (LIPITOR) 40 MG tablet TAKE 1 TABLET BY MOUTH AT  NIGHT 2/2/22   Lori Valdez MD   metoprolol tartrate (LOPRESSOR) 25 MG tablet Take 12.5 mg by mouth 2 times daily     Historical Provider, MD   Zinc Sulfate (ZINC 15 PO) Take by mouth    Historical Provider, MD   Turmeric Curcumin 500 MG CAPS Take by mouth    Historical Provider, MD   Omega-3 Fatty Acids (FISH OIL PO) Take by mouth Historical Provider, MD   clopidogrel (PLAVIX) 75 MG tablet TAKE 1 TABLET BY MOUTH  DAILY 6/8/21   Meera Jiang MD   OXYGEN Inhale 3 L into the lungs nightly    Historical Provider, MD   acetaminophen (TYLENOL) 500 MG tablet Take 1,000 mg by mouth every 6 hours as needed for Pain    Historical Provider, MD   Coenzyme Q10 (CO Q-10) 100 MG CAPS Take by mouth    Historical Provider, MD   Cholecalciferol (VITAMIN D3) 125 MCG (5000 UT) TABS Take by mouth    Historical Provider, MD   APPLE CIDER VINEGAR PO Take 450 mg by mouth    Historical Provider, MD   pantoprazole (PROTONIX) 40 MG tablet TAKE 1 TABLET BY MOUTH  DAILY 9/22/20   Meera Jiang MD   PARoxetine (PAXIL) 20 MG tablet Take 20 mg by mouth every morning    Historical Provider, MD   Magnesium 500 MG TABS Take by mouth daily     Historical Provider, MD   CINNAMON PO Take 1,000 mg by mouth daily    Historical Provider, MD   Probiotic Product (PROBIOTIC DAILY PO) Take by mouth daily    Historical Provider, MD   Ascorbic Acid (VITAMIN C) 1000 MG tablet Take 1,000 mg by mouth daily     Historical Provider, MD       Allergies:  Patient has no known allergies. Social History:    The patient currently lives at home. Tobacco use:   reports that he quit smoking about 52 years ago. His smoking use included pipe. He started smoking about 67 years ago. He has a 15.00 pack-year smoking history. He has never used smokeless tobacco.  Alcohol use:   reports no history of alcohol use. Drug use:  reports no history of drug use. Family History:  as follows:      Problem Relation Age of Onset    Alzheimer's Disease Mother     Heart Disease Father        Review of Systems:   Pertinent positives and negatives as noted in the HPI. All other systems reviewed and negative.     Physical Exam:    BP (!) 133/107   Pulse 83   Temp 98.1 °F (36.7 °C) (Oral)   Resp (!) 32   Ht 5' 8\" (1.727 m)   Wt 255 lb (115.7 kg)   SpO2 94%   BMI 38.77 kg/m²       General appearance: No apparent distress, well developed, appears stated age. obese  Eyes:  Pupils equal, round, and reactive to light. Conjunctivae/corneas clear. HENT: Head normal in appearance. External nares normal.  Oral mucosa moist without lesions. Hearing grossly intact. Neck: Supple, with full range of motion. Trachea midline. No gross JVD appreciated. Respiratory:  Normal respiratory effort. Diminished BS with occasional wheezing  Cardiovascular: Normal rate, regular rhythm with normal S1/S2 without murmurs. No lower extremity edema. Abdomen: Soft, non-tender, non-distended with normal bowel sounds. Musculoskeletal: There is no joint swelling or tenderness. Normal tone. No abnormal movements. Skin: Warm and dry. No rashes or lesions. Neurologic:  No focal sensory/motor deficits in the upper and lower extremities. Cranial nerves:  grossly non-focal 2-12. Psychiatric: Alert and oriented, normal insight and thought content. Capillary Refill: Brisk,< 3 seconds. Peripheral Pulses: +2 palpable, equal bilaterally. Labs:     Recent Labs     05/29/22  1115   WBC 21.4*   HGB 16.0   HCT 49.0        Recent Labs     05/29/22  1105   *   K 4.5   CL 95*   CO2 27   BUN 33*   CREATININE 1.1   CALCIUM 8.7     No results for input(s): AST, ALT, BILIDIR, BILITOT, ALKPHOS in the last 72 hours. No results for input(s): INR in the last 72 hours. No results for input(s): Juvenal Hug in the last 72 hours. Lab Results   Component Value Date    NITRU NEGATIVE 05/06/2021    WBCUA 0-2 05/06/2021    BACTERIA NONE SEEN 05/06/2021    RBCUA 3-5 05/06/2021    BLOODU NEGATIVE 05/06/2021    GLUCOSEU NEGATIVE 05/06/2021         Radiology:     CTA Chest W WO Contrast   Final Result   1. No pulmonary embolism. 2. Bilateral pneumonia. 3. 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       Final report electronically signed by Dr. Evangelina Rodríguez on 5/29/2022 3:30 PM      XR CHEST PORTABLE   Final Result   1. Cardiomegaly and possible congestive heart failure. 2. Slightly increased density throughout both lung fields. 3. Atherosclerotic calcification aortic arch. 4. Bilateral shoulder replacements. **This report has been created using voice recognition software. It may contain minor errors which are inherent in voice recognition technology. **      Final report electronically signed by DR Jacque Verdugo on 5/29/2022 10:40 AM             PT/OT Eval Status:  will be assessed  Diet: ADULT DIET; Regular; 5 carb choices (75 gm/meal); Low Fat/Low Chol/High Fiber/2 gm Na  DVT prophylaxis: on xarelto  Code Status: Limited  Disposition: admit to med    Thank you MARTIN Vann - EMEKA for the opportunity to be involved in this patient's care.     Electronically signed by Latha Fonseca MD on 5/29/2022 at 5:16 PM.

## 2022-05-29 NOTE — ED TRIAGE NOTES
Pt presents to the ED from home for SOB x2 weeks. Pt states he tested positive for COVID on 5/15. Pt states he has not improved. Pt denies chest pain. Pt is 78% on his normal 3L NC.  Pt increased to 6L NC.

## 2022-05-29 NOTE — PROGRESS NOTES
Baricitinib Initiation    This patient meets criteria for baricitinib. Criteria:   Age 2 years or greater   COVID-19 positive & hospitalized   Requiring supplemental oxygen, high flow nasal cannula, invasive or non-invasive ventilation, or ECMO   Any elevation in any of the following: CRP, D-dimer, ferritin, or LDH   Must also be on dexamethasone     Exclusions:   If patient already received tocilizumab (due to long half-life)   Patients with active TB    Special Considerations (that warrant provider discussion):   Active DVT or PE   Pregnancy   Severe hepatic impairment   Chronic/recurrent infections   Hemoglobin < 8.0   Chronically immunosuppressed patients (drug or disease etiology)    eGFR:  Recent Labs     05/29/22  1105   LABGLOM 64*     Plan:  Start baricitinib 4 mg once daily. Will order for 11 more doses (patient received 3 doses during prior admission 5/19-5/21). EUA Adult Dosing Reference for COVID-19 (FDA 2021):  eGFR ?60: No dosage adjustment necessary. eGFR 30 to <60: 2 mg once daily. eGFR 15 to <30: 1 mg once daily. eGFR <15: Use is not recommended. *Dosing is different for ages 3to 5years old.  (see Angella)*

## 2022-05-29 NOTE — PROGRESS NOTES
[address] [facility] Pharmacokinetic Monitoring Service - Vancomycin     Lisseth Blackmon is a 79 y.o. male starting on vancomycin therapy for HAP. Pharmacy consulted by Dr Shalonda Marin for monitoring and adjustment. Target Concentration: Goal AUC/RADHA 400-600 mg*hr/L    Additional Antimicrobials: cefepime    Pertinent Laboratory Values: Wt Readings from Last 1 Encounters:   05/29/22 255 lb (115.7 kg)     Temp Readings from Last 1 Encounters:   05/29/22 98.1 °F (36.7 °C) (Oral)     Estimated Creatinine Clearance: 64 mL/min (based on SCr of 1.1 mg/dL). Recent Labs     05/29/22  1105 05/29/22  1115   CREATININE 1.1  --    WBC  --  21.4*     Procalcitonin: 0.09    Pertinent Cultures:  Culture Date Source Results   05/29/22 Blood x 2 sent   MRSA Nasal Swab: was ordered by provider, awaiting results.     Plan:  Dosing recommendations based on Bayesian software  Start vancomycin 1250 mg IV Q18H  Anticipated AUC of 471 and trough concentration of 15.3 at steady state  Renal labs as indicated   Vancomycin concentration not ordered yet  Pharmacy will continue to monitor patient and adjust therapy as indicated    Thank you for the consult,  LUCILA Sim [facility]  5/29/2022 5:11 PM

## 2022-05-30 LAB
ANION GAP SERPL CALCULATED.3IONS-SCNC: 12 MEQ/L (ref 8–16)
BACTERIA: ABNORMAL
BASOPHILS # BLD: 0.3 %
BASOPHILS ABSOLUTE: 0 THOU/MM3 (ref 0–0.1)
BILIRUBIN URINE: NEGATIVE
BLOOD, URINE: ABNORMAL
BUN BLDV-MCNC: 36 MG/DL (ref 7–22)
C-REACTIVE PROTEIN: 9.71 MG/DL (ref 0–1)
CALCIUM SERPL-MCNC: 8.8 MG/DL (ref 8.5–10.5)
CASTS: ABNORMAL /LPF
CASTS: ABNORMAL /LPF
CHARACTER, URINE: CLEAR
CHLORIDE BLD-SCNC: 95 MEQ/L (ref 98–111)
CO2: 28 MEQ/L (ref 23–33)
COLOR: YELLOW
CREAT SERPL-MCNC: 1.4 MG/DL (ref 0.4–1.2)
CRYSTALS: ABNORMAL
EOSINOPHIL # BLD: 0.6 %
EOSINOPHILS ABSOLUTE: 0.1 THOU/MM3 (ref 0–0.4)
EPITHELIAL CELLS, UA: ABNORMAL /HPF
ERYTHROCYTE [DISTWIDTH] IN BLOOD BY AUTOMATED COUNT: 13.4 % (ref 11.5–14.5)
ERYTHROCYTE [DISTWIDTH] IN BLOOD BY AUTOMATED COUNT: 48.7 FL (ref 35–45)
GFR SERPL CREATININE-BSD FRML MDRD: 48 ML/MIN/1.73M2
GLUCOSE BLD-MCNC: 128 MG/DL (ref 70–108)
GLUCOSE BLD-MCNC: 145 MG/DL (ref 70–108)
GLUCOSE BLD-MCNC: 200 MG/DL (ref 70–108)
GLUCOSE BLD-MCNC: 241 MG/DL (ref 70–108)
GLUCOSE BLD-MCNC: 280 MG/DL (ref 70–108)
GLUCOSE, URINE: NEGATIVE MG/DL
HCT VFR BLD CALC: 46.5 % (ref 42–52)
HEMOGLOBIN: 15.5 GM/DL (ref 14–18)
IMMATURE GRANS (ABS): 0.22 THOU/MM3 (ref 0–0.07)
IMMATURE GRANULOCYTES: 2 %
KETONES, URINE: NEGATIVE
LEGIONELLA PNEUMOPHILIA AG, URINE: NEGATIVE
LEUKOCYTE ESTERASE, URINE: NEGATIVE
LYMPHOCYTES # BLD: 7.7 %
LYMPHOCYTES ABSOLUTE: 0.8 THOU/MM3 (ref 1–4.8)
MCH RBC QN AUTO: 32.8 PG (ref 26–33)
MCHC RBC AUTO-ENTMCNC: 33.3 GM/DL (ref 32.2–35.5)
MCV RBC AUTO: 98.3 FL (ref 80–94)
MISCELLANEOUS LAB TEST RESULT: ABNORMAL
MONOCYTES # BLD: 6.2 %
MONOCYTES ABSOLUTE: 0.7 THOU/MM3 (ref 0.4–1.3)
NITRITE, URINE: NEGATIVE
NUCLEATED RED BLOOD CELLS: 0 /100 WBC
PH UA: 5 (ref 5–9)
PLATELET # BLD: 141 THOU/MM3 (ref 130–400)
PMV BLD AUTO: 10.3 FL (ref 9.4–12.4)
POTASSIUM REFLEX MAGNESIUM: 5.1 MEQ/L (ref 3.5–5.2)
PROTEIN UA: 30 MG/DL
RBC # BLD: 4.73 MILL/MM3 (ref 4.7–6.1)
RBC URINE: > 100 /HPF
REASON FOR REJECTION: NORMAL
REJECTED TEST: NORMAL
RENAL EPITHELIAL, UA: ABNORMAL
SEG NEUTROPHILS: 83.2 %
SEGMENTED NEUTROPHILS ABSOLUTE COUNT: 9 THOU/MM3 (ref 1.8–7.7)
SODIUM BLD-SCNC: 135 MEQ/L (ref 135–145)
SPECIFIC GRAVITY UA: > 1.03 (ref 1–1.03)
STREP PNEUMO AG, UR: NEGATIVE
UROBILINOGEN, URINE: 0.2 EU/DL (ref 0–1)
WBC # BLD: 10.8 THOU/MM3 (ref 4.8–10.8)
WBC UA: ABNORMAL /HPF
YEAST: ABNORMAL

## 2022-05-30 PROCEDURE — 82948 REAGENT STRIP/BLOOD GLUCOSE: CPT

## 2022-05-30 PROCEDURE — 94669 MECHANICAL CHEST WALL OSCILL: CPT

## 2022-05-30 PROCEDURE — 80048 BASIC METABOLIC PNL TOTAL CA: CPT

## 2022-05-30 PROCEDURE — 6370000000 HC RX 637 (ALT 250 FOR IP): Performed by: FAMILY MEDICINE

## 2022-05-30 PROCEDURE — 86140 C-REACTIVE PROTEIN: CPT

## 2022-05-30 PROCEDURE — 94760 N-INVAS EAR/PLS OXIMETRY 1: CPT

## 2022-05-30 PROCEDURE — 99233 SBSQ HOSP IP/OBS HIGH 50: CPT | Performed by: FAMILY MEDICINE

## 2022-05-30 PROCEDURE — 2060000000 HC ICU INTERMEDIATE R&B

## 2022-05-30 PROCEDURE — 36415 COLL VENOUS BLD VENIPUNCTURE: CPT

## 2022-05-30 PROCEDURE — 2700000000 HC OXYGEN THERAPY PER DAY

## 2022-05-30 PROCEDURE — 2580000003 HC RX 258: Performed by: FAMILY MEDICINE

## 2022-05-30 PROCEDURE — 6360000002 HC RX W HCPCS: Performed by: FAMILY MEDICINE

## 2022-05-30 PROCEDURE — 85025 COMPLETE CBC W/AUTO DIFF WBC: CPT

## 2022-05-30 RX ADMIN — SODIUM CHLORIDE, PRESERVATIVE FREE 10 ML: 5 INJECTION INTRAVENOUS at 20:30

## 2022-05-30 RX ADMIN — Medication 1250 MG: at 11:48

## 2022-05-30 RX ADMIN — SODIUM CHLORIDE, PRESERVATIVE FREE 10 ML: 5 INJECTION INTRAVENOUS at 10:05

## 2022-05-30 RX ADMIN — METOPROLOL TARTRATE 12.5 MG: 25 TABLET, FILM COATED ORAL at 10:04

## 2022-05-30 RX ADMIN — CEFEPIME HYDROCHLORIDE 2000 MG: 2 INJECTION, POWDER, FOR SOLUTION INTRAVENOUS at 17:33

## 2022-05-30 RX ADMIN — Medication 50 MG: at 10:03

## 2022-05-30 RX ADMIN — TOCILIZUMAB 810 MG: 180 INJECTION, SOLUTION SUBCUTANEOUS at 15:44

## 2022-05-30 RX ADMIN — PAROXETINE HYDROCHLORIDE 20 MG: 20 TABLET, FILM COATED ORAL at 10:05

## 2022-05-30 RX ADMIN — APIXABAN 2.5 MG: 2.5 TABLET, FILM COATED ORAL at 20:30

## 2022-05-30 RX ADMIN — INSULIN LISPRO 2 UNITS: 100 INJECTION, SOLUTION INTRAVENOUS; SUBCUTANEOUS at 22:04

## 2022-05-30 RX ADMIN — Medication 1000 UNITS: at 10:03

## 2022-05-30 RX ADMIN — CEFEPIME HYDROCHLORIDE 2000 MG: 2 INJECTION, POWDER, FOR SOLUTION INTRAVENOUS at 04:34

## 2022-05-30 RX ADMIN — OXYCODONE HYDROCHLORIDE AND ACETAMINOPHEN 1000 MG: 500 TABLET ORAL at 10:03

## 2022-05-30 RX ADMIN — Medication 1 CAPSULE: at 12:01

## 2022-05-30 RX ADMIN — CLOPIDOGREL BISULFATE 75 MG: 75 TABLET ORAL at 10:03

## 2022-05-30 RX ADMIN — FUROSEMIDE 20 MG: 20 TABLET ORAL at 10:04

## 2022-05-30 RX ADMIN — PANTOPRAZOLE SODIUM 40 MG: 40 TABLET, DELAYED RELEASE ORAL at 10:03

## 2022-05-30 RX ADMIN — APIXABAN 2.5 MG: 2.5 TABLET, FILM COATED ORAL at 10:04

## 2022-05-30 RX ADMIN — INSULIN LISPRO 2 UNITS: 100 INJECTION, SOLUTION INTRAVENOUS; SUBCUTANEOUS at 17:34

## 2022-05-30 RX ADMIN — AMLODIPINE BESYLATE 5 MG: 5 TABLET ORAL at 10:04

## 2022-05-30 RX ADMIN — DEXAMETHASONE SODIUM PHOSPHATE 10 MG: 4 INJECTION, SOLUTION INTRA-ARTICULAR; INTRALESIONAL; INTRAMUSCULAR; INTRAVENOUS; SOFT TISSUE at 11:48

## 2022-05-30 RX ADMIN — ATORVASTATIN CALCIUM 40 MG: 40 TABLET, FILM COATED ORAL at 20:29

## 2022-05-30 RX ADMIN — INSULIN LISPRO 2 UNITS: 100 INJECTION, SOLUTION INTRAVENOUS; SUBCUTANEOUS at 11:50

## 2022-05-30 NOTE — PROGRESS NOTES
[address] [facility] Pharmacokinetic Monitoring Service - Vancomycin    Consulting Provider: Dr. Marii Avery   Indication: HAP  Target Concentration: Goal AUC/RADHA 400-600 mg*hr/L  Day of Therapy: 2  Additional Antimicrobials: cefepime and azithromycin    Pertinent Laboratory Values: Wt Readings from Last 1 Encounters:   05/29/22 255 lb (115.7 kg)     Temp Readings from Last 1 Encounters:   05/30/22 99 °F (37.2 °C) (Oral)     Estimated Creatinine Clearance: 50 mL/min (A) (based on SCr of 1.4 mg/dL (H)). Recent Labs     05/29/22  1105 05/29/22  1115 05/30/22  0530   CREATININE 1.1  --  1.4*   WBC  --  21.4* 10.8     Procalcitonin: 0.09    Pertinent Cultures:  Culture Date Source Results   05/29/22 Blood x 2 sent   MRSA Nasal Swab: was ordered by provider, awaiting results. Recent vancomycin administrations                     vancomycin (VANCOCIN) 1250 mg in dextrose 5 % 250 mL IVPB (mg) 1,250 mg New Bag 05/30/22 1148     1,250 mg New Bag 05/29/22 2103                    Assessment:  Date/Time Current Dose Concentration Timing of Concentration (h) AUC   5/30/22 1,250 mg IV q18h N/A N/A 586   Note: Serum concentrations collected for AUC dosing may appear elevated if collected in close proximity to the dose administered, this is not necessarily an indication of toxicity    Plan:  1. Current dosing regimen is therapeutic based on software predictions, but close to the higher range of normal  2. \"Decrease dose to 1250 mg IV q24h  3. Repeat vancomycin concentration ordered for 5/31/22 @ 0600   4.  Pharmacy will continue to monitor patient and adjust therapy as indicated    Thank you for the consult,  LUCILA Olsen KALEY [facility]  5/30/2022 1:12 PM

## 2022-05-30 NOTE — PROGRESS NOTES
Hospitalist Progress Note    Patient:  Darylene Morton      Unit/Bed:4K-12/012-A    YOB: 1940    MRN: [id number]       Acct: [de-identified]     PCP: MARTIN Galvan CNP    Date of Admission: 5/29/2022    Assessment/Plan:    Anticipated Discharge in :     ABUNDIO[facility] [address] Problems    Diagnosis Date Noted    Acute hypoxemic respiratory failure ([facility] [address] [J96.01] 05/18/2022     Priority: Medium     Acute on chronic hypoxic respiratory failure secondary to COVID-19 PNA  Pateint has progressively gotten worse since discharge  CXR showed Slightly increased density throughout both lung fields. CTA chest No pulmonary embolism. Bilateral pneumonia. Patient finished a 5 day course of azith, also received Rocephin x3 days, given a script of decadron x10 days as well  increased coverage to HAP, started Cefepime and VAnc  Started on baricitinib, switched to Tocilizumab 5/30  Continue decadron to 10mg/IV (already finished 6mg doses outpatient)  Guaifenesin + codeine prn for cough    Continue vitamin D, zinc, vitamin C  Incentive spirometer, Acapella  Currently on HHF     Persistent atrial Fibrillation:   Rate controlled on metoprolol. Continue Eliquis      Essential HTN  Hyperlipidemia  CAD, S/P PCI 11/2019  Continue amlodipine, metoprolol, statin  Continue DAPT, statin, BB     Chronic HFpEF  Echo (5/20) notable for EF estimated 55%              Continue home lasix  Strict I&O's and Daily weights             CKD stage III  monitor     Hx of DVT:   Continue eliquis.      Hx of CVA:  Continue home statin, ASA, Plavix.         Hyperglycemia from steroids use  HSS     Obesity  BMI 38.77 kg/m²  Diet and lifestyle modifications      Chief Complaint:   Shortness of breath    Hospital Course:   Darylene Morton is a 79 y.o. male with PMHx of CAD, HTN, hyperlipidemia, A fib HFpEF, hx of DVT and CVA, who presents to [address] [place] [address] with progressive shortness of breath even at rest, since he was discharged from the hospital a week ago from COVID pneumonia. He received baricitinib, Rocpehin, azith and decadron. He went home with 3 lpm O2, which is his baseline. He was having some fever, nausea, decreased appetite.      Patient was admitted under the hospitalist service. Subjective:   Patient seen and examined, appears comfortable in bed, on HHF, desaturates on ambulation and sometimes during conversations. Afebrile. He did not sleep well last night. Eating/drinking well. Medications:  Reviewed    Infusion Medications    sodium chloride      dextrose       Scheduled Medications    azithromycin  500 mg IntraVENous Once    sodium chloride flush  5-40 mL IntraVENous 2 times per day    baricitinib  4 mg Oral Daily    dexamethasone  10 mg IntraVENous Daily    cefepime  2,000 mg IntraVENous Q12H    amLODIPine  5 mg Oral Daily    vitamin C  1,000 mg Oral Daily    atorvastatin  40 mg Oral Nightly    Vitamin D  1,000 Units Oral Daily    clopidogrel  75 mg Oral Daily    apixaban  2.5 mg Oral BID    furosemide  20 mg Oral Daily    metoprolol tartrate  12.5 mg Oral BID    pantoprazole  40 mg Oral QAM AC    PARoxetine  20 mg Oral QAM    zinc sulfate  50 mg Oral Daily    lactobacillus  1 capsule Oral Daily with breakfast    insulin lispro  0-6 Units SubCUTAneous TID WC    insulin lispro  0-3 Units SubCUTAneous Nightly    vancomycin (VANCOCIN) intermittent dosing (placeholder)   Other RX Placeholder    vancomycin  1,250 mg IntraVENous Q18H     PRN Meds: sodium chloride flush, sodium chloride, ondansetron **OR** ondansetron, polyethylene glycol, acetaminophen **OR** acetaminophen, glucose, dextrose bolus **OR** dextrose bolus, glucagon (rDNA), dextrose, albuterol, guaiFENesin-codeine, benzonatate      Intake/Output Summary (Last 24 hours) at 5/30/2022 0842  Last data filed at 5/30/2022 0729  Gross per 24 hour   Intake 450 ml   Output 500 ml   Net -50 ml       Diet:  ADULT DIET;  Regular; 5 carb choices (75 gm/meal); Low Fat/Low Chol/High Fiber/2 gm Na    Exam:  /66   Pulse 71   Temp 98.5 °F (36.9 °C) (Oral)   Resp 26   Ht 5' 8\" (1.727 m)   Wt 255 lb (115.7 kg)   SpO2 94%   BMI 38.77 kg/m²     General appearance: No apparent distress, well developed, appears stated age. obese  Eyes:  Pupils equal, round, and reactive to light. Conjunctivae/corneas clear. HENT: Head normal in appearance. External nares normal.  Oral mucosa moist without lesions. Hearing grossly intact. Neck: Supple, with full range of motion. Trachea midline. No gross JVD appreciated. Respiratory:  Normal respiratory effort. Diminished BS with diffused fine crackles, no wheezing  Cardiovascular: Normal rate, regular rhythm with normal S1/S2 without murmurs. No lower extremity edema. Abdomen: Soft, non-tender, non-distended with normal bowel sounds. Musculoskeletal: There is no joint swelling or tenderness. Normal tone. No abnormal movements. Skin: Warm and dry. No rashes or lesions. Neurologic:  No focal sensory/motor deficits in the upper and lower extremities. Cranial nerves:  grossly non-focal 2-12. Psychiatric: Alert and oriented, normal insight and thought content. Capillary Refill: Brisk,< 3 seconds. Peripheral Pulses: +2 palpable, equal bilaterally. Labs:   Recent Labs     05/29/22  1115 05/30/22  0530   WBC 21.4* 10.8   HGB 16.0 15.5   HCT 49.0 46.5    141     Recent Labs     05/29/22  1105 05/30/22  0530   * 135   K 4.5 5.1   CL 95* 95*   CO2 27 28   BUN 33* 36*   CREATININE 1.1 1.4*   CALCIUM 8.7 8.8     No results for input(s): AST, ALT, BILIDIR, BILITOT, ALKPHOS in the last 72 hours. No results for input(s): INR in the last 72 hours. No results for input(s): Earlis Dryden in the last 72 hours.     Urinalysis:      Lab Results   Component Value Date    NITRU NEGATIVE 05/29/2022    WBCUA 2-4 05/29/2022    BACTERIA NONE SEEN 05/29/2022    RBCUA > 100 05/29/2022    BLOODU LARGE 05/29/2022    SPECGRAV >1.030 05/29/2022    GLUCOSEU NEGATIVE 05/06/2021       Radiology:  CTA Chest W WO Contrast   Final Result   1. No pulmonary embolism. 2. Bilateral pneumonia. 3. 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 Final report electronically signed by Dr. Leslie Evans on 5/29/2022 3:30 PM      XR CHEST PORTABLE   Final Result   1. Cardiomegaly and possible congestive heart failure. 2. Slightly increased density throughout both lung fields. 3. Atherosclerotic calcification aortic arch. 4. Bilateral shoulder replacements. **This report has been created using voice recognition software. It may contain minor errors which are inherent in voice recognition technology. **      Final report electronically signed by DR Funmilayo Albarran on 5/29/2022 10:40 AM          Diet: ADULT DIET; Regular; 5 carb choices (75 gm/meal);  Low Fat/Low Chol/High Fiber/2 gm Na    DVT prophylaxis: [] Lovenox                                 [] SCDs                                 [] SQ Heparin                                 [] Encourage ambulation           [] Already on Anticoagulation     Disposition:    [] Home       [] TCU       [] Rehab       [] Psych       [] SNF       [] [place]       [] Other-    Code Status: Limited    PT/OT Eval Status:       Electronically signed by Yani Norton MD on 5/30/2022 at 8:19 AM

## 2022-05-30 NOTE — PLAN OF CARE
Problem: Discharge Planning  Goal: Discharge to home or other facility with appropriate resources  Outcome: Progressing  Flowsheets (Taken 5/30/2022 1630)  Discharge to home or other facility with appropriate resources:   Identify discharge learning needs (meds, wound care, etc)   Identify barriers to discharge with patient and caregiver     Problem: Safety - Adult  Goal: Free from fall injury  Outcome: Progressing  Flowsheets  Taken 5/30/2022 1630  Free From Fall Injury: Instruct family/caregiver on patient safety  Taken 5/30/2022 1629  Free From Fall Injury: Instruct family/caregiver on patient safety     Problem: ABCDS Injury Assessment  Goal: Absence of physical injury  Outcome: Progressing  Flowsheets  Taken 5/30/2022 1630  Absence of Physical Injury: Implement safety measures based on patient assessment  Taken 5/30/2022 1629  Absence of Physical Injury: Implement safety measures based on patient assessment   Pain Assessment: None - Denies Pain          Is pain goal met at this time? Yes        Care plan reviewed with patient. Patient verbalize understanding of the plan of care and contribute to goal setting.

## 2022-05-31 LAB
ANION GAP SERPL CALCULATED.3IONS-SCNC: 13 MEQ/L (ref 8–16)
BUN BLDV-MCNC: 37 MG/DL (ref 7–22)
C-REACTIVE PROTEIN: 6.4 MG/DL (ref 0–1)
CALCIUM SERPL-MCNC: 8.6 MG/DL (ref 8.5–10.5)
CHLORIDE BLD-SCNC: 98 MEQ/L (ref 98–111)
CO2: 21 MEQ/L (ref 23–33)
CREAT SERPL-MCNC: 1.2 MG/DL (ref 0.4–1.2)
ERYTHROCYTE [DISTWIDTH] IN BLOOD BY AUTOMATED COUNT: 12.9 % (ref 11.5–14.5)
ERYTHROCYTE [DISTWIDTH] IN BLOOD BY AUTOMATED COUNT: 47 FL (ref 35–45)
GFR SERPL CREATININE-BSD FRML MDRD: 58 ML/MIN/1.73M2
GLUCOSE BLD-MCNC: 218 MG/DL (ref 70–108)
GLUCOSE BLD-MCNC: 250 MG/DL (ref 70–108)
GLUCOSE BLD-MCNC: 267 MG/DL (ref 70–108)
GLUCOSE BLD-MCNC: 334 MG/DL (ref 70–108)
GLUCOSE BLD-MCNC: 355 MG/DL (ref 70–108)
HCT VFR BLD CALC: 47.4 % (ref 42–52)
HEMOGLOBIN: 15.8 GM/DL (ref 14–18)
MCH RBC QN AUTO: 33 PG (ref 26–33)
MCHC RBC AUTO-ENTMCNC: 33.3 GM/DL (ref 32.2–35.5)
MCV RBC AUTO: 99 FL (ref 80–94)
MRSA SCREEN RT-PCR: NEGATIVE
PLATELET # BLD: 146 THOU/MM3 (ref 130–400)
PMV BLD AUTO: 10.5 FL (ref 9.4–12.4)
POTASSIUM SERPL-SCNC: 4.7 MEQ/L (ref 3.5–5.2)
RBC # BLD: 4.79 MILL/MM3 (ref 4.7–6.1)
SODIUM BLD-SCNC: 132 MEQ/L (ref 135–145)
VANCOMYCIN RANDOM: 9 UG/ML (ref 0.1–39.9)
VANCOMYCIN RESISTANT ENTEROCOCCUS: NEGATIVE
WBC # BLD: 9 THOU/MM3 (ref 4.8–10.8)

## 2022-05-31 PROCEDURE — 94761 N-INVAS EAR/PLS OXIMETRY MLT: CPT

## 2022-05-31 PROCEDURE — 86140 C-REACTIVE PROTEIN: CPT

## 2022-05-31 PROCEDURE — 80202 ASSAY OF VANCOMYCIN: CPT

## 2022-05-31 PROCEDURE — 82948 REAGENT STRIP/BLOOD GLUCOSE: CPT

## 2022-05-31 PROCEDURE — 87500 VANOMYCIN DNA AMP PROBE: CPT

## 2022-05-31 PROCEDURE — 2060000000 HC ICU INTERMEDIATE R&B

## 2022-05-31 PROCEDURE — 85027 COMPLETE CBC AUTOMATED: CPT

## 2022-05-31 PROCEDURE — 2580000003 HC RX 258: Performed by: FAMILY MEDICINE

## 2022-05-31 PROCEDURE — 36415 COLL VENOUS BLD VENIPUNCTURE: CPT

## 2022-05-31 PROCEDURE — 6370000000 HC RX 637 (ALT 250 FOR IP): Performed by: FAMILY MEDICINE

## 2022-05-31 PROCEDURE — 94669 MECHANICAL CHEST WALL OSCILL: CPT

## 2022-05-31 PROCEDURE — 2700000000 HC OXYGEN THERAPY PER DAY

## 2022-05-31 PROCEDURE — 6360000002 HC RX W HCPCS: Performed by: FAMILY MEDICINE

## 2022-05-31 PROCEDURE — 99233 SBSQ HOSP IP/OBS HIGH 50: CPT | Performed by: FAMILY MEDICINE

## 2022-05-31 PROCEDURE — 87641 MR-STAPH DNA AMP PROBE: CPT

## 2022-05-31 PROCEDURE — 80048 BASIC METABOLIC PNL TOTAL CA: CPT

## 2022-05-31 RX ORDER — INSULIN LISPRO 100 [IU]/ML
0-12 INJECTION, SOLUTION INTRAVENOUS; SUBCUTANEOUS
Status: DISCONTINUED | OUTPATIENT
Start: 2022-05-31 | End: 2022-06-01 | Stop reason: HOSPADM

## 2022-05-31 RX ORDER — INSULIN LISPRO 100 [IU]/ML
0-6 INJECTION, SOLUTION INTRAVENOUS; SUBCUTANEOUS NIGHTLY
Status: DISCONTINUED | OUTPATIENT
Start: 2022-05-31 | End: 2022-06-01 | Stop reason: HOSPADM

## 2022-05-31 RX ADMIN — INSULIN LISPRO 2 UNITS: 100 INJECTION, SOLUTION INTRAVENOUS; SUBCUTANEOUS at 09:17

## 2022-05-31 RX ADMIN — Medication 1 CAPSULE: at 09:05

## 2022-05-31 RX ADMIN — PAROXETINE HYDROCHLORIDE 20 MG: 20 TABLET, FILM COATED ORAL at 09:06

## 2022-05-31 RX ADMIN — INSULIN LISPRO 10 UNITS: 100 INJECTION, SOLUTION INTRAVENOUS; SUBCUTANEOUS at 16:41

## 2022-05-31 RX ADMIN — AMLODIPINE BESYLATE 5 MG: 5 TABLET ORAL at 09:06

## 2022-05-31 RX ADMIN — PANTOPRAZOLE SODIUM 40 MG: 40 TABLET, DELAYED RELEASE ORAL at 05:50

## 2022-05-31 RX ADMIN — SODIUM CHLORIDE, PRESERVATIVE FREE 10 ML: 5 INJECTION INTRAVENOUS at 21:45

## 2022-05-31 RX ADMIN — OXYCODONE HYDROCHLORIDE AND ACETAMINOPHEN 1000 MG: 500 TABLET ORAL at 09:06

## 2022-05-31 RX ADMIN — Medication 50 MG: at 09:05

## 2022-05-31 RX ADMIN — ATORVASTATIN CALCIUM 40 MG: 40 TABLET, FILM COATED ORAL at 21:45

## 2022-05-31 RX ADMIN — DEXAMETHASONE SODIUM PHOSPHATE 10 MG: 4 INJECTION, SOLUTION INTRA-ARTICULAR; INTRALESIONAL; INTRAMUSCULAR; INTRAVENOUS; SOFT TISSUE at 09:06

## 2022-05-31 RX ADMIN — Medication 1000 UNITS: at 09:05

## 2022-05-31 RX ADMIN — INSULIN LISPRO 3 UNITS: 100 INJECTION, SOLUTION INTRAVENOUS; SUBCUTANEOUS at 12:21

## 2022-05-31 RX ADMIN — CEFEPIME HYDROCHLORIDE 2000 MG: 2 INJECTION, POWDER, FOR SOLUTION INTRAVENOUS at 15:38

## 2022-05-31 RX ADMIN — VANCOMYCIN HYDROCHLORIDE 1250 MG: 5 INJECTION, POWDER, LYOPHILIZED, FOR SOLUTION INTRAVENOUS at 09:25

## 2022-05-31 RX ADMIN — SODIUM CHLORIDE, PRESERVATIVE FREE 10 ML: 5 INJECTION INTRAVENOUS at 09:25

## 2022-05-31 RX ADMIN — CEFEPIME HYDROCHLORIDE 2000 MG: 2 INJECTION, POWDER, FOR SOLUTION INTRAVENOUS at 04:09

## 2022-05-31 RX ADMIN — METOPROLOL TARTRATE 12.5 MG: 25 TABLET, FILM COATED ORAL at 21:46

## 2022-05-31 RX ADMIN — APIXABAN 2.5 MG: 2.5 TABLET, FILM COATED ORAL at 09:06

## 2022-05-31 RX ADMIN — APIXABAN 2.5 MG: 2.5 TABLET, FILM COATED ORAL at 21:46

## 2022-05-31 RX ADMIN — INSULIN LISPRO 3 UNITS: 100 INJECTION, SOLUTION INTRAVENOUS; SUBCUTANEOUS at 23:38

## 2022-05-31 RX ADMIN — FUROSEMIDE 20 MG: 20 TABLET ORAL at 09:06

## 2022-05-31 RX ADMIN — CLOPIDOGREL BISULFATE 75 MG: 75 TABLET ORAL at 09:06

## 2022-05-31 RX ADMIN — METOPROLOL TARTRATE 12.5 MG: 25 TABLET, FILM COATED ORAL at 09:05

## 2022-05-31 RX ADMIN — BENZONATATE 100 MG: 100 CAPSULE ORAL at 09:05

## 2022-05-31 NOTE — PLAN OF CARE
Problem: Discharge Planning  Goal: Discharge to home or other facility with appropriate resources  5/31/2022 1248 by Jacqui Estrada RN  Outcome: Progressing  Discharge plan is in process. Plan discharge home with family. Problem: Safety - Adult  Goal: Free from fall injury  5/31/2022 1248 by Jacqui Estrada RN  Outcome: Progressing  Fall assessment completed. Patient using call light appropriately to call for assistance with ambulation to bathroom. Personal items within reach. Patient is also compliant with use of non-skid slippers. Problem: ABCDS Injury Assessment  Goal: Absence of physical injury  5/31/2022 1248 by Jacqui Estrada RN  Outcome: Progressing  No falls noted this shift. Patient ambulates with x1 staff assistance without difficulty. Family member at bedside, spent the day. Bed kept in low position. Safe environment maintained. Bedside table & call light in reach. Uses call light appropriately when needing assistance. Problem: Respiratory - Adult  Goal: Achieves optimal ventilation and oxygenation  5/31/2022 1248 by Jacqui Estrada RN  Outcome: Progressing  Patients oxygen saturation 92 % on high flow. No shortness of breath noted. Lung sounds rhonhi, able C&DB as ordered. Problem: Chronic Conditions and Co-morbidities  Goal: Patient's chronic conditions and co-morbidity symptoms are monitored and maintained or improved  Outcome: Progressing  Patient's chronic conditions and co-morbidity symptoms are monitored and maintained . Care plan reviewed with patient and family. Patient and family. verbalize understanding of the plan of care and contribute to goal setting.

## 2022-05-31 NOTE — CARE COORDINATION
05/31/22 1115   Readmission Assessment   Number of Days since last admission? 8-30 days   Previous Disposition Home with Family   What was the patient's/caregiver's perception as to why they think they needed to return back to the hospital?   (cough and chest congestion)   Did you visit your Primary Care Physician after you left the hospital, before you returned this time? Yes   Did you see a specialist, such as Cardiac, Pulmonary, Orthopedic Physician, etc. after you left the hospital? No   Who advised the patient to return to the hospital? Self-referral;Caregiver   Does the patient report anything that got in the way of taking their medications? No   In our efforts to provide the best possible care to you and others like you, can you think of anything that we could have done to help you after you left the hospital the first time, so that you might not have needed to return so soon?  Other (Comment)  (no)

## 2022-05-31 NOTE — PROGRESS NOTES
ml   Output 1300 ml   Net -1060 ml       Diet:  ADULT DIET; Regular; 5 carb choices (75 gm/meal); Low Fat/Low Chol/High Fiber/2 gm Na    Exam:  /78   Pulse 78   Temp 98.2 °F (36.8 °C) (Oral)   Resp 17   Ht 5' 8\" (1.727 m)   Wt 255 lb (115.7 kg)   SpO2 95%   BMI 38.77 kg/m²     General appearance: No apparent distress, well developed, appears stated age. obese  Eyes:  Pupils equal, round, and reactive to light. Conjunctivae/corneas clear. HENT: Head normal in appearance. External nares normal.  Oral mucosa moist without lesions. Hearing grossly intact. Neck: Supple, with full range of motion. Trachea midline. No gross JVD appreciated. Respiratory:  Normal respiratory effort. Diminished BS with diffused fine crackles, no wheezing  Cardiovascular: Normal rate, regular rhythm with normal S1/S2 without murmurs. No lower extremity edema. Abdomen: Soft, non-tender, non-distended with normal bowel sounds. Musculoskeletal: There is no joint swelling or tenderness. Normal tone. No abnormal movements. Skin: Warm and dry. No rashes or lesions. Neurologic:  No focal sensory/motor deficits in the upper and lower extremities. Cranial nerves:  grossly non-focal 2-12. Psychiatric: Alert and oriented, normal insight and thought content. Capillary Refill: Brisk,< 3 seconds. Peripheral Pulses: +2 palpable, equal bilaterally. Labs:   Recent Labs     05/29/22  1115 05/30/22  0530 05/31/22  0506   WBC 21.4* 10.8 9.0   HGB 16.0 15.5 15.8   HCT 49.0 46.5 47.4    141 146     Recent Labs     05/29/22  1105 05/30/22  0530 05/31/22  0506   * 135 132*   K 4.5 5.1 4.7   CL 95* 95* 98   CO2 27 28 21*   BUN 33* 36* 37*   CREATININE 1.1 1.4* 1.2   CALCIUM 8.7 8.8 8.6     No results for input(s): AST, ALT, BILIDIR, BILITOT, ALKPHOS in the last 72 hours. No results for input(s): INR in the last 72 hours. No results for input(s): Eulalia Breeze in the last 72 hours.     Urinalysis:      Lab Results Component Value Date    NITRU NEGATIVE 05/29/2022    WBCUA 2-4 05/29/2022    BACTERIA NONE SEEN 05/29/2022    RBCUA > 100 05/29/2022    BLOODU LARGE 05/29/2022    SPECGRAV >1.030 05/29/2022    GLUCOSEU NEGATIVE 05/06/2021       Radiology:  CTA Chest W WO Contrast   Final Result   1. No pulmonary embolism. 2. Bilateral pneumonia. 3. 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 Final report electronically signed by Dr. Evangelina Rodríguez on 5/29/2022 3:30 PM      XR CHEST PORTABLE   Final Result   1. Cardiomegaly and possible congestive heart failure. 2. Slightly increased density throughout both lung fields. 3. Atherosclerotic calcification aortic arch. 4. Bilateral shoulder replacements. **This report has been created using voice recognition software. It may contain minor errors which are inherent in voice recognition technology. **      Final report electronically signed by DR Cl Camacho on 5/29/2022 10:40 AM          Diet: ADULT DIET; Regular; 5 carb choices (75 gm/meal);  Low Fat/Low Chol/High Fiber/2 gm Na    DVT prophylaxis: [] Lovenox                                 [] SCDs                                 [] SQ Heparin                                 [] Encourage ambulation           [] Already on Anticoagulation     Disposition:    [] Home       [] TCU       [] Rehab       [] Psych       [] SNF       [] [facility]       [] Other-    Code Status: Limited    PT/OT Eval Status:       Electronically signed by Arleth Ren MD on 5/31/2022 at 9:37 AM

## 2022-05-31 NOTE — PROGRESS NOTES
Physician Progress Note      PATIENT:               Jorge Nicole  CSN #:                  [id number]  :                       1940  ADMIT DATE:       2022 10:03 AM  DISCH DATE:  RESPONDING  PROVIDER #:        Jus Mckay MD          QUERY TEXT:    Pt admitted with COVID-19 and noted to have SIRS. If possible, please   document in progress notes and discharge summary if you are evaluating and/or   treating: The medical record reflects the following:    Risk Factors: Covid PNA  Clinical Indicators: WBC 21.4, 10.8, 9.0, PCT WNL, lactic acid sepsis 2.5,   tachypnea up to 32, no tachycardia, afebrile  Treatment: Barcitinib, IV Decadron, IV Cefepime, Tocilizumab, Vitamin C,   Vitamin D and Zinc    Thank you! Erika Galeazzi, Jose Fletcher, CRCR  RN Clinical   P: [phone]  Options provided:  -- Sepsis present on admission due to COVID-19 pneumonia  -- Covid-19 pneumonia without sepsis  -- Other - I will add my own diagnosis  -- Disagree - Not applicable / Not valid  -- Disagree - Clinically unable to determine / Unknown  -- Refer to Clinical Documentation Reviewer    PROVIDER RESPONSE TEXT:    This patient has sepsis which was present on admission due to COVID-19   pneumonia.     Query created by: Lon Bear on 2022 1:41 PM      Electronically signed by:  Jus Mckay MD 2022 3:26 PM

## 2022-05-31 NOTE — PLAN OF CARE
Problem: Discharge Planning  Goal: Discharge to home or other facility with appropriate resources  5/31/2022 1029 by ROCK Salas  Outcome: Progressing  SW consult received and completed. See SW note.

## 2022-05-31 NOTE — CARE COORDINATION
DISCHARGE/PLANNING EVALUATION  5/31/22, 10:27 AM EDT    Reason for Referral: Discharge planning  Mental Status: Alert and Oriented  Decision Making: Self  Family/Social/Home Environment: Patient lives with his spouse in their home. Patient has supportive family and friends. Current Services including food security, transportation and housekeeping: Patient reported that he was independent prior to admission. He reported that he did not have any current services. Current Equipment: walker, O2  Payment Source: Medicare  Concerns or Barriers to Discharge: None  Post acute provider list with quality measures, geographic area and applicable managed care information provided. Questions regarding selection process answered: Offered    Teach Back Method used with pateint and spouse regarding care plan and needs. Patient and spouse verbalize understanding of the plan of care and contribute to goal setting. Patient goals, treatment preferences and discharge plan: Patient plans to return home with spouse. Open to [place]. Discharge plan pending clinical course.     Electronically signed by ROCK Holland on 5/31/2022 at 10:27 AM

## 2022-05-31 NOTE — CARE COORDINATION
5/31/22, 12:35 PM EDT  DISCHARGE PLANNING EVALUATION:    Argyle Olszewski       Admitted: 5/29/2022/ [facility] day: 2   Location: [facility]12/012-A Reason for admit: Acute hypoxemic respiratory failure ([facility] [address] [J96.01]  Pneumonia due to infectious organism, unspecified laterality, unspecified part of lung [J18.9]  Acute respiratory failure due to COVID-19 ([facility] [address] [U07.1, J96.00]   PMH:  has a past medical history of AAA (abdominal aortic aneurysm) ([facility] [address] CAD (coronary artery disease), Cancer ([facility] [address] Cancer of kidney ([facility] [address] Heart attack ([facility] [address] History of placement of chest tube, Hx of blood clots, Hyperlipidemia, Hypertension, Kidney stones, Obesity, Osteoarthritis, and Stroke ([facility] [address] Barriers to Discharge:  Covid 19 Pneumonia. HF 75% FIO2/60L, IV AB continued  PCP: MARTIN Pan CNP  Readmission Risk Score: 20.9 ( )%  Patient's Healthcare Decision Maker: Named in [address]     Patient Goals/Plan/Treatment Preferences: lives home w spouse/POA Tim Sanchez independently as PTA when medically cleared; full LTACH eval, has [facility] home oxygen 3L ATC; collaborated w Attending, [facility] Emerson Liaison  Transportation/Food Security/Housekeeping Addressed:  No issues identified.

## 2022-05-31 NOTE — PLAN OF CARE
Problem: Discharge Planning  Goal: Discharge to home or other facility with appropriate resources  5/30/2022 2252 by Mayra Ernst RN  Outcome: Progressing  Flowsheets (Taken 5/30/2022 2252)  Discharge to home or other facility with appropriate resources:   Identify barriers to discharge with patient and caregiver   Arrange for needed discharge resources and transportation as appropriate   Identify discharge learning needs (meds, wound care, etc)  5/30/2022 2245 by Mayra Ernst RN  Outcome: Progressing  Flowsheets (Taken 5/30/2022 2245)  Discharge to home or other facility with appropriate resources:   Identify discharge learning needs (meds, wound care, etc)   Arrange for needed discharge resources and transportation as appropriate   Identify barriers to discharge with patient and caregiver  5/30/2022 1630 by Nikolas Young RN  Outcome: Progressing  Flowsheets (Taken 5/30/2022 1630)  Discharge to home or other facility with appropriate resources:   Identify discharge learning needs (meds, wound care, etc)   Identify barriers to discharge with patient and caregiver     Problem: Safety - Adult  Goal: Free from fall injury  5/30/2022 2252 by Mayra Ernst RN  Flowsheets (Taken 5/30/2022 2252)  Free From Fall Injury: Instruct family/caregiver on patient safety  5/30/2022 1630 by Nikolas Young RN  Outcome: Progressing  Flowsheets  Taken 5/30/2022 1630  Free From Fall Injury: Instruct family/caregiver on patient safety  Taken 5/30/2022 1629  Free From Fall Injury: Instruct family/caregiver on patient safety     Problem: ABCDS Injury Assessment  Goal: Absence of physical injury  5/30/2022 2252 by Mayra Ernst RN  Flowsheets (Taken 5/30/2022 2252)  Absence of Physical Injury: Implement safety measures based on patient assessment  5/30/2022 1630 by Nikolas Young RN  Outcome: Progressing  Flowsheets  Taken 5/30/2022 1630  Absence of Physical Injury: Implement safety measures based on patient assessment  Taken 5/30/2022 4481  Absence of Physical Injury: Implement safety measures based on patient assessment

## 2022-05-31 NOTE — PLAN OF CARE
Problem: Respiratory - Adult  Goal: Achieves optimal ventilation and oxygenation  Outcome: Progressing   Patient currently requiring HFNC 60L 75% FiO2 to maintain SPO2 >90%. Will continue weaning as patient tolerates.

## 2022-06-01 VITALS
WEIGHT: 255 LBS | DIASTOLIC BLOOD PRESSURE: 74 MMHG | BODY MASS INDEX: 38.65 KG/M2 | SYSTOLIC BLOOD PRESSURE: 126 MMHG | OXYGEN SATURATION: 94 % | RESPIRATION RATE: 18 BRPM | TEMPERATURE: 98.1 F | HEIGHT: 68 IN | HEART RATE: 59 BPM

## 2022-06-01 LAB
ANION GAP SERPL CALCULATED.3IONS-SCNC: 11 MEQ/L (ref 8–16)
BUN BLDV-MCNC: 41 MG/DL (ref 7–22)
C-REACTIVE PROTEIN: 3.14 MG/DL (ref 0–1)
CALCIUM SERPL-MCNC: 8.5 MG/DL (ref 8.5–10.5)
CHLORIDE BLD-SCNC: 98 MEQ/L (ref 98–111)
CO2: 23 MEQ/L (ref 23–33)
CREAT SERPL-MCNC: 1.3 MG/DL (ref 0.4–1.2)
ERYTHROCYTE [DISTWIDTH] IN BLOOD BY AUTOMATED COUNT: 12.9 % (ref 11.5–14.5)
ERYTHROCYTE [DISTWIDTH] IN BLOOD BY AUTOMATED COUNT: 45.9 FL (ref 35–45)
GFR SERPL CREATININE-BSD FRML MDRD: 53 ML/MIN/1.73M2
GLUCOSE BLD-MCNC: 193 MG/DL (ref 70–108)
GLUCOSE BLD-MCNC: 222 MG/DL (ref 70–108)
GLUCOSE BLD-MCNC: 287 MG/DL (ref 70–108)
HCT VFR BLD CALC: 45.6 % (ref 42–52)
HEMOGLOBIN: 15.9 GM/DL (ref 14–18)
MCH RBC QN AUTO: 33.9 PG (ref 26–33)
MCHC RBC AUTO-ENTMCNC: 34.9 GM/DL (ref 32.2–35.5)
MCV RBC AUTO: 97.2 FL (ref 80–94)
PLATELET # BLD: 173 THOU/MM3 (ref 130–400)
PMV BLD AUTO: 12.7 FL (ref 9.4–12.4)
POTASSIUM SERPL-SCNC: 4.9 MEQ/L (ref 3.5–5.2)
RBC # BLD: 4.69 MILL/MM3 (ref 4.7–6.1)
SODIUM BLD-SCNC: 132 MEQ/L (ref 135–145)
WBC # BLD: 21.4 THOU/MM3 (ref 4.8–10.8)

## 2022-06-01 PROCEDURE — 6370000000 HC RX 637 (ALT 250 FOR IP): Performed by: FAMILY MEDICINE

## 2022-06-01 PROCEDURE — 80048 BASIC METABOLIC PNL TOTAL CA: CPT

## 2022-06-01 PROCEDURE — 86140 C-REACTIVE PROTEIN: CPT

## 2022-06-01 PROCEDURE — 94761 N-INVAS EAR/PLS OXIMETRY MLT: CPT

## 2022-06-01 PROCEDURE — 2580000003 HC RX 258: Performed by: FAMILY MEDICINE

## 2022-06-01 PROCEDURE — 36415 COLL VENOUS BLD VENIPUNCTURE: CPT

## 2022-06-01 PROCEDURE — 85027 COMPLETE CBC AUTOMATED: CPT

## 2022-06-01 PROCEDURE — 6360000002 HC RX W HCPCS: Performed by: FAMILY MEDICINE

## 2022-06-01 PROCEDURE — 94669 MECHANICAL CHEST WALL OSCILL: CPT

## 2022-06-01 PROCEDURE — 99239 HOSP IP/OBS DSCHRG MGMT >30: CPT | Performed by: STUDENT IN AN ORGANIZED HEALTH CARE EDUCATION/TRAINING PROGRAM

## 2022-06-01 PROCEDURE — 82948 REAGENT STRIP/BLOOD GLUCOSE: CPT

## 2022-06-01 RX ORDER — BENZONATATE 100 MG/1
100 CAPSULE ORAL 3 TIMES DAILY PRN
Qty: 30 CAPSULE | Refills: 0 | DISCHARGE
Start: 2022-06-01 | End: 2022-06-11

## 2022-06-01 RX ORDER — DEXAMETHASONE SODIUM PHOSPHATE 4 MG/ML
10 INJECTION, SOLUTION INTRA-ARTICULAR; INTRALESIONAL; INTRAMUSCULAR; INTRAVENOUS; SOFT TISSUE DAILY
DISCHARGE
Start: 2022-06-02 | End: 2022-08-30

## 2022-06-01 RX ADMIN — DEXAMETHASONE SODIUM PHOSPHATE 10 MG: 4 INJECTION, SOLUTION INTRA-ARTICULAR; INTRALESIONAL; INTRAMUSCULAR; INTRAVENOUS; SOFT TISSUE at 09:37

## 2022-06-01 RX ADMIN — AMLODIPINE BESYLATE 5 MG: 5 TABLET ORAL at 09:28

## 2022-06-01 RX ADMIN — Medication 1000 UNITS: at 09:28

## 2022-06-01 RX ADMIN — METOPROLOL TARTRATE 12.5 MG: 25 TABLET, FILM COATED ORAL at 09:28

## 2022-06-01 RX ADMIN — Medication 50 MG: at 09:27

## 2022-06-01 RX ADMIN — INSULIN LISPRO 6 UNITS: 100 INJECTION, SOLUTION INTRAVENOUS; SUBCUTANEOUS at 12:26

## 2022-06-01 RX ADMIN — APIXABAN 2.5 MG: 2.5 TABLET, FILM COATED ORAL at 09:27

## 2022-06-01 RX ADMIN — FUROSEMIDE 20 MG: 20 TABLET ORAL at 09:34

## 2022-06-01 RX ADMIN — SODIUM CHLORIDE, PRESERVATIVE FREE 10 ML: 5 INJECTION INTRAVENOUS at 09:29

## 2022-06-01 RX ADMIN — INSULIN LISPRO 2 UNITS: 100 INJECTION, SOLUTION INTRAVENOUS; SUBCUTANEOUS at 09:30

## 2022-06-01 RX ADMIN — CEFEPIME HYDROCHLORIDE 2000 MG: 2 INJECTION, POWDER, FOR SOLUTION INTRAVENOUS at 04:48

## 2022-06-01 RX ADMIN — CLOPIDOGREL BISULFATE 75 MG: 75 TABLET ORAL at 09:28

## 2022-06-01 RX ADMIN — PANTOPRAZOLE SODIUM 40 MG: 40 TABLET, DELAYED RELEASE ORAL at 09:28

## 2022-06-01 RX ADMIN — Medication 1 CAPSULE: at 09:27

## 2022-06-01 RX ADMIN — OXYCODONE HYDROCHLORIDE AND ACETAMINOPHEN 1000 MG: 500 TABLET ORAL at 09:29

## 2022-06-01 RX ADMIN — PAROXETINE HYDROCHLORIDE 20 MG: 20 TABLET, FILM COATED ORAL at 09:38

## 2022-06-01 NOTE — PLAN OF CARE
Problem: Safety - Adult  Goal: Free from fall injury  6/1/2022 0230 by Gabriele Bergman RN  Outcome: Progressing  Note: Call light within reach, bed alarm on, non skid socks on, hourly rounding, no falls    Problem: Respiratory - Adult  Goal: Achieves optimal ventilation and oxygenation  6/1/2022 0230 by Gabriele Bergman RN  Outcome: Progressing  Flowsheets (Taken 5/31/2022 2130)  Achieves optimal ventilation and oxygenation:   Assess for changes in respiratory status   Assess for changes in mentation and behavior   Position to facilitate oxygenation and minimize respiratory effort   Oxygen supplementation based on oxygen saturation or arterial blood gases   Encourage broncho-pulmonary hygiene including cough, deep breathe, incentive spirometry   Assess the need for suctioning and aspirate as needed   Respiratory therapy support as indicated   Assess and instruct to report shortness of breath or any respiratory difficulty  Note: Patient on high flow nasal cannula oxygen, no breathing issues     Problem: Discharge Planning  Goal: Discharge to home or other facility with appropriate resources  6/1/2022 0230 by Gabriele Bergman RN  Outcome: Progressing  Flowsheets (Taken 5/31/2022 2130)  Discharge to home or other facility with appropriate resources:   Identify barriers to discharge with patient and caregiver   Arrange for needed discharge resources and transportation as appropriate   Identify discharge learning needs (meds, wound care, etc)   Refer to discharge planning if patient needs post-hospital services based on physician order or complex needs related to functional status, cognitive ability or social support system  Note: Patient on iv antibiotics.  Anticipates to discharge home with wife and home care

## 2022-06-01 NOTE — CARE COORDINATION
Collaborative Discharge Planning    Robert Small  :  1940  MRN:  [id number]    ADMIT DATE:  2022      Discharge Planning Discharge Planning  Type of Residence: House  Living Arrangements: Spouse/Significant Other  Support Systems: Spouse/Significant Other,Children  Current Services Prior To Admission: Oxygen Therapy  Potential Assistance Needed: N/A  DME Ordered?: No  Potential Assistance Purchasing Medications: No  Meds-to-Beds: Does the patient want to have any new prescriptions delivered to bedside prior to discharge?: Yes  Type of Home Care Services: None  Patient expects to be discharged to[de-identified] LTAC  Follow Up Appointment: Best Day/Time : Friday AM  One/Two Story Residence: One story  History of falls?: No  White Board Notes /Social Work Whiteboard Notes  /Social Work Whiteboard: ; [id number] with spouse and possible HH v [facility] referral for HF Oxygen weaning, IV AB    Discharge Plan [facility]  lives home w spouse/POA Neoma Larsen independently as PTA when medically cleared; full LTACH eval, has [facility] home oxygen 3L ATC; collaborated w Attending, Yoly Adorno, [facility] Liaison, Clair Leahy RN  Discharge Milestones and Delays: Bed availability  Covid 19 Pneumonia. HF 70% FIO2/60L, IV AB, IV Steroids continued    [facility] accepted client; collaborated w Attending, patient, Yoly Adorno, [address] Liaison    SIGNED:  George Fox RN   2022, 10:31 AM        22, 2:13 PM EDT    Patient goals/plan/ treatment preferences discussed by  and . Patient goals/plan/ treatment preferences reviewed with patient/ family. Patient/ family verbalize understanding of discharge plan and are in agreement with goal/plan/treatment preferences. Understanding was demonstrated using the teach back method.   AVS provided by RN at time of discharge, which includes all necessary medical information pertaining to the patients current course of illness, treatment, post-discharge goals of care, and treatment preferences.      Services At/After Discharge: LTAC

## 2022-06-01 NOTE — PLAN OF CARE
Problem: Respiratory - Adult  Goal: Achieves optimal ventilation and oxygenation  6/1/2022 0446 by Lizbeth Grant RCP  Outcome: Progressing     Patient continues on high flow nasal cannula at this time, will continue to wean fio2 as tolerates.

## 2022-06-01 NOTE — DISCHARGE SUMMARY
Hospitalist Discharge Summary        Patient: Laney Zamudio  YOB: 1940  MRN: [id number]   Acct: [de-identified]    Primary Care Physician: MARTIN Olivo CNP    Admit date  5/29/2022    Discharge date:  6/1/2022 3:17 PM    Chief Complaint on presentation :-    Shortness of breath    Discharge Assessment and Plan:-   1. Acute on chronic hypoxic respiratory failure  2. COVID-19 pneumonia  a. This patient's first rehospitalization after being discharged recently with COVID-19 pneumonia on baseline oxygen requirements of 3 L.  b. He was started on broad-spectrum antibiotics with vancomycin and cefepime. Vancomycin discontinued once MRSA PCR was negative. c. Patient also continued on azithromycin. d. Patient started on Decadron therapy and will continue for the time being.  e. Remains on heated high flow nasal cannula. FiO2 50% at 60 L maintaining oxygen saturations of 93-94% at rest.  Patient is being discharged to [place] [address], for further weaning from high flow. Jaida Contreras with Dr. Riki Flowers infectious disease. Patient did require standard precautions rather than droplet plus precautions at this time given the onset of his symptomatology and testing. Chronic conditions:  g. Persistent atrial fibrillation  h. Hypertension  i. Hyperlipidemia  j. CAD status post PCI 11/2019  k. Chronic HFpEF, not in acute exacerbation  l. CKD stage III  m. History of CVA  n. History of DVT  o. Obesity    Initial H and P and Hospital course:-  Patient is an 50-year-old male with medical history of chronic hypoxic respiratory failure on 3 L nasal cannula, history of DVT on Eliquis, and history of HFpEF who presents back to the hospital after recently being hospitalized and treated for COVID-19 pneumonia. During his prior hospitalization, the patient received Rocephin and azithromycin as well as baricitinib and Decadron therapy.   He was discharged home with a prescription for Decadron as well as supplemental oxygen at 3 L which was his baseline. He presented back to the hospital with worsening shortness of breath and worsening hypoxia. He was placed on heated high flow nasal cannula and started on broad-spectrum antibiotic therapy and continued on Decadron therapy. Vancomycin was discontinued once his MRSA PCR was negative and cefepime and azithromycin were continued. The patient was weaned down to heated high flow nasal cannula 50% FiO2 and 60 L to maintain O2 saturations were 9394% at rest.  Patient is being discharged to [facility] for further weaning from his heated high flow nasal cannula. Physical Exam:-  Vitals:   Patient Vitals for the past 24 hrs:   BP Temp Temp src Pulse Resp SpO2   06/01/22 1451 -- 98.1 °F (36.7 °C) Axillary 59 18 94 %   06/01/22 1159 -- -- -- 56 18 95 %   06/01/22 1151 (!) 127/56 98 °F (36.7 °C) Oral 62 20 96 %   06/01/22 0830 (!) 122/55 -- -- 73 -- --   06/01/22 0800 -- 98.3 °F (36.8 °C) Oral 66 18 96 %   06/01/22 0445 123/74 97.6 °F (36.4 °C) Oral 67 18 93 %   06/01/22 0214 122/72 97.7 °F (36.5 °C) Oral 62 16 94 %   06/01/22 0106 -- -- -- -- 22 94 %   05/31/22 2130 120/68 97.4 °F (36.3 °C) Oral -- 15 93 %   05/31/22 1532 129/67 98.1 °F (36.7 °C) Oral 75 22 95 %   05/31/22 1522 -- -- -- -- -- 95 %     Weight:   Weight: 255 lb (115.7 kg)   24 hour intake/output:     Intake/Output Summary (Last 24 hours) at 6/1/2022 1517  Last data filed at 6/1/2022 1451  Gross per 24 hour   Intake 880 ml   Output 650 ml   Net 230 ml       1. General appearance: No apparent distress, appears stated age and cooperative. 2. HEENT: Normal cephalic, atraumatic without obvious deformity. Pupils equal, round, and reactive to light. Extra ocular muscles intact. Conjunctivae/corneas clear. 3. Neck: Supple, with full range of motion. No jugular venous distention. Trachea midline. 4. Respiratory:  Normal respiratory effort.  Clear to auscultation, bilaterally without Rales/Wheezes/Rhonchi. 5. Cardiovascular: Irregularly irregular rhythm. Normal rate. Normal S1/S2 without murmurs, rubs or gallops. 6. Abdomen: Soft, non-tender, non-distended with normal bowel sounds. 7. Musculoskeletal:  No clubbing, cyanosis or edema bilaterally. 8. Skin: Skin color, texture, turgor normal.  No rashes or lesions. 9. Neurologic:  Neurovascularly intact without any focal sensory/motor deficits. Cranial nerves: II-XII intact, grossly non-focal.  10. Psychiatric: Alert and oriented, thought content appropriate, normal insight  11. Capillary Refill: Brisk,< 3 seconds   12. Peripheral Pulses: +2 palpable, equal bilaterally       Discharge Medications:-      Medication List      START taking these medications    cefepime  infusion  Commonly known as: MAXIPIME  Infuse 1,000 mg intravenously every 8 hours for 7 days Compound per protocol     dexamethasone 4 MG/ML injection  Commonly known as: DECADRON  Infuse 2.5 mLs intravenously daily  Start taking on: June 2, 2022        Ina Coad taking these medications    acetaminophen 500 MG tablet  Commonly known as: TYLENOL     albuterol sulfate  (90 Base) MCG/ACT inhaler  Commonly known as: Ventolin HFA  Inhale 2 puffs into the lungs every 4 hours as needed for Wheezing or Shortness of Breath     amLODIPine 5 MG tablet  Commonly known as: NORVASC  TAKE 1 TABLET BY MOUTH  DAILY     APPLE CIDER VINEGAR PO     atorvastatin 40 MG tablet  Commonly known as: LIPITOR  TAKE 1 TABLET BY MOUTH AT  NIGHT     benzonatate 100 MG capsule  Commonly known as: TESSALON  Take 1 capsule by mouth 3 times daily as needed for Cough     blood glucose test strips  Test 4 times a day & as needed for symptoms of irregular blood glucose. Dispense sufficient amount for indicated testing frequency plus additional to accommodate PRN testing needs.      CINNAMON PO     clopidogrel 75 MG tablet  Commonly known as: PLAVIX  TAKE 1 TABLET BY MOUTH  DAILY     Co Q-10 100 MG Caps Blood, Urine LARGE (A) NEGATIVE    pH, UA 5.0 5.0 - 9.0    Protein, UA 30 (A) NEGATIVE mg/dl    Urobilinogen, Urine 0.2 0.0 - 1.0 eu/dl    Nitrite, Urine NEGATIVE NEGATIVE    Leukocyte Esterase, Urine NEGATIVE NEGATIVE    Color, UA YELLOW YELLOW-STRAW    Character, Urine CLEAR CLR-SL.CLOUD    RBC, UA > 100 0-2/hpf /hpf    WBC, UA 2-4 0-4/hpf /hpf    Epithelial Cells, UA 0-2 3-5/hpf /hpf    Bacteria, UA NONE SEEN FEW/NONE SEEN    Casts NONE SEEN NONE SEEN /lpf    Crystals NONE SEEN NONE SEEN    Renal Epithelial, UA NONE SEEN NONE SEEN    Yeast, UA NONE SEEN NONE SEEN    Casts NONE SEEN /lpf    Miscellaneous Lab Test Result NONE SEEN    Strep Pneumoniae Antigen    Collection Time: 05/29/22 11:30 PM    Specimen: Urine, clean catch   Result Value Ref Range    Strep pneumo Ag, Ur NEGATIVE    Legionella antigen, urine    Collection Time: 05/29/22 11:30 PM    Specimen: Urine   Result Value Ref Range    Legionella Pneumophilia Ag, Urine NEGATIVE NEG   Basic Metabolic Panel w/ Reflex to MG    Collection Time: 05/30/22  5:30 AM   Result Value Ref Range    Sodium 135 135 - 145 meq/L    Potassium reflex Magnesium 5.1 3.5 - 5.2 meq/L    Chloride 95 (L) 98 - 111 meq/L    CO2 28 23 - 33 meq/L    Glucose 145 (H) 70 - 108 mg/dL    BUN 36 (H) 7 - 22 mg/dL    CREATININE 1.4 (H) 0.4 - 1.2 mg/dL    Calcium 8.8 8.5 - 10.5 mg/dL   CBC with Auto Differential    Collection Time: 05/30/22  5:30 AM   Result Value Ref Range    WBC 10.8 4.8 - 10.8 thou/mm3    RBC 4.73 4.70 - 6.10 mill/mm3    Hemoglobin 15.5 14.0 - 18.0 gm/dl    Hematocrit 46.5 42.0 - 52.0 %    MCV 98.3 (H) 80.0 - 94.0 fL    MCH 32.8 26.0 - 33.0 pg    MCHC 33.3 32.2 - 35.5 gm/dl    RDW-CV 13.4 11.5 - 14.5 %    RDW-SD 48.7 (H) 35.0 - 45.0 fL    Platelets 883 664 - 129 thou/mm3    MPV 10.3 9.4 - 12.4 fL    Seg Neutrophils 83.2 %    Lymphocytes 7.7 %    Monocytes 6.2 %    Eosinophils 0.6 %    Basophils 0.3 %    Immature Granulocytes 2.0 %    Segs Absolute 9.0 (H) 1.8 - 7.7 thou/mm3    Lymphocytes Absolute 0.8 (L) 1.0 - 4.8 thou/mm3    Monocytes Absolute 0.7 0.4 - 1.3 thou/mm3    Eosinophils Absolute 0.1 0.0 - 0.4 thou/mm3    Basophils Absolute 0.0 0.0 - 0.1 thou/mm3    Immature Grans (Abs) 0.22 (H) 0.00 - 0.07 thou/mm3    nRBC 0 /100 wbc   C-Reactive Protein    Collection Time: 05/30/22  5:30 AM   Result Value Ref Range    CRP 9.71 (H) 0.00 - 1.00 mg/dl   Anion Gap    Collection Time: 05/30/22  5:30 AM   Result Value Ref Range    Anion Gap 12.0 8.0 - 16.0 meq/L   Glomerular Filtration Rate, Estimated    Collection Time: 05/30/22  5:30 AM   Result Value Ref Range    Est, Glom Filt Rate 48 (A) ml/min/1.73m2   POCT glucose    Collection Time: 05/30/22  7:40 AM   Result Value Ref Range    POC Glucose 128 (H) 70 - 108 mg/dl   SPECIMEN REJECTION    Collection Time: 05/30/22 10:53 AM   Result Value Ref Range    Rejected Test sputum culture     Reason for Rejection see below    POCT glucose    Collection Time: 05/30/22 11:19 AM   Result Value Ref Range    POC Glucose 200 (H) 70 - 108 mg/dl   POCT glucose    Collection Time: 05/30/22  4:09 PM   Result Value Ref Range    POC Glucose 241 (H) 70 - 108 mg/dl   POCT glucose    Collection Time: 05/30/22  9:57 PM   Result Value Ref Range    POC Glucose 280 (H) 70 - 108 mg/dl   Vancomycin Level, Random    Collection Time: 05/31/22  5:06 AM   Result Value Ref Range    Vancomycin Rm 9.0 0.1 - 39.9 ug/mL   CBC    Collection Time: 05/31/22  5:06 AM   Result Value Ref Range    WBC 9.0 4.8 - 10.8 thou/mm3    RBC 4.79 4.70 - 6.10 mill/mm3    Hemoglobin 15.8 14.0 - 18.0 gm/dl    Hematocrit 47.4 42.0 - 52.0 %    MCV 99.0 (H) 80.0 - 94.0 fL    MCH 33.0 26.0 - 33.0 pg    MCHC 33.3 32.2 - 35.5 gm/dl    RDW-CV 12.9 11.5 - 14.5 %    RDW-SD 47.0 (H) 35.0 - 45.0 fL    Platelets 431 473 - 013 thou/mm3    MPV 10.5 9.4 - 12.4 fL   Basic Metabolic Panel    Collection Time: 05/31/22  5:06 AM   Result Value Ref Range    Sodium 132 (L) 135 - 145 meq/L    Potassium 4.7 3.5 - 5.2 meq/L    Chloride 98 98 - 111 meq/L    CO2 21 (L) 23 - 33 meq/L    Glucose 250 (H) 70 - 108 mg/dL    BUN 37 (H) 7 - 22 mg/dL    CREATININE 1.2 0.4 - 1.2 mg/dL    Calcium 8.6 8.5 - 10.5 mg/dL   C-Reactive Protein    Collection Time: 05/31/22  5:06 AM   Result Value Ref Range    CRP 6.40 (H) 0.00 - 1.00 mg/dl   Anion Gap    Collection Time: 05/31/22  5:06 AM   Result Value Ref Range    Anion Gap 13.0 8.0 - 16.0 meq/L   Glomerular Filtration Rate, Estimated    Collection Time: 05/31/22  5:06 AM   Result Value Ref Range    Est, Glom Filt Rate 58 (A) ml/min/1.73m2   POCT glucose    Collection Time: 05/31/22  7:40 AM   Result Value Ref Range    POC Glucose 218 (H) 70 - 108 mg/dl   VRE Screen by PCR    Collection Time: 05/31/22 11:55 AM    Specimen: Rectal Swab   Result Value Ref Range    Vancomycin Resistant Enterococcus NEGATIVE    MRSA by PCR    Collection Time: 05/31/22 11:55 AM   Result Value Ref Range    MRSA SCREEN RT-PCR NEGATIVE    POCT glucose    Collection Time: 05/31/22 12:11 PM   Result Value Ref Range    POC Glucose 334 (H) 70 - 108 mg/dl   POCT glucose    Collection Time: 05/31/22  4:33 PM   Result Value Ref Range    POC Glucose 355 (H) 70 - 108 mg/dl   POCT glucose    Collection Time: 05/31/22  9:34 PM   Result Value Ref Range    POC Glucose 267 (H) 70 - 108 mg/dl   CBC    Collection Time: 06/01/22  4:44 AM   Result Value Ref Range    WBC 21.4 (H) 4.8 - 10.8 thou/mm3    RBC 4.69 (L) 4.70 - 6.10 mill/mm3    Hemoglobin 15.9 14.0 - 18.0 gm/dl    Hematocrit 45.6 42.0 - 52.0 %    MCV 97.2 (H) 80.0 - 94.0 fL    MCH 33.9 (H) 26.0 - 33.0 pg    MCHC 34.9 32.2 - 35.5 gm/dl    RDW-CV 12.9 11.5 - 14.5 %    RDW-SD 45.9 (H) 35.0 - 45.0 fL    Platelets 506 040 - 564 thou/mm3    MPV 12.7 (H) 9.4 - 12.4 fL   Basic Metabolic Panel    Collection Time: 06/01/22  4:44 AM   Result Value Ref Range    Sodium 132 (L) 135 - 145 meq/L    Potassium 4.9 3.5 - 5.2 meq/L    Chloride 98 98 - 111 meq/L    CO2 23 23 - 33 meq/L Glucose 222 (H) 70 - 108 mg/dL    BUN 41 (H) 7 - 22 mg/dL    CREATININE 1.3 (H) 0.4 - 1.2 mg/dL    Calcium 8.5 8.5 - 10.5 mg/dL   C-Reactive Protein    Collection Time: 06/01/22  4:44 AM   Result Value Ref Range    CRP 3.14 (H) 0.00 - 1.00 mg/dl   Anion Gap    Collection Time: 06/01/22  4:44 AM   Result Value Ref Range    Anion Gap 11.0 8.0 - 16.0 meq/L   Glomerular Filtration Rate, Estimated    Collection Time: 06/01/22  4:44 AM   Result Value Ref Range    Est, Glom Filt Rate 53 (A) ml/min/1.73m2   POCT glucose    Collection Time: 06/01/22  7:52 AM   Result Value Ref Range    POC Glucose 193 (H) 70 - 108 mg/dl   POCT glucose    Collection Time: 06/01/22 11:48 AM   Result Value Ref Range    POC Glucose 287 (H) 70 - 108 mg/dl        Microbiology:    Blood culture #1:   Lab Results   Component Value Date    BC No growth-preliminary  05/29/2022       Blood culture #2:No results found for: BLOODCULT2    Organism:  No results found for: LABGRAM    MRSA culture only:No results found for: [facility]    Urine culture: No results found for: LABURIN  No results found for: ORG     Respiratory culture: No results found for: CULTRESP    Aerobic and Anaerobic :  No results found for: LABAERO  No results found for: LABANAE    Urinalysis:      Lab Results   Component Value Date    NITRU NEGATIVE 05/29/2022    WBCUA 2-4 05/29/2022    BACTERIA NONE SEEN 05/29/2022    RBCUA > 100 05/29/2022    BLOODU LARGE 05/29/2022    SPECGRAV >1.030 05/29/2022    GLUCOSEU NEGATIVE 05/06/2021       Radiology:-  CTA Chest W WO Contrast    Result Date: 5/29/2022  PROCEDURE: CTA CHEST W WO CONTRAST CLINICAL INFORMATION: Shortness of breath, hypoxia TECHNIQUE: CTA of the chest was performed following administration of 80 mL Isovue-370 intravenous contrast. Axial images as well as coronal and sagittal MIP reconstructions were obtained.  All CT scans at this facility use dose modulation, iterative reconstruction, and/or weight-based dosing when appropriate to electronically signed by DR Tomi Montoya on 5/29/2022 10:40 AM       Follow-up scheduled after discharge :-    in 1-2 weeks with MARTIN Cleaning CNP      Consultations during this hospital stay:-  [] NONE [] Cardiology  [] Nephrology  [] Hemo onco   [] GI   [] ID  [] Endocrine  [] Pulm    [] Neuro    [] Psych   [] Urology  [] ENT   [] G SURGERY   []Ortho    []CV surg    [] Palliative  [] Hospice [] Pain management   []    []TCU   [] PT/OT  OTHERS:-    Disposition: long term acute care facility  Condition at Discharge: Stable    Time Spent:- 40 minutes    Electronically signed by Esteban Panchal DO on 6/1/22 at 3:17 PM EDT   Discharging Hospitalist

## 2022-06-01 NOTE — DISCHARGE INSTR - COC
Continuity of Care Form    Patient Name: Haleigh Haines   :  1940  MRN:  [id number]    Admit date:  2022  Discharge date:  ***    Code Status Order: Limited   Advance Directives:      Admitting Physician:  Carlos Willson MD  PCP: MARTIN Dougherty CNP    Discharging Nurse: [facility] Unit/Room#: 4K-12/012-A  Discharging Unit Phone Number: ***    Emergency Contact:   Extended Emergency Contact Information  Primary Emergency Contact: Omi Acosta [facility]  Address: [address] [place] [address] Phone: [phone]  Relation: Spouse  Secondary Emergency Contact: Miller Alvarez  Address: [place]ie Jean [address] [place] [address] Phone: [phone]  Relation: Child    Past Surgical History:  Past Surgical History:   Procedure Laterality Date    ABDOMEN SURGERY      AAA    ABDOMINAL AORTIC ANEURYSM REPAIR  2005    CARDIAC SURGERY  , C/Mohinder Reece      AAA repair    CAROTID ENDARTERECTOMY  2006    FINGER AMPUTATION      JOINT REPLACEMENT  2007    knees bilateral    KNEE SURGERY Left     PARTIAL NEPHRECTOMY  2010    Right    PTCA      [facility]    VASCULAR SURGERY         Immunization History:   Immunization History   Administered Date(s) Administered    DTP 1999    DTaP (Infanrix) 2014    Influenza Virus Vaccine 2014, 2015    Influenza Whole 10/01/2009    Influenza, High-dose, Quadv, 65 yrs +, IM (Fluzone) 2016, 2018, 10/12/2018, 2019    Pneumococcal Conjugate 7-valent (Ashli Simpson) 2006    Tetanus Toxoid, absorbed 2014    Zoster Live (Zostavax) 09/15/2014       Active Problems:  Patient Active Problem List   Diagnosis Code    CAD (coronary artery disease) I25.10    Obesity E66.9    Hypertension I10    Hyperlipidemia E78.5    AAA (abdominal aortic aneurysm) ([facility] [address] I71.4    Renal cancer ([facility] [address] C64.9    Renal calculus N20.0    CKD (chronic kidney disease) stage 3, GFR 30-59 ml/min (HCC) N18.30    BENJAMIN (obstructive sleep apnea) can't tolerate CPAP G47.33    Paroxysmal atrial fibrillation (HCC) I48.0    Unstable angina ([facility]) I20.0    Dyspnea R06.00    Hypoxia R09.02    Acute hypoxemic respiratory failure (HCC) J96.01       Isolation/Infection:   Isolation            Droplet Plus          Patient Infection Status       Infection Onset Added Last Indicated Last Indicated By Review Planned Expiration Resolved Resolved By    COVID-19 05/18/22 05/29/22 05/29/22 Saad Velasco RN 06/04/22 06/06/22      + 5/15 at home, + here 5/18, readmit 5/29- severe    Resolved    COVID-19 05/15/22 05/18/22 05/18/22 COVID-19, Rapid   05/29/22 Saad Velasco RN    +5/15, admit 5/18    COVID-19 (Rule Out) 05/18/22 05/18/22 05/18/22 COVID-19, Rapid (Ordered)   05/18/22 Rule-Out Test Resulted    COVID-19 (Rule Out) 05/06/21 05/06/21 05/06/21 COVID-19, Rapid (Ordered)   05/06/21 Rule-Out Test Resulted            Nurse Assessment:  Last Vital Signs: BP (!) 127/56   Pulse 59   Temp 98.1 °F (36.7 °C) (Axillary)   Resp 18   Ht 5' 8\" (1.727 m)   Wt 255 lb (115.7 kg)   SpO2 94%   BMI 38.77 kg/m²     Last documented pain score (0-10 scale):    Last Weight:   Wt Readings from Last 1 Encounters:   05/29/22 255 lb (115.7 kg)     Mental Status:  {IP PT MENTAL STATUS:20030}    IV Access:  {[facility] [place]ESS:340138923}    Nursing Mobility/ADLs:  Walking   {[facility] DME YBQX:538326448}  Transfer  {[facility] DME KVIE:329037259}  Bathing  {[facility] DME HFMS:118688211}  Dressing  {[facility] DME FSGR:452531475}  Toileting  {[facility] DME QUUD:738197766}  Feeding  {[facility] DME CYXJ:317097439}  Med Admin  {P DME ELHC:094520614}  Med Delivery   {[facility] MED Delivery:519934550}    Wound Care Documentation and Therapy:        Elimination:  Continence:    Bowel: {YES / ED:80134}  Bladder: {YES / ER:20746}  Urinary Catheter: {Urinary Catheter:396222968}   Colostomy/Ileostomy/Ileal Conduit: {YES / VV:59303}       Date of Last BM: ***    Intake/Output Summary (Last 24 hours) at 2022 1506  Last data filed at 2022 1451  Gross per 24 hour   Intake 880 ml   Output 650 ml   Net 230 ml     I/O last 3 completed shifts: In: 36 [P.O.:760]  Out: 1800 [Urine:1800]    Safety Concerns:     [employer] Safety Concerns:999735735}    Impairments/Disabilities:      [employer] Impairments/Disabilities:255411372}    Nutrition Therapy:  Current Nutrition Therapy:   [employer] Diet List:944078342}    Routes of Feeding: {[facility] DME Other Feedings:660596045}  Liquids: {Slp liquid thickness:47871}  Daily Fluid Restriction: {[facility] DME Yes amt example:112040122}  Last Modified Barium Swallow with Video (Video Swallowing Test): {Done Not Done FKXX:091179037}    Treatments at the Time of Hospital Discharge:   Respiratory Treatments: ***  Oxygen Therapy:  {Therapy; copd oxygen:70596}  Ventilator:    { CC Vent GREK:718044907}    Rehab Therapies: {THERAPEUTIC INTERVENTION:9179320206}  Weight Bearing Status/Restrictions: [facility] Weight Bearin}  Other Medical Equipment (for information only, NOT a DME order):  {EQUIPMENT:267328052}  Other Treatments: ***    Patient's personal belongings (please select all that are sent with patient):  {[facility] DME Belongings:832091111}    RN SIGNATURE:  {Esignature:715000261}    CASE MANAGEMENT/SOCIAL WORK SECTION    Inpatient Status Date: ***    Readmission Risk Assessment Score:  Readmission Risk              Risk of Unplanned Readmission:  27           Discharging to Facility/ Agency   Name:   Address:  Phone:  Fax:    Dialysis Facility (if applicable)   Name:  Address:  Dialysis Schedule:  Phone:  Fax:    / signature: {Esignature:873162294}    PHYSICIAN SECTION    Prognosis: Fair    Condition at Discharge: Stable    Rehab Potential (if transferring to Rehab):  Fair    Recommended Labs or Other Treatments After Discharge: Continue COVID-19 treatment interventions    Physician Certification: I certify the above information and transfer of Yolette Colón  is necessary for the continuing treatment of the diagnosis listed and that he requires LTAC for less 30 days.      Update Admission H&P: No change in H&P    PHYSICIAN SIGNATURE:  Electronically signed by Lorette Epley, DO on 6/1/22 at 3:06 PM EDT

## 2022-06-01 NOTE — PATIENT INSTRUCTIONS
We will schedule your CT scan to monitor your AAA for roughly May 9th, 2022. Please have your labwork done the week prior to this. Quality 130: Documentation Of Current Medications In The Medical Record: Current Medications Documented Quality 431: Preventive Care And Screening: Unhealthy Alcohol Use - Screening: Patient not identified as an unhealthy alcohol user when screened for unhealthy alcohol use using a systematic screening method Detail Level: Detailed Quality 226: Preventive Care And Screening: Tobacco Use: Screening And Cessation Intervention: Patient screened for tobacco use and is an ex/non-smoker

## 2022-06-04 LAB
BLOOD CULTURE, ROUTINE: NORMAL
BLOOD CULTURE, ROUTINE: NORMAL

## 2022-06-14 ENCOUNTER — TELEPHONE (OUTPATIENT)
Dept: CARDIOLOGY CLINIC | Age: 82
End: 2022-06-14

## 2022-06-14 NOTE — TELEPHONE ENCOUNTER
Dakotah called to make a f/u with VINITA Hill 06-14 to home, dx=atrial fib with plauses, no soon openings with Liz, jesus schedule with CNP, Liz wants to see his own patients.

## 2022-07-07 ENCOUNTER — OFFICE VISIT (OUTPATIENT)
Dept: CARDIOLOGY CLINIC | Age: 82
End: 2022-07-07
Payer: MEDICARE

## 2022-07-07 VITALS
WEIGHT: 246 LBS | BODY MASS INDEX: 37.28 KG/M2 | SYSTOLIC BLOOD PRESSURE: 126 MMHG | HEIGHT: 68 IN | HEART RATE: 72 BPM | DIASTOLIC BLOOD PRESSURE: 73 MMHG

## 2022-07-07 DIAGNOSIS — I48.19 PERSISTENT ATRIAL FIBRILLATION (HCC): Primary | ICD-10-CM

## 2022-07-07 PROCEDURE — 1123F ACP DISCUSS/DSCN MKR DOCD: CPT | Performed by: INTERNAL MEDICINE

## 2022-07-07 PROCEDURE — 99214 OFFICE O/P EST MOD 30 MIN: CPT | Performed by: INTERNAL MEDICINE

## 2022-07-07 PROCEDURE — 1036F TOBACCO NON-USER: CPT | Performed by: INTERNAL MEDICINE

## 2022-07-07 PROCEDURE — G8417 CALC BMI ABV UP PARAM F/U: HCPCS | Performed by: INTERNAL MEDICINE

## 2022-07-07 PROCEDURE — G8427 DOCREV CUR MEDS BY ELIG CLIN: HCPCS | Performed by: INTERNAL MEDICINE

## 2022-07-07 RX ORDER — DOXYCYCLINE HYCLATE 100 MG/1
CAPSULE ORAL
COMMUNITY
Start: 2022-07-05 | End: 2022-08-30

## 2022-07-07 RX ORDER — HYDROXYZINE HYDROCHLORIDE 25 MG/1
TABLET, FILM COATED ORAL
COMMUNITY
Start: 2022-06-29 | End: 2022-08-30

## 2022-07-07 RX ORDER — PREDNISONE 20 MG/1
TABLET ORAL
COMMUNITY
Start: 2022-06-15 | End: 2022-08-30

## 2022-07-07 NOTE — PROGRESS NOTES
[address] [facility] [address] [facility] [address]  Dept: [phone]  Dept Fax: [phone]  Loc: [phone]    Visit Date: 7/7/2022    Mr. Ian Duron is a 79 y.o. male  who presented for:    Post admission at [facility]  A Fib, pauses     HPI:   HPI   Ignacio Serna is a pleasant 79year old male patient who  has a past medical history of AAA (abdominal aortic aneurysm) ([address] CAD (coronary artery disease), Cancer ([address] Cancer of kidney ([address] Heart attack ([address] History of placement of chest tube, Hx of blood clots, Hyperlipidemia, Hypertension, Kidney stones, Obesity, Osteoarthritis, and Stroke ([address] He had prior CEA, AAA repair. In 11/2019, he was found to have severe ISR in RCA, underwent successful PCI. Nuclear stress test 5/2021 was negative for ischemia. Chest CTA that was done 5/2021 while patient was hospitalized revealed distal thoracic aorta aneurysm, diameter 4.7 cm (stable on chest CTA on 5/2022). Cardiac monitor on 12/2021 revealed persistent atrial fibrillaiton in 100% with minimum heart rate 38, average heart rate 66, maximum heart rate of 96. On 5/2022, patient was admitted to [address] [place] [address] for COVID PNA, respiratory failure. He was subsequently transferred to [place]. While at [address] [facility] [employer], cardiology was consulted for A Fib with evidence for 2-2.5 second pauses. Cardiology recommended stopping AV blocking agents, continuing Eliquis. Echo 5/2022 revealed mild LVH, EF 55%. He feels tired, but reports gradual improvement. He was started on low dose metoprolol by his PCP for increased HR. Reports good tolerance. Patient denies chest pain. He is planning to start physical therapy. Chest CTA 5/2022     Impression   1. No pulmonary embolism. 2. Bilateral pneumonia.    3. 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       Final report electronically signed by Dr. Javier Taveras on 5/29/2022 3:30 PM             Current Outpatient Medications:     dexamethasone (DECADRON) 4 MG/ML injection, Infuse 2.5 mLs intravenously daily, Disp: , Rfl:     guaiFENesin (MUCINEX) 600 MG extended release tablet, Take 1 tablet by mouth 2 times daily, Disp: , Rfl:     glucose monitoring (FREESTYLE FREEDOM) kit, 1 kit by Does not apply route daily (Patient not taking: Reported on 5/29/2022), Disp: 1 kit, Rfl: 0    Lancets MISC, 1 each by Does not apply route 4 times daily (Patient not taking: Reported on 5/29/2022), Disp: 200 each, Rfl: 0    blood glucose monitor strips, Test 4 times a day & as needed for symptoms of irregular blood glucose. Dispense sufficient amount for indicated testing frequency plus additional to accommodate PRN testing needs. (Patient not taking: Reported on 5/29/2022), Disp: 300 strip, Rfl: 0    Gauze Pads & Dressings 2\"X2\" PADS, 1 each by Does not apply route daily as needed (finger sticks) (Patient not taking: Reported on 5/29/2022), Disp: 60 each, Rfl: 0    Insulin Pen Needle ([employer] PEN NEEDLES 29G) 29G X 12MM MISC, 1 each by Does not apply route daily (Patient not taking: Reported on 5/29/2022), Disp: 100 each, Rfl: 3    glucagon 1 MG injection, Inject 1 mg into the muscle See Admin Instructions Follow package directions for low blood sugar.  (Patient not taking: Reported on 5/29/2022), Disp: 1 kit, Rfl: 3    albuterol sulfate HFA (VENTOLIN HFA) 108 (90 Base) MCG/ACT inhaler, Inhale 2 puffs into the lungs every 4 hours as needed for Wheezing or Shortness of Breath, Disp: 54 g, Rfl: 1    insulin lispro, 1 Unit Dial, (HUMALOG KWIKPEN) 100 UNIT/ML SOPN, Sliding scale (Patient not taking: Reported on 5/29/2022), Disp: 1 pen, Rfl: 1    furosemide (LASIX) 20 MG tablet, TAKE 1 TABLET BY MOUTH  DAILY, Disp: 90 tablet, Rfl: 0    ELIQUIS 2.5 MG TABS tablet, TAKE 1 TABLET BY MOUTH  TWICE DAILY, Disp: 180 tablet, Rfl: 2    amLODIPine (NORVASC) 5 MG tablet, TAKE 1 TABLET BY MOUTH  DAILY, Disp: 90 tablet, Rfl: 1   atorvastatin (LIPITOR) 40 MG tablet, TAKE 1 TABLET BY MOUTH AT  NIGHT, Disp: 90 tablet, Rfl: 2    metoprolol tartrate (LOPRESSOR) 25 MG tablet, Take 12.5 mg by mouth 2 times daily , Disp: , Rfl:     Zinc Sulfate (ZINC 15 PO), Take by mouth, Disp: , Rfl:     Turmeric Curcumin 500 MG CAPS, Take by mouth, Disp: , Rfl:     Omega-3 Fatty Acids (FISH OIL PO), Take by mouth, Disp: , Rfl:     clopidogrel (PLAVIX) 75 MG tablet, TAKE 1 TABLET BY MOUTH  DAILY, Disp: 90 tablet, Rfl: 3    OXYGEN, Inhale 3 L into the lungs nightly, Disp: , Rfl:     acetaminophen (TYLENOL) 500 MG tablet, Take 1,000 mg by mouth every 6 hours as needed for Pain, Disp: , Rfl:     Coenzyme Q10 (CO Q-10) 100 MG CAPS, Take by mouth, Disp: , Rfl:     Cholecalciferol (VITAMIN D3) 125 MCG (5000 UT) TABS, Take by mouth, Disp: , Rfl:     APPLE CIDER VINEGAR PO, Take 450 mg by mouth, Disp: , Rfl:     pantoprazole (PROTONIX) 40 MG tablet, TAKE 1 TABLET BY MOUTH  DAILY, Disp: 90 tablet, Rfl: 3    PARoxetine (PAXIL) 20 MG tablet, Take 20 mg by mouth every morning, Disp: , Rfl:     Magnesium 500 MG TABS, Take by mouth daily , Disp: , Rfl:     CINNAMON PO, Take 1,000 mg by mouth daily, Disp: , Rfl:     Probiotic Product (PROBIOTIC DAILY PO), Take by mouth daily, Disp: , Rfl:     Ascorbic Acid (VITAMIN C) 1000 MG tablet, Take 1,000 mg by mouth daily , Disp: , Rfl:     Past Medical History  Deuce Yanez  has a past medical history of AAA (abdominal aortic aneurysm) ([facility] CAD (coronary artery disease), Cancer ([facility] Cancer of kidney ([facility] [address] Heart attack ([facility] [address] History of placement of chest tube, Hx of blood clots, Hyperlipidemia, Hypertension, Kidney stones, Obesity, Osteoarthritis, and Stroke ([facility] [address] Social History  Deuce Yanez  reports that he quit smoking about 52 years ago. His smoking use included pipe. He started smoking about 67 years ago. He has a 15.00 pack-year smoking history.  He has never used smokeless tobacco. He reports that he does not drink alcohol and does not use drugs. Family History  Blanka Gaines family history includes Alzheimer's Disease in his mother; Heart Disease in his father. Past Surgical History   Past Surgical History:   Procedure Laterality Date    ABDOMINAL AORTIC ANEURYSM REPAIR  2005    ABDOMINAL SURGERY      AAA   [facility]ARDIAC SURGERY  2008, [facility]  2005    AAA repair    CAROTID ENDARTERECTOMY  2006    FINGER AMPUTATION      JOINT REPLACEMENT  2007    knees bilateral    KNEE SURGERY Left 6-2013    PARTIAL NEPHRECTOMY  2010    Right    PTCA      SHOULDER SURGERY  1997    TONSILLECTOMY  1948    VASCULAR SURGERY         Review of Systems   Constitutional: Negative for chills and fever  HENT: Negative for congestion, sinus pressure, sneezing and sore throat. Eyes: Negative for pain, discharge, redness and itching. Respiratory: Negative for apnea, cough  Gastrointestinal: Negative for blood in stool, constipation, diarrhea   Endocrine: Negative for cold intolerance, heat intolerance, polydipsia. Genitourinary: Negative for dysuria, enuresis, flank pain and hematuria. Musculoskeletal: Negative for arthralgias, joint swelling and neck pain. Neurological: Negative for numbness and headaches. Psychiatric/Behavioral: Negative for agitation, confusion, decreased concentration and dysphoric mood. Objective: There were no vitals taken for this visit. Wt Readings from Last 3 Encounters:   05/29/22 255 lb (115.7 kg)   05/18/22 255 lb (115.7 kg)   03/09/22 251 lb 14.4 oz (114.3 kg)     BP Readings from Last 3 Encounters:   06/01/22 126/74   05/21/22 126/77   05/13/22 (!) 162/75       Nursing note and vitals reviewed. Physical Exam   Constitutional: Oriented to person, place, and time. Appears well-developed and well-nourished. ENT: Moist mucous membranes. No bleeding. Tongue is midline. Head: Normocephalic and atraumatic.    Eyes: EOM are normal. Pupils are equal, round, and reactive INR 0.93 11/27/2019    INR 0.94 01/31/2012         Lab Results   Component Value Date/Time    LABA1C 7.8 06/13/2022 07:20 AM       Lab Results   Component Value Date/Time    TRIG 179 01/20/2022 08:55 AM    HDL 32 01/20/2022 08:55 AM    LDLCALC 69 01/20/2022 08:55 AM    LDLDIRECT 141 04/19/2018 08:41 AM    LABVLDL 36 01/20/2022 08:55 AM       Lab Results   Component Value Date/Time    TSH 0.494 06/03/2022 04:57 AM         Testing Reviewed:      I have individually reviewed the cardiac test below:    ECHO: Results for orders placed during the hospital encounter of 05/18/22    ECHO Complete 2D W Doppler W Color    Narrative  Transthoracic Echocardiography Report (TTE)    Demographics    Patient Name   Terence Tena  Gender              Male  G    MR #           [id number]       Race                    Ethnicity    Account #      [de-identified]       Room Number         0010    Accession      [id number]      Date of Study       05/20/2022  Number    Date of Birth  1940      Referring Physician EMEKA Harris APRN-CNP    Age            79 year(s)      Esther Lea, [facility], RVT    Interpreting        Vianca Sanchez MD  Physician    Procedure    Type of Study    TTE procedure:ECHOCARDIOGRAM COMPLETE 2D W DOPPLER W COLOR. Procedure Date  Date: 05/20/2022 Start: 04:29 PM    Study Location: Echo Lab  Technical Quality: Limited visualization    Indications:Elevated BNP. Additional Medical History:Coronary artery disease, Hypertension,  Hyperlipidemia, Renal cancer, Sleep apnea, Stroke. Patient Status: Routine    Height: 68 inches Weight: 255.01 pounds BSA: 2.27 m^2 BMI: 38.77 kg/m^2    BP: 118/60 mmHg    Allergies  - Tetracycline. Conclusions    Summary  Left ventricle size is normal.  Mild concentric left ventricular hypertrophy. There were no regional wall motion abnormalities.   Ejection fraction is visually estimated at Dimension: 3  AV Cusp Separation: 1.8 cmLA  Dimension: 4.5 cm                        Dimension: 5.1 cmAO Root  cm             LV Volume Diastolic: 87.1 Dimension: 3.3 cmLA Area: 17.2  LV FS:33.3 %   ml                        cm^2  LV PW          LV Volume Systolic: 35 ml  Diastolic: 1.1 LV EDV/LV EDV Index: 92.4  cm             ml/41 m^2LV ESV/LV ESV  Septum         Index: 35 ml/15 m^2       RV Diastolic Dimension: 3.4 cm  Diastolic: 1.3 EF Calculated: 62.1 %  cm                                       LA/Aorta: 1.55    LA volume/Index: 43.9 ml /19m^2    Doppler Measurements & Calculations    MV Peak E-Wave: 125 cm/s  AV Peak Velocity: 111 LVOT Peak Velocity: 94.6  MV Peak A-Wave: 59.6 cm/s cm/s                  cm/s  MV E/A Ratio: 2.1         AV Peak Gradient:     LVOT Peak Gradient: 4 mmHg  MV Peak Gradient: 6.25    4.93 mmHg  mmHg                                            TV Peak E-Wave: 62.4 cm/s    MV Deceleration Time: 172                       TV Peak Gradient: 1.56  msec                                            mmHg  AV P1/2t: 471 msec    TR Velocity:182 cm/s  IVRT: 61 msec         TR Gradient:13.25 mmHg  PV Peak Velocity: 49.7  MV E' Lateral Velocity:                         cm/s  9.7 cm/s                  AV DVI (Vmax):0.85    PV Peak Gradient: 0.99  MV A' Lateral Velocity:                         mmHg  5.4 cm/s  E/E' lateral: 12.89  MR Velocity: 443 cm/s    [email or web address] [id number]%3d    Holter Monitor:12/2021  CONCLUSION:   Atrial fibrillation , persistent   Patient was in atrial fibrillaiton in 100% of the study duration  Minimum heart rate 38, average heart rate 66, maximum heart rate of 96  Multiple pauses noted, longest fot 2.72 seconds  Premature ventricular complexes , occasional   One 5 beat run of nonsustained ventricular tachycrdia      Confirmed by Atul Cunha ([id number]) on 12/28/2021 4:00:15 PM     Stress Test:5/2021   Summary   There is mild attenuation artifact noted in the inferior wall seems to be   related to diaphragm artifact.   The nuclear images is not suggestive for myocardial ischemia.      Recommendation   Clinical correlation is recommended.      Signatures      ----------------------------------------------------------------   Electronically signed by Jose Jennings MD (Interpreting   Cardiologist) on 05/17/2021 at 14:20   ----------------------------------------------------------------        Cath:11/2019 11/29/19  FINDINGS:  HEMODYNAMICS:  LVEDP 15 mmHg.     LEFT VENTRICULOGRAM:  No significant wall motion abnormality noted. Ejection fraction estimated to be at 60%.     CORONARY ANGIOGRAM:  1.  RCA:  Right coronary artery was noticed to have severe 90% to 95%  in-stent restenosis within the proximal RCA.  Mid RCA has mild diffuse  disease.  Distal RCA with mild diffuse disease. Cynda Anamika is a dominant  vessel and bifurcates into RPDA and RPL.  Both RPL and RPDA with mild  diffuse disease without any obstructive lesions. 2.  Left main:  Left main coronary artery has no significant stenosis  and is widely patent.  Vessel bifurcates into LAD and LCX. 3.  Left anterior descending artery:  Left anterior descending artery  has 30% diffuse stenosis in the proximal LAD.  There seems to be a high  first diagonal branch versus possibly ramus branch that has 30% to 50%  proximal stenosis.  Mid LAD has mild diffuse disease, distal LAD with  mild diffuse disease. 4.  Left circumflex artery:  LCX has 30% stenosis in the proximal  segment.  OM1 is a large branching vessel with mild diffuse disease. Distal LCX has 30% to 50% lesion. CONCLUSION:  1.  Unstable angina. 2.  Coronary artery disease. 3.  Severe in-stent restenosis within previously placed stent in the  proximal RCA, status post successful PCI stenting.     RECOMMENDATIONS:  1.  Continue aspirin. 2.  Continue Plavix.   3.  The patient receives renal dose of Plavix of 300. 4.  We will increase Lipitor from 10 mg p.o. daily to 40 mg p.o. daily. 5.  Further optimization of coronary artery disease and risk factor  modification. 6.  Outpatient followup with Cardiology.     Findings and plan of care were discussed with the patient and his family  in detail.  Questions were answered. David Ashlye are agreeable to the plan.     Grant Turner MD     Narrative   CT of the chest after administration of IV contrast.       Technique: Axial CT images from the lung apices through the adrenal glands    after administration of IV contrast.  Sagittal and coronal reconstruction    images provided.       Comparison:    CR,SR  - XR CHEST PORTABLE  - 05/06/2021 02:41 PM EDT       Findings: Moderate atherosclerotic vessel disease within the thoracic    aorta.  Aneurysm of the distal thoracic aorta measuring 4.7 cm anterior to    posterior.  Moderate atherosclerotic vessel disease within the thoracic    aorta.  No dissection. No mediastinal or axillary adenopathy. Normal thyroid. Low lung volumes.  No pulmonary nodules.  No areas of consolidation.  No    pneumothorax. Degenerative changes within the spine. Scarring right kidney. Clovis Chough in the epigastric region containing    nonobstructed colon.  Simple left renal cyst.           Impression   Impression:   Aneurysm of the distal thoracic aorta.       This document has been electronically signed by: Jeferson Martinez MD on    05/06/2021 06:49 PM       All CTs at this facility use dose modulation techniques and iterative    reconstructions, and/or weight-based dosing   when appropriate to reduce radiation to a low as reasonably achievable.          AssessmentPlan:   Crystal Elizondo is a pleasant 79year old male patient who  has a past medical history of AAA (abdominal aortic aneurysm) ([address] CAD (coronary artery disease), Cancer ([address] Cancer of kidney ([address] Heart attack ([address] History of placement of periodic lipid and liver profile   Encouraged to proceed with PT      Above findings and plan of care were discussed with patient in details, patient's questions were answered.      Disposition:  RTC in 6 months             Electronically signed by Anai Garrett MD, [facility]    7/7/2022 at 9:16 AM EDT

## 2022-07-14 ENCOUNTER — HOSPITAL ENCOUNTER (OUTPATIENT)
Dept: NON INVASIVE DIAGNOSTICS | Age: 82
Discharge: HOME | End: 2022-07-14
Payer: MEDICARE

## 2022-07-14 DIAGNOSIS — I48.19 PERSISTENT ATRIAL FIBRILLATION (HCC): ICD-10-CM

## 2022-07-14 PROCEDURE — 93226 XTRNL ECG REC<48 HR SCAN A/R: CPT

## 2022-07-14 PROCEDURE — 93225 XTRNL ECG REC<48 HRS REC: CPT

## 2022-07-14 NOTE — PROCEDURES
The skin was prepped and a 48 hour holter monitor was applied. The patient was instructed on the documentation of symptoms and the purpose of the holter as well as the things to avoid while wearing the holter. The patient was instructed to remove and return the holter on 07/16/2022.   The serial number of the holter that was applied is [id number]

## 2022-07-22 LAB
ACQUISITION DURATION: NORMAL S
AVERAGE HEART RATE: 88 BPM
HOOKUP DATE: NORMAL
HOOKUP TIME: NORMAL
MAX HEART RATE TIME/DATE: NORMAL
MAX HEART RATE: 143 BPM
MIN HEART RATE TIME/DATE: NORMAL
MIN HEART RATE: 59 BPM
NUMBER OF QRS COMPLEXES: NORMAL
NUMBER OF SUPRAVENTRICULAR COUPLETS: 0
NUMBER OF SUPRAVENTRICULAR ECTOPICS: 0
NUMBER OF SUPRAVENTRICULAR ISOLATED BEATS: 0
NUMBER OF VENTRICULAR BIGEMINAL CYCLES: 0
NUMBER OF VENTRICULAR COUPLETS: 10
NUMBER OF VENTRICULAR ECTOPICS: 65

## 2022-07-25 ENCOUNTER — TELEPHONE (OUTPATIENT)
Dept: CARDIOLOGY CLINIC | Age: 82
End: 2022-07-25

## 2022-07-26 DIAGNOSIS — I25.10 ASCVD (ARTERIOSCLEROTIC CARDIOVASCULAR DISEASE): ICD-10-CM

## 2022-07-26 NOTE — TELEPHONE ENCOUNTER
Avg Hr is higher at 88. Lowest Hr was 59 with max higher also. Looks like taking metop 12.5? Increase to 25 with each dose. Continue to monitor Hr and BP.

## 2022-07-28 RX ORDER — CLOPIDOGREL BISULFATE 75 MG/1
75 TABLET ORAL DAILY
Qty: 90 TABLET | Refills: 3 | Status: SHIPPED | OUTPATIENT
Start: 2022-07-28

## 2022-08-12 ENCOUNTER — TELEPHONE (OUTPATIENT)
Dept: CARDIOLOGY CLINIC | Age: 82
End: 2022-08-12

## 2022-08-12 NOTE — TELEPHONE ENCOUNTER
Pt spouse is calling and states that pt has swelling in feet and legs up to the knees. They scheduled an appt for 9/07/2022 but would like to be seen sooner.   Please advise spouse, Ludmila Nash at [phone]

## 2022-08-15 RX ORDER — AMLODIPINE BESYLATE 5 MG/1
5 TABLET ORAL DAILY
Qty: 90 TABLET | Refills: 0 | Status: SHIPPED | OUTPATIENT
Start: 2022-08-15

## 2022-08-16 NOTE — PROGRESS NOTES
[address] [facility] [place] [address] [facility] [address]  Dept: [phone]  Dept Fax: [phone]  Loc: [phone]    Visit Date: 8/17/2022    Mr. Ian Duron is a 79 y.o. male  who presented for:    A Fib, pauses   Leg swelling   HPI:   RHODA   Ignacio Serna is a pleasant 79year old male patient who  has a past medical history of AAA (abdominal aortic aneurysm) ([facility] CAD (coronary artery disease), Cancer ([facility] Cancer of kidney ([facility] Heart attack ([facility] History of placement of chest tube, Hx of blood clots, Hyperlipidemia, Hypertension, Kidney stones, Obesity, Osteoarthritis, and Stroke ([facility] He had prior CEA, AAA repair. In 11/2019, he was found to have severe ISR in RCA, underwent successful PCI. Nuclear stress test 5/2021 was negative for ischemia. Chest CTA that was done 5/2021 while patient was hospitalized revealed distal thoracic aorta aneurysm, diameter 4.7 cm (stable on chest CTA on 5/2022). On 5/2022, patient was admitted to [facility] for COVID PNA, respiratory failure. He was subsequently transferred to [place]. While at [address], cardiology was consulted for A Fib with evidence for 2-2.5 second pauses. Cardiology recommended stopping AV blocking agents, continuing Eliquis. Echo 5/2022 revealed mild LVH, EF 55%. Holter monitor on 7/2022 revealed persistent atrial fibrillation, average HR 88, minimal HR 59, no pauses, no profound bradycardia. He's back on Metoprolol 25 mg po BID, HR home readings were reviewed, no significant bradycardia. /80. HR 86. Patient did not take his diuretics for few days 2 weeks ago. Now with increased bilateral leg swelling. Has mild MULLINS and SOB. Patient denies chest pain. Reports 15 Ib pound weight gain.      Current Outpatient Medications:     amLODIPine (NORVASC) 5 MG tablet, TAKE 1 TABLET BY MOUTH  DAILY, Disp: 90 tablet, Rfl: 0    clopidogrel (PLAVIX) 75 MG tablet, TAKE 1 TABLET BY MOUTH  DAILY, Disp: 15 PO), Take by mouth, Disp: , Rfl:     Turmeric Curcumin 500 MG CAPS, Take by mouth, Disp: , Rfl:     Omega-3 Fatty Acids (FISH OIL PO), Take by mouth, Disp: , Rfl:     OXYGEN, Inhale 3 L into the lungs nightly, Disp: , Rfl:     acetaminophen (TYLENOL) 500 MG tablet, Take 1,000 mg by mouth every 6 hours as needed for Pain, Disp: , Rfl:     Coenzyme Q10 (CO Q-10) 100 MG CAPS, Take by mouth, Disp: , Rfl:     Cholecalciferol (VITAMIN D3) 125 MCG (5000 UT) TABS, Take by mouth, Disp: , Rfl:     APPLE CIDER VINEGAR PO, Take 450 mg by mouth, Disp: , Rfl:     pantoprazole (PROTONIX) 40 MG tablet, TAKE 1 TABLET BY MOUTH  DAILY, Disp: 90 tablet, Rfl: 3    PARoxetine (PAXIL) 20 MG tablet, Take 20 mg by mouth every morning, Disp: , Rfl:     Magnesium 500 MG TABS, Take by mouth daily , Disp: , Rfl:     CINNAMON PO, Take 1,000 mg by mouth daily, Disp: , Rfl:     Probiotic Product (PROBIOTIC DAILY PO), Take by mouth daily, Disp: , Rfl:     Ascorbic Acid (VITAMIN C) 1000 MG tablet, Take 1,000 mg by mouth daily , Disp: , Rfl:     Past Medical History  Christy Vines  has a past medical history of AAA (abdominal aortic aneurysm) ([facility] [facility] CAD (coronary artery disease), Cancer ([facility] [facility] Cancer of kidney ([facility] [facility] Heart attack ([facility] History of placement of chest tube, Hx of blood clots, Hyperlipidemia, Hypertension, Kidney stones, Obesity, Osteoarthritis, and Stroke ([facility] [address] Social History  Christy Vines  reports that he quit smoking about 52 years ago. His smoking use included pipe. He started smoking about 67 years ago. He has a 15.00 pack-year smoking history. He has never used smokeless tobacco. He reports that he does not drink alcohol and does not use drugs. Family History  Christy Vines family history includes Alzheimer's Disease in his mother; Heart Disease in his father.     Past Surgical History   Past Surgical History:   Procedure Laterality Date    ABDOMEN SURGERY      AAA    ABDOMINAL AORTIC ANEURYSM REPAIR  2005    CARDIAC SURGERY  2008, 1996 stents    CARDIAC SURGERY  2005    AAA repair    CAROTID ENDARTERECTOMY  2006    FINGER AMPUTATION      JOINT REPLACEMENT  2007    knees bilateral    KNEE SURGERY Left 6-2013    PARTIAL NEPHRECTOMY  2010    Right    PTCA      [facility]    VASCULAR SURGERY         Review of Systems   Constitutional: Negative for chills and fever  HENT: Negative for congestion, sinus pressure, sneezing and sore throat. Eyes: Negative for pain, discharge, redness and itching. Respiratory: Negative for apnea, cough  Gastrointestinal: Negative for blood in stool, constipation, diarrhea   Endocrine: Negative for cold intolerance, heat intolerance, polydipsia. Genitourinary: Negative for dysuria, enuresis, flank pain and hematuria. Musculoskeletal: Negative for arthralgias, joint swelling and neck pain. Neurological: Negative for numbness and headaches. Psychiatric/Behavioral: Negative for agitation, confusion, decreased concentration and dysphoric mood. Objective: There were no vitals taken for this visit. Wt Readings from Last 3 Encounters:   07/07/22 246 lb (111.6 kg)   05/29/22 255 lb (115.7 kg)   05/18/22 255 lb (115.7 kg)     BP Readings from Last 3 Encounters:   07/07/22 126/73   06/01/22 126/74   05/21/22 126/77       Nursing note and vitals reviewed. Physical Exam   Constitutional: Oriented to person, place, and time. Appears well-developed and well-nourished. ENT: Moist mucous membranes. No bleeding. Tongue is midline. Head: Normocephalic and atraumatic. Eyes: EOM are normal. Pupils are equal, round, and reactive to light. Neck: Normal range of motion. Neck supple. No JVD present. Cardiovascular: irregular rhythm, no murmur, no rubs, and intact distal pulses. Pulmonary/Chest: Effort normal and breath sounds normal. No respiratory distress. No wheezes. No rales. Abdominal: Soft. Bowel sounds are normal. No distension. There is no tenderness. Lab Results   Component Value Date/Time    TSH 0.494 06/03/2022 04:57 AM         Testing Reviewed:      I have individually reviewed the cardiac test below:    ECHO: Results for orders placed during the hospital encounter of 05/18/22    ECHO Complete 2D W Doppler W Color    Narrative  Transthoracic Echocardiography Report (TTE)    Demographics    Patient Name   Kain Tellez  Gender              Male  G    MR #           [id number]       Race                    Ethnicity    Account #      [de-identified]       Room Number         0010    Accession      [id number]      Date of Study       05/20/2022  Number    Date of Birth  1940      Referring Physician EMEKA Melendez,  APRN-EMEKA    Age            79 year(s)      Sonographer         Rome Rubio T    Interpreting        Juana Bautista MD  Physician    Procedure    Type of Study    TTE procedure:ECHOCARDIOGRAM COMPLETE 2D W DOPPLER W COLOR. Procedure Date  Date: 05/20/2022 Start: 04:29 PM    Study Location: Echo Lab  Technical Quality: Limited visualization    Indications:Elevated BNP. Additional Medical History:Coronary artery disease, Hypertension,  Hyperlipidemia, Renal cancer, Sleep apnea, Stroke. Patient Status: Routine    Height: 68 inches Weight: 255.01 pounds BSA: 2.27 m^2 BMI: 38.77 kg/m^2    BP: 118/60 mmHg    Allergies  - Tetracycline. Conclusions    Summary  Left ventricle size is normal.  Mild concentric left ventricular hypertrophy. There were no regional wall motion abnormalities. Ejection fraction is visually estimated at 55%. Signature    ----------------------------------------------------------------  Electronically signed by Juana Bautista MD (Interpreting  physician) on 05/20/2022 at 07:05 PM  ----------------------------------------------------------------    Findings    Mitral Valve  The mitral valve structure was normal with normal leaflet separation.   DOPPLER: The transmitral velocity was within the normal range with no  evidence for mitral stenosis. There was evidence of Mild mitral  regurgitation. Aortic Valve  The aortic valve was trileaflet with normal thickness and cuspal  separation. DOPPLER: Transaortic velocity was within the normal range with  no evidence of aortic stenosis. There was no evidence of aortic  regurgitation. Tricuspid Valve  The tricuspid valve structure was normal with normal leaflet separation. DOPPLER: There was no evidence of tricuspid stenosis. There was evidence  of Mild tricuspid regurgitation. Pulmonic Valve  The pulmonic valve leaflets exhibited normal thickness, no calcification,  and normal cuspal separation. DOPPLER: The transpulmonic velocity was  within the normal range with no evidence for regurgitation. Left Atrium  Left atrial size was normal.    Left Ventricle  Left ventricle size is normal.  Mild concentric left ventricular hypertrophy. There were no regional wall motion abnormalities. Ejection fraction is visually estimated at 55%. Right Atrium  Right atrial size was normal.    Right Ventricle  The right ventricular size was normal with normal systolic function and  wall thickness. Pericardial Effusion  The pericardium was normal in appearance with no evidence of a pericardial  effusion. Pleural Effusion  No evidence of pleural effusion. Aorta / Great Vessels  -Aortic root dimension within normal limits.  -The Pulmonary artery is within normal limits. Hepatic veins not visualized.     M-Mode/2D Measurements & Calculations    LV Diastolic   LV Systolic Dimension: 3  AV Cusp Separation: 1.8 cmLA  Dimension: 4.5 cm                        Dimension: 5.1 cmAO Root  cm             LV Volume Diastolic: 07.6 Dimension: 3.3 cmLA Area: 17.2  LV FS:33.3 %   ml                        cm^2  LV PW          LV Volume Systolic: 35 ml  Diastolic: 1.1 LV EDV/LV EDV Index: 92.4  cm             ml/41 m^2LV ESV/LV ESV  Septum Index: 35 ml/15 m^2       RV Diastolic Dimension: 3.4 cm  Diastolic: 1.3 EF Calculated: 62.1 %  cm                                       LA/Aorta: 1.55    LA volume/Index: 43.9 ml /19m^2    Doppler Measurements & Calculations    MV Peak E-Wave: 125 cm/s  AV Peak Velocity: 111 LVOT Peak Velocity: 94.6  MV Peak A-Wave: 59.6 cm/s cm/s                  cm/s  MV E/A Ratio: 2.1         AV Peak Gradient:     LVOT Peak Gradient: 4 mmHg  MV Peak Gradient: 6.25    4.93 mmHg  mmHg                                            TV Peak E-Wave: 62.4 cm/s    MV Deceleration Time: 172                       TV Peak Gradient: 1.56  msec                                            mmHg  AV P1/2t: 471 msec    TR Velocity:182 cm/s  IVRT: 61 msec         TR Gradient:13.25 mmHg  PV Peak Velocity: 49.7  MV E' Lateral Velocity:                         cm/s  9.7 cm/s                  AV DVI (Vmax):0.85    PV Peak Gradient: 0.99  MV A' Lateral Velocity:                         mmHg  5.4 cm/s  E/E' lateral: 12.89  MR Velocity: 443 cm/s    [email or web address] [id number]       Holter Monitor:12/2021  CONCLUSION:  Atrial fibrillation , persistent  Patient was in atrial fibrillaiton in 100% of the study duration  Minimum heart rate 38, average heart rate 66, maximum heart rate of 96  Multiple pauses noted, longest fot 2.72 seconds  Premature ventricular complexes , occasional  One 5 beat run of nonsustained ventricular tachycrdia     Confirmed by Atul Cunha ([id number]) on 12/28/2021 4:00:15 PM     Stress Test:5/2021   Summary   There is mild attenuation artifact noted in the inferior wall seems to be   related to diaphragm artifact. The nuclear images is not suggestive for myocardial ischemia. Recommendation   Clinical correlation is recommended.       Signatures      ---------------------------------------------------------------- Electronically signed by Franky Gonzalez MD (Interpreting   Cardiologist) on 05/17/2021 at 14:20   ----------------------------------------------------------------        Cath:11/2019 11/29/19  FINDINGS:  HEMODYNAMICS:  LVEDP 15 mmHg. LEFT VENTRICULOGRAM:  No significant wall motion abnormality noted. Ejection fraction estimated to be at 60%. CORONARY ANGIOGRAM:  1. RCA:  Right coronary artery was noticed to have severe 90% to 95%  in-stent restenosis within the proximal RCA. Mid RCA has mild diffuse  disease. Distal RCA with mild diffuse disease. RCA is a dominant  vessel and bifurcates into RPDA and RPL. Both RPL and RPDA with mild  diffuse disease without any obstructive lesions. 2.  Left main:  Left main coronary artery has no significant stenosis  and is widely patent. Vessel bifurcates into LAD and LCX. 3.  Left anterior descending artery:  Left anterior descending artery  has 30% diffuse stenosis in the proximal LAD. There seems to be a high  first diagonal branch versus possibly ramus branch that has 30% to 50%  proximal stenosis. Mid LAD has mild diffuse disease, distal LAD with  mild diffuse disease. 4.  Left circumflex artery:  LCX has 30% stenosis in the proximal  segment. OM1 is a large branching vessel with mild diffuse disease. Distal LCX has 30% to 50% lesion. CONCLUSION:  1. Unstable angina. 2.  Coronary artery disease. 3.  Severe in-stent restenosis within previously placed stent in the  proximal RCA, status post successful PCI stenting. RECOMMENDATIONS:  1. Continue aspirin. 2.  Continue Plavix. 3.  The patient receives renal dose of Plavix of 300.  4. We will increase Lipitor from 10 mg p.o. daily to 40 mg p.o. daily. 5.  Further optimization of coronary artery disease and risk factor  modification. 6.  Outpatient followup with Cardiology. Findings and plan of care were discussed with the patient and his family  in detail. Questions were answered.   They are agreeable to the plan. Joan Sun MD     Narrative   CT of the chest after administration of IV contrast.       Technique: Axial CT images from the lung apices through the adrenal glands   after administration of IV contrast.  Sagittal and coronal reconstruction   images provided. Comparison:    CR,SR  - XR CHEST PORTABLE  - 05/06/2021 02:41 PM EDT       Findings: Moderate atherosclerotic vessel disease within the thoracic   aorta. Aneurysm of the distal thoracic aorta measuring 4.7 cm anterior to   posterior. Moderate atherosclerotic vessel disease within the thoracic   aorta. No dissection. No mediastinal or axillary adenopathy. Normal thyroid. Low lung volumes. No pulmonary nodules. No areas of consolidation. No   pneumothorax. Degenerative changes within the spine. Scarring right kidney. Hernia in the epigastric region containing   nonobstructed colon. Simple left renal cyst.           Impression   Impression:   Aneurysm of the distal thoracic aorta. This document has been electronically signed by: Ady Aguilar MD on   05/06/2021 06:49 PM       All CTs at this facility use dose modulation techniques and iterative   reconstructions, and/or weight-based dosing   when appropriate to reduce radiation to a low as reasonably achievable.       HOLTER 7/2022  Narrative & Impression    HOLTER MONITOR:  Hours  48     INDICATION FOR STUDY:  Irregular Heart Rate     CONCLUSION:  Persistent Atrial Fibrillation  Average Heart Rate 88 Range from 59 bpm to 143 bpm  No long pause or profound bradycardia  Rare Premature ventricular complexes       AssessmentPlan:     [place] is a pleasant 79year old male patient who  has a past medical history of AAA (abdominal aortic aneurysm) ([address] CAD (coronary artery disease), Cancer ([address] Cancer of kidney ([address] Heart attack ([address] History of placement of chest tube, Hx of blood clots, Hyperlipidemia, Hypertension, Kidney stones, Obesity, Osteoarthritis, and Stroke ([facility] He had prior CEA, AAA repair. In 11/2019, he was found to have severe ISR in RCA, underwent successful PCI. Nuclear stress test 5/2021 was negative for ischemia. Chest CTA that was done 5/2021 while patient was hospitalized revealed distal thoracic aorta aneurysm, diameter 4.7 cm (stable on chest CTA on 5/2022). On 5/2022, patient was admitted to [facility] for COVID PNA, respiratory failure. He was subsequently transferred to [place]. While at [facility], cardiology was consulted for A Fib with evidence for 2-2.5 second pauses. Cardiology recommended stopping AV blocking agents, continuing Eliquis. Echo 5/2022 revealed mild LVH, EF 55%. Holter monitor on 7/2022 revealed persistent atrial fibrillation, average HR 88, minimal HR 59, no pauses, no profound bradycardia. He's back on Metoprolol 25 mg po BID, HR home readings were reviewed, no significant bradycardia. /80. HR 86. Patient did not take his diuretics for few days 2 weeks ago. Now with increased bilateral leg swelling. Has mild MULLINS and SOB. Patient denies chest pain. Reports 15 Ib pound weight gain. Volume overload  Leg swelling  CAD  Severe RCA ISR, s/p PCI/Stenting 11/2019  Paroxysmal atrial fibrillation  PAD, S/p CEA  AAA, s/p repair 2005 (surgical repair, at [facility])   Thoracic aortic aneurysm (4.7 cm on CTA 5/2021)  Hypertension  Dyslipidemia  BENJAMIN    On Eliquis   No signs of bleeding   Holter monitor on 7/2022 revealed persistent atrial fibrillation, average HR 88, minimal HR 59, no pauses, no profound bradycardia   Metoprolol 25 mg po BID  The patient was instructed to check the blood pressure at home, and record the readings.  Patient will call office with blood pressure readings, will adjust patient's antihypertensive medications as needed accordingly   Know CAD  Aggressive medical therapy and risk factor modification for CAD is indicated   Plavix   On lasix   Has volume overload   Did not take lasix for few days  Increase lasix to 40 mg po BID   Daily weight  Limit Na   K was 5.2 on most recent labs   LABS [place] [facility]       Above findings and plan of care were discussed with patient in details, patient's questions were answered.      Disposition:  RTC in 6 months             Electronically signed by Pati Andrade MD, [facility]    8/16/2022 at 5:07 PM EDT

## 2022-08-17 ENCOUNTER — OFFICE VISIT (OUTPATIENT)
Dept: CARDIOLOGY CLINIC | Age: 82
End: 2022-08-17
Payer: MEDICARE

## 2022-08-17 VITALS
BODY MASS INDEX: 39.22 KG/M2 | WEIGHT: 258.8 LBS | HEIGHT: 68 IN | SYSTOLIC BLOOD PRESSURE: 144 MMHG | DIASTOLIC BLOOD PRESSURE: 80 MMHG | HEART RATE: 86 BPM

## 2022-08-17 DIAGNOSIS — I50.32 CHRONIC HEART FAILURE WITH PRESERVED EJECTION FRACTION (HCC): Primary | ICD-10-CM

## 2022-08-17 PROCEDURE — 99214 OFFICE O/P EST MOD 30 MIN: CPT | Performed by: INTERNAL MEDICINE

## 2022-08-17 PROCEDURE — G8417 CALC BMI ABV UP PARAM F/U: HCPCS | Performed by: INTERNAL MEDICINE

## 2022-08-17 PROCEDURE — G8427 DOCREV CUR MEDS BY ELIG CLIN: HCPCS | Performed by: INTERNAL MEDICINE

## 2022-08-17 PROCEDURE — 1123F ACP DISCUSS/DSCN MKR DOCD: CPT | Performed by: INTERNAL MEDICINE

## 2022-08-17 PROCEDURE — 1036F TOBACCO NON-USER: CPT | Performed by: INTERNAL MEDICINE

## 2022-08-17 RX ORDER — FUROSEMIDE 40 MG/1
40 TABLET ORAL 2 TIMES DAILY
Qty: 90 TABLET | Refills: 3 | Status: SHIPPED | OUTPATIENT
Start: 2022-08-17 | End: 2022-08-24 | Stop reason: DRUGHIGH

## 2022-08-17 NOTE — PROGRESS NOTES
Pt here for a check up    EKG done 6-9-22    Pt denies cp, dizziness and palpitations     Pt c/o QUINTON and sob

## 2022-08-23 LAB
ANION GAP SERPL CALCULATED.3IONS-SCNC: 16 MEQ/L (ref 7–16)
BUN BLDV-MCNC: 15 MG/DL (ref 8–23)
CALCIUM SERPL-MCNC: 9.3 MG/DL (ref 8.5–10.5)
CHLORIDE BLD-SCNC: 101 MEQ/L (ref 95–107)
CO2: 27 MEQ/L (ref 19–31)
CREAT SERPL-MCNC: 1.63 MG/DL (ref 0.8–1.4)
EGFR IF NONAFRICAN AMERICAN: 42 ML/MIN/1.73
GLUCOSE: 156 MG/DL (ref 70–99)
MAGNESIUM: 2 MG/DL (ref 1.6–2.6)
POTASSIUM SERPL-SCNC: 3.8 MEQ/L (ref 3.5–5.4)
SODIUM BLD-SCNC: 144 MEQ/L (ref 133–146)

## 2022-08-24 ENCOUNTER — TELEPHONE (OUTPATIENT)
Dept: CARDIOLOGY CLINIC | Age: 82
End: 2022-08-24

## 2022-08-24 DIAGNOSIS — I25.10 CORONARY ARTERY DISEASE INVOLVING NATIVE CORONARY ARTERY OF NATIVE HEART WITHOUT ANGINA PECTORIS: ICD-10-CM

## 2022-08-24 DIAGNOSIS — I48.0 PAROXYSMAL ATRIAL FIBRILLATION (HCC): Primary | ICD-10-CM

## 2022-08-24 RX ORDER — FUROSEMIDE 40 MG/1
40 TABLET ORAL SEE ADMIN INSTRUCTIONS
COMMUNITY

## 2022-08-24 NOTE — TELEPHONE ENCOUNTER
----- Message from Raven Murphy MD sent at 8/23/2022  7:34 PM EDT -----  · Change lasix to 40 mg po Q AM and 20 mg po Q PM  · Daily weight  · Limit Na   -           Repeat BMP in 1-2 weeks

## 2022-08-24 NOTE — TELEPHONE ENCOUNTER
Patient's wife Zackary Boss notified and voiced understanding. Mediation list updated. BMP in chart. Note on CHF appt desk to print order and give to patient at appointment on 8-30-22.

## 2022-08-30 ENCOUNTER — OFFICE VISIT (OUTPATIENT)
Dept: CARDIOLOGY CLINIC | Age: 82
End: 2022-08-30
Payer: MEDICARE

## 2022-08-30 VITALS — SYSTOLIC BLOOD PRESSURE: 134 MMHG | HEART RATE: 91 BPM | OXYGEN SATURATION: 96 % | DIASTOLIC BLOOD PRESSURE: 72 MMHG

## 2022-08-30 DIAGNOSIS — I50.32 CHRONIC DIASTOLIC CONGESTIVE HEART FAILURE, NYHA CLASS 2 (HCC): Primary | ICD-10-CM

## 2022-08-30 PROCEDURE — 1123F ACP DISCUSS/DSCN MKR DOCD: CPT | Performed by: NURSE PRACTITIONER

## 2022-08-30 PROCEDURE — G8427 DOCREV CUR MEDS BY ELIG CLIN: HCPCS | Performed by: NURSE PRACTITIONER

## 2022-08-30 PROCEDURE — G8417 CALC BMI ABV UP PARAM F/U: HCPCS | Performed by: NURSE PRACTITIONER

## 2022-08-30 PROCEDURE — 99215 OFFICE O/P EST HI 40 MIN: CPT | Performed by: NURSE PRACTITIONER

## 2022-08-30 PROCEDURE — 1036F TOBACCO NON-USER: CPT | Performed by: NURSE PRACTITIONER

## 2022-08-30 ASSESSMENT — ENCOUNTER SYMPTOMS
NAUSEA: 0
ABDOMINAL DISTENTION: 0
VOMITING: 0
COUGH: 0
SHORTNESS OF BREATH: 1

## 2022-08-30 NOTE — PATIENT INSTRUCTIONS
You may receive a survey regarding the care you received during your visit. Your input is valuable to us. We encourage you to complete and return your survey. We hope you will choose us in the future for your healthcare needs. Continue:  Continue current medications  Daily weights and record  Fluid restriction of 2 Liters per day  Limit sodium in diet to around 5047-5195 mg/day  Monitor BP  Activity as tolerated     Call the [facility] Failure Clinic for any of the following symptoms: [phone]  Weight gain of 2-3 pounds in 1 day or 5 pounds in 1 week  Increased shortness of breath  Shortness of breath while laying down  Cough  Chest pain  Swelling in feet, ankles or legs  Tenderness or bloating in the abdomen  Fatigue   Decreased appetite or nausea   Confusion          Repeat blood work on 9/8/22    No med changes today need new lab work. May try metolazone. Continue diet/fluid adherence  Continue daily wts.   F/U w/ Cardiology  F/U in clinic in 4 weeks

## 2022-08-30 NOTE — PROGRESS NOTES
Heart Failure Clinic       Visit Date: 8/30/2022  Cardiologist:  Dr. Josué Barahona  Primary Care Physician: Dr. Jake Briones is a 79 y.o. male who presents today for:  Chief Complaint   Patient presents with    Congestive Heart Failure     New Patient        HPI:     TYPE HF: HFpEF 55%   Cause:   Device:   HX: AAA s/p repair, Afib (eliquis), CAD  Cancer , Cancer of kidney, Heart attack , Hx of blood clots, Hyperlipidemia, Hypertension, Kidney stones, Obesity, Osteoarthritis, and Stroke   Dry Wt:  258 on 8/30/22      Tiffanie Bell is a 79 y.o. male who presents to the office for a new patient visit in the heart failure clinic. Concerns today: pt referred by Dr. Josué Barahona d/t LE swelling. Noted pt did not take his diuretic 7 days and did note a 15lb weight gain w/ worsening swelling. Pt urinating well on diuretics, repeat labs are ordered d/t small jump in Cr. Some improvement of SOB.        Patient follows:      Hospitalization:        Activity: ADLs performed w/o limitations some hip pain  Diet: educated today    Patient has:  Chest Pain: no  SOB: MULLINS w/ exertion   Orthopnea/PND: sleeps in recliner d/t BENJAMIN  BENJAMIN: yes, does not use CPAP but on oxygen   Edema: yes, some improvements  Fatigue: no  Abdominal bloating: no  Cough: no  Appetite: good      Past Medical History:   Diagnosis Date    AAA (abdominal aortic aneurysm) ([address]     CAD (coronary artery disease)     Cancer ([address]     cheek and nose     Cancer of kidney ([address]     Heart attack ([address]     History of placement of chest tube     Due to pt falling and breaking 4 ribs and puncturing his lung    Hx of blood clots     Hyperlipidemia     Hypertension     Kidney stones     Obesity     Osteoarthritis     Stroke [facility])      Past Surgical History:   Procedure Laterality Date    ABDOMEN SURGERY      AAA    ABDOMINAL AORTIC ANEURYSM REPAIR  2005    CARDIAC SURGERY  2008, [facility] SURGERY  2005    AAA repair    CAROTID ENDARTERECTOMY  2006    FINGER AMPUTATION      JOINT REPLACEMENT  2007    knees bilateral    KNEE SURGERY Left     PARTIAL NEPHRECTOMY  2010    Right    PTCA      SHOULDER SURGERY      TONSILLECTOMY  1948    VASCULAR SURGERY       Family History   Problem Relation Age of Onset    Alzheimer's Disease Mother     Heart Disease Father      Social History     Tobacco Use    Smoking status: Former     Packs/day: 1.00     Years: 15.00     Pack years: 15.00     Types: Pipe, Cigarettes     Start date:      Quit date: 1970     Years since quittin.3    Smokeless tobacco: Never   Substance Use Topics    Alcohol use: No     Alcohol/week: 0.0 standard drinks     Current Outpatient Medications   Medication Sig Dispense Refill    furosemide (LASIX) 40 MG tablet Take 40 mg by mouth See Admin Instructions Take 40 mg in AM and 20 mg in the PM      amLODIPine (NORVASC) 5 MG tablet TAKE 1 TABLET BY MOUTH  DAILY 90 tablet 0    clopidogrel (PLAVIX) 75 MG tablet TAKE 1 TABLET BY MOUTH  DAILY 90 tablet 3    metoprolol tartrate (LOPRESSOR) 25 MG tablet Take 1 tablet by mouth in the morning and 1 tablet before bedtime.       ELIQUIS 2.5 MG TABS tablet TAKE 1 TABLET BY MOUTH  TWICE DAILY 180 tablet 2    atorvastatin (LIPITOR) 40 MG tablet TAKE 1 TABLET BY MOUTH AT  NIGHT 90 tablet 2    Zinc Sulfate (ZINC 15 PO) Take by mouth      Turmeric Curcumin 500 MG CAPS Take by mouth      Omega-3 Fatty Acids (FISH OIL PO) Take by mouth      OXYGEN Inhale 3 L into the lungs nightly      acetaminophen (TYLENOL) 500 MG tablet Take 1,000 mg by mouth every 6 hours as needed for Pain      Coenzyme Q10 (CO Q-10) 100 MG CAPS Take by mouth      Cholecalciferol (VITAMIN D3) 125 MCG (5000 UT) TABS Take by mouth      APPLE CIDER VINEGAR PO Take 450 mg by mouth      pantoprazole (PROTONIX) 40 MG tablet TAKE 1 TABLET BY MOUTH  DAILY 90 tablet 3    PARoxetine (PAXIL) 20 MG tablet Take 20 mg by mouth every morning      Magnesium 500 MG TABS Take by mouth daily       CINNAMON PO Take 1,000 mg by mouth daily      Probiotic Product (PROBIOTIC DAILY PO) Take by mouth daily      Ascorbic Acid (VITAMIN C) 1000 MG tablet Take 1,000 mg by mouth daily       glucose monitoring (FREESTYLE FREEDOM) kit 1 kit by Does not apply route daily 1 kit 0    Lancets MISC 1 each by Does not apply route 4 times daily 200 each 0    blood glucose monitor strips Test 4 times a day & as needed for symptoms of irregular blood glucose. Dispense sufficient amount for indicated testing frequency plus additional to accommodate PRN testing needs. 300 strip 0    Gauze Pads & Dressings 2\"X2\" PADS 1 each by Does not apply route daily as needed (finger sticks) 60 each 0    glucagon 1 MG injection Inject 1 mg into the muscle See Admin Instructions Follow package directions for low blood sugar. 1 kit 3     No current facility-administered medications for this visit. No Known Allergies    SUBJECTIVE:   Review of Systems   Constitutional:  Negative for activity change, appetite change, diaphoresis and fatigue. Respiratory:  Positive for shortness of breath. Negative for cough. Cardiovascular:  Positive for leg swelling. Negative for chest pain and palpitations. Gastrointestinal:  Negative for abdominal distention, nausea and vomiting. Neurological:  Negative for weakness, light-headedness and headaches. Hematological:  Negative for adenopathy. Psychiatric/Behavioral:  Negative for sleep disturbance. OBJECTIVE:   Today's Vitals:  /72   Pulse 91   SpO2 96%     Physical Exam  Vitals reviewed. Constitutional:       General: He is not in acute distress. Appearance: Normal appearance. He is well-developed. He is not diaphoretic. HENT:      Head: Normocephalic and atraumatic. Eyes:      Conjunctiva/sclera: Conjunctivae normal.   Cardiovascular:      Rate and Rhythm: Normal rate and regular rhythm. Heart sounds: Normal heart sounds. No murmur heard.   Pulmonary: Effort: Pulmonary effort is normal. No respiratory distress. Breath sounds: Normal breath sounds. No wheezing or rales. Abdominal:      General: Bowel sounds are normal. There is no distension. Palpations: Abdomen is soft. Tenderness: There is no abdominal tenderness. Musculoskeletal:         General: Normal range of motion. Cervical back: Normal range of motion and neck supple. Right lower leg: Edema (trace - +1 ankle feet) present. Left lower leg: Edema (trace to +1 ankel foot) present. Skin:     General: Skin is warm and dry. Capillary Refill: Capillary refill takes less than 2 seconds. Neurological:      Mental Status: He is alert and oriented to person, place, and time. Coordination: Coordination normal.   Psychiatric:         Behavior: Behavior normal.       Wt Readings from Last 3 Encounters:   08/17/22 258 lb 12.8 oz (117.4 kg)   07/07/22 246 lb (111.6 kg)   05/29/22 255 lb (115.7 kg)     BP Readings from Last 3 Encounters:   08/30/22 134/72   08/17/22 (!) 144/80   07/07/22 126/73     Pulse Readings from Last 3 Encounters:   08/30/22 91   08/17/22 86   07/07/22 72     There is no height or weight on file to calculate BMI. ECHO:   Summary  Left ventricle size is normal.  Mild concentric left ventricular hypertrophy. There were no regional wall motion abnormalities. Ejection fraction is visually estimated at 55%. Signature     ----------------------------------------------------------------  Electronically signed by Marcelina Claude MD (Interpreting  physician) on 05/20/2022 at 07:05 PM  ----------------------------------------------------------------       CATH/STRESS:   Stress Test:5/2021   Summary   There is mild attenuation artifact noted in the inferior wall seems to be   related to diaphragm artifact. The nuclear images is not suggestive for myocardial ischemia. Recommendation   Clinical correlation is recommended.       Signatures ----------------------------------------------------------------   Electronically signed by Vikram Castanon MD (Interpreting   Cardiologist) on 05/17/2021 at 14:20   ----------------------------------------------------------------     2019    CONCLUSION:  1. Unstable angina. 2.  Coronary artery disease. 3.  Severe in-stent restenosis within previously placed stent in the  proximal RCA, status post successful PCI stenting. RECOMMENDATIONS:  1. Continue aspirin. 2.  Continue Plavix. 3.  The patient receives renal dose of Plavix of 300.  4. We will increase Lipitor from 10 mg p.o. daily to 40 mg p.o. daily. 5.  Further optimization of coronary artery disease and risk factor  modification. 6.  Outpatient followup with Cardiology. Findings and plan of care were discussed with the patient and his family  in detail. Questions were answered. They are agreeable to the plan.       Results reviewed:  BNP:   Lab Results   Component Value Date    BNP 67.8 02/01/2012     CBC:   Lab Results   Component Value Date/Time    WBC 7.6 06/15/2022 06:33 AM    RBC 4.53 06/15/2022 06:33 AM    RBC 5.22 01/20/2022 08:55 AM    HGB 15.0 06/15/2022 06:33 AM    HCT 46.0 06/15/2022 06:33 AM    PLT 61 06/15/2022 06:33 AM     CMP:    Lab Results   Component Value Date/Time     08/22/2022 09:34 AM    K 3.8 08/22/2022 09:34 AM    K 5.1 05/30/2022 05:30 AM     08/22/2022 09:34 AM    CO2 27 08/22/2022 09:34 AM    BUN 15 08/22/2022 09:34 AM    CREATININE 1.63 08/22/2022 09:34 AM    LABGLOM 48 06/06/2022 07:03 AM    GLUCOSE 156 08/22/2022 09:34 AM    CALCIUM 9.3 08/22/2022 09:34 AM     Hepatic Function Panel:    Lab Results   Component Value Date/Time    ALKPHOS 78 06/02/2022 06:48 AM    ALT 83 06/02/2022 06:48 AM    AST 44 06/02/2022 06:48 AM    PROT 5.8 06/02/2022 06:48 AM    BILITOT 0.8 06/02/2022 06:48 AM    BILIDIR <0.2 06/02/2022 06:48 AM    LABALBU 3.0 06/02/2022 06:48 AM     Magnesium:    Lab Results   Component Value Date/Time    MG 2.0 08/22/2022 09:34 AM     PT/INR:    Lab Results   Component Value Date/Time    INR 0.95 11/29/2019 05:12 AM     Lipids:    Lab Results   Component Value Date/Time    TRIG 179 01/20/2022 08:55 AM    HDL 32 01/20/2022 08:55 AM    LDLCALC 69 01/20/2022 08:55 AM    LDLDIRECT 141 04/19/2018 08:41 AM    LABVLDL 36 01/20/2022 08:55 AM       ASSESSMENT AND PLAN:   The patient's condition/symptoms are stable        Diagnosis Orders   1. Chronic diastolic congestive heart failure, NYHA class 2 (HCC)          Continue:  GDMT:   ACE/ARB/ARNI - none   BB - Lopressor 25 BID    Diuretic - Lasix 40am 20 Pm  AA - none  SGLT2 -  none  Vasodilator - none  Other - Plavix Eliquis, amlodipine       HFpEF 55%  Stable, some fluid on lower legs, would like compression sock but has left lower leg wound currently (seeing wound care)  GDMT: discussed Jardiance  Lab reviewed - K 3.8, Cr 1.6  ECHO 2022: Mild concentric left ventricular hypertrophy  CATH 2019: s/p PCI to LAD  Stress 2020: negative    Repeat blood work on 9/8/22    No med changes today need new lab work. May try metolazone. Continue diet/fluid adherence  Continue daily wts. F/U w/ Cardiology  F/U in clinic in 4 weeks      Tolerating above noted HF meds, no ill side effects noted. Will continue to monitor kidney function and electrolytes. Will optimize as tolerated. Pt is compliant w/ medications. Total visit time of 55 minutes has been spent with patient on education of symptoms, management, medication, and plan of care; as well as review of chart: labs, ECHO, radiology reports, etc.   I personally spent more then 50% of the appt time face to face with the patient. Daily weights  Fluid restriction of 2 Liters per day  Limit sodium in diet to around 5396-1472 mg/day  Monitor BP  Activity as tolerated     Patient was instructed to call the [facility] for any changes in symptoms as noted in AVS.      No follow-ups on file.  or sooner if needed Patient given educational materials - see patient instructions. We discussed the importance of weighing oneself and recording daily. We also discussed the importance of a low sodium diet, higher sodium foods to avoid and better low sodium food options. Patient verbalizes understanding of plan of care using teach back method, and is agreeable to the treatment plan.        Electronically signed by MARTIN Novak CNP on 8/30/2022 at 2:05 PM

## 2022-09-13 ENCOUNTER — TELEPHONE (OUTPATIENT)
Dept: CARDIOLOGY CLINIC | Age: 82
End: 2022-09-13

## 2022-10-05 ENCOUNTER — OFFICE VISIT (OUTPATIENT)
Dept: CARDIOLOGY CLINIC | Age: 82
End: 2022-10-05
Payer: MEDICARE

## 2022-10-05 VITALS
WEIGHT: 252.6 LBS | OXYGEN SATURATION: 98 % | HEIGHT: 68 IN | DIASTOLIC BLOOD PRESSURE: 62 MMHG | SYSTOLIC BLOOD PRESSURE: 118 MMHG | BODY MASS INDEX: 38.28 KG/M2 | HEART RATE: 51 BPM

## 2022-10-05 DIAGNOSIS — Z91.89 AT RISK FOR FLUID VOLUME OVERLOAD: ICD-10-CM

## 2022-10-05 DIAGNOSIS — I50.30 HEART FAILURE WITH PRESERVED EJECTION FRACTION, BORDERLINE, CLASS II (HCC): Primary | ICD-10-CM

## 2022-10-05 PROCEDURE — 1123F ACP DISCUSS/DSCN MKR DOCD: CPT | Performed by: NURSE PRACTITIONER

## 2022-10-05 PROCEDURE — 99213 OFFICE O/P EST LOW 20 MIN: CPT | Performed by: NURSE PRACTITIONER

## 2022-10-05 PROCEDURE — 1036F TOBACCO NON-USER: CPT | Performed by: NURSE PRACTITIONER

## 2022-10-05 PROCEDURE — G8427 DOCREV CUR MEDS BY ELIG CLIN: HCPCS | Performed by: NURSE PRACTITIONER

## 2022-10-05 PROCEDURE — G8417 CALC BMI ABV UP PARAM F/U: HCPCS | Performed by: NURSE PRACTITIONER

## 2022-10-05 PROCEDURE — G8484 FLU IMMUNIZE NO ADMIN: HCPCS | Performed by: NURSE PRACTITIONER

## 2022-10-05 ASSESSMENT — ENCOUNTER SYMPTOMS
NAUSEA: 0
VOMITING: 0
ABDOMINAL DISTENTION: 0
SHORTNESS OF BREATH: 1
COUGH: 0

## 2022-10-05 NOTE — PATIENT INSTRUCTIONS
You may receive a survey regarding the care you received during your visit. Your input is valuable to us. We encourage you to complete and return your survey. We hope you will choose us in the future for your healthcare needs. Continue:  Continue current medications  Daily weights and record  Fluid restriction of 2 Liters per day  Limit sodium in diet to around 6525-8618 mg/day  Monitor BP  Activity as tolerated     Call the Heart Failure Clinic for any of the following symptoms: [phone]  Weight gain of 2-3 pounds in 1 day or 5 pounds in 1 week  Increased shortness of breath  Shortness of breath while laying down  Cough  Chest pain  Swelling in feet, ankles or legs  Tenderness or bloating in the abdomen  Fatigue   Decreased appetite or nausea   Confusion        No med changes today   Blood work in 3 months    Continue diet/fluid adherence  Continue daily wts.   F/U w/ Cardiology  F/U in clinic in 6 month f/u

## 2022-10-05 NOTE — PROGRESS NOTES
Heart Failure Clinic       Visit Date: 10/5/2022  Cardiologist:  Dr. Jay Moore  Primary Care Physician: Dr. Paris Donahue is a 79 y.o. male who presents today for:  No chief complaint on file. HPI:     TYPE HF: HFpEF 55%, mild LV hypertrophy   Cause:   Device:   HX: AAA s/p repair, Afib (eliquis), CAD  Cancer , Cancer of kidney, Heart attack , Hx of blood clots, Hyperlipidemia, Hypertension, Kidney stones, Obesity, Osteoarthritis, and Stroke   Dry Wt:  258 on 8/30/22, 252 on 10/5/22,       Teena Yuen is a 79 y.o. male who presents to the office for a new patient visit in the heart failure clinic. Concerns today: pt here today for a  week f/u. Doing well. Weight is down on our scale by 6lbs. Urinating well, swelling is down. Does note some blisters that formed on left lower leg that he also had during [place]. He does have an appointment tomorrow w/ wound care    Visit on 8/30/22: pt referred by Dr. Jay Moore d/t LE swelling. Noted pt did not take his diuretic 7 days and did note a 15lb weight gain w/ worsening swelling. Pt urinating well on diuretics, repeat labs are ordered d/t small jump in Cr. Some improvement of SOB.        Patient follows:      Hospitalization:        Activity: ADLs performed w/o limitations some hip pain  Diet: educated today    Patient has:  Chest Pain: no  SOB: MULLINS w/ exertion   Orthopnea/PND: sleeps in recliner d/t BENJAMIN  BENJAMIN: yes, does not use CPAP but on oxygen   Edema: yes, some improvements  Fatigue: no  Abdominal bloating: no  Cough: no  Appetite: good      Past Medical History:   Diagnosis Date    AAA (abdominal aortic aneurysm)     CAD (coronary artery disease)     Cancer (HCC)     cheek and nose     Cancer of kidney ([address]     Heart attack ([address]     History of placement of chest tube     Due to pt falling and breaking 4 ribs and puncturing his lung    Hx of blood clots     Hyperlipidemia     Hypertension     Kidney stones     Obesity     Osteoarthritis Stroke [facility])      Past Surgical History:   Procedure Laterality Date    ABDOMEN SURGERY      AAA    ABDOMINAL AORTIC ANEURYSM REPAIR  2005    CARDIAC SURGERY  2008, 1996    stents    CARDIAC SURGERY  2005    AAA repair    CAROTID ENDARTERECTOMY  2006    FINGER AMPUTATION      JOINT REPLACEMENT  2007    knees bilateral    KNEE SURGERY Left     PARTIAL NEPHRECTOMY  2010    Right    PTCA      SHOULDER SURGERY  1997    TONSILLECTOMY  1948    VASCULAR SURGERY       Family History   Problem Relation Age of Onset    Alzheimer's Disease Mother     Heart Disease Father      Social History     Tobacco Use    Smoking status: Former     Packs/day: 1.00     Years: 15.00     Pack years: 15.00     Types: Pipe, Cigarettes     Start date:      Quit date: 1970     Years since quittin.4    Smokeless tobacco: Never   Substance Use Topics    Alcohol use: No     Alcohol/week: 0.0 standard drinks     Current Outpatient Medications   Medication Sig Dispense Refill    furosemide (LASIX) 40 MG tablet Take 40 mg by mouth See Admin Instructions Take 40 mg in AM and 20 mg in the PM      amLODIPine (NORVASC) 5 MG tablet TAKE 1 TABLET BY MOUTH  DAILY 90 tablet 0    clopidogrel (PLAVIX) 75 MG tablet TAKE 1 TABLET BY MOUTH  DAILY 90 tablet 3    metoprolol tartrate (LOPRESSOR) 25 MG tablet Take 1 tablet by mouth in the morning and 1 tablet before bedtime. glucose monitoring (FREESTYLE FREEDOM) kit 1 kit by Does not apply route daily 1 kit 0    Lancets MISC 1 each by Does not apply route 4 times daily 200 each 0    blood glucose monitor strips Test 4 times a day & as needed for symptoms of irregular blood glucose. Dispense sufficient amount for indicated testing frequency plus additional to accommodate PRN testing needs.  300 strip 0    Gauze Pads & Dressings 2\"X2\" PADS 1 each by Does not apply route daily as needed (finger sticks) 60 each 0    glucagon 1 MG injection Inject 1 mg into the muscle See Admin Instructions Follow package directions for low blood sugar. 1 kit 3    ELIQUIS 2.5 MG TABS tablet TAKE 1 TABLET BY MOUTH  TWICE DAILY 180 tablet 2    atorvastatin (LIPITOR) 40 MG tablet TAKE 1 TABLET BY MOUTH AT  NIGHT 90 tablet 2    Zinc Sulfate (ZINC 15 PO) Take by mouth      Turmeric Curcumin 500 MG CAPS Take by mouth      Omega-3 Fatty Acids (FISH OIL PO) Take by mouth      OXYGEN Inhale 3 L into the lungs nightly      acetaminophen (TYLENOL) 500 MG tablet Take 1,000 mg by mouth every 6 hours as needed for Pain      Coenzyme Q10 (CO Q-10) 100 MG CAPS Take by mouth      Cholecalciferol (VITAMIN D3) 125 MCG (5000 UT) TABS Take by mouth      APPLE CIDER VINEGAR PO Take 450 mg by mouth      pantoprazole (PROTONIX) 40 MG tablet TAKE 1 TABLET BY MOUTH  DAILY 90 tablet 3    PARoxetine (PAXIL) 20 MG tablet Take 20 mg by mouth every morning      Magnesium 500 MG TABS Take by mouth daily       CINNAMON PO Take 1,000 mg by mouth daily      Probiotic Product (PROBIOTIC DAILY PO) Take by mouth daily      Ascorbic Acid (VITAMIN C) 1000 MG tablet Take 1,000 mg by mouth daily        No current facility-administered medications for this visit. No Known Allergies    SUBJECTIVE:   Review of Systems   Constitutional:  Negative for activity change, appetite change, diaphoresis and fatigue. Respiratory:  Positive for shortness of breath. Negative for cough. Cardiovascular:  Positive for leg swelling. Negative for chest pain and palpitations. Gastrointestinal:  Negative for abdominal distention, nausea and vomiting. Neurological:  Negative for weakness, light-headedness and headaches. Hematological:  Negative for adenopathy. Psychiatric/Behavioral:  Negative for sleep disturbance. OBJECTIVE:   Today's Vitals:  /62   Pulse 51   Ht 5' 8\" (1.727 m)   Wt 252 lb 9.6 oz (114.6 kg)   SpO2 98%   BMI 38.41 kg/m²     Physical Exam  Vitals reviewed. Constitutional:       General: He is not in acute distress.      Appearance: Normal appearance. He is well-developed. He is not diaphoretic. HENT:      Head: Normocephalic and atraumatic. Eyes:      Conjunctiva/sclera: Conjunctivae normal.   Cardiovascular:      Rate and Rhythm: Normal rate and regular rhythm. Heart sounds: Normal heart sounds. No murmur heard. Pulmonary:      Effort: Pulmonary effort is normal. No respiratory distress. Breath sounds: Normal breath sounds. No wheezing or rales. Abdominal:      General: Bowel sounds are normal. There is no distension. Palpations: Abdomen is soft. Tenderness: There is no abdominal tenderness. Musculoskeletal:         General: Normal range of motion. Cervical back: Normal range of motion and neck supple. Right lower leg: Edema (trace+ at ankle) present. Left lower leg: Edema (trace+ at ankle) present. Skin:     General: Skin is warm and dry. Capillary Refill: Capillary refill takes less than 2 seconds. Neurological:      Mental Status: He is alert and oriented to person, place, and time. Coordination: Coordination normal.   Psychiatric:         Behavior: Behavior normal.       Wt Readings from Last 3 Encounters:   10/05/22 252 lb 9.6 oz (114.6 kg)   08/17/22 258 lb 12.8 oz (117.4 kg)   07/07/22 246 lb (111.6 kg)     BP Readings from Last 3 Encounters:   10/05/22 118/62   08/30/22 134/72   08/17/22 (!) 144/80     Pulse Readings from Last 3 Encounters:   10/05/22 51   08/30/22 91   08/17/22 86     Body mass index is 38.41 kg/m². ECHO:   Summary  Left ventricle size is normal.  Mild concentric left ventricular hypertrophy. There were no regional wall motion abnormalities. Ejection fraction is visually estimated at 55%.      Signature     ----------------------------------------------------------------  Electronically signed by Ember Scott MD (Interpreting  physician) on 05/20/2022 at 07:05 PM  ----------------------------------------------------------------       CATH/STRESS:   Stress Test:5/2021   Summary   There is mild attenuation artifact noted in the inferior wall seems to be   related to diaphragm artifact. The nuclear images is not suggestive for myocardial ischemia. Recommendation   Clinical correlation is recommended. Signatures      ----------------------------------------------------------------   Electronically signed by Tabitha Sheffield MD (Interpreting   Cardiologist) on 05/17/2021 at 14:20   ----------------------------------------------------------------     2019    CONCLUSION:  1. Unstable angina. 2.  Coronary artery disease. 3.  Severe in-stent restenosis within previously placed stent in the  proximal RCA, status post successful PCI stenting. RECOMMENDATIONS:  1. Continue aspirin. 2.  Continue Plavix. 3.  The patient receives renal dose of Plavix of 300.  4. We will increase Lipitor from 10 mg p.o. daily to 40 mg p.o. daily. 5.  Further optimization of coronary artery disease and risk factor  modification. 6.  Outpatient followup with Cardiology. Findings and plan of care were discussed with the patient and his family  in detail. Questions were answered. They are agreeable to the plan.       Results reviewed:  BNP:   Lab Results   Component Value Date    BNP 67.8 02/01/2012     CBC:   Lab Results   Component Value Date/Time    WBC 7.6 06/15/2022 06:33 AM    RBC 4.53 06/15/2022 06:33 AM    RBC 5.22 01/20/2022 08:55 AM    HGB 15.0 06/15/2022 06:33 AM    HCT 46.0 06/15/2022 06:33 AM    PLT 61 06/15/2022 06:33 AM     CMP:    Lab Results   Component Value Date/Time     08/22/2022 09:34 AM    K 3.8 08/22/2022 09:34 AM    K 5.1 05/30/2022 05:30 AM     08/22/2022 09:34 AM    CO2 27 08/22/2022 09:34 AM    BUN 15 08/22/2022 09:34 AM    CREATININE 1.63 08/22/2022 09:34 AM    LABGLOM 48 06/06/2022 07:03 AM    GLUCOSE 156 08/22/2022 09:34 AM    CALCIUM 9.3 08/22/2022 09:34 AM     Hepatic Function Panel:    Lab Results   Component Value Date/Time ALKPHOS 78 06/02/2022 06:48 AM    ALT 83 06/02/2022 06:48 AM    AST 44 06/02/2022 06:48 AM    PROT 5.8 06/02/2022 06:48 AM    BILITOT 0.8 06/02/2022 06:48 AM    BILIDIR <0.2 06/02/2022 06:48 AM    LABALBU 3.0 06/02/2022 06:48 AM     Magnesium:    Lab Results   Component Value Date/Time    MG 2.0 08/22/2022 09:34 AM     PT/INR:    Lab Results   Component Value Date/Time    INR 0.95 11/29/2019 05:12 AM     Lipids:    Lab Results   Component Value Date/Time    TRIG 179 01/20/2022 08:55 AM    HDL 32 01/20/2022 08:55 AM    LDLCALC 69 01/20/2022 08:55 AM    LDLDIRECT 141 04/19/2018 08:41 AM    LABVLDL 36 01/20/2022 08:55 AM       ASSESSMENT AND PLAN:   The patient's condition/symptoms are stable        Diagnosis Orders   1. Heart failure with preserved ejection fraction, borderline, class II (HCC)  Basic Metabolic Panel      2. At risk for fluid volume overload          Continue:  GDMT:   ACE/ARB/ARNI - none   BB - Lopressor 25 BID    Diuretic - Lasix 40am 20 Pm  AA - none  SGLT2 -  none  Vasodilator - none  Other - Plavix Eliquis, amlodipine       HFpEF 55%  Stable, improved lower leg swelling, good urination w/ weight loss. GDMT: discussed [facility] (will add if hospital readmission)   Lab reviewed - K 4.0, Cr 1.54  ECHO 2022: Mild concentric left ventricular hypertrophy  CATH 2019: s/p PCI to LAD  Stress 2020: negative    Blood work in 3 months  No med changes today     Continue diet/fluid adherence  Continue daily wts. F/U w/ Cardiology  F/U in clinic in 6 month f/u      Tolerating above noted HF meds, no ill side effects noted. Will continue to monitor kidney function and electrolytes. Will optimize as tolerated. Pt is compliant w/ medications.     Total visit time of 25 minutes has been spent with patient on education of symptoms, management, medication, and plan of care; as well as review of chart: labs, ECHO, radiology reports, etc.   I personally spent more then 50% of the appt time face to face with the patient. Daily weights  Fluid restriction of 2 Liters per day  Limit sodium in diet to around 6602-9120 mg/day  Monitor BP  Activity as tolerated     Patient was instructed to call the [facility] for any changes in symptoms as noted in AVS.      Return in about 6 months (around 4/5/2023). or sooner if needed     Patient given educational materials - see patient instructions. We discussed the importance of weighing oneself and recording daily. We also discussed the importance of a low sodium diet, higher sodium foods to avoid and better low sodium food options. Patient verbalizes understanding of plan of care using teach back method, and is agreeable to the treatment plan.        Electronically signed by MARTIN Eldridge CNP on 10/5/2022 at 2:42 PM

## 2022-10-18 ENCOUNTER — TELEPHONE (OUTPATIENT)
Dept: CARDIOLOGY CLINIC | Age: 82
End: 2022-10-18

## 2022-10-18 NOTE — TELEPHONE ENCOUNTER
Patient wife called in patient had severe cramps with legs last night   Has been having them for a while but last night was bad  To you want him to get his BMP now?  Has order for [facility]

## 2022-10-20 LAB
ANION GAP SERPL CALCULATED.3IONS-SCNC: 11 MEQ/L (ref 7–16)
BUN BLDV-MCNC: 23 MG/DL (ref 8–23)
CALCIUM SERPL-MCNC: 9.4 MG/DL (ref 8.5–10.5)
CHLORIDE BLD-SCNC: 103 MEQ/L (ref 95–107)
CO2: 28 MEQ/L (ref 19–31)
CREAT SERPL-MCNC: 1.59 MG/DL (ref 0.8–1.4)
EGFR IF NONAFRICAN AMERICAN: 43 ML/MIN/1.73
GLUCOSE: 155 MG/DL (ref 70–99)
POTASSIUM SERPL-SCNC: 4.3 MEQ/L (ref 3.5–5.4)
SODIUM BLD-SCNC: 142 MEQ/L (ref 133–146)

## 2022-10-20 NOTE — TELEPHONE ENCOUNTER
Message  Received: Today  MARTIN Reynaga - CNP  [facility] Clinical Staff  Leg cramping is not from abnormal potassium. Does not appear to be dehydrated either.

## 2022-11-09 ENCOUNTER — HOSPITAL ENCOUNTER (EMERGENCY)
Age: 82
Discharge: HOME | End: 2022-11-09
Payer: MEDICARE

## 2022-11-09 ENCOUNTER — APPOINTMENT (OUTPATIENT)
Dept: CT IMAGING | Age: 82
End: 2022-11-09
Payer: MEDICARE

## 2022-11-09 VITALS
OXYGEN SATURATION: 93 % | HEART RATE: 75 BPM | WEIGHT: 250 LBS | BODY MASS INDEX: 37.89 KG/M2 | DIASTOLIC BLOOD PRESSURE: 74 MMHG | TEMPERATURE: 97.8 F | SYSTOLIC BLOOD PRESSURE: 125 MMHG | RESPIRATION RATE: 18 BRPM | HEIGHT: 68 IN

## 2022-11-09 DIAGNOSIS — M31.6 TEMPORAL ARTERITIS (HCC): Primary | ICD-10-CM

## 2022-11-09 LAB
ALBUMIN SERPL-MCNC: 3.8 G/DL (ref 3.5–5.1)
ALP BLD-CCNC: 80 U/L (ref 38–126)
ALT SERPL-CCNC: 14 U/L (ref 11–66)
ANION GAP SERPL CALCULATED.3IONS-SCNC: 14 MEQ/L (ref 8–16)
AST SERPL-CCNC: 15 U/L (ref 5–40)
BASOPHILS # BLD: 0.3 %
BASOPHILS ABSOLUTE: 0 THOU/MM3 (ref 0–0.1)
BILIRUB SERPL-MCNC: 0.6 MG/DL (ref 0.3–1.2)
BUN BLDV-MCNC: 22 MG/DL (ref 7–22)
C-REACTIVE PROTEIN: 8.1 MG/DL (ref 0–1)
CALCIUM SERPL-MCNC: 8.9 MG/DL (ref 8.5–10.5)
CHLORIDE BLD-SCNC: 102 MEQ/L (ref 98–111)
CO2: 25 MEQ/L (ref 23–33)
CREAT SERPL-MCNC: 1.5 MG/DL (ref 0.4–1.2)
EOSINOPHIL # BLD: 1.2 %
EOSINOPHILS ABSOLUTE: 0.1 THOU/MM3 (ref 0–0.4)
ERYTHROCYTE [DISTWIDTH] IN BLOOD BY AUTOMATED COUNT: 13.1 % (ref 11.5–14.5)
ERYTHROCYTE [DISTWIDTH] IN BLOOD BY AUTOMATED COUNT: 46.6 FL (ref 35–45)
GFR SERPL CREATININE-BSD FRML MDRD: 46 ML/MIN/1.73M2
GLUCOSE BLD-MCNC: 126 MG/DL (ref 70–108)
HCT VFR BLD CALC: 45.9 % (ref 42–52)
HEMOGLOBIN: 15.4 GM/DL (ref 14–18)
IMMATURE GRANS (ABS): 0.13 THOU/MM3 (ref 0–0.07)
IMMATURE GRANULOCYTES: 1.2 %
INFLUENZA A: NOT DETECTED
INFLUENZA B: NOT DETECTED
LYMPHOCYTES # BLD: 10.7 %
LYMPHOCYTES ABSOLUTE: 1.2 THOU/MM3 (ref 1–4.8)
MCH RBC QN AUTO: 32.6 PG (ref 26–33)
MCHC RBC AUTO-ENTMCNC: 33.6 GM/DL (ref 32.2–35.5)
MCV RBC AUTO: 97 FL (ref 80–94)
MONOCYTES # BLD: 8.7 %
MONOCYTES ABSOLUTE: 1 THOU/MM3 (ref 0.4–1.3)
NUCLEATED RED BLOOD CELLS: 0 /100 WBC
OSMOLALITY CALCULATION: 286.1 MOSMOL/KG (ref 275–300)
PLATELET # BLD: 145 THOU/MM3 (ref 130–400)
PMV BLD AUTO: 9.8 FL (ref 9.4–12.4)
POTASSIUM REFLEX MAGNESIUM: 4 MEQ/L (ref 3.5–5.2)
RBC # BLD: 4.73 MILL/MM3 (ref 4.7–6.1)
SARS-COV-2 RNA, RT PCR: NOT DETECTED
SEDIMENTATION RATE, ERYTHROCYTE: 18 MM/HR (ref 0–10)
SEG NEUTROPHILS: 77.9 %
SEGMENTED NEUTROPHILS ABSOLUTE COUNT: 8.6 THOU/MM3 (ref 1.8–7.7)
SODIUM BLD-SCNC: 141 MEQ/L (ref 135–145)
TOTAL PROTEIN: 5.6 G/DL (ref 6.1–8)
WBC # BLD: 11.1 THOU/MM3 (ref 4.8–10.8)

## 2022-11-09 PROCEDURE — 36415 COLL VENOUS BLD VENIPUNCTURE: CPT

## 2022-11-09 PROCEDURE — 6370000000 HC RX 637 (ALT 250 FOR IP): Performed by: NURSE PRACTITIONER

## 2022-11-09 PROCEDURE — 85025 COMPLETE CBC W/AUTO DIFF WBC: CPT

## 2022-11-09 PROCEDURE — 85651 RBC SED RATE NONAUTOMATED: CPT

## 2022-11-09 PROCEDURE — 80053 COMPREHEN METABOLIC PANEL: CPT

## 2022-11-09 PROCEDURE — 70450 CT HEAD/BRAIN W/O DYE: CPT

## 2022-11-09 PROCEDURE — 87636 SARSCOV2 & INF A&B AMP PRB: CPT

## 2022-11-09 PROCEDURE — 99284 EMERGENCY DEPT VISIT MOD MDM: CPT

## 2022-11-09 PROCEDURE — 86140 C-REACTIVE PROTEIN: CPT

## 2022-11-09 RX ORDER — NITROFURANTOIN 25; 75 MG/1; MG/1
CAPSULE ORAL
COMMUNITY
Start: 2022-11-08

## 2022-11-09 RX ORDER — PREDNISONE 20 MG/1
60 TABLET ORAL DAILY
Qty: 42 TABLET | Refills: 0 | Status: SHIPPED | OUTPATIENT
Start: 2022-11-09 | End: 2022-11-23

## 2022-11-09 RX ORDER — PREDNISONE 20 MG/1
60 TABLET ORAL ONCE
Status: COMPLETED | OUTPATIENT
Start: 2022-11-09 | End: 2022-11-09

## 2022-11-09 RX ADMIN — PREDNISONE 60 MG: 20 TABLET ORAL at 22:27

## 2022-11-09 ASSESSMENT — PAIN - FUNCTIONAL ASSESSMENT
PAIN_FUNCTIONAL_ASSESSMENT: NONE - DENIES PAIN
PAIN_FUNCTIONAL_ASSESSMENT: 0-10
PAIN_FUNCTIONAL_ASSESSMENT: NONE - DENIES PAIN

## 2022-11-09 ASSESSMENT — PAIN SCALES - GENERAL
PAINLEVEL_OUTOF10: 1
PAINLEVEL_OUTOF10: 5

## 2022-11-09 ASSESSMENT — PAIN DESCRIPTION - ORIENTATION: ORIENTATION: LEFT

## 2022-11-09 ASSESSMENT — PAIN DESCRIPTION - LOCATION: LOCATION: HEAD

## 2022-11-09 NOTE — ED NOTES
Pt arrives to the ED for a headahce that began about 2 days ago. Pt states he has pain on the left side of his head near his temple. Pt states his pain with be as has as a 8/10 at times. Pt states he has been taking tylenol for the pain which will help. Pt has hx of stroke in 2006 however has no deficits today. Pt denies any blurred vision, weakness, impaired gait to speech problems.  Pt VSS     Nette Blackwood RN  11/09/22 1800

## 2022-11-10 NOTE — ED NOTES
Pt resting in bed. voiced no concerns. Call light within reach.  Will monitor      Ashley Kwon RN  11/09/22 2022

## 2022-11-10 NOTE — ED NOTES
Pt awake in bed. Family at bedside. Pt complained of a shooting pain in head that quickly went away. Vitals reassessed. Breathing regular and unlabored. No distress noted. Pt stated no needs at this time. Call Light within reach. Side rails u x2.   Shane Trujillo, [place] [facility] [place]     Dami Walton  11/09/22 [id number]

## 2022-11-10 NOTE — DISCHARGE INSTRUCTIONS
Monitor your blood sugars daily. Call your PCP if your morning blood sugar is >200 or any blood sugar is >300. Return for worsening pain.

## 2022-11-10 NOTE — ED NOTES
Pt awake in bed. Denies ain. Breathing unlabored. Vitals reassessed. Nasal swab for covid and influenza. Side rails up x2. Call light within reach. Family at bedside.    Junior Teague, [place] [facility] [place]     Hari Driver  11/09/22 1927

## 2022-11-10 NOTE — ED PROVIDER NOTES
[facility]  eMERGENCY dEPARTMENT eNCOUnter          CHIEF COMPLAINT       Chief Complaint   Patient presents with    Headache       Nurses Notes reviewed and I agree except as noted inthe HPI. HISTORY OF PRESENT ILLNESS    Eliza Ken is a 79 y.o. male who presents to the Emergency Department for the evaluation of. Patient reports headache began suddenly 2 days ago. It has waxed and waned since onset but has not resolved. He states that is when it is severe, it is the worst headache of his life. He has been taking Tylenol at home with some temporary improvement. Headache seems to worsen with head movement, otherwise no known exacerbating or alleviating factors. He is on Plavix and Eliquis. Reports history of repaired abdominal aortic aneurysm but no known intracranial aneurysm. Denies any associated vision changes, numbness, tingling, confusion, weakness, photophobia, phonophobia, URI symptoms, nausea, vomiting, vertigo, syncope, eye watering or diaphoresis. No known sick contacts. He was diagnosed with UTI yesterday, started on Macrobid for symptoms of dysuria, urinary frequency and incomplete bladder emptying. The HPI was provided by the patient. REVIEW OF SYSTEMS     Review of Systems   HENT:  Negative for ear pain. Neurological:  Positive for headaches. All other systems reviewed and are negative. PAST MEDICAL HISTORY    has a past medical history of AAA (abdominal aortic aneurysm), CAD (coronary artery disease), Cancer ([address] Cancer of kidney ([address] Heart attack ([address] History of placement of chest tube, Hx of blood clots, Hyperlipidemia, Hypertension, Kidney stones, Obesity, Osteoarthritis, and Stroke ([address] SURGICAL HISTORY      has a past surgical history that includes joint replacement (2007); Carotid endarterectomy (2006); Abdominal aortic aneurysm repair (2005); shoulder surgery (1997); Tonsillectomy (1948); partial nephrectomy (2010);  Cardiac surgery (2008, 1996); Cardiac surgery (2005); Abdomen surgery; vascular surgery; knee surgery (Left, 6-2013); Finger amputation; and Percutaneous Transluminal Coronary Angio. CURRENT MEDICATIONS       Discharge Medication List as of 11/9/2022 10:19 PM        CONTINUE these medications which have NOT CHANGED    Details   nitrofurantoin, macrocrystal-monohydrate, (MACROBID) 100 MG capsule Historical Med      furosemide (LASIX) 40 MG tablet Take 40 mg by mouth See Admin Instructions Take 40 mg in AM and 20 mg in the PMHistorical Med      amLODIPine (NORVASC) 5 MG tablet TAKE 1 TABLET BY MOUTH  DAILY, Disp-90 tablet, R-0Requesting 1 year supplyNormal      clopidogrel (PLAVIX) 75 MG tablet TAKE 1 TABLET BY MOUTH  DAILY, Disp-90 tablet, R-3Requesting 1 year supplyNormal      metoprolol tartrate (LOPRESSOR) 25 MG tablet Take 1 tablet by mouth in the morning and 1 tablet before bedtime. Historical Med      glucose monitoring (FREESTYLE FREEDOM) kit DAILY Starting Sat 5/21/2022, Disp-1 kit, R-0, Normal      Lancets MISC 4 TIMES DAILY Starting Sat 5/21/2022, Disp-200 each, R-0, Normal      blood glucose monitor strips Test 4 times a day & as needed for symptoms of irregular blood glucose. Dispense sufficient amount for indicated testing frequency plus additional to accommodate PRN testing needs. , Disp-300 strip, R-0, Normal      Gauze Pads & Dressings 2\"X2\" PADS DAILY PRN Starting Sat 5/21/2022, Disp-60 each, R-0, Normal      glucagon 1 MG injection Inject 1 mg into the muscle See Admin Instructions Follow package directions for low blood sugar., Disp-1 kit, R-3Normal      ELIQUIS 2.5 MG TABS tablet TAKE 1 TABLET BY MOUTH  TWICE DAILY, Disp-180 tablet, R-2Requesting 1 year supplyNormal      atorvastatin (LIPITOR) 40 MG tablet TAKE 1 TABLET BY MOUTH AT  NIGHT, Disp-90 tablet, R-2Requesting 1 year supplyNormal      Zinc Sulfate (ZINC 15 PO) Take by mouthHistorical Med      Turmeric Curcumin 500 MG CAPS Take by mouthHistorical Med      Omega-3 Fatty Acids (FISH OIL PO) Take by mouthHistorical Med      OXYGEN Inhale 3 L into the lungs nightlyHistorical Med      acetaminophen (TYLENOL) 500 MG tablet Take 1,000 mg by mouth every 6 hours as needed for PainHistorical Med      Coenzyme Q10 (CO Q-10) 100 MG CAPS Take by mouthHistorical Med      Cholecalciferol (VITAMIN D3) 125 MCG (5000 UT) TABS Take by mouthHistorical Med      APPLE CIDER VINEGAR PO Take 450 mg by mouthHistorical Med      pantoprazole (PROTONIX) 40 MG tablet TAKE 1 TABLET BY MOUTH  DAILY, Disp-90 tablet,R-3Requesting 1 year supplyNormal      PARoxetine (PAXIL) 20 MG tablet Take 20 mg by mouth every morningHistorical Med      Magnesium 500 MG TABS Take by mouth daily Historical Med      CINNAMON PO Take 1,000 mg by mouth dailyHistorical Med      Probiotic Product (PROBIOTIC DAILY PO) Take by mouth dailyHistorical Med      Ascorbic Acid (VITAMIN C) 1000 MG tablet Take 1,000 mg by mouth daily Historical Med             ALLERGIES     has No Known Allergies. FAMILY HISTORY     He indicated that his mother is . He indicated that his father is . family history includes Alzheimer's Disease in his mother; Heart Disease in his father. SOCIAL HISTORY      reports that he quit smoking about 52 years ago. His smoking use included pipe. He started smoking about 67 years ago. He has a 15.00 pack-year smoking history. He has never used smokeless tobacco. He reports that he does not drink alcohol and does not use drugs. PHYSICAL EXAM     INITIAL VITALS:  height is 5' 8\" (1.727 m) and weight is 250 lb (113.4 kg). His oral temperature is 97.8 °F (36.6 °C). His blood pressure is 125/74 and his pulse is 75. His respiration is 18 and oxygen saturation is 93%. Physical Exam  Vitals and nursing note reviewed. Constitutional:       Appearance: Normal appearance. HENT:      Head: Normocephalic and atraumatic. Comments:  There is no palpable throbbing over the left temporal region. No cutaneous change to suggest shingles. No notable tenderness. Eyes:      Extraocular Movements: Extraocular movements intact. Conjunctiva/sclera: Conjunctivae normal.      Pupils: Pupils are equal, round, and reactive to light. Cardiovascular:      Rate and Rhythm: Normal rate. Pulmonary:      Effort: Pulmonary effort is normal. No respiratory distress. Musculoskeletal:      Cervical back: Normal range of motion. Skin:     General: Skin is warm and dry. Neurological:      General: No focal deficit present. Mental Status: He is alert and oriented to person, place, and time. Psychiatric:         Mood and Affect: Mood normal.       DIFFERENTIAL DIAGNOSIS:   Differential diagnoses are discussed    DIAGNOSTIC RESULTS     EKG: All EKG's are interpreted by the Emergency Department Physician who either signs or Co-signsthis chart in the absence of a cardiologist.          RADIOLOGY: non-plain film images(s) such as CT, Ultrasound and MRI are read by the radiologist.    CT HEAD WO CONTRAST   Final Result   Impression:   Negative for acute intracranial abnormality. This document has been electronically signed by: Rose Viveros MD on    11/09/2022 09:18 PM      All CTs at this facility use dose modulation techniques and iterative    reconstructions, and/or weight-based dosing   when appropriate to reduce radiation to a low as reasonably achievable.           LABS:      Labs Reviewed   CBC WITH AUTO DIFFERENTIAL - Abnormal; Notable for the following components:       Result Value    WBC 11.1 (*)     MCV 97.0 (*)     RDW-SD 46.6 (*)     Segs Absolute 8.6 (*)     Immature Grans (Abs) 0.13 (*)     All other components within normal limits   COMPREHENSIVE METABOLIC PANEL W/ REFLEX TO MG FOR LOW K - Abnormal; Notable for the following components:    Glucose 126 (*)     Creatinine 1.5 (*)     Total Protein 5.6 (*)     All other components within normal limits   C-REACTIVE PROTEIN - Abnormal; Notable for the following components:    CRP 8.10 (*)     All other components within normal limits   SEDIMENTATION RATE - Abnormal; Notable for the following components:    Sed Rate 18 (*)     All other components within normal limits   GLOMERULAR FILTRATION RATE, ESTIMATED - Abnormal; Notable for the following components:    Est, Glom Filt Rate 46 (*)     All other components within normal limits   COVID-19 & INFLUENZA COMBO   ANION GAP   OSMOLALITY       EMERGENCY DEPARTMENT COURSE:   Vitals:    Vitals:    11/09/22 1754 11/09/22 1924 11/09/22 2021 11/09/22 2137   BP: (!) 162/72 (!) 140/64 134/70 125/74   Pulse: 76 72 82 75   Resp: 15 20 16 18   Temp: 97.8 °F (36.6 °C)      TempSrc: Oral      SpO2: 93% 93% 93% 93%   Weight: 250 lb (113.4 kg)      Height: 5' 8\" (1.727 m)         7:25 PM EST: The patient was seen and evaluated. Patient presents for complaints of headache. It is intermittent, severe and sudden in onset when it occurs. Minimal currently and he denied any need for medication during my care. Pain is present over the left temporal region. Preliminary laboratory studies do reveal elevated CRP. COVID and flu testing negative. He was signed out at end of shift pending sedimentation rate and CT results. CRITICAL CARE:   None    CONSULTS:  None    PROCEDURES:  None    FINAL IMPRESSION      1.  Temporal arteritis ([facility]          DISPOSITION/PLAN   Please see follow-up provider note for final disposition    PATIENT REFERRED TO:  [address]  [address] [address] [phone]    Call in 1 day  For follow up    Your Opthomologist  Call your  In [address] that did your cataract surgery  Call in 1 day  For follow up    [facility] Mimi:  Discharge Medication List as of 11/9/2022 10:19 PM        START taking these medications    Details   predniSONE (DELTASONE) 20 MG tablet Take 3 tablets by mouth daily for 14 days, Disp-42 tablet, R-0Normal             (Please note that portions of this note were completedwith a voice recognition program.  Efforts were made to edit the dictations but occasionally words are mis-transcribed.)        Milind Hoff PA-C  11/14/22 0700

## 2022-11-14 NOTE — ED PROVIDER NOTES
[address]     Pt Name: Hernesto Dumont  MRN: [id number]  [place] 1940  Date of evaluation: 11/13/22        Mid-level provider Note:    I have personally performed and/or participated in the history, exam and medical decision making and agree with all pertinent clinical information as noted by the previous provider. I have also reviewed and agree with the past medical, family and social history unless otherwise noted. I have personally performed a face to face diagnostic evaluation on this patient. I have reviewed the previous provider's findings and agree. Evaluation:  I assumed care of this patient from [place], [place] pending imaging results. Imaging is negative. Concern for Temporal arteritis. I d/w Dr. René Rangel. Patient is given a loading dose of steroids. Has established optho. Will be dc home with 2 weeks of steroids and follow up to their optho. Return if symptoms worsen. Patient verbalized understanding. DC home with close follow up. CT HEAD WO CONTRAST    Result Date: 11/9/2022  CT Head without contrast Indication: Severe sudden left-sided headache. Technique: CT head without contrast. Coronal and sagittal reformations. Comparison: CT - CT HEAD WO CONTR - 08/13/2014 02:30 PM EDT Findings: The ventricular system is midline in position. No acute major vessel vascular territory infarct. No acute intraparenchymal hemorrhage. No parenchymal mass lesions. Focal encephalomalacia in the bilateral parietal lobes, most consistent with chronic infarcts, new on the right side and stable on the left side since the prior study. Cerebral atrophy. Hypodensities in the cerebral white matter, likely chronic small vessel ischemic changes. No abnormal extra-axial masses or fluid collections. The basal cisterns and foramen magnum are patent. The calvarium is intact. The mastoid air cells and visualized paranasal sinuses are well-aerated.  Prior bilateral lens replacements. Impression: Negative for acute intracranial abnormality. This document has been electronically signed by: Zahra Joya MD on 11/09/2022 09:18 PM All CTs at this facility use dose modulation techniques and iterative reconstructions, and/or weight-based dosing when appropriate to reduce radiation to a low as reasonably achievable. DIAGNOSIS  1.  Temporal arteritis ([address]           DISPOSITION/PLAN  PATIENT REFERRED TO:  [address]  [facility]     Call in 1 day  For follow up    Your Opthomologist  Call your  [facility] that did your cataract surgery  Call in 1 day  For follow up    DISCHARGE MEDICATIONS:  Discharge Medication List as of 11/9/2022 10:19 PM        START taking these medications    Details   predniSONE (DELTASONE) 20 MG tablet Take 3 tablets by mouth daily for 14 days, Disp-42 tablet, R-0Normal               MARTIN Ford - MARTIN Lima - CNP  11/13/22 9340

## 2022-12-12 ENCOUNTER — APPOINTMENT (OUTPATIENT)
Dept: GENERAL RADIOLOGY | Age: 82
DRG: 872 | End: 2022-12-12
Payer: MEDICARE

## 2022-12-12 ENCOUNTER — HOSPITAL ENCOUNTER (INPATIENT)
Age: 82
LOS: 2 days | Discharge: HOME | DRG: 872 | End: 2022-12-15
Attending: STUDENT IN AN ORGANIZED HEALTH CARE EDUCATION/TRAINING PROGRAM | Admitting: INTERNAL MEDICINE
Payer: MEDICARE

## 2022-12-12 DIAGNOSIS — I50.9 CONGESTIVE HEART FAILURE, UNSPECIFIED HF CHRONICITY, UNSPECIFIED HEART FAILURE TYPE (HCC): ICD-10-CM

## 2022-12-12 DIAGNOSIS — N17.9 AKI (ACUTE KIDNEY INJURY) (HCC): Primary | ICD-10-CM

## 2022-12-12 PROBLEM — N17.0 ACUTE KIDNEY INJURY (AKI) WITH ACUTE TUBULAR NECROSIS (ATN) (HCC): Status: ACTIVE | Noted: 2022-12-12

## 2022-12-12 LAB
ANION GAP SERPL CALCULATED.3IONS-SCNC: 13 MEQ/L (ref 8–16)
BACTERIA: ABNORMAL /HPF
BASOPHILS # BLD: 0.4 %
BASOPHILS ABSOLUTE: 0 THOU/MM3 (ref 0–0.1)
BILIRUBIN URINE: NEGATIVE
BLOOD, URINE: ABNORMAL
BUN BLDV-MCNC: 31 MG/DL (ref 7–22)
CALCIUM SERPL-MCNC: 9.6 MG/DL (ref 8.5–10.5)
CASTS 2: ABNORMAL /LPF
CASTS UA: ABNORMAL /LPF
CHARACTER, URINE: ABNORMAL
CHLORIDE BLD-SCNC: 96 MEQ/L (ref 98–111)
CO2: 25 MEQ/L (ref 23–33)
COLOR: YELLOW
CREAT SERPL-MCNC: 1.9 MG/DL (ref 0.4–1.2)
CRYSTALS, UA: ABNORMAL
EOSINOPHIL # BLD: 0.4 %
EOSINOPHILS ABSOLUTE: 0 THOU/MM3 (ref 0–0.4)
EPITHELIAL CELLS, UA: ABNORMAL /HPF
ERYTHROCYTE [DISTWIDTH] IN BLOOD BY AUTOMATED COUNT: 15 % (ref 11.5–14.5)
ERYTHROCYTE [DISTWIDTH] IN BLOOD BY AUTOMATED COUNT: 53.7 FL (ref 35–45)
GFR SERPL CREATININE-BSD FRML MDRD: 35 ML/MIN/1.73M2
GLUCOSE BLD-MCNC: 152 MG/DL (ref 70–108)
GLUCOSE URINE: NEGATIVE MG/DL
HCT VFR BLD CALC: 43.1 % (ref 42–52)
HEMOGLOBIN: 14.3 GM/DL (ref 14–18)
IMMATURE GRANS (ABS): 0.15 THOU/MM3 (ref 0–0.07)
IMMATURE GRANULOCYTES: 1.4 %
KETONES, URINE: NEGATIVE
LEUKOCYTE ESTERASE, URINE: ABNORMAL
LYMPHOCYTES # BLD: 6.7 %
LYMPHOCYTES ABSOLUTE: 0.7 THOU/MM3 (ref 1–4.8)
MCH RBC QN AUTO: 32.3 PG (ref 26–33)
MCHC RBC AUTO-ENTMCNC: 33.2 GM/DL (ref 32.2–35.5)
MCV RBC AUTO: 97.3 FL (ref 80–94)
MISCELLANEOUS 2: ABNORMAL
MONOCYTES # BLD: 5.1 %
MONOCYTES ABSOLUTE: 0.5 THOU/MM3 (ref 0.4–1.3)
NITRITE, URINE: NEGATIVE
NUCLEATED RED BLOOD CELLS: 0 /100 WBC
OSMOLALITY CALCULATION: 277.8 MOSMOL/KG (ref 275–300)
PH UA: 6.5 (ref 5–9)
PLATELET # BLD: 134 THOU/MM3 (ref 130–400)
PMV BLD AUTO: 10.1 FL (ref 9.4–12.4)
POTASSIUM SERPL-SCNC: 4.6 MEQ/L (ref 3.5–5.2)
PRO-BNP: 5297 PG/ML (ref 0–1800)
PROTEIN UA: 100
RBC # BLD: 4.43 MILL/MM3 (ref 4.7–6.1)
RBC URINE: ABNORMAL /HPF
RENAL EPITHELIAL, UA: ABNORMAL
SEG NEUTROPHILS: 86 %
SEGMENTED NEUTROPHILS ABSOLUTE COUNT: 8.9 THOU/MM3 (ref 1.8–7.7)
SODIUM BLD-SCNC: 134 MEQ/L (ref 135–145)
SPECIFIC GRAVITY, URINE: 1.01 (ref 1–1.03)
TROPONIN T: < 0.01 NG/ML
UROBILINOGEN, URINE: 0.2 EU/DL (ref 0–1)
WBC # BLD: 10.4 THOU/MM3 (ref 4.8–10.8)
WBC UA: > 100 /HPF
YEAST: ABNORMAL

## 2022-12-12 PROCEDURE — 93005 ELECTROCARDIOGRAM TRACING: CPT | Performed by: STUDENT IN AN ORGANIZED HEALTH CARE EDUCATION/TRAINING PROGRAM

## 2022-12-12 PROCEDURE — 96372 THER/PROPH/DIAG INJ SC/IM: CPT

## 2022-12-12 PROCEDURE — 84484 ASSAY OF TROPONIN QUANT: CPT

## 2022-12-12 PROCEDURE — 87086 URINE CULTURE/COLONY COUNT: CPT

## 2022-12-12 PROCEDURE — 85025 COMPLETE CBC W/AUTO DIFF WBC: CPT

## 2022-12-12 PROCEDURE — 99285 EMERGENCY DEPT VISIT HI MDM: CPT

## 2022-12-12 PROCEDURE — 81001 URINALYSIS AUTO W/SCOPE: CPT

## 2022-12-12 PROCEDURE — 71046 X-RAY EXAM CHEST 2 VIEWS: CPT

## 2022-12-12 PROCEDURE — 6370000000 HC RX 637 (ALT 250 FOR IP)

## 2022-12-12 PROCEDURE — 99223 1ST HOSP IP/OBS HIGH 75: CPT

## 2022-12-12 PROCEDURE — 36415 COLL VENOUS BLD VENIPUNCTURE: CPT

## 2022-12-12 PROCEDURE — 80048 BASIC METABOLIC PNL TOTAL CA: CPT

## 2022-12-12 PROCEDURE — G0378 HOSPITAL OBSERVATION PER HR: HCPCS

## 2022-12-12 PROCEDURE — 72170 X-RAY EXAM OF PELVIS: CPT

## 2022-12-12 PROCEDURE — 2580000003 HC RX 258

## 2022-12-12 PROCEDURE — 83880 ASSAY OF NATRIURETIC PEPTIDE: CPT

## 2022-12-12 RX ORDER — ASCORBIC ACID 500 MG
1000 TABLET ORAL DAILY
Status: DISCONTINUED | OUTPATIENT
Start: 2022-12-13 | End: 2022-12-15 | Stop reason: HOSPADM

## 2022-12-12 RX ORDER — ONDANSETRON 4 MG/1
4 TABLET, ORALLY DISINTEGRATING ORAL EVERY 8 HOURS PRN
Status: DISCONTINUED | OUTPATIENT
Start: 2022-12-12 | End: 2022-12-15 | Stop reason: HOSPADM

## 2022-12-12 RX ORDER — LANOLIN ALCOHOL/MO/W.PET/CERES
400 CREAM (GRAM) TOPICAL DAILY
Status: DISCONTINUED | OUTPATIENT
Start: 2022-12-13 | End: 2022-12-15 | Stop reason: HOSPADM

## 2022-12-12 RX ORDER — CLOPIDOGREL BISULFATE 75 MG/1
75 TABLET ORAL DAILY
Status: DISCONTINUED | OUTPATIENT
Start: 2022-12-13 | End: 2022-12-15 | Stop reason: HOSPADM

## 2022-12-12 RX ORDER — ONDANSETRON 2 MG/ML
4 INJECTION INTRAMUSCULAR; INTRAVENOUS EVERY 6 HOURS PRN
Status: DISCONTINUED | OUTPATIENT
Start: 2022-12-12 | End: 2022-12-15 | Stop reason: HOSPADM

## 2022-12-12 RX ORDER — POTASSIUM CHLORIDE 7.45 MG/ML
10 INJECTION INTRAVENOUS PRN
Status: DISCONTINUED | OUTPATIENT
Start: 2022-12-12 | End: 2022-12-15 | Stop reason: HOSPADM

## 2022-12-12 RX ORDER — SODIUM CHLORIDE 9 MG/ML
INJECTION, SOLUTION INTRAVENOUS PRN
Status: DISCONTINUED | OUTPATIENT
Start: 2022-12-12 | End: 2022-12-15 | Stop reason: HOSPADM

## 2022-12-12 RX ORDER — POLYETHYLENE GLYCOL 3350 17 G/17G
17 POWDER, FOR SOLUTION ORAL DAILY PRN
Status: DISCONTINUED | OUTPATIENT
Start: 2022-12-12 | End: 2022-12-15 | Stop reason: HOSPADM

## 2022-12-12 RX ORDER — MAGNESIUM SULFATE IN WATER 40 MG/ML
2000 INJECTION, SOLUTION INTRAVENOUS PRN
Status: DISCONTINUED | OUTPATIENT
Start: 2022-12-12 | End: 2022-12-15 | Stop reason: HOSPADM

## 2022-12-12 RX ORDER — VITAMIN B COMPLEX
5000 TABLET ORAL DAILY
Status: DISCONTINUED | OUTPATIENT
Start: 2022-12-13 | End: 2022-12-15 | Stop reason: HOSPADM

## 2022-12-12 RX ORDER — POTASSIUM CHLORIDE 20 MEQ/1
40 TABLET, EXTENDED RELEASE ORAL PRN
Status: DISCONTINUED | OUTPATIENT
Start: 2022-12-12 | End: 2022-12-15 | Stop reason: HOSPADM

## 2022-12-12 RX ORDER — SODIUM CHLORIDE 0.9 % (FLUSH) 0.9 %
5-40 SYRINGE (ML) INJECTION EVERY 12 HOURS SCHEDULED
Status: DISCONTINUED | OUTPATIENT
Start: 2022-12-12 | End: 2022-12-15 | Stop reason: HOSPADM

## 2022-12-12 RX ORDER — ACETAMINOPHEN 650 MG/1
650 SUPPOSITORY RECTAL EVERY 6 HOURS PRN
Status: DISCONTINUED | OUTPATIENT
Start: 2022-12-12 | End: 2022-12-15 | Stop reason: HOSPADM

## 2022-12-12 RX ORDER — ACETAMINOPHEN 325 MG/1
650 TABLET ORAL EVERY 6 HOURS PRN
Status: DISCONTINUED | OUTPATIENT
Start: 2022-12-12 | End: 2022-12-15 | Stop reason: HOSPADM

## 2022-12-12 RX ORDER — ENOXAPARIN SODIUM 100 MG/ML
30 INJECTION SUBCUTANEOUS 2 TIMES DAILY
Status: DISCONTINUED | OUTPATIENT
Start: 2022-12-12 | End: 2022-12-12

## 2022-12-12 RX ORDER — AMLODIPINE BESYLATE 5 MG/1
5 TABLET ORAL DAILY
Status: DISCONTINUED | OUTPATIENT
Start: 2022-12-13 | End: 2022-12-15 | Stop reason: HOSPADM

## 2022-12-12 RX ORDER — FUROSEMIDE 40 MG/1
40 TABLET ORAL SEE ADMIN INSTRUCTIONS
Status: DISCONTINUED | OUTPATIENT
Start: 2022-12-12 | End: 2022-12-15 | Stop reason: HOSPADM

## 2022-12-12 RX ORDER — ATORVASTATIN CALCIUM 40 MG/1
40 TABLET, FILM COATED ORAL DAILY
Status: DISCONTINUED | OUTPATIENT
Start: 2022-12-12 | End: 2022-12-15 | Stop reason: HOSPADM

## 2022-12-12 RX ORDER — DIMENHYDRINATE 50 MG
100 TABLET ORAL DAILY
Status: DISCONTINUED | OUTPATIENT
Start: 2022-12-12 | End: 2022-12-12 | Stop reason: RX

## 2022-12-12 RX ORDER — CEPHALEXIN 250 MG/1
500 CAPSULE ORAL EVERY 12 HOURS SCHEDULED
Status: DISCONTINUED | OUTPATIENT
Start: 2022-12-12 | End: 2022-12-13

## 2022-12-12 RX ORDER — PANTOPRAZOLE SODIUM 40 MG/1
40 TABLET, DELAYED RELEASE ORAL
Status: DISCONTINUED | OUTPATIENT
Start: 2022-12-13 | End: 2022-12-15 | Stop reason: HOSPADM

## 2022-12-12 RX ORDER — SODIUM CHLORIDE 0.9 % (FLUSH) 0.9 %
5-40 SYRINGE (ML) INJECTION PRN
Status: DISCONTINUED | OUTPATIENT
Start: 2022-12-12 | End: 2022-12-15 | Stop reason: HOSPADM

## 2022-12-12 RX ADMIN — CEPHALEXIN 500 MG: 250 CAPSULE ORAL at 21:50

## 2022-12-12 RX ADMIN — ATORVASTATIN CALCIUM 40 MG: 40 TABLET, FILM COATED ORAL at 21:49

## 2022-12-12 RX ADMIN — SODIUM CHLORIDE, PRESERVATIVE FREE 10 ML: 5 INJECTION INTRAVENOUS at 21:48

## 2022-12-12 RX ADMIN — APIXABAN 2.5 MG: 2.5 TABLET, FILM COATED ORAL at 21:49

## 2022-12-12 RX ADMIN — METOPROLOL TARTRATE 25 MG: 25 TABLET, FILM COATED ORAL at 21:50

## 2022-12-12 ASSESSMENT — ENCOUNTER SYMPTOMS
NAUSEA: 0
SORE THROAT: 0
VOMITING: 0
TROUBLE SWALLOWING: 0
COUGH: 0
SHORTNESS OF BREATH: 1
PHOTOPHOBIA: 0
BACK PAIN: 0
ABDOMINAL PAIN: 0
DIARRHEA: 0

## 2022-12-12 ASSESSMENT — PAIN - FUNCTIONAL ASSESSMENT: PAIN_FUNCTIONAL_ASSESSMENT: NONE - DENIES PAIN

## 2022-12-12 NOTE — PROGRESS NOTES
[facility]   Herbal and Nutritional Product Restrictions      The following herbal, alternative, and/or nutritional/dietary supplement product(s) has been discontinued per [employer] approved policy:    Co K-61 [id number] daily    Please reorder upon discharge if appropriate.     Thank you,  Jt Ugalde, [facility]  12/12/2022 4:43 PM

## 2022-12-12 NOTE — ED NOTES
Pt transported 8a19 on cart in stable condition. Floor contacted prior to transport.      Arti Hurst  12/12/22 8052

## 2022-12-12 NOTE — FLOWSHEET NOTE
Spoke with Gabriel Alegria via phone; Waiting until tomorrow before drawing Sepsis labs per provider instructions.

## 2022-12-12 NOTE — ED NOTES
Bedside report given to Glenis Hoover RN. Patient alert and oriented. Respirations easy and unlabored.      Rayo Prater RN  12/12/22 6263

## 2022-12-12 NOTE — H&P
Hospitalist History & Physical    Patient:  Zahra Acosta    Unit/Bed:08/008A  YOB: 1940  MRN: [id number]   Acct: [de-identified]   PCP: Phu Montes  Code Status: Prior    Date of Service: Pt seen/examined on 12/12/22 and admitted to Outpatient with expected LOS less than two midnights due to medical therapy. Chief Complaint: bilateral hip pain    Assessment/Plan:    TO on CKD stage III: Creatinine 1.9, baseline appears to be 1.3-1.5. Patient reports he was diagnosed with a UTI yesterday at [facility]. Started on Keflex 500 mg, will continue this medication and get a repeat UA. BMP. Hold lasix and avoid nephrotoxic agents. No IVF at this time due to #3. Acute Cystitis: diagnosed 12/11 at outside ER and started on Keflex. UA pending and Keflex resumed. Chronic HFpEF: Echo from 5/18/22 shows EF 55%. Hold lasix as for #1. BB, amlodipine. Patient experiencing some SOB. No signs of acute decompensation. CXR reveals no acute cardiopulmonary findings. Strict I&Os. Daily weights. Mechanical Falls: Patient reports recent falls. Pelvic xray negative for acute bony abnormality. PT/OT. Persistent Afib, on OAC and rate control: Eliquis, amlodipine. On tele, continue to monitor. CAD s/p PCI 11/2019: Plavix, Lipitor, lopressor. Stress test 5/2019 was not suggestive for myocardial ischemia. Essential HTN: lopressor, amlodipine, continue to monitor. Hyperlipidemia: statin    History of CVA: noted    History of DVT: noted    Obesity: BMI 37.25 kg/m2    Disposition: from home, hx of falls. PT/OT. Evaluate for need of placement. History of Present Illness:  Zahra Acosta is a 79 y.o. male with PMHx of CKD, CHF, HTN, and hyperlipidemia who presented to [facility] with chief complaint of bilateral hip pain. Patient reports he fell 2 days ago and landed on his hips. He also complains of some mild shortness of breath, but denies any coughing or congestion.   He went to an outside ER yesterday, and was found to have a UTI. They prescribed him Keflex and discharged him to home. He admits to some fever/chills over the last 2 days. He also states there is some urgency and burning with urination. Patient reports taking all of his prescription medications as prescribed, including his Lasix. He denies any chest pain, lightheadedness, N/V, abdominal pain, change in bowel habits, and increasing leg swelling. Review of Systems: Pertinent positives as noted in the HPI. All other systems reviewed and negative. Past Medical History:        Diagnosis Date    AAA (abdominal aortic aneurysm)     CAD (coronary artery disease)     Cancer (HCC)     cheek and nose     Cancer of kidney ([facility]     Heart attack ([facility]     History of placement of chest tube     Due to pt falling and breaking 4 ribs and puncturing his lung    Hx of blood clots     Hyperlipidemia     Hypertension     Kidney stones     Obesity     Osteoarthritis     Stroke [facility])        Past Surgical History:        Procedure Laterality Date    ABDOMEN SURGERY      AAA    ABDOMINAL AORTIC ANEURYSM REPAIR  2005    CARDIAC SURGERY  2008, 1996    stents    CARDIAC SURGERY  2005    AAA repair    CAROTID ENDARTERECTOMY  2006    FINGER AMPUTATION      JOINT REPLACEMENT  2007    knees bilateral    KNEE SURGERY Left 6-2013    PARTIAL NEPHRECTOMY  2010    Right    PTCA      [facility]    VASCULAR SURGERY         Home Medications:   No current facility-administered medications on file prior to encounter.      Current Outpatient Medications on File Prior to Encounter   Medication Sig Dispense Refill    nitrofurantoin, macrocrystal-monohydrate, (MACROBID) 100 MG capsule       furosemide (LASIX) 40 MG tablet Take 40 mg by mouth See Admin Instructions Take 40 mg in AM and 20 mg in the PM      amLODIPine (NORVASC) 5 MG tablet TAKE 1 TABLET BY MOUTH  DAILY 90 tablet 0    clopidogrel (PLAVIX) 75 MG tablet TAKE 1 TABLET BY MOUTH  DAILY 90 tablet 3    metoprolol tartrate (LOPRESSOR) 25 MG tablet Take 1 tablet by mouth in the morning and 1 tablet before bedtime. glucose monitoring (FREESTYLE FREEDOM) kit 1 kit by Does not apply route daily 1 kit 0    Lancets MISC 1 each by Does not apply route 4 times daily 200 each 0    blood glucose monitor strips Test 4 times a day & as needed for symptoms of irregular blood glucose. Dispense sufficient amount for indicated testing frequency plus additional to accommodate PRN testing needs. 300 strip 0    Gauze Pads & Dressings 2\"X2\" PADS 1 each by Does not apply route daily as needed (finger sticks) 60 each 0    glucagon 1 MG injection Inject 1 mg into the muscle See Admin Instructions Follow package directions for low blood sugar. 1 kit 3    ELIQUIS 2.5 MG TABS tablet TAKE 1 TABLET BY MOUTH  TWICE DAILY 180 tablet 2    atorvastatin (LIPITOR) 40 MG tablet TAKE 1 TABLET BY MOUTH AT  NIGHT 90 tablet 2    Zinc Sulfate (ZINC 15 PO) Take by mouth      Turmeric Curcumin 500 MG CAPS Take by mouth      Omega-3 Fatty Acids (FISH OIL PO) Take by mouth      OXYGEN Inhale 3 L into the lungs nightly      acetaminophen (TYLENOL) 500 MG tablet Take 1,000 mg by mouth every 6 hours as needed for Pain      Coenzyme Q10 (CO Q-10) 100 MG CAPS Take by mouth      Cholecalciferol (VITAMIN D3) 125 MCG (5000 UT) TABS Take by mouth      APPLE CIDER VINEGAR PO Take 450 mg by mouth      pantoprazole (PROTONIX) 40 MG tablet TAKE 1 TABLET BY MOUTH  DAILY 90 tablet 3    PARoxetine (PAXIL) 20 MG tablet Take 20 mg by mouth every morning      Magnesium 500 MG TABS Take by mouth daily       CINNAMON PO Take 1,000 mg by mouth daily      Probiotic Product (PROBIOTIC DAILY PO) Take by mouth daily      Ascorbic Acid (VITAMIN C) 1000 MG tablet Take 1,000 mg by mouth daily          Allergies:    Patient has no known allergies. Social History:    reports that he quit smoking about 52 years ago. His smoking use included pipe.  He started smoking about 67 years ago. He has a 15.00 pack-year smoking history. He has never used smokeless tobacco. He reports that he does not drink alcohol and does not use drugs. Family History:       Problem Relation Age of Onset    Alzheimer's Disease Mother     Heart Disease Father        Diet:  No diet orders on file      Physical Exam:  /88   Pulse 95   Temp 98.3 °F (36.8 °C) (Oral)   Resp 20   Ht 5' 8\" (1.727 m)   Wt 245 lb (111.1 kg)   SpO2 94%   BMI 37.25 kg/m²   General:   Pleasant male resting in bed, appears stated age, no apparent distress. HEENT:  normocephalic and atraumatic. No scleral icterus. PERR. Neck: supple. No JVD. No thyromegaly. Lungs: clear to auscultation. No retractions  Cardiac: RRR without murmur. Abdomen: soft. Nontender. Bowel sounds positive. Extremities:  No clubbing, cyanosis, +1 pitting edema  Vasculature: capillary refill < 3 seconds. Palpable LE pulses bilaterally. Skin:  warm and dry. No rashes or lesions. Psych:  Alert and oriented x3. Affect appropriate  Lymph:  No supraclavicular adenopathy. Neurologic:  No focal deficit. No seizures. Data: (All radiographs, tracings, PFTs, and imaging are personally viewed and interpreted unless otherwise noted)  Labs:   Recent Labs     12/12/22  1050   WBC 10.4   HGB 14.3   HCT 43.1        Recent Labs     12/12/22  1050   *   K 4.6   CL 96*   CO2 25   BUN 31*   CREATININE 1.9*   CALCIUM 9.6     No results for input(s): AST, ALT, BILIDIR, BILITOT, ALKPHOS in the last 72 hours. No results for input(s): INR in the last 72 hours. No results for input(s): [place] in the last 72 hours.   Urinalysis:   Lab Results   Component Value Date/Time    NITRU NEGATIVE 05/29/2022 11:30 PM    WBCUA 2-4 05/29/2022 11:30 PM    BACTERIA NONE SEEN 05/29/2022 11:30 PM    RBCUA > 100 05/29/2022 11:30 PM    BLOODU LARGE 05/29/2022 11:30 PM    SPECGRAV >1.030 05/29/2022 11:30 PM    GLUCOSEU NEGATIVE 05/06/2021 03:10 PM       EKG: atrial fibrillation with RBBB    Radiology:  XR PELVIS (1-2 VIEWS)   Final Result   1. No acute bony abnormality            **This report has been created using voice recognition software. It may contain minor errors which are inherent in voice recognition technology. **      Final report electronically signed by Dr Cecile Salmeron on 12/12/2022 11:41 AM      XR CHEST (2 VW)   Final Result   1. No acute cardiopulmonary finding. **This report has been created using voice recognition software. It may contain minor errors which are inherent in voice recognition technology. **      Final report electronically signed by Dr Cecile Salmeron on 12/12/2022 11:42 AM        XR CHEST (2 VW)    Result Date: 12/12/2022  PROCEDURE: XR CHEST (2 VW) CLINICAL INFORMATION: sob COMPARISON: Chest radiograph 5/29/2022 TECHNIQUE: PA and lateral views of the chest performed. FINDINGS: No focal pulmonary consolidation. Cardiac silhouette is mildly enlarged. Aortic arch calcifications. No pleural effusion. No pneumothorax. No acute bony abnormality. Bilateral shoulder arthroplasty. Bones are demineralized. Mild degenerative changes of the thoracic spine. Surgical clips are seen in the right upper quadrant that relate to prior cholecystectomy. 1. No acute cardiopulmonary finding. **This report has been created using voice recognition software. It may contain minor errors which are inherent in voice recognition technology. ** Final report electronically signed by Dr Cecile Salmeron on 12/12/2022 11:42 AM    XR PELVIS (1-2 VIEWS)    Result Date: 12/12/2022  PROCEDURE: XR PELVIS (1-2 VIEWS) CLINICAL INFORMATION: fall; bilateral hip pain COMPARISON: None TECHNIQUE: AP pelvis performed. FINDINGS: No acute fracture or dislocation. Bone mineralization is unremarkable. Mild degenerative changes of the hips. Mild degenerative changes of the SI joints. Vascular calcifications are seen. .     1. No acute

## 2022-12-12 NOTE — ED PROVIDER NOTES
[address] [facility] [address]          Pt Name: Nicolette Gaspar  MRN: [id number]  [place] 1940  Date of evaluation: 12/12/2022  Treating Resident Physician: Tessa Arceo MD  Supervising Physician: Adwoa Hayes       Chief Complaint   Patient presents with    Shortness of Breath    Tachycardia     History obtained from the patient. HISTORY OF PRESENT ILLNESS    HPI  Nicolette Gaspar is a 79 y.o. male with PMHx of patient on Eliquis, CKD 3 who presents to the emergency department for evaluation of bilateral hip pain and shortness of breath. Patient's wife states that he fell out of a recliner last week onto carpet and hit his left hip. Yesterday, patient fell out of recliner and hit right hip. Reports he has been having chills for 2 days. Went to [facility] emergency department. Chest x-ray at that time was unremarkable. COVID, influenza and RSV were negative. Patient was diagnosed with a UTI and started on Keflex. Today patient's wife states that his been breathing faster than usual.  Also states that he has been more off balance when using his walker. The patient has no other acute complaints at this time. REVIEW OF SYSTEMS   Review of Systems   Constitutional:  Negative for chills and fever. HENT:  Negative for sore throat and trouble swallowing. Eyes:  Negative for photophobia and visual disturbance. Respiratory:  Positive for shortness of breath. Negative for cough. Cardiovascular:  Negative for chest pain, palpitations and leg swelling. Gastrointestinal:  Negative for abdominal pain, diarrhea, nausea and vomiting. Genitourinary:  Negative for difficulty urinating and flank pain. Musculoskeletal:  Negative for arthralgias, back pain, myalgias and neck pain. Skin:  Negative for rash. Neurological:  Negative for seizures, syncope, weakness and headaches.        PAST MEDICAL AND SURGICAL HISTORY     Past Medical History:   Diagnosis Date    AAA (abdominal aortic aneurysm)     CAD (coronary artery disease)     Cancer (HCC)     cheek and nose     Cancer of kidney ([facility] [address]     Heart attack ([facility] [address]     History of placement of chest tube     Due to pt falling and breaking 4 ribs and puncturing his lung    Hx of blood clots     Hyperlipidemia     Hypertension     Kidney stones     Obesity     Osteoarthritis     Stroke [facility])      Past Surgical History:   Procedure Laterality Date    ABDOMEN SURGERY      AAA    ABDOMINAL AORTIC ANEURYSM REPAIR  2005    CARDIAC SURGERY  2008, 1996    stents    CARDIAC SURGERY  2005    AAA repair    CAROTID ENDARTERECTOMY  2006    FINGER AMPUTATION      JOINT REPLACEMENT  2007    knees bilateral    KNEE SURGERY Left 6-2013    PARTIAL NEPHRECTOMY  2010    Right    PTCA      [facility]    VASCULAR SURGERY           MEDICATIONS   No current facility-administered medications for this encounter. Current Outpatient Medications:     nitrofurantoin, macrocrystal-monohydrate, (MACROBID) 100 MG capsule, , Disp: , Rfl:     furosemide (LASIX) 40 MG tablet, Take 40 mg by mouth See Admin Instructions Take 40 mg in AM and 20 mg in the PM, Disp: , Rfl:     amLODIPine (NORVASC) 5 MG tablet, TAKE 1 TABLET BY MOUTH  DAILY, Disp: 90 tablet, Rfl: 0    clopidogrel (PLAVIX) 75 MG tablet, TAKE 1 TABLET BY MOUTH  DAILY, Disp: 90 tablet, Rfl: 3    metoprolol tartrate (LOPRESSOR) 25 MG tablet, Take 1 tablet by mouth in the morning and 1 tablet before bedtime. , Disp: , Rfl:     glucose monitoring (FREESTYLE FREEDOM) kit, 1 kit by Does not apply route daily, Disp: 1 kit, Rfl: 0    Lancets MISC, 1 each by Does not apply route 4 times daily, Disp: 200 each, Rfl: 0    blood glucose monitor strips, Test 4 times a day & as needed for symptoms of irregular blood glucose.  Dispense sufficient amount for indicated testing frequency plus additional to accommodate PRN testing needs., Disp: 300 strip, Rfl: 0    Gauze Pads & Dressings 2\"X2\" PADS, 1 each by Does not apply route daily as needed (finger sticks), Disp: 60 each, Rfl: 0    glucagon 1 MG injection, Inject 1 mg into the muscle See Admin Instructions Follow package directions for low blood sugar., Disp: 1 kit, Rfl: 3    ELIQUIS 2.5 MG TABS tablet, TAKE 1 TABLET BY MOUTH  TWICE DAILY, Disp: 180 tablet, Rfl: 2    atorvastatin (LIPITOR) 40 MG tablet, TAKE 1 TABLET BY MOUTH AT  NIGHT, Disp: 90 tablet, Rfl: 2    Zinc Sulfate (ZINC 15 PO), Take by mouth, Disp: , Rfl:     Turmeric Curcumin 500 MG CAPS, Take by mouth, Disp: , Rfl:     Omega-3 Fatty Acids (FISH OIL PO), Take by mouth, Disp: , Rfl:     OXYGEN, Inhale 3 L into the lungs nightly, Disp: , Rfl:     acetaminophen (TYLENOL) 500 MG tablet, Take 1,000 mg by mouth every 6 hours as needed for Pain, Disp: , Rfl:     Coenzyme Q10 (CO Q-10) 100 MG CAPS, Take by mouth, Disp: , Rfl:     Cholecalciferol (VITAMIN D3) 125 MCG (5000 UT) TABS, Take by mouth, Disp: , Rfl:     APPLE CIDER VINEGAR PO, Take 450 mg by mouth, Disp: , Rfl:     pantoprazole (PROTONIX) 40 MG tablet, TAKE 1 TABLET BY MOUTH  DAILY, Disp: 90 tablet, Rfl: 3    PARoxetine (PAXIL) 20 MG tablet, Take 20 mg by mouth every morning, Disp: , Rfl:     Magnesium 500 MG TABS, Take by mouth daily , Disp: , Rfl:     CINNAMON PO, Take 1,000 mg by mouth daily, Disp: , Rfl:     Probiotic Product (PROBIOTIC DAILY PO), Take by mouth daily, Disp: , Rfl:     Ascorbic Acid (VITAMIN C) 1000 MG tablet, Take 1,000 mg by mouth daily , Disp: , Rfl:       SOCIAL HISTORY     Social History     Social History Narrative    Not on file     Social History     Tobacco Use    Smoking status: Former     Packs/day: 1.00     Years: 15.00     Pack years: 15.00     Types: Pipe, Cigarettes     Start date:      Quit date: 1970     Years since quittin.5    Smokeless tobacco: Never   Vaping Use    Vaping Use: Never used   Substance Use Topics Alcohol use: No     Alcohol/week: 0.0 standard drinks    Drug use: No         ALLERGIES   No Known Allergies      FAMILY HISTORY     Family History   Problem Relation Age of Onset    Alzheimer's Disease Mother     Heart Disease Father          PREVIOUS RECORDS   Previous records reviewed: I reviewed the patient's past medical records including relevant labs, imaging and procedures. PHYSICAL EXAM     ED Triage Vitals   BP Temp Temp Source Heart Rate Resp SpO2 Height Weight   12/12/22 1042 12/12/22 1042 12/12/22 1042 12/12/22 1042 12/12/22 1042 12/12/22 1044 12/12/22 1042 12/12/22 1042   138/88 98.3 °F (36.8 °C) Oral 95 20 94 % 5' 8\" (1.727 m) 245 lb (111.1 kg)     Initial vital signs and nursing assessment reviewed and normal. Body mass index is 37.25 kg/m². Pulsoximetry is normal per my interpretation. Additional Vital Signs:  Vitals:    12/12/22 1044   BP:    Pulse:    Resp:    Temp:    SpO2: 94%       Physical Exam  Vitals and nursing note reviewed. Constitutional:       General: He is not in acute distress. Appearance: Normal appearance. He is normal weight. He is not toxic-appearing. HENT:      Head: Normocephalic and atraumatic. Right Ear: Tympanic membrane normal.      Left Ear: Tympanic membrane normal.      Nose: Nose normal.      Mouth/Throat:      Mouth: Mucous membranes are moist.      Pharynx: Oropharynx is clear. Eyes:      General: No scleral icterus. Extraocular Movements: Extraocular movements intact. Conjunctiva/sclera: Conjunctivae normal.      Pupils: Pupils are equal, round, and reactive to light. Cardiovascular:      Rate and Rhythm: Normal rate and regular rhythm. Pulses: Normal pulses. Heart sounds: Normal heart sounds. No murmur heard. No friction rub. No gallop. Pulmonary:      Effort: Pulmonary effort is normal.      Breath sounds: Normal breath sounds. No wheezing or rales. Abdominal:      Palpations: Abdomen is soft.       Tenderness: There is no abdominal tenderness. There is no guarding or rebound. Musculoskeletal:         General: Normal range of motion. Cervical back: Normal range of motion and neck supple. Right lower leg: No edema. Left lower leg: No edema. Skin:     General: Skin is warm and dry. Capillary Refill: Capillary refill takes less than 2 seconds. Neurological:      General: No focal deficit present. Mental Status: He is alert and oriented to person, place, and time. Cranial Nerves: No cranial nerve deficit. Sensory: No sensory deficit. Motor: No weakness. Coordination: Coordination normal.           ED RESULTS   Laboratory results:  Labs Reviewed   CBC WITH AUTO DIFFERENTIAL - Abnormal; Notable for the following components:       Result Value    RBC 4.43 (*)     MCV 97.3 (*)     RDW-CV 15.0 (*)     RDW-SD 53.7 (*)     Segs Absolute 8.9 (*)     Lymphocytes Absolute 0.7 (*)     Immature Grans (Abs) 0.15 (*)     All other components within normal limits   BASIC METABOLIC PANEL - Abnormal; Notable for the following components:    Sodium 134 (*)     Chloride 96 (*)     Glucose 152 (*)     BUN 31 (*)     Creatinine 1.9 (*)     All other components within normal limits   BRAIN NATRIURETIC PEPTIDE - Abnormal; Notable for the following components:    Pro-BNP 5297.0 (*)     All other components within normal limits   GLOMERULAR FILTRATION RATE, ESTIMATED - Abnormal; Notable for the following components:    Est, Glom Filt Rate 35 (*)     All other components within normal limits   TROPONIN   ANION GAP   OSMOLALITY       Radiologic studies results:  XR PELVIS (1-2 VIEWS)   Final Result   1. No acute bony abnormality            **This report has been created using voice recognition software. It may contain minor errors which are inherent in voice recognition technology. **      Final report electronically signed by Dr Tayo Ospina on 12/12/2022 11:41 AM      XR CHEST (2 VW)   Final Result 1. No acute cardiopulmonary finding. **This report has been created using voice recognition software. It may contain minor errors which are inherent in voice recognition technology. **      Final report electronically signed by Dr Livan Quintero on 12/12/2022 11:42 AM          ED Medications administered this visit: Medications - No data to display      ED COURSE     ED Course as of 12/12/22 1215   Mon Dec 12, 2022   1111 ECHO 6 months ago  Summary   Left ventricle size is normal.   Mild concentric left ventricular hypertrophy. There were no regional wall motion abnormalities. Ejection fraction is visually estimated at 55%. [TM]   1147 Creatinine(!): 1.9  TO [TM]   1148 XR CHEST (2 VW)  IMPRESSION:  1. No acute cardiopulmonary finding. [TM]   1148 XR PELVIS (1-2 VIEWS)  IMPRESSION:  1. No acute bony abnormality    [TM]   1150 Pro-BNP(!): 5297.0 [TM]      ED Course User Index  [TM] Samina Ibrahim MD           MEDICAL DECISION MAKING   Given the patient's above chief complaint and findings on history and physical examination, I thought it was appropriate to consider the following emergency medical conditions:  CHF exacerbation, TO, ACS, pneumonia, COPD, viral URTI  Although some of these diagnoses are unlikely they were considered in my medical decision making. Patient presents with worsening exertional dyspnea. EKG revealed no acute ischemic findings. Troponin not elevated. BNP markedly elevated compared to prior. Suspect patient's symptoms are likely from worsening CHF. Patient also has an TO. BUN to creatinine ratio not specific for prerenal versus intrarenal.  Given both factors and high likelihood of falls. Patient will likely need observation admission and repeat echo.         MEDICATION CHANGES     New Prescriptions    No medications on file         FINAL DISPOSITION     Final diagnoses:   TO (acute kidney injury) ([facility] [address]   Congestive heart failure, unspecified HF chronicity, unspecified heart failure type ([facility] [address]     Condition: condition: stable  Dispo: Admit to observation      This transcription was electronically signed. Parts of this transcriptions may have been dictated by use of voice recognition software and electronically transcribed, and parts may have been transcribed with the assistance of an ED scribe. The transcription may contain errors not detected in proofreading. Please refer to my supervising physician's documentation if my documentation differs.     Electronically Signed: Shanti Javier MD, 12/12/22, 12:15 PM         Kirill Correa MD  Resident  12/12/22 7713

## 2022-12-12 NOTE — ED TRIAGE NOTES
Presents to ED with c/o sob and tachycardia that started a week ago. Patient was seen yesterday and diagnosed with UTI. Patient alert and oriented. Respirations easy and unlabored.

## 2022-12-12 NOTE — PLAN OF CARE
Problem: Discharge Planning  Goal: Discharge to home or other facility with appropriate resources  Outcome: Progressing  Flowsheets (Taken 12/12/2022 1819)  Discharge to home or other facility with appropriate resources:   Identify barriers to discharge with patient and caregiver   Arrange for needed discharge resources and transportation as appropriate     Problem: Safety - Adult  Goal: Free from fall injury  Outcome: Progressing  Flowsheets (Taken 12/12/2022 1819)  Free From Fall Injury: Instruct family/caregiver on patient safety     Problem: Skin/Tissue Integrity  Goal: Absence of new skin breakdown  Description: 1. Monitor for areas of redness and/or skin breakdown  2. Assess vascular access sites hourly  3. Every 4-6 hours minimum:  Change oxygen saturation probe site  4. Every 4-6 hours:  If on nasal continuous positive airway pressure, respiratory therapy assess nares and determine need for appliance change or resting period.   Outcome: Progressing     Problem: Respiratory - Adult  Goal: Achieves optimal ventilation and oxygenation  Outcome: Progressing  Flowsheets (Taken 12/12/2022 1819)  Achieves optimal ventilation and oxygenation:   Assess for changes in respiratory status   Assess for changes in mentation and behavior   Position to facilitate oxygenation and minimize respiratory effort     Problem: Skin/Tissue Integrity - Adult  Goal: Skin integrity remains intact  Outcome: Progressing  Flowsheets (Taken 12/12/2022 1819)  Skin Integrity Remains Intact:   Monitor for areas of redness and/or skin breakdown   Assess vascular access sites hourly   Every 4-6 hours minimum: Change oxygen saturation probe site     Problem: Musculoskeletal - Adult  Goal: Return mobility to safest level of function  Outcome: Progressing  Flowsheets (Taken 12/12/2022 1819)  Return Mobility to Safest Level of Function:   Assess patient stability and activity tolerance for standing, transferring and ambulating with or without assistive devices   Assist with transfers and ambulation using safe patient handling equipment as needed   Ensure adequate protection for wounds/incisions during mobilization  Goal: Maintain proper alignment of affected body part  Outcome: Progressing  Flowsheets (Taken 12/12/2022 1819)  Maintain proper alignment of affected body part: Support and protect limb and body alignment per provider's orders  Goal: Return ADL status to a safe level of function  Outcome: Progressing  Flowsheets (Taken 12/12/2022 1819)  Return ADL Status to a Safe Level of Function:   Administer medication as ordered   Assess activities of daily living deficits and provide assistive devices as needed   Obtain physical therapy/occupational therapy consults as needed     Problem: Infection - Adult  Goal: Absence of infection at discharge  Outcome: Progressing  Flowsheets (Taken 12/12/2022 1819)  Absence of infection at discharge:   Assess and monitor for signs and symptoms of infection   Monitor lab/diagnostic results   Monitor all insertion sites i.e., indwelling lines, tubes and drains     Problem: Metabolic/Fluid and Electrolytes - Adult  Goal: Electrolytes maintained within normal limits  Outcome: Progressing  Flowsheets (Taken 12/12/2022 1819)  Electrolytes maintained within normal limits:   Monitor labs and assess patient for signs and symptoms of electrolyte imbalances   Administer electrolyte replacement as ordered   Monitor response to electrolyte replacements, including repeat lab results as appropriate   Care plan reviewed with patient. Patient verbalize understanding of the plan of care and contribute to goal setting.

## 2022-12-12 NOTE — ED NOTES
ED to inpatient nurses report    Chief Complaint   Patient presents with    Shortness of Breath    Tachycardia      Present to ED from home  LOC: alert and orientated to name, place, date  Vital signs   Vitals:    12/12/22 1042 12/12/22 1044   BP: 138/88    Pulse: 95    Resp: 20    Temp: 98.3 °F (36.8 °C)    TempSrc: Oral    SpO2:  94%   Weight: 245 lb (111.1 kg)    Height: 5' 8\" (1.727 m)       Oxygen Baseline room air    Current needs required none Bipap/Cpap No  LDAs:   Peripheral IV 12/12/22 Right Forearm (Active)   Site Assessment Clean, dry & intact 12/12/22 1048     Mobility: Requires assistance * 1  Present condition: stable      C-SSRS    Swallow Screening    Preferred Language: [place]     Electronically signed by Antonio Barry RN on 12/12/2022 at 12:56 PM       Antonio Barry RN  12/12/22 5634

## 2022-12-13 PROBLEM — N17.9 AKI (ACUTE KIDNEY INJURY) (HCC): Status: ACTIVE | Noted: 2022-12-13

## 2022-12-13 LAB
ANION GAP SERPL CALCULATED.3IONS-SCNC: 12 MEQ/L (ref 8–16)
BASOPHILS # BLD: 0.2 %
BASOPHILS ABSOLUTE: 0 THOU/MM3 (ref 0–0.1)
BUN BLDV-MCNC: 29 MG/DL (ref 7–22)
CALCIUM SERPL-MCNC: 8.9 MG/DL (ref 8.5–10.5)
CHLORIDE BLD-SCNC: 96 MEQ/L (ref 98–111)
CO2: 26 MEQ/L (ref 23–33)
CREAT SERPL-MCNC: 1.7 MG/DL (ref 0.4–1.2)
EKG Q-T INTERVAL: 412 MS
EKG QRS DURATION: 130 MS
EKG QTC CALCULATION (BAZETT): 460 MS
EKG R AXIS: 26 DEGREES
EKG T AXIS: -32 DEGREES
EKG VENTRICULAR RATE: 75 BPM
EOSINOPHIL # BLD: 0.7 %
EOSINOPHILS ABSOLUTE: 0.1 THOU/MM3 (ref 0–0.4)
ERYTHROCYTE [DISTWIDTH] IN BLOOD BY AUTOMATED COUNT: 15.1 % (ref 11.5–14.5)
ERYTHROCYTE [DISTWIDTH] IN BLOOD BY AUTOMATED COUNT: 53 FL (ref 35–45)
GFR SERPL CREATININE-BSD FRML MDRD: 39 ML/MIN/1.73M2
GLUCOSE BLD-MCNC: 125 MG/DL (ref 70–108)
HCT VFR BLD CALC: 39.6 % (ref 42–52)
HEMOGLOBIN: 13.2 GM/DL (ref 14–18)
IMMATURE GRANS (ABS): 0.1 THOU/MM3 (ref 0–0.07)
IMMATURE GRANULOCYTES: 1.1 %
LACTIC ACID: 2.4 MMOL/L (ref 0.5–2)
LYMPHOCYTES # BLD: 6 %
LYMPHOCYTES ABSOLUTE: 0.5 THOU/MM3 (ref 1–4.8)
MCH RBC QN AUTO: 31.8 PG (ref 26–33)
MCHC RBC AUTO-ENTMCNC: 33.3 GM/DL (ref 32.2–35.5)
MCV RBC AUTO: 95.4 FL (ref 80–94)
MONOCYTES # BLD: 7.9 %
MONOCYTES ABSOLUTE: 0.7 THOU/MM3 (ref 0.4–1.3)
NUCLEATED RED BLOOD CELLS: 0 /100 WBC
PLATELET # BLD: 138 THOU/MM3 (ref 130–400)
PMV BLD AUTO: 10 FL (ref 9.4–12.4)
POTASSIUM REFLEX MAGNESIUM: 4.1 MEQ/L (ref 3.5–5.2)
PROCALCITONIN: 3 NG/ML (ref 0.01–0.09)
RBC # BLD: 4.15 MILL/MM3 (ref 4.7–6.1)
SEG NEUTROPHILS: 84.1 %
SEGMENTED NEUTROPHILS ABSOLUTE COUNT: 7.5 THOU/MM3 (ref 1.8–7.7)
SODIUM BLD-SCNC: 134 MEQ/L (ref 135–145)
WBC # BLD: 8.9 THOU/MM3 (ref 4.8–10.8)

## 2022-12-13 PROCEDURE — 96365 THER/PROPH/DIAG IV INF INIT: CPT

## 2022-12-13 PROCEDURE — 97116 GAIT TRAINING THERAPY: CPT

## 2022-12-13 PROCEDURE — 97110 THERAPEUTIC EXERCISES: CPT

## 2022-12-13 PROCEDURE — 97530 THERAPEUTIC ACTIVITIES: CPT

## 2022-12-13 PROCEDURE — 93010 ELECTROCARDIOGRAM REPORT: CPT | Performed by: INTERNAL MEDICINE

## 2022-12-13 PROCEDURE — 6370000000 HC RX 637 (ALT 250 FOR IP)

## 2022-12-13 PROCEDURE — 87040 BLOOD CULTURE FOR BACTERIA: CPT

## 2022-12-13 PROCEDURE — 80048 BASIC METABOLIC PNL TOTAL CA: CPT

## 2022-12-13 PROCEDURE — 2580000003 HC RX 258: Performed by: NURSE PRACTITIONER

## 2022-12-13 PROCEDURE — 97166 OT EVAL MOD COMPLEX 45 MIN: CPT

## 2022-12-13 PROCEDURE — 1200000003 HC TELEMETRY R&B

## 2022-12-13 PROCEDURE — G0378 HOSPITAL OBSERVATION PER HR: HCPCS

## 2022-12-13 PROCEDURE — 85025 COMPLETE CBC W/AUTO DIFF WBC: CPT

## 2022-12-13 PROCEDURE — 99232 SBSQ HOSP IP/OBS MODERATE 35: CPT | Performed by: NURSE PRACTITIONER

## 2022-12-13 PROCEDURE — 97162 PT EVAL MOD COMPLEX 30 MIN: CPT

## 2022-12-13 PROCEDURE — 83605 ASSAY OF LACTIC ACID: CPT

## 2022-12-13 PROCEDURE — 6360000002 HC RX W HCPCS: Performed by: NURSE PRACTITIONER

## 2022-12-13 PROCEDURE — 84145 PROCALCITONIN (PCT): CPT

## 2022-12-13 PROCEDURE — 36415 COLL VENOUS BLD VENIPUNCTURE: CPT

## 2022-12-13 PROCEDURE — 2580000003 HC RX 258

## 2022-12-13 PROCEDURE — 96361 HYDRATE IV INFUSION ADD-ON: CPT

## 2022-12-13 RX ORDER — SODIUM CHLORIDE 9 MG/ML
INJECTION, SOLUTION INTRAVENOUS CONTINUOUS
Status: DISCONTINUED | OUTPATIENT
Start: 2022-12-13 | End: 2022-12-15 | Stop reason: HOSPADM

## 2022-12-13 RX ORDER — 0.9 % SODIUM CHLORIDE 0.9 %
500 INTRAVENOUS SOLUTION INTRAVENOUS ONCE
Status: COMPLETED | OUTPATIENT
Start: 2022-12-13 | End: 2022-12-13

## 2022-12-13 RX ADMIN — CLOPIDOGREL BISULFATE 75 MG: 75 TABLET ORAL at 08:22

## 2022-12-13 RX ADMIN — ACETAMINOPHEN 650 MG: 325 TABLET ORAL at 04:51

## 2022-12-13 RX ADMIN — AMLODIPINE BESYLATE 5 MG: 5 TABLET ORAL at 08:22

## 2022-12-13 RX ADMIN — SODIUM CHLORIDE, PRESERVATIVE FREE 10 ML: 5 INJECTION INTRAVENOUS at 08:25

## 2022-12-13 RX ADMIN — ATORVASTATIN CALCIUM 40 MG: 40 TABLET, FILM COATED ORAL at 08:22

## 2022-12-13 RX ADMIN — SODIUM CHLORIDE: 9 INJECTION, SOLUTION INTRAVENOUS at 10:37

## 2022-12-13 RX ADMIN — METOPROLOL TARTRATE 25 MG: 25 TABLET, FILM COATED ORAL at 08:22

## 2022-12-13 RX ADMIN — SODIUM CHLORIDE 500 ML: 9 INJECTION, SOLUTION INTRAVENOUS at 12:56

## 2022-12-13 RX ADMIN — OXYCODONE HYDROCHLORIDE AND ACETAMINOPHEN 1000 MG: 500 TABLET ORAL at 08:22

## 2022-12-13 RX ADMIN — METOPROLOL TARTRATE 25 MG: 25 TABLET, FILM COATED ORAL at 20:30

## 2022-12-13 RX ADMIN — Medication 400 MG: at 08:24

## 2022-12-13 RX ADMIN — CEFTRIAXONE SODIUM 1000 MG: 1 INJECTION, POWDER, FOR SOLUTION INTRAMUSCULAR; INTRAVENOUS at 12:10

## 2022-12-13 RX ADMIN — APIXABAN 2.5 MG: 2.5 TABLET, FILM COATED ORAL at 20:30

## 2022-12-13 RX ADMIN — PANTOPRAZOLE SODIUM 40 MG: 40 TABLET, DELAYED RELEASE ORAL at 04:53

## 2022-12-13 RX ADMIN — APIXABAN 2.5 MG: 2.5 TABLET, FILM COATED ORAL at 08:22

## 2022-12-13 RX ADMIN — CEPHALEXIN 500 MG: 250 CAPSULE ORAL at 08:22

## 2022-12-13 ASSESSMENT — PAIN SCALES - GENERAL
PAINLEVEL_OUTOF10: 0
PAINLEVEL_OUTOF10: 0

## 2022-12-13 NOTE — PROGRESS NOTES
[facility] 59  INPATIENT OCCUPATIONAL THERAPY  [facility] 8A  EVALUATION    Time:   Time In: 1091  Time Out: 1403  Timed Code Treatment Minutes: 13 Minutes  Minutes: 23          Date: 2022  Patient Name: Isamar Bautista,   Gender: male      MRN: [id number]  : 1940  (79 y.o.)  Referring Practitioner: Marisol Bright PA-C  Diagnosis: Acute kidney injury (TO) with acute tubular necrosis  Additional Pertinent Hx: Isamar Bautista is a 79 y.o. male with PMHx of CKD, CHF, HTN, and hyperlipidemia who presented to [facility] with chief complaint of bilateral hip pain. Patient reports he fell 2 days ago and landed on his hips. He also complains of some mild shortness of breath, but denies any coughing or congestion. He went to an outside ER yesterday, and was found to have a UTI. They prescribed him Keflex and discharged him to home. He admits to some fever/chills over the last 2 days. He also states there is some urgency and burning with urination. Patient reports taking all of his prescription medications as prescribed, including his Lasix. He denies any chest pain, lightheadedness, N/V, abdominal pain, change in bowel habits, and increasing leg swelling    Restrictions/Precautions:  Restrictions/Precautions: General Precautions    Subjective  Chart Reviewed: Yes, Orders, Progress Notes, History and Physical  Patient assessed for rehabilitation services?: Yes  Response to previous treatment: Patient with no complaints from previous session  Family / Caregiver Present: Yes (wife)    Subjective: RN approved of OT session. Pt supine in bed and agreeable to therapy.  Pleasant and cooperative    Pain: Right hip pain - did not rate    Vitals: Vitals not assessed per clinical judgement, see nursing flowsheet    Social/Functional History:  Lives With: Spouse  Type of Home: House  Home Layout: One level  Home Access: Stairs to enter without rails  Entrance Stairs - Number of Steps: 2 platform steps  Home Equipment: Oxygen, Walker, standard   Bathroom Shower/Tub: Tub/Shower unit  Bathroom Toilet: Handicap height  Bathroom Equipment: Shower chair, Grab bars in shower       ADL Assistance: [address]: Needs assistance  Homemaking Responsibilities: Yes  Ambulation Assistance: Independent  Transfer Assistance: Independent    Active : Yes  Mode of Transportation: Car  Occupation: Retired       VISION:Corrected    HEARING:  WFL    COGNITION: Decreased Insight, Decreased Safety Awareness, and Impulsive    RANGE OF MOTION:  Bilateral Upper Extremity:  WFL    STRENGTH:  Bilateral Upper Extremity:  WFL    SENSATION:   WFL    ADL:   No ADL's completed this session. Pt completed ADLs this am  .    BALANCE:  Sitting Balance:  Stand By Assistance, slightly impulsive . Standing Balance: Stand By Assistance. With 1-2 UE support on RW. Standing x2 minutes     BED MOBILITY:  Supine to Sit: Stand By Assistance    Sit to Supine: Stand By Assistance      TRANSFERS:  Sit to Stand:  Stand By Assistance. Stand to Sit: Stand By Assistance. FUNCTIONAL MOBILITY:  Assistive Device: Rolling Walker  Assist Level:  Stand By Assistance. Distance:  around unit   Cues to slow pace d/t safety      Provided education on home safety such as removing through rugs, complete tasks slowly to ensure safety, etc.     Activity Tolerance:  Patient tolerance of  treatment: good. Assessment:  Assessment: Pt admitted to the hospital d/t TO. Pt demo'ed performance deficits with requiring  SBA for functional mobility and transfers which effect ability to perform ADLs and IADLs. Pt displayed  decreased endurance, balance, safety awareness and ability to complete  ADL tasks and mobility at [facility]. Pt requires further skilled OT Services to improve performance in functional tasks and educate on safety awareness for more indep with ADL tasks and mobility to return  home.      Performance deficits / Impairments: Decreased functional mobility , Decreased ADL status, Decreased endurance, Decreased strength, Decreased high-level IADLs, Decreased balance  Prognosis: Good  REQUIRES OT FOLLOW-UP: Yes  Decision Making: Medium Complexity    Treatment Initiated: Treatment and education initiated within context of evaluation. Evaluation time included review of current medical information, gathering information related to past medical, social and functional history, completion of standardized testing, formal and informal observation of tasks, assessment of data and development of plan of care and goals. Treatment time included skilled education and facilitation of tasks to increase safety and independence with ADL's for improved functional independence and quality of life. Discharge Recommendations:  Continue to assess pending progress,  Home with assist PRN     Patient Education  Education Given To: Patient, Family  Education Provided: Role of Therapy, Plan of Care, Transfer Training, Fall Prevention Strategies  Education Method: Demonstration, Verbal  Education Outcome: Verbalized understanding, Demonstrated understanding    Equipment Recommendations:  Equipment Needed: No    Plan:  Times Per Week: 3-5x  Times Per Day: Once a day  Current Treatment Recommendations: Strengthening, Balance training, Functional mobility training, Endurance training, Safety education & training, Patient/Caregiver education & training, Self-Care / ADL, Home management training, Equipment evaluation, education, & procurement. See long-term goal time frame for expected duration of plan of care. If no long-term goals established, a short length of stay is anticipated.     Goals:  Patient goals : return home  Short Term Goals  Time Frame for Short Term Goals: by discharge  Short Term Goal 1: pt will complete functional mobility within [facility] distances mod-I with requiring 0 vc's for safety to increase indep with ADLs  Short Term Goal 2: pt will complete dynamic standing task x5 minutes with unilateral release mod-I to increase ease with showering  Short Term Goal 3: pt will complete LB ADLs with SBA to increase ease with donning pants  Long Term Goals  Time Frame for Long Term Goals : no LTG est d/t Short ELOS         Following session, patient left in safe position with all fall risk precautions in place.

## 2022-12-13 NOTE — PLAN OF CARE
Problem: Infection - Adult  Goal: Absence of infection at discharge  Flowsheets (Taken 12/13/2022 0815)  Absence of infection at discharge: Assess and monitor for signs and symptoms of infection     Problem: Metabolic/Fluid and Electrolytes - Adult  Goal: Electrolytes maintained within normal limits  Flowsheets (Taken 12/13/2022 0815)  Electrolytes maintained within normal limits: Monitor labs and assess patient for signs and symptoms of electrolyte imbalances     Problem: Chronic Conditions and Co-morbidities  Goal: Patient's chronic conditions and co-morbidity symptoms are monitored and maintained or improved  Flowsheets (Taken 12/13/2022 0815)  Care Plan - Patient's Chronic Conditions and Co-Morbidity Symptoms are Monitored and Maintained or Improved: Monitor and assess patient's chronic conditions and comorbid symptoms for stability, deterioration, or improvement

## 2022-12-13 NOTE — CARE COORDINATION
Case Management Assessment  Initial Evaluation    Date/Time of Evaluation: 12/13/2022 11:59 AM  Assessment Completed by: Geeta Moreno RN    If patient is discharged prior to next notation, then this note serves as note for discharge by case management. Patient Name: Eliza Ken                   YOB: 1940  Diagnosis: TO (acute kidney injury) ([facility] [N17.9]  Congestive heart failure, unspecified HF chronicity, unspecified heart failure type ([facility] [I50.9]  Acute kidney injury (TO) with acute tubular necrosis (ATN) ([facility] [N17.0]                   Date / Time: 12/12/2022 10:33 AM  Location: [facility]     Patient Admission Status: Observation   Readmission Risk (Low < 19, Mod (19-27), High > 27): Readmission Risk Score: 21.1 ( )    Current PCP: Anne Marie Estrada  PCP verified by CM? Yes    Chart Reviewed: Yes      History Provided by: Patient  Patient Orientation: Alert and Oriented, Person, Place, Situation, Self    Patient Cognition: Alert    Hospitalization in the last 30 days (Readmission):  No    If yes, Readmission Assessment in CM Navigator will be completed. Advance Directives:      Code Status: Full Code   Patient's Primary Decision Maker is: Named in [address] [place] [address]    Primary Decision Maker: Amelia Griffith - Spouse - [phone]    Secondary Decision Maker: Enzo Pritchardinder - Child - [phone]    Discharge Planning:    Patient lives with: Spouse/Significant Other Type of Home: House  Primary Care Giver: Self  Patient Support Systems include: Spouse/Significant Other   Current Financial resources: Medicare (Secondary through GP)  Current community resources:    Current services prior to admission: None            Current DME:              Type of Home Care services:  None    ADLS  Prior functional level: Independent in ADLs/IADLs  Current functional level: Independent in ADLs/IADLs    Family can provide assistance at DC:     Would you like Case Management to discuss the discharge plan with any other family members/significant others, and if so, who? No  Plans to Return to Present Housing: Yes  Other Identified Issues/Barriers to RETURNING to current housing: None  Potential Assistance needed at discharge: N/A            Potential DME:    Patient expects to discharge to: [address] [place] [address] for transportation at discharge: Family    Financial    Payor: Ezekiel Robbinsw / Plan: MEDICARE PART A AND B / Product Type: *No Product type* /     Does insurance require precert for SNF: No    Potential assistance Purchasing Medications: No  Meds-to-Beds request: Yes      [facility] ([facility]) - Elyssa Ba[address]uelos [address] [facility] [phone] - F [phone]  [address] [place] [phone]  Phone: [phone] Fax: [phone]    [facility] [id number] [address] [phone] Hiwot Riosstein [phone]  [facility] [place] [address] [place] [address]  Phone: [phone] Fax: [phone]      Notes:    Factors facilitating achievement of predicted outcomes: Family support, Motivated, Cooperative, Pleasant, Sense of humor, Good insight into deficits, and Has needed Durable Medical Equipment at home    Barriers to discharge: Creatinine elevated at 1.7. lactic acid 2.4, procalcitonin 3.0. Additional Case Management Notes: Patient presents with shortness of breath and tachycardia. Creatinine 1.9. Eliquis, Lasix on hold, prn Tylenol and Zofran, Regular diet, carb choice diet, daily weights, PT/OT, telemetry, up with assistance. Procedure: N/A    The Plan for Transition of Care is related to the following treatment goals of TO (acute kidney injury) ([address] [N17.9]  Congestive heart failure, unspecified HF chronicity, unspecified heart failure type ([address] [I50.9]  Acute kidney injury (TO) with acute tubular necrosis (ATN) ([address] [N17.0]    Patient Goals/Plan/Treatment Preferences: Met with Ayad Dewitt and his wife Luis Mariano present at bedside.  Insurance and PCP verified. They are able to afford his medications and deny a need for DME. Teresa Baker does wear oxygen at home. He gets his oxygen from [employer]. Sophie Bryant will drive Vasiliy home at discharge and they decline HH. Transportation/Food Security/Housekeeping Addressed: No issues identified.      Kendal Velazquez RN  Case Management Department

## 2022-12-13 NOTE — PLAN OF CARE
Problem: Discharge Planning  Goal: Discharge to home or other facility with appropriate resources  12/12/2022 2344 by Juan Leonard RN  Outcome: Progressing  Note: Discharge needs are assessed daily     Problem: Safety - Adult  Goal: Free from fall injury  12/12/2022 2344 by Juan Leonard RN  Outcome: Progressing  Note: Patient free of falls this shift. Patient on falling star program. Fall band intact. RN visually checks on patient with hourly rounds. Patient tolerates ambulation each shift. Problem: Skin/Tissue Integrity  Goal: Absence of new skin breakdown  Description: 1. Monitor for areas of redness and/or skin breakdown  2. Assess vascular access sites hourly  3. Every 4-6 hours minimum:  Change oxygen saturation probe site  4. Every 4-6 hours:  If on nasal continuous positive airway pressure, respiratory therapy assess nares and determine need for appliance change or resting period.   12/12/2022 2344 by Juan Leonard RN  Outcome: Progressing  Note: Skin is assessed every shift, no new skin breakdown noted     Problem: Respiratory - Adult  Goal: Achieves optimal ventilation and oxygenation  12/12/2022 2344 by Juan Leonard RN  Outcome: Progressing  Note: O2 sats remain >90% on 3L NC     Problem: Skin/Tissue Integrity - Adult  Goal: Skin integrity remains intact  12/12/2022 2344 by Juan Leonard RN  Outcome: Progressing  Note: Skin is assessed every shift, no new skin breakdown noted     Problem: Musculoskeletal - Adult  Goal: Return mobility to safest level of function  12/12/2022 2344 by Juan Leonard RN  Outcome: Progressing  Note: Patient encouraged to perform daily tasks independently, assistance provided when needed     Problem: Musculoskeletal - Adult  Goal: Maintain proper alignment of affected body part  12/12/2022 2344 by Juan Leonard RN  Outcome: Progressing     Problem: Musculoskeletal - Adult  Goal: Return ADL status to a safe level of function  12/12/2022 2344 by Shoaib Capone RN  Outcome: Progressing     Problem: Infection - Adult  Goal: Absence of infection at discharge  12/12/2022 2344 by Shoaib Capone RN  Outcome: Progressing     Problem: Metabolic/Fluid and Electrolytes - Adult  Goal: Electrolytes maintained within normal limits  12/12/2022 2344 by Shoaib Capone RN  Outcome: Progressing  Note: Labs are monitored daily   Care plan reviewed with patient and spouse. Patient and spouse verbalize understanding of the plan of care and contribute to goal setting.

## 2022-12-13 NOTE — PROGRESS NOTES
[address] [address]  INPATIENT PHYSICAL THERAPY  EVALUATION  [facility] MED SURG 8A - 4H-14/461-W    Time In: 719  Time Out: 1130  Timed Code Treatment Minutes: 25 Minutes  Minutes: 39          Date: 2022  Patient Name: Taty Rausch,  Gender:  male        MRN: [id number]  : 1940  (79 y.o.)      Referring Practitioner: Kunal Gonzales PA-C  Diagnosis: TO (Acute kidney injury)  Additional Pertinent Hx: Taty Rausch is a 79 y.o. male with PMHx of CKD, CHF, HTN, and hyperlipidemia who presented to [facility] with chief complaint of bilateral hip pain. Patient reports he fell 2 days ago and landed on his hips. He also complains of some mild shortness of breath, but denies any coughing or congestion. He went to an outside ER yesterday, and was found to have a UTI. They prescribed him Keflex and discharged him to home. He admits to some fever/chills over the last 2 days. He also states there is some urgency and burning with urination. Patient reports taking all of his prescription medications as prescribed, including his Lasix. Restrictions/Precautions:  Restrictions/Precautions: General Precautions    Subjective:  Chart Reviewed: Yes  Patient assessed for rehabilitation services?: Yes  Subjective: Pt resting in bed with spouse present. Both agree to therapy. RN approved session and was called in to assess IV site when pt reported that it was hurting.     General:     Vision: Impaired  Vision Exceptions: Wears glasses at all times  Hearing: Exceptions to [facility]  Hearing Exceptions: Bilateral hearing aid, Hard of hearing/hearing concerns       Pain: 5/10: IV site RN notified    Vitals: Vitals not assessed per clinical judgement, see nursing flowsheet    Social/Functional History:    Lives With: Spouse  Type of Home: House  Home Layout: One level  Home Access: Stairs to enter without rails  Entrance Stairs - Number of Steps: 2 platform steps  Home Equipment: Rawland Och, standard     Bathroom Shower/Tub: Tub/Shower unit  Bathroom Toilet: Handicap height  Bathroom Equipment: Shower chair, Grab bars in shower       ADL Assistance: [address]: Needs assistance  Homemaking Responsibilities: Yes  Ambulation Assistance: Independent  Transfer Assistance: Independent    Active : Yes  Mode of Transportation: Car  Occupation: Retired       OBJECTIVE:  Range of Motion:  Bilateral Lower Extremity: WFL    Strength:  Bilateral Lower Extremity: grossly 4/5    Balance:  Static Sitting Balance:  Supervision  Dynamic Sitting Balance: Supervision, Stand By Assistance  Static Standing Balance: Stand By Assistance  Dynamic Standing Balance: Stand By Assistance, Contact Guard Assistance    Bed Mobility:  Supine to Sit: Stand By Assistance, with head of bed raised, with rail, with increased time for completion  Sit to Supine: Stand By Assistance, with head of bed flat, with increased time for completion     Transfers:  Sit to Stand: Stand By Assistance  Stand to Sit:Stand By Assistance    Ambulation:  Stand By Assistance  Distance: 300 ft  Surface: Level Tile  Device:Rolling Walker  Gait Deviations: Forward Flexed Posture, Decreased Step Length Bilaterally, Decreased Gait Speed, and Decreased Heel Strike Bilaterally    Exercise:  Patient was guided in 1 set(s) 10 reps of exercise to both lower extremities. Ankle pumps, Glut sets, Quad sets, Heelslides, Hip abduction/adduction, Seated marches, and Long arc quads. Exercises were completed for increased independence with functional mobility. Functional Outcome Measures: Completed  AM-PAC Inpatient Mobility Raw Score : 19  AM-PAC Inpatient T-Scale Score : 45.44    ASSESSMENT:  Activity Tolerance:  Patient tolerance of  treatment: good. Treatment Initiated: Treatment and education initiated within context of evaluation.   Evaluation time included review of current medical information, gathering information related to past medical, social and functional history, completion of standardized testing, formal and informal observation of tasks, assessment of data and development of plan of care and goals. Treatment time included skilled education and facilitation of tasks to increase safety and independence with functional mobility for improved independence and quality of life. Assessment: Body Structures, Functions, Activity Limitations Requiring Skilled Therapeutic Intervention: Decreased functional mobility , Decreased strength, Decreased endurance, Decreased balance  Assessment: Pt is a pleasant 78 yo male that is mildly deconditioned and participated well with exercises and mobility. Pt was generally Mod I for mobility in [facility] and is at SBA to Supervision today. Pt would benefit from continued skilled PT to address strengthening, balance, bed mobility, endurance building, and functional mobility training. Therapy Prognosis: Good, Excellent    Requires PT Follow-Up: Yes    Discharge Recommendations:  Discharge Recommendations: Continue to assess pending progress, Home with Home health PT, Patient would benefit from continued therapy after discharge, Home with assist PRN    Patient Education:      . Patient Education  Education Given To: Patient, Family  Education Provided: Role of Therapy, Plan of Care, Home Exercise Program  Education Method: Demonstration, Verbal  Barriers to Learning: None  Education Outcome: Verbalized understanding, Demonstrated understanding, Continued education needed       Equipment Recommendations:   Other: monitor for needs    Plan:  Current Treatment Recommendations: Strengthening, Balance training, Endurance training, Functional mobility training, Transfer training, Gait training, Stair training, Home exercise program, Patient/Caregiver education & training, Safety education & training, Therapeutic activities  General Plan:  (5x GM)    Goals:  Patient Goals : go home and get back to doing everything  Short Term Goals  Time Frame for Short Term Goals: at discharge  Short Term Goal 1: Pt to be MOD I for supine <> sit to get in/out of bed  Short Term Goal 2: Pt to be MOD I for sit <> stand to get up to ambulate  Short Term Goal 3: Pt to ambulate >350 ft with RW with Supervision for household distances  Short Term Goal 4: Pt to negotiate 4\" step with AD with SBA for home access  Long Term Goals  Time Frame for Long Term Goals : not set due to short ELOS    Following session, patient left in safe position with all fall risk precautions in place. Brigida Faustin.  Jemima Carson, [place] [address]

## 2022-12-13 NOTE — PROGRESS NOTES
Hospitalist Progress Note    Patient:  Greyson Demarco    YOB: 1940    MRN: [id number]       Acct: [de-identified]     PCP: Phu Montes    Date of Admission: 12/12/2022    Assessment/Plan:      TO on CKD stage III, improved: Creatinine 1.9 on admission. Baseline appears to be 1.3-1.5. Creatine today 1.7. Restart maintenance IVF. Holding lasix and avoid nephrotoxic agents. Sepsis most probable secondary to acute cystitis: PCT 3, Lactic acid 2.4, Temp 100.7, . Diagnosed 12/11 at outside ER and started on Keflex. Failed OP ATB. Start IV Rocephin. UA culture and sensitivity pending. Give only 500 ml bolus due to CHF. BC x 2 were drawn this AM.      Lactic acidosis, IVF. Repeat lactic in 6 hours. Chronic HFpEF: Echo from 5/18/22 shows EF 55%. Hold lasix as for #1. BB, amlodipine. No signs of acute decompensation. CXR reveals no acute cardiopulmonary findings. Currently on oxygen, appears to be for comfort. No hypoxia is noted on chart. Wean oxygen as able. Strict I&Os. Daily weights. Possible urinary retention. Reports difficulty fully voiding. Check PVR. If > 250 with TO will place resendez. Hyponatremia, mild. Mechanical Falls: Patient reports recent falls. Pelvic xray negative for acute bony abnormality. PT/OT. Persistent Afib, on OAC and rate control: Eliquis, amlodipine. On tele, continue to monitor. CAD s/p PCI 11/2019: Plavix, Lipitor, lopressor. Stress test 5/2019 was not suggestive for myocardial ischemia. Essential HTN: lopressor, amlodipine, continue to monitor. Hyperlipidemia: statin     History of CVA: noted     History of DVT: noted     Obesity: BMI 37.25 kg/m2         Chief Complaint: bilateral hip pain    Hospital Course:       Zahra Acosta is a 79 y.o. male with PMHx of CKD, CHF, HTN, and hyperlipidemia who presented to [facility] with chief complaint of bilateral hip pain.  Patient reports he fell 2 days ago and landed on his hips. He also complains of some mild shortness of breath, but denies any coughing or congestion. He went to an outside ER yesterday, and was found to have a UTI. They prescribed him Keflex and discharged him to home. He admits to some fever/chills over the last 2 days. He also states there is some urgency and burning with urination. Patient reports taking all of his prescription medications as prescribed, including his Lasix. He denies any chest pain, lightheadedness, N/V, abdominal pain, change in bowel habits, and increasing leg swelling. Subjective: Reports difficulty emptying when voiding. No dysuria. Low grade temp this AM. No CP/SOB      Medications:  Reviewed    Infusion Medications    sodium chloride Stopped (12/13/22 1259)    sodium chloride       Scheduled Medications    cefTRIAXone (ROCEPHIN) IV  1,000 mg IntraVENous Q24H    sodium chloride  500 mL IntraVENous Once    sodium chloride flush  5-40 mL IntraVENous 2 times per day    amLODIPine  5 mg Oral Daily    vitamin C  1,000 mg Oral Daily    atorvastatin  40 mg Oral Daily    Vitamin D  5,000 Units Oral Daily    clopidogrel  75 mg Oral Daily    apixaban  2.5 mg Oral BID    magnesium oxide  400 mg Oral Daily    metoprolol tartrate  25 mg Oral BID    pantoprazole  40 mg Oral QAM AC    [Held by provider] furosemide  40 mg Oral See Admin Instructions     PRN Meds: sodium chloride flush, sodium chloride, ondansetron **OR** ondansetron, polyethylene glycol, acetaminophen **OR** acetaminophen, potassium chloride **OR** potassium alternative oral replacement **OR** potassium chloride, magnesium sulfate      Intake/Output Summary (Last 24 hours) at 12/13/2022 1310  Last data filed at 12/13/2022 0816  Gross per 24 hour   Intake 650 ml   Output --   Net 650 ml       Diet:  ADULT DIET; Regular; 5 carb choices (75 gm/meal); Low Fat/Low Chol/High Fiber/2 gm Na;  Low Sodium (2 gm); 2000 ml    Exam:  /67   Pulse 88   Temp 97.9 °F (36.6 °C) (Oral) Resp 18   Ht 5' 8\" (1.727 m)   Wt 245 lb 2 oz (111.2 kg)   SpO2 95%   BMI 37.27 kg/m²     General appearance: No apparent distress, appears stated age and cooperative. HEENT: Pupils equal, round, and reactive to light. Conjunctivae/corneas clear. Neck: Supple, with full range of motion. No jugular venous distention. Trachea midline. Respiratory:  Normal respiratory effort. Clear to auscultation, bilaterally without Rales/Wheezes/Rhonchi. Cardiovascular: Regular rate and rhythm with normal S1/S2 without murmurs, rubs or gallops. Abdomen: Soft, non-tender, non-distended with normal bowel sounds. Musculoskeletal: passive and active ROM x 4 extremities. Skin: Skin color, texture, turgor normal.  No rashes or lesions. Neurologic:  Neurovascularly intact without any focal sensory/motor deficits. Cranial nerves: II-XII intact, grossly non-focal.  Psychiatric: Alert and oriented, thought content appropriate, normal insight  Capillary Refill: Brisk,< 3 seconds   Peripheral Pulses: +2 palpable, equal bilaterally       Labs:   Recent Labs     12/12/22  1050 12/13/22  0637   WBC 10.4 8.9   HGB 14.3 13.2*   HCT 43.1 39.6*    138     Recent Labs     12/12/22  1050 12/13/22  0637   * 134*   K 4.6 4.1   CL 96* 96*   CO2 25 26   BUN 31* 29*   CREATININE 1.9* 1.7*   CALCIUM 9.6 8.9     No results for input(s): AST, ALT, BILIDIR, BILITOT, ALKPHOS in the last 72 hours. No results for input(s): INR in the last 72 hours. No results for input(s): [place] Knock in the last 72 hours. Urinalysis:      Lab Results   Component Value Date/Time    NITRU NEGATIVE 12/12/2022 06:30 PM    WBCUA > 100 12/12/2022 06:30 PM    BACTERIA NONE SEEN 12/12/2022 06:30 PM    RBCUA 25-50 12/12/2022 06:30 PM    BLOODU SMALL 12/12/2022 06:30 PM    SPECGRAV >1.030 05/29/2022 11:30 PM    GLUCOSEU NEGATIVE 12/12/2022 06:30 PM       Radiology:  XR PELVIS (1-2 VIEWS)   Final Result   1.  No acute bony abnormality            **This report has been created using voice recognition software. It may contain minor errors which are inherent in voice recognition technology. **      Final report electronically signed by Dr Harper العراقي on 12/12/2022 11:41 AM      XR CHEST (2 VW)   Final Result   1. No acute cardiopulmonary finding. **This report has been created using voice recognition software. It may contain minor errors which are inherent in voice recognition technology. **      Final report electronically signed by Dr Harper العراقي on 12/12/2022 11:42 AM          Diet: ADULT DIET; Regular; 5 carb choices (75 gm/meal); Low Fat/Low Chol/High Fiber/2 gm Na;  Low Sodium (2 gm); 2000 ml    DVT prophylaxis: [] Lovenox                                 [] SCDs                                 [] SQ Heparin                                 [] Encourage ambulation           [x] Already on Anticoagulation     Disposition:    [x] Home       [] TCU       [] Rehab       [] Psych       [] SNF       [] [place]       [] Other-    Code Status: Full Code    PT/OT Eval Status: Ordered        Electronically signed by MARTIN Paulson CNP on 12/13/2022 at 1:10 PM

## 2022-12-14 LAB
ANION GAP SERPL CALCULATED.3IONS-SCNC: 10 MEQ/L (ref 8–16)
BASOPHILS # BLD: 0.3 %
BASOPHILS ABSOLUTE: 0 THOU/MM3 (ref 0–0.1)
BUN BLDV-MCNC: 25 MG/DL (ref 7–22)
CALCIUM SERPL-MCNC: 8.4 MG/DL (ref 8.5–10.5)
CHLORIDE BLD-SCNC: 100 MEQ/L (ref 98–111)
CO2: 27 MEQ/L (ref 23–33)
CREAT SERPL-MCNC: 1.6 MG/DL (ref 0.4–1.2)
EOSINOPHIL # BLD: 3.2 %
EOSINOPHILS ABSOLUTE: 0.2 THOU/MM3 (ref 0–0.4)
ERYTHROCYTE [DISTWIDTH] IN BLOOD BY AUTOMATED COUNT: 15.2 % (ref 11.5–14.5)
ERYTHROCYTE [DISTWIDTH] IN BLOOD BY AUTOMATED COUNT: 53.3 FL (ref 35–45)
GFR SERPL CREATININE-BSD FRML MDRD: 42 ML/MIN/1.73M2
GLUCOSE BLD-MCNC: 127 MG/DL (ref 70–108)
GLUCOSE BLD-MCNC: 128 MG/DL (ref 70–108)
GLUCOSE BLD-MCNC: 129 MG/DL (ref 70–108)
GLUCOSE BLD-MCNC: 140 MG/DL (ref 70–108)
HCT VFR BLD CALC: 37.6 % (ref 42–52)
HEMOGLOBIN: 12.6 GM/DL (ref 14–18)
IMMATURE GRANS (ABS): 0.08 THOU/MM3 (ref 0–0.07)
IMMATURE GRANULOCYTES: 1.2 %
LYMPHOCYTES # BLD: 10.2 %
LYMPHOCYTES ABSOLUTE: 0.7 THOU/MM3 (ref 1–4.8)
MCH RBC QN AUTO: 32.2 PG (ref 26–33)
MCHC RBC AUTO-ENTMCNC: 33.5 GM/DL (ref 32.2–35.5)
MCV RBC AUTO: 96.2 FL (ref 80–94)
MONOCYTES # BLD: 7.9 %
MONOCYTES ABSOLUTE: 0.5 THOU/MM3 (ref 0.4–1.3)
NUCLEATED RED BLOOD CELLS: 0 /100 WBC
ORGANISM: ABNORMAL
PLATELET # BLD: 142 THOU/MM3 (ref 130–400)
PMV BLD AUTO: 9.7 FL (ref 9.4–12.4)
POTASSIUM SERPL-SCNC: 3.9 MEQ/L (ref 3.5–5.2)
RBC # BLD: 3.91 MILL/MM3 (ref 4.7–6.1)
SEG NEUTROPHILS: 77.2 %
SEGMENTED NEUTROPHILS ABSOLUTE COUNT: 5 THOU/MM3 (ref 1.8–7.7)
SODIUM BLD-SCNC: 137 MEQ/L (ref 135–145)
URINE CULTURE REFLEX: ABNORMAL
WBC # BLD: 6.5 THOU/MM3 (ref 4.8–10.8)

## 2022-12-14 PROCEDURE — 1200000003 HC TELEMETRY R&B

## 2022-12-14 PROCEDURE — 82948 REAGENT STRIP/BLOOD GLUCOSE: CPT

## 2022-12-14 PROCEDURE — 36415 COLL VENOUS BLD VENIPUNCTURE: CPT

## 2022-12-14 PROCEDURE — 6370000000 HC RX 637 (ALT 250 FOR IP)

## 2022-12-14 PROCEDURE — 97110 THERAPEUTIC EXERCISES: CPT

## 2022-12-14 PROCEDURE — 2580000003 HC RX 258: Performed by: NURSE PRACTITIONER

## 2022-12-14 PROCEDURE — 6360000002 HC RX W HCPCS: Performed by: NURSE PRACTITIONER

## 2022-12-14 PROCEDURE — 85025 COMPLETE CBC W/AUTO DIFF WBC: CPT

## 2022-12-14 PROCEDURE — 97116 GAIT TRAINING THERAPY: CPT

## 2022-12-14 PROCEDURE — 80048 BASIC METABOLIC PNL TOTAL CA: CPT

## 2022-12-14 PROCEDURE — 99232 SBSQ HOSP IP/OBS MODERATE 35: CPT | Performed by: NURSE PRACTITIONER

## 2022-12-14 RX ADMIN — METOPROLOL TARTRATE 25 MG: 25 TABLET, FILM COATED ORAL at 20:08

## 2022-12-14 RX ADMIN — ATORVASTATIN CALCIUM 40 MG: 40 TABLET, FILM COATED ORAL at 08:15

## 2022-12-14 RX ADMIN — Medication 400 MG: at 08:17

## 2022-12-14 RX ADMIN — APIXABAN 2.5 MG: 2.5 TABLET, FILM COATED ORAL at 20:09

## 2022-12-14 RX ADMIN — CLOPIDOGREL BISULFATE 75 MG: 75 TABLET ORAL at 08:16

## 2022-12-14 RX ADMIN — AMLODIPINE BESYLATE 5 MG: 5 TABLET ORAL at 08:14

## 2022-12-14 RX ADMIN — OXYCODONE HYDROCHLORIDE AND ACETAMINOPHEN 1000 MG: 500 TABLET ORAL at 08:15

## 2022-12-14 RX ADMIN — PANTOPRAZOLE SODIUM 40 MG: 40 TABLET, DELAYED RELEASE ORAL at 08:18

## 2022-12-14 RX ADMIN — APIXABAN 2.5 MG: 2.5 TABLET, FILM COATED ORAL at 08:13

## 2022-12-14 RX ADMIN — SODIUM CHLORIDE: 9 INJECTION, SOLUTION INTRAVENOUS at 00:42

## 2022-12-14 RX ADMIN — METOPROLOL TARTRATE 25 MG: 25 TABLET, FILM COATED ORAL at 08:17

## 2022-12-14 RX ADMIN — SODIUM CHLORIDE: 9 INJECTION, SOLUTION INTRAVENOUS at 20:11

## 2022-12-14 RX ADMIN — Medication 5000 UNITS: at 08:19

## 2022-12-14 RX ADMIN — ACETAMINOPHEN 650 MG: 325 TABLET ORAL at 13:43

## 2022-12-14 RX ADMIN — CEFTRIAXONE SODIUM 1000 MG: 1 INJECTION, POWDER, FOR SOLUTION INTRAMUSCULAR; INTRAVENOUS at 10:01

## 2022-12-14 ASSESSMENT — PAIN SCALES - GENERAL
PAINLEVEL_OUTOF10: 0
PAINLEVEL_OUTOF10: 0
PAINLEVEL_OUTOF10: 4
PAINLEVEL_OUTOF10: 0

## 2022-12-14 ASSESSMENT — PAIN DESCRIPTION - LOCATION
LOCATION: HIP
LOCATION: HIP

## 2022-12-14 ASSESSMENT — PAIN DESCRIPTION - ORIENTATION
ORIENTATION: RIGHT;LEFT
ORIENTATION: RIGHT;LEFT

## 2022-12-14 ASSESSMENT — PAIN DESCRIPTION - DESCRIPTORS: DESCRIPTORS: ACHING

## 2022-12-14 NOTE — PROGRESS NOTES
Hospitalist Progress Note    Patient:  Marcelina Montes      Unit/Bed:8A-19/019-A    YOB: 1940    MRN: [id number]       Acct: [de-identified]     PCP: Bari Figueroa    Date of Admission: 12/12/2022    Assessment/Plan:    TO on CKD stage III--appears around 1.5 is baseline creatinine; monitor, avoid nephrotoxic agents; creatinine at 1.6  Sepsis (POA) likely secondary to UTI, failed outpatient treatment--Rocephin 12/13, await final urine culture  Lactic acidosis--initial level noted be 2.4 from 12/13 at 916 in the morning  Possible urinary retention--resolved  Hyponatremia--resolved  Essential hypertension, uncontrolled--Norvasc, Lopressor; monitor  Hyperlipidemia--statin  Mechanical falls  Normocytic anemia--stable  Persistent atrial fibrillation--on Eliquis  CAD status post PCI November 2019  History of CVA--statin, Plavix  History of DVT--on Eliquis  Nocturnal hypoxia/BENJAMIN--on 3 L at baseline, wean during the day as he does not use it and discussed with RN  Obesity with BMI 38.11      Expected discharge date: Likely 12/15    Disposition:    [] Home       [] TCU       [] Rehab       [] Psych       [] SNF       [] [place]       [] Other-    Chief Complaint: Hip pain    Hospital Course: Per H&P done 12/12/2022: Angelica Bryant is a 79 y.o. male with PMHx of CKD, CHF, HTN, and hyperlipidemia who presented to [facility] with chief complaint of bilateral hip pain. Patient reports he fell 2 days ago and landed on his hips. He also complains of some mild shortness of breath, but denies any coughing or congestion. He went to an outside ER yesterday, and was found to have a UTI. They prescribed him Keflex and discharged him to home. He admits to some fever/chills over the last 2 days. He also states there is some urgency and burning with urination. Patient reports taking all of his prescription medications as prescribed, including his Lasix.   He denies any chest pain, lightheadedness, N/V, abdominal pain, change in bowel habits, and increasing leg swelling. \"    12/14--> hemodynamically stable, wife at bedside and states patient looks and acts a lot better today, she states he uses 3 L of oxygen at night as he has BENJAMIN and cannot tolerate CPAP secondary to claustrophobia       Subjective (past 24 hours): Denies any pain, shortness of breath or nausea, he is alert and oriented, eating well      Medications:  Reviewed    Infusion Medications    sodium chloride 50 mL/hr at 12/14/22 0042    sodium chloride       Scheduled Medications    cefTRIAXone (ROCEPHIN) IV  1,000 mg IntraVENous Q24H    sodium chloride flush  5-40 mL IntraVENous 2 times per day    amLODIPine  5 mg Oral Daily    vitamin C  1,000 mg Oral Daily    atorvastatin  40 mg Oral Daily    Vitamin D  5,000 Units Oral Daily    clopidogrel  75 mg Oral Daily    apixaban  2.5 mg Oral BID    magnesium oxide  400 mg Oral Daily    metoprolol tartrate  25 mg Oral BID    pantoprazole  40 mg Oral QAM AC    [Held by provider] furosemide  40 mg Oral See Admin Instructions     PRN Meds: sodium chloride flush, sodium chloride, ondansetron **OR** ondansetron, polyethylene glycol, acetaminophen **OR** acetaminophen, potassium chloride **OR** potassium alternative oral replacement **OR** potassium chloride, magnesium sulfate      Intake/Output Summary (Last 24 hours) at 12/14/2022 0716  Last data filed at 12/13/2022 0816  Gross per 24 hour   Intake 400 ml   Output --   Net 400 ml       Diet:  ADULT DIET; Regular; 5 carb choices (75 gm/meal); Low Fat/Low Chol/High Fiber/2 gm Na; Low Sodium (2 gm); 2000 ml    Exam:  /78   Pulse 89   Temp 98.3 °F (36.8 °C) (Oral)   Resp 20   Ht 5' 8\" (1.727 m)   Wt 250 lb 10.6 oz (113.7 kg)   SpO2 91%   BMI 38.11 kg/m²     General appearance: No apparent distress, appears stated age and cooperative. HEENT: Pupils equal, round, and reactive to light. Conjunctivae/corneas clear.   Neck: Supple, with full range of motion. No jugular venous distention. Trachea midline. Respiratory:  Normal respiratory effort. Clear to auscultation, bilaterally without Rales/Wheezes/Rhonchi. Cardiovascular: Regular rate and rhythm with normal S1/S2 without murmurs, rubs or gallops. Abdomen: Soft, non-tender, non-distended with normal bowel sounds. Musculoskeletal: passive and active ROM x 4 extremities. Skin: Skin color, texture, turgor normal.    Neurologic:  Neurovascularly intact without any focal sensory/motor deficits. Cranial nerves: II-XII intact, grossly non-focal.  Psychiatric: Alert and oriented, thought content appropriate  Capillary Refill: Brisk,< 3 seconds   Peripheral Pulses: +2 palpable, equal bilaterally       Labs:   Recent Labs     12/12/22  1050 12/13/22  0637 12/14/22  0639   WBC 10.4 8.9 6.5   HGB 14.3 13.2* 12.6*   HCT 43.1 39.6* 37.6*    138 142     Recent Labs     12/12/22  1050 12/13/22  0637 12/14/22  0639   * 134* 137   K 4.6 4.1 3.9   CL 96* 96* 100   CO2 25 26 27   BUN 31* 29* 25*   CREATININE 1.9* 1.7* 1.6*   CALCIUM 9.6 8.9 8.4*     Microbiology:    2 blood cultures are pending  Urine culture is pending    Urinalysis:      Lab Results   Component Value Date/Time    NITRU NEGATIVE 12/12/2022 06:30 PM    WBCUA > 100 12/12/2022 06:30 PM    BACTERIA NONE SEEN 12/12/2022 06:30 PM    RBCUA 25-50 12/12/2022 06:30 PM    BLOODU SMALL 12/12/2022 06:30 PM    SPECGRAV >1.030 05/29/2022 11:30 PM    GLUCOSEU NEGATIVE 12/12/2022 06:30 PM       Radiology:  XR CHEST (2 VW)    Result Date: 12/12/2022  PROCEDURE: XR CHEST (2 VW) CLINICAL INFORMATION: sob COMPARISON: Chest radiograph 5/29/2022 TECHNIQUE: PA and lateral views of the chest performed. FINDINGS: No focal pulmonary consolidation. Cardiac silhouette is mildly enlarged. Aortic arch calcifications. No pleural effusion. No pneumothorax. No acute bony abnormality. Bilateral shoulder arthroplasty. Bones are demineralized.  Mild degenerative changes of the thoracic spine. Surgical clips are seen in the right upper quadrant that relate to prior cholecystectomy. 1. No acute cardiopulmonary finding. **This report has been created using voice recognition software. It may contain minor errors which are inherent in voice recognition technology. ** Final report electronically signed by Dr Jaspal Howell on 12/12/2022 11:42 AM    XR PELVIS (1-2 VIEWS)    Result Date: 12/12/2022  PROCEDURE: XR PELVIS (1-2 VIEWS) CLINICAL INFORMATION: fall; bilateral hip pain COMPARISON: None TECHNIQUE: AP pelvis performed. FINDINGS: No acute fracture or dislocation. Bone mineralization is unremarkable. Mild degenerative changes of the hips. Mild degenerative changes of the SI joints. Vascular calcifications are seen. .     1. No acute bony abnormality **This report has been created using voice recognition software. It may contain minor errors which are inherent in voice recognition technology. ** Final report electronically signed by Dr Jaspal Howell on 12/12/2022 11:41 AM      DVT prophylaxis: [] Lovenox                                 [] SCDs                                 [] SQ Heparin                                 [] Encourage ambulation           [x] Already on Anticoagulation~Eliquis     Code Status: Full Code    PT/OT Eval Status:  On case    Tele:   [] yes             [x] no    Active Hospital Problems    Diagnosis Date Noted    TO (acute kidney injury) ([address] [N17.9] 12/13/2022     Priority: Medium    Acute kidney injury (TO) with acute tubular necrosis (ATN) ([address] [N17.0] 12/12/2022     Priority: Medium       Electronically signed by MARTIN Willis CNP on 12/14/2022 at 7:16 AM

## 2022-12-14 NOTE — CARE COORDINATION
12/14/22, 11:33 AM EST    DISCHARGE ON GOING EVALUATION    Junior Rinaldi day: 1  Location: -19/019-A Reason for admit: TO (acute kidney injury) ([facility] [N17.9]  Congestive heart failure, unspecified HF chronicity, unspecified heart failure type ([facility] [I50.9]  Acute kidney injury (TO) with acute tubular necrosis (ATN) ([facility] [N17.0]   Procedure:   12/12 CXR 1. No acute cardiopulmonary finding. Barriers to Discharge: creatinine 1.6, blood cultures (-), diabetes management, PT/OT, IV fluids, Eliquis, Lipitor, IV Rocephin, Plavix, Protonix, electrolyte replacement protocols. PCP: Rj Kraus  Readmission Risk Score: 20.9%  Patient Goals/Plan/Treatment Preferences: Plan home with spouse. Denies needs. Will follow.

## 2022-12-14 NOTE — PLAN OF CARE
Problem: Discharge Planning  Goal: Discharge to home or other facility with appropriate resources  12/13/2022 2344 by Shoaib Capone RN  Outcome: Progressing  Note: Discharge needs are assessed daily     Problem: Safety - Adult  Goal: Free from fall injury  12/13/2022 2344 by Shoaib Capone RN  Outcome: Progressing  Note: Patient free of falls this shift. Patient on falling star program. Fall band intact. RN visually checks on patient with hourly rounds. Patient tolerates ambulation each shift. Problem: Skin/Tissue Integrity  Goal: Absence of new skin breakdown  Description: 1. Monitor for areas of redness and/or skin breakdown  2. Assess vascular access sites hourly  3. Every 4-6 hours minimum:  Change oxygen saturation probe site  4. Every 4-6 hours:  If on nasal continuous positive airway pressure, respiratory therapy assess nares and determine need for appliance change or resting period.   12/13/2022 2344 by Shoaib Capone RN  Outcome: Progressing  Note: Skin is assessed every shift, no new skin breakdown noted     Problem: Respiratory - Adult  Goal: Achieves optimal ventilation and oxygenation  12/13/2022 2344 by Shoaib Capone RN  Outcome: Progressing  Note: O2 sats remain >90% on 3L NC     Problem: Skin/Tissue Integrity - Adult  Goal: Skin integrity remains intact  12/13/2022 2344 by Shoaib Capone RN  Outcome: Progressing  Note: Skin is assessed every shift, no new skin breakdown noted     Problem: Musculoskeletal - Adult  Goal: Return mobility to safest level of function  12/13/2022 2344 by Shoaib Capone RN  Outcome: Progressing  Note: Patient encouraged to perform daily tasks independently, assistance provided when needed     Problem: Musculoskeletal - Adult  Goal: Maintain proper alignment of affected body part  12/13/2022 2344 by Shoaib Capone RN  Outcome: Progressing     Problem: Musculoskeletal - Adult  Goal: Return ADL status to a safe level of function  12/13/2022 2344 by Froylan Hopper RN  Outcome: Progressing     Problem: Infection - Adult  Goal: Absence of infection at discharge  12/13/2022 2344 by Froylan Hopper RN  Outcome: Progressing     Problem: Metabolic/Fluid and Electrolytes - Adult  Goal: Electrolytes maintained within normal limits  12/13/2022 2344 by Froylan Hopper RN  Outcome: Progressing  Note: Labs are monitored daily   Care plan reviewed with patient and spouse. Patient and spouse verbalize understanding of the plan of care and contribute to goal setting.

## 2022-12-14 NOTE — PROGRESS NOTES
[address] [place] [address]  INPATIENT PHYSICAL THERAPY  DAILY NOTE  [facility] MED SURG 8A - 2J-34/296-S    Time In: 0196  Time Out: 0801  Timed Code Treatment Minutes: 30 Minutes  Minutes: 30          Date: 2022  Patient Name: Radha Villagran,  Gender:  male        MRN: [id number]  : 1940  (79 y.o.)     Referring Practitioner: Jamal Mendoza PA-C  Diagnosis: TO (Acute kidney injury)  Additional Pertinent Hx: Radha Villagran is a 79 y.o. male with PMHx of CKD, CHF, HTN, and hyperlipidemia who presented to [facility] with chief complaint of bilateral hip pain. Patient reports he fell 2 days ago and landed on his hips. He also complains of some mild shortness of breath, but denies any coughing or congestion. He went to an outside ER yesterday, and was found to have a UTI. They prescribed him Keflex and discharged him to home. He admits to some fever/chills over the last 2 days. He also states there is some urgency and burning with urination. Patient reports taking all of his prescription medications as prescribed, including his Lasix. Prior Level of Function:  Lives With: Spouse  Type of Home: House  Home Layout: One level  Home Access: Stairs to enter without rails  Entrance Stairs - Number of Steps: 2 platform steps  Home Equipment: Oxygen, Walker, standard   Bathroom Shower/Tub: Tub/Shower unit  Bathroom Toilet: Handicap height  Bathroom Equipment: Shower chair, Grab bars in shower    ADL Assistance: [address] [place] [address] Avenue: Needs assistance  Homemaking Responsibilities: Yes  Ambulation Assistance: Independent  Transfer Assistance: Independent  Active : Yes    Restrictions/Precautions:  Restrictions/Precautions: General Precautions     SUBJECTIVE: RN approved session. Patient in bed upon arrival and agreeable to therapy. Wife present to observe session.     PAIN: Slight soreness in left hip but did not quantify    Vitals: Oxygen: SpO2 90% following ambulation then O2 applied and rebounded to 93%    OBJECTIVE:  Bed Mobility:  Supine to Sit: Stand By Assistance  Scooting: Stand By Assistance    Transfers:  Sit to Stand: Stand By Assistance, x4 trials all from EOB  Stand to Sit:Stand By Assistance    Ambulation:  Stand By Assistance  Distance: ~200 ft  Surface: Level Tile  Device:Rolling Walker  Gait Deviations:  Slow Lana and Decreased Gait Speed  - 5 ft without AD in room, CGA and not LOB    Balance:  Dynamic Sitting Balance: Stand By Assistance, While performing seated exercises without back support  Static Standing Balance: Contact Guard Assistance  -Shoulder width SONIA and narrow SONIA without UE support x30 sec each EO/EC  Tandem Stance: x30 sec with each LE in front, no UE support    Exercise:  Patient was guided in 1 set(s) 10 reps of exercise to both lower extremities. Ankle pumps, Heelslides, Seated marches, and Long arc quads. Exercises were completed for increased independence with functional mobility. Functional Outcome Measures: Completed  AM-PAC Inpatient Mobility Raw Score : 19  AM-PAC Inpatient T-Scale Score : 45.44    ASSESSMENT:  Assessment: Patient progressing toward established goals. Activity Tolerance:  Patient tolerance of  treatment: good. Equipment Recommendations: Other: monitor for needs  Discharge Recommendations: Home with Assist as Needed and Patient would benefit from continued PT at discharge  Plan: Current Treatment Recommendations: Strengthening, Balance training, Endurance training, Functional mobility training, Transfer training, Gait training, Stair training, Home exercise program, Patient/Caregiver education & training, Safety education & training, Therapeutic activities  General Plan:  (5x GM)    Patient Education  Patient Education: Plan of Care, Bed Mobility, Transfers, Gait, Up in Chair for All Meals, Verbal Exercise Instruction    Goals:  Patient Goals : go home and get back to doing everything  Short Term Goals  Time Frame for Short Term Goals: at discharge  Short Term Goal 1: Pt to be MOD I for supine <> sit to get in/out of bed  Short Term Goal 2: Pt to be MOD I for sit <> stand to get up to ambulate  Short Term Goal 3: Pt to ambulate >350 ft with RW with Supervision for household distances  Short Term Goal 4: Pt to negotiate 4\" step with AD with SBA for home access  Long Term Goals  Time Frame for Long Term Goals : not set due to short ELOS    Following session, patient left in safe position with all fall risk precautions in place.

## 2022-12-15 VITALS
BODY MASS INDEX: 37.99 KG/M2 | TEMPERATURE: 97.9 F | HEIGHT: 68 IN | OXYGEN SATURATION: 93 % | HEART RATE: 76 BPM | RESPIRATION RATE: 16 BRPM | DIASTOLIC BLOOD PRESSURE: 65 MMHG | SYSTOLIC BLOOD PRESSURE: 115 MMHG | WEIGHT: 250.66 LBS

## 2022-12-15 LAB
ANION GAP SERPL CALCULATED.3IONS-SCNC: 9 MEQ/L (ref 8–16)
BUN BLDV-MCNC: 22 MG/DL (ref 7–22)
CALCIUM SERPL-MCNC: 8.2 MG/DL (ref 8.5–10.5)
CHLORIDE BLD-SCNC: 105 MEQ/L (ref 98–111)
CO2: 28 MEQ/L (ref 23–33)
CREAT SERPL-MCNC: 1.3 MG/DL (ref 0.4–1.2)
ERYTHROCYTE [DISTWIDTH] IN BLOOD BY AUTOMATED COUNT: 15.2 % (ref 11.5–14.5)
ERYTHROCYTE [DISTWIDTH] IN BLOOD BY AUTOMATED COUNT: 53.6 FL (ref 35–45)
GFR SERPL CREATININE-BSD FRML MDRD: 54 ML/MIN/1.73M2
GLUCOSE BLD-MCNC: 124 MG/DL (ref 70–108)
HCT VFR BLD CALC: 39 % (ref 42–52)
HEMOGLOBIN: 12.6 GM/DL (ref 14–18)
MCH RBC QN AUTO: 31.3 PG (ref 26–33)
MCHC RBC AUTO-ENTMCNC: 32.3 GM/DL (ref 32.2–35.5)
MCV RBC AUTO: 96.8 FL (ref 80–94)
PLATELET # BLD: 155 THOU/MM3 (ref 130–400)
PMV BLD AUTO: 10 FL (ref 9.4–12.4)
POTASSIUM REFLEX MAGNESIUM: 4.6 MEQ/L (ref 3.5–5.2)
RBC # BLD: 4.03 MILL/MM3 (ref 4.7–6.1)
SODIUM BLD-SCNC: 142 MEQ/L (ref 135–145)
WBC # BLD: 5.5 THOU/MM3 (ref 4.8–10.8)

## 2022-12-15 PROCEDURE — 97535 SELF CARE MNGMENT TRAINING: CPT

## 2022-12-15 PROCEDURE — 80048 BASIC METABOLIC PNL TOTAL CA: CPT

## 2022-12-15 PROCEDURE — 99239 HOSP IP/OBS DSCHRG MGMT >30: CPT | Performed by: NURSE PRACTITIONER

## 2022-12-15 PROCEDURE — 36415 COLL VENOUS BLD VENIPUNCTURE: CPT

## 2022-12-15 PROCEDURE — 85027 COMPLETE CBC AUTOMATED: CPT

## 2022-12-15 PROCEDURE — 97530 THERAPEUTIC ACTIVITIES: CPT

## 2022-12-15 PROCEDURE — 6360000002 HC RX W HCPCS: Performed by: NURSE PRACTITIONER

## 2022-12-15 PROCEDURE — 2580000003 HC RX 258: Performed by: NURSE PRACTITIONER

## 2022-12-15 PROCEDURE — 6370000000 HC RX 637 (ALT 250 FOR IP)

## 2022-12-15 RX ORDER — FUROSEMIDE 40 MG/1
40 TABLET ORAL DAILY
Qty: 60 TABLET | Refills: 3 | Status: SHIPPED
Start: 2022-12-15

## 2022-12-15 RX ORDER — CEPHALEXIN 500 MG/1
500 CAPSULE ORAL 2 TIMES DAILY
Qty: 14 CAPSULE | Refills: 0 | Status: SHIPPED | OUTPATIENT
Start: 2022-12-15

## 2022-12-15 RX ADMIN — METOPROLOL TARTRATE 25 MG: 25 TABLET, FILM COATED ORAL at 08:41

## 2022-12-15 RX ADMIN — APIXABAN 2.5 MG: 2.5 TABLET, FILM COATED ORAL at 08:42

## 2022-12-15 RX ADMIN — Medication 400 MG: at 08:36

## 2022-12-15 RX ADMIN — CLOPIDOGREL BISULFATE 75 MG: 75 TABLET ORAL at 08:42

## 2022-12-15 RX ADMIN — OXYCODONE HYDROCHLORIDE AND ACETAMINOPHEN 1000 MG: 500 TABLET ORAL at 08:36

## 2022-12-15 RX ADMIN — Medication 5000 UNITS: at 08:36

## 2022-12-15 RX ADMIN — AMLODIPINE BESYLATE 5 MG: 5 TABLET ORAL at 08:41

## 2022-12-15 RX ADMIN — PANTOPRAZOLE SODIUM 40 MG: 40 TABLET, DELAYED RELEASE ORAL at 08:43

## 2022-12-15 RX ADMIN — CEFTRIAXONE SODIUM 1000 MG: 1 INJECTION, POWDER, FOR SOLUTION INTRAMUSCULAR; INTRAVENOUS at 10:30

## 2022-12-15 NOTE — PROGRESS NOTES
[address] [place] [address]  Occupational Therapy  Daily Note  Time:   Time In: 3484  Time Out: 1021  Timed Code Treatment Minutes: 23 Minutes  Minutes: 23          Date: 12/15/2022  Patient Name: López Nash,   Gender: male      Room: 019-A  MRN: [id number]  : 1940  (79 y.o.)  Referring Practitioner: Iqra Aguilera PA-C  Diagnosis: Acute kidney injury (TO) with acute tubular necrosis  Additional Pertinent Hx: López Nash is a 79 y.o. male with PMHx of CKD, CHF, HTN, and hyperlipidemia who presented to [facility] with chief complaint of bilateral hip pain. Patient reports he fell 2 days ago and landed on his hips. He also complains of some mild shortness of breath, but denies any coughing or congestion. He went to an outside ER yesterday, and was found to have a UTI. They prescribed him Keflex and discharged him to home. He admits to some fever/chills over the last 2 days. He also states there is some urgency and burning with urination. Patient reports taking all of his prescription medications as prescribed, including his Lasix. He denies any chest pain, lightheadedness, N/V, abdominal pain, change in bowel habits, and increasing leg swelling    Restrictions/Precautions:  Restrictions/Precautions: General Precautions     SUBJECTIVE: Upon arrival pt seated in bedside chair and agreeable to OT session. Pt's spouse present during session. Nurse approved session. 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 Reports discomfort behind L ear with no numeric rating given. Vitals: Vitals not assessed per clinical judgement, see nursing flowsheet    COGNITION: WFL    ADL:   Grooming: Supervision. Standing sinkside for 9 minutes to engage in oral care task, pt at[place] BALANCE:  Sitting Balance:  Modified Independent. Standing Balance: Supervision. BED MOBILITY:  Not Tested    TRANSFERS:  Sit to Stand:  Supervision. Stand to Sit: Supervision. FUNCTIONAL MOBILITY:  Assistive Device: Rolling Walker  Assist Level:  Supervision. Distance: To and from bathroom        ADDITIONAL ACTIVITIES:   Pt. Educated on fall prevention techniques such as wearing adequate footwear, adequate lighting, keeping commonly used items within reach, taking rest breaks, pt reports understanding. ASSESSMENT:     Activity Tolerance:  Patient tolerance of  treatment: good. Discharge Recommendations:  Pt. To return home with spouse with strong support. No need for [facility] OT at this time, continue to monitor progress. Equipment Recommendations: Equipment Needed: No  Plan: Times Per Week: 3-5x  Times Per Day: Once a day  Current Treatment Recommendations: Strengthening, Balance training, Functional mobility training, Endurance training, Safety education & training, Patient/Caregiver education & training, Self-Care / ADL, Home management training, Equipment evaluation, education, & procurement    Patient Education  Patient Education: ADL's and Importance of Increasing Activity Fall prevention techniques    Goals  Short Term Goals  Time Frame for Short Term Goals: by discharge  Short Term Goal 1: pt will complete functional mobility within [facility] distances mod-I with requiring 0 vc's for safety to increase indep with ADLs  Short Term Goal 2: pt will complete dynamic standing task x5 minutes with unilateral release mod-I to increase ease with showering  Short Term Goal 3: pt will complete LB ADLs with SBA to increase ease with donning pants  Long Term Goals  Time Frame for Long Term Goals : no LTG est d/t Short ELOS    Following session, patient left in safe position with all fall risk precautions in place.

## 2022-12-15 NOTE — CARE COORDINATION
12/15/22, 12:00 PM EST    Patient goals/plan/ treatment preferences discussed by  and . Patient goals/plan/ treatment preferences reviewed with patient/ family. Patient/ family verbalize understanding of discharge plan and are in agreement with goal/plan/treatment preferences. Understanding was demonstrated using the teach back method. AVS provided by RN at time of discharge, which includes all necessary medical information pertaining to the patients current course of illness, treatment, post-discharge goals of care, and treatment preferences. Services At/After Discharge: None       IMM Letter  IMM Letter given to Patient/Family/Significant other/Guardian/POA/by[de-identified] Livia Strickland RN Case Mgr.   IMM Letter date given[de-identified] 12/13/22  IMM Letter time given[de-identified] 1  Observation Status Letter date given[de-identified] 12/12/22  Observation Status Letter time given[de-identified] 4623  Observation Status Letter given to Patient/Family/Significant other/Guardian/POA/by[de-identified] Pt. Access

## 2022-12-15 NOTE — PLAN OF CARE
Problem: Discharge Planning  Goal: Discharge to home or other facility with appropriate resources  12/14/2022 2344 by Cj Noe RN  Outcome: Progressing  Note: Discharge needs are assessed daily     Problem: Safety - Adult  Goal: Free from fall injury  12/14/2022 2344 by Cj Noe RN  Outcome: Progressing  Note: Patient free of falls this shift. Patient on falling star program. Fall band intact. RN visually checks on patient with hourly rounds. Patient tolerates ambulation each shift. Problem: Skin/Tissue Integrity  Goal: Absence of new skin breakdown  Description: 1. Monitor for areas of redness and/or skin breakdown  2. Assess vascular access sites hourly  3. Every 4-6 hours minimum:  Change oxygen saturation probe site  4. Every 4-6 hours:  If on nasal continuous positive airway pressure, respiratory therapy assess nares and determine need for appliance change or resting period.   12/14/2022 2344 by Cj Noe RN  Outcome: Progressing  Note: Skin is assessed every shift, no new skin breakdown noted     Problem: Respiratory - Adult  Goal: Achieves optimal ventilation and oxygenation  12/14/2022 2344 by Cj Noe RN  Outcome: Progressing  Note: O2 sats remain >90% on 3L NC     Problem: Skin/Tissue Integrity - Adult  Goal: Skin integrity remains intact  12/14/2022 2344 by Cj Noe RN  Outcome: Progressing  Note: Skin is assessed every shift, no new skin breakdown noted     Problem: Musculoskeletal - Adult  Goal: Return mobility to safest level of function  12/13/2022 2344 by Cj Noe RN  Outcome: Progressing  Note: Patient encouraged to perform daily tasks independently, assistance provided when needed     Problem: Musculoskeletal - Adult  Goal: Maintain proper alignment of affected body part  12/14/2022 2344 by Cj Noe RN  Outcome: Progressing     Problem: Musculoskeletal - Adult  Goal: Return ADL status to a safe level of function  12/14/2022 2344 by Eduard Kan RN  Outcome: Progressing     Problem: Infection - Adult  Goal: Absence of infection at discharge  12/14/2022 2344 by Eduard Kan RN  Outcome: Progressing     Problem: Metabolic/Fluid and Electrolytes - Adult  Goal: Electrolytes maintained within normal limits  12/14/2022 2344 by Eduard Kan RN  Outcome: Progressing  Note: Labs are monitored daily   Care plan reviewed with patient and spouse. Patient and spouse verbalize understanding of the plan of care and contribute to goal setting.

## 2022-12-15 NOTE — DISCHARGE SUMMARY
Hospital Medicine Discharge Summary      Patient Identification:   Cezar Reinoso   : 1940  MRN: [id number]   Account: [de-identified]      Patient's PCP: Loida Peralta    Admit Date: 2022     Discharge Date: 12/15/2022      Admitting Physician: Sharda Barcenas MD     Discharging Nurse Practitioner: Tommie Mendenhall APRN - CNP     Discharge Diagnoses with Assessment/Plan:  TO on CKD stage III--appears around 1.5 is baseline creatinine; monitor, avoid nephrotoxic agents; creatinine at 1.3  Sepsis (POA) likely secondary to UTI, failed outpatient treatment--Rocephin -12/15, final urine culture showing contaminants; Keflex 500 mg PO BID  Lactic acidosis--initial level noted be 2.4 from  at 916 in the morning; hemodynamically stable  Possible urinary retention--resolved  Hyponatremia--resolved  Essential hypertension, uncontrolled--Norvasc, Lopressor; stable  Hyperlipidemia--statin  Mechanical falls--patient is on Plavix and Eliquis  Normocytic anemia--stable  Persistent atrial fibrillation--on Eliquis  CAD status post PCI 2019--on statin, Plavix, Lopressor  History of CVA--statin, Plavix  History of DVT--on Eliquis  Nocturnal hypoxia/BENJAMIN--on 3 L at baseline, wean during the day as he does not use it and discussed with RN  Obesity with BMI 38.11     The patient was seen and examined on day of discharge and this discharge summary is in conjunction with any daily progress note from day of discharge. Hospital Course:   Cezar Reinoso is a 79 y.o. male admitted to [facility] on 2022 for hip pain;     Per H&P done 2022: Rosy Quevedo is a 79 y.o. male with PMHx of CKD, CHF, HTN, and hyperlipidemia who presented to [facility] with chief complaint of bilateral hip pain. Patient reports he fell 2 days ago and landed on his hips. He also complains of some mild shortness of breath, but denies any coughing or congestion.   He went to an outside ER yesterday, and was found to have a UTI. They prescribed him Keflex and discharged him to home. He admits to some fever/chills over the last 2 days. He also states there is some urgency and burning with urination. Patient reports taking all of his prescription medications as prescribed, including his Lasix. He denies any chest pain, lightheadedness, N/V, abdominal pain, change in bowel habits, and increasing leg swelling. \"     12/14--> hemodynamically stable, wife at bedside and states patient looks and acts a lot better today, she states he uses 3 L of oxygen at night as he has BENJAMIN and cannot tolerate CPAP secondary to claustrophobia     12/15--> hemodynamically stable, urine culture growing contaminants; creatinine improved to 1.3 from 1.6; he sitting up in a chair, wife at bedside, states he feels much better, his bowels are moving, he is eating, he is ambulating, at this time plan is to transition him to oral antibiotics and discharge home, both patient and wife are very happy. Exam:     Vitals:  Vitals:    12/15/22 0049 12/15/22 0457 12/15/22 0832 12/15/22 1055   BP: 110/68 (!) 147/65 132/65 115/65   Pulse: 85 84 82 76   Resp: 18 18 18 16   Temp: 97.8 °F (36.6 °C) 97.9 °F (36.6 °C) 98.1 °F (36.7 °C) 97.9 °F (36.6 °C)   TempSrc: Oral Oral Oral Oral   SpO2: 94% 95% 92% 93%   Weight:       Height:         Weight: Weight: 250 lb 10.6 oz (113.7 kg)     24 hour intake/output:  Intake/Output Summary (Last 24 hours) at 12/15/2022 1325  Last data filed at 12/14/2022 2011  Gross per 24 hour   Intake 120 ml   Output --   Net 120 ml         General appearance:  No apparent distress, appears stated age and cooperative. HEENT:  Normal cephalic, atraumatic without obvious deformity. Pupils equal, round, and reactive to light. Conjunctivae/corneas clear. Neck: Supple, with full range of motion. No jugular venous distention. Trachea midline. Respiratory:  Normal respiratory effort.  Clear to auscultation, bilaterally without Rales/Wheezes/Rhonchi. Cardiovascular:  Regular rate and rhythm with normal S1/S2 without murmurs, rubs or gallops. Abdomen: Soft, non-tender, non-distended with normal bowel sounds. Musculoskeletal:  No clubbing, cyanosis or edema bilaterally. Full range of motion without deformity. Skin: Skin color, texture, turgor normal.    Neurologic:  Neurovascularly intact without any focal sensory/motor deficits. Cranial nerves: II-XII intact, grossly non-focal.  Psychiatric:  Alert and oriented, thought content appropriate  Capillary Refill: Brisk,< 3 seconds   Peripheral Pulses: +2 palpable, equal bilaterally       Labs: For convenience and continuity at follow-up the following most recent labs are provided:      CBC:    Lab Results   Component Value Date/Time    WBC 5.5 12/15/2022 05:56 AM    HGB 12.6 12/15/2022 05:56 AM    HCT 39.0 12/15/2022 05:56 AM     12/15/2022 05:56 AM       Renal:    Lab Results   Component Value Date/Time     12/15/2022 05:56 AM    K 4.6 12/15/2022 05:56 AM     12/15/2022 05:56 AM    CO2 28 12/15/2022 05:56 AM    BUN 22 12/15/2022 05:56 AM    CREATININE 1.3 12/15/2022 05:56 AM    CALCIUM 8.2 12/15/2022 05:56 AM    PHOS 3.4 06/03/2022 04:57 AM       Cardiac: No results for input(s): [facility] in the last 72 hours. Significant Diagnostic Studies    Radiology:   XR PELVIS (1-2 VIEWS)   Final Result   1. No acute bony abnormality            **This report has been created using voice recognition software. It may contain minor errors which are inherent in voice recognition technology. **      Final report electronically signed by Dr Jayla Schwab on 12/12/2022 11:41 AM      XR CHEST (2 VW)   Final Result   1. No acute cardiopulmonary finding. **This report has been created using voice recognition software. It may contain minor errors which are inherent in voice recognition technology. **      Final report electronically signed by Dr Jayla Schwab on 12/12/2022 11:42 AM             Consults:     None    Disposition:    [x] Home       [] TCU       [] Rehab       [] Psych       [] SNF       [] [place]       [] Other-    Condition at Discharge: Stable    Code Status:  Full Code     Pending tests at discharge:      none    Patient Instructions:    Discharge lab work: none  Activity: activity as tolerated  Diet: ADULT DIET; Regular; 5 carb choices (75 gm/meal); Low Fat/Low Chol/High Fiber/2 gm Na;  Low Sodium (2 gm); 2000 ml      Follow-up visits:   [address] [facility] [address]  [facility] [address]  [phone]    Follow up on 1/3/2023  Follow up appointment January 3, 2023 at 2pm.         Discharge Medications:        Medication List        START taking these medications      cephALEXin 500 MG capsule  Commonly known as: KEFLEX  Take 1 capsule by mouth 2 times daily            CHANGE how you take these medications      furosemide 40 MG tablet  Commonly known as: LASIX  Take 1 tablet by mouth daily Resume on Dec 17, 2022  What changed: additional instructions            CONTINUE taking these medications      acetaminophen 500 MG tablet  Commonly known as: TYLENOL     amLODIPine 5 MG tablet  Commonly known as: NORVASC  TAKE 1 TABLET BY MOUTH  DAILY     APPLE CIDER VINEGAR PO     atorvastatin 40 MG tablet  Commonly known as: LIPITOR  TAKE 1 TABLET BY MOUTH AT  NIGHT     CINNAMON PO     clopidogrel 75 MG tablet  Commonly known as: PLAVIX  TAKE 1 TABLET BY MOUTH  DAILY     Co Q-10 100 MG Caps     Eliquis 2.5 MG Tabs tablet  Generic drug: apixaban  TAKE 1 TABLET BY MOUTH  TWICE DAILY     FISH OIL PO     Gauze Pads & Dressings 2\"X2\" Pads  1 each by Does not apply route daily as needed (finger sticks)     glucose monitoring kit  1 kit by Does not apply route daily     Lancets Misc  1 each by Does not apply route 4 times daily     Magnesium 500 MG Tabs     metoprolol tartrate 25 MG tablet  Commonly known as: LOPRESSOR     OXYGEN pantoprazole 40 MG tablet  Commonly known as: PROTONIX  TAKE 1 TABLET BY MOUTH  DAILY     PARoxetine 20 MG tablet  Commonly known as: PAXIL     PROBIOTIC DAILY PO     Turmeric Curcumin 500 MG Caps     vitamin C 1000 MG tablet     Vitamin D3 125 MCG (5000 UT) Tabs     ZINC 15 PO            STOP taking these medications      blood glucose test strips     glucagon 1 MG injection     nitrofurantoin (macrocrystal-monohydrate) 100 MG capsule  Commonly known as: MACROBID               Where to Get Your Medications        These medications were sent to [address] [place] [address] , [address]  [address] [place] [address]  [address] Floor, [address] [address]      Phone: [phone]   cephALEXin 500 MG capsule       Information about where to get these medications is not yet available    Ask your nurse or doctor about these medications  furosemide 40 MG tablet         Time Spent on discharge is more than 45 minutes in the examination, evaluation, counseling and review of medications and discharge plan. Signed: Thank you Hailee Hester for the opportunity to be involved in this patient's care.     Electronically signed by Babetta Prader, APRN - CNP on 12/15/2022 at 1:25 PM

## 2022-12-18 LAB
BLOOD CULTURE, ROUTINE: NORMAL
BLOOD CULTURE, ROUTINE: NORMAL

## 2022-12-19 ENCOUNTER — TELEPHONE (OUTPATIENT)
Dept: CARDIOLOGY CLINIC | Age: 82
End: 2022-12-19

## 2022-12-19 NOTE — TELEPHONE ENCOUNTER
Looks like Lasix was decreased at hospital discharge, it was 40am 20pm, changed to 40 daily, please remind of CHF symptoms.

## 2022-12-20 NOTE — PROGRESS NOTES
Physician Progress Note      PATIENT:               Sergio Martines  [facility] #:                  [id number]  :                       1940  ADMIT DATE:       2022 10:33 AM  DISCH DATE:        12/15/2022 12:21 PM  RESPONDING  PROVIDER #:        Humza Ayers CNP          QUERY TEXT:    Patient admitted with Sepsis , TO . Documentation reflects in ABUNDIO/Carmen Valentin 1106   List dated 22  with Progress note dated  and dismissed from DC   summary . If possible, please make selection below, document in the progress   notes and discharge summary if ATN was: The medical record reflects the following:  Risk Factors: Sepsis with TO  Clinical Indicators: Discharge summary states \"TO on CKD stage III--appears   around 1.5 is baseline creatinine; monitor, avoid nephrotoxic agents;   creatinine at 1.3\",    Urine -  Spec grav. 1.013, no cast  Treatment: admission , imaging, labs, medication review and adjustment ,   treatment of underlying cause    Thank Josh Gaines RN, BSN, [facility]  Clinical   P: [phone]  F: [phone]  Options provided:  -- ATN ruled out after study  -- ATN present on admission and confirmed after study  -- ATN ruled out after study  -- Other - I will add my own diagnosis  -- Disagree - Not applicable / Not valid  -- Disagree - Clinically unable to determine / Unknown  -- Refer to Clinical Documentation Reviewer    PROVIDER RESPONSE TEXT:    Provider disagreed with this query.     Query created by: Jemima Terry on 2022 5:54 AM      Electronically signed by:  Humza Ayers CNP 2022 6:39 AM

## 2023-01-10 RX ORDER — AMLODIPINE BESYLATE 5 MG/1
5 TABLET ORAL DAILY
Qty: 90 TABLET | Refills: 0 | Status: SHIPPED | OUTPATIENT
Start: 2023-01-10

## 2023-01-17 ENCOUNTER — OFFICE VISIT (OUTPATIENT)
Dept: CARDIOLOGY CLINIC | Age: 83
End: 2023-01-17
Payer: MEDICARE

## 2023-01-17 VITALS
BODY MASS INDEX: 37.59 KG/M2 | HEIGHT: 68 IN | WEIGHT: 248 LBS | DIASTOLIC BLOOD PRESSURE: 72 MMHG | SYSTOLIC BLOOD PRESSURE: 120 MMHG | HEART RATE: 80 BPM

## 2023-01-17 DIAGNOSIS — I50.32 CHRONIC HEART FAILURE WITH PRESERVED EJECTION FRACTION (HFPEF) (HCC): Primary | ICD-10-CM

## 2023-01-17 PROCEDURE — 3074F SYST BP LT 130 MM HG: CPT | Performed by: INTERNAL MEDICINE

## 2023-01-17 PROCEDURE — 1123F ACP DISCUSS/DSCN MKR DOCD: CPT | Performed by: INTERNAL MEDICINE

## 2023-01-17 PROCEDURE — G8417 CALC BMI ABV UP PARAM F/U: HCPCS | Performed by: INTERNAL MEDICINE

## 2023-01-17 PROCEDURE — 3078F DIAST BP <80 MM HG: CPT | Performed by: INTERNAL MEDICINE

## 2023-01-17 PROCEDURE — G8427 DOCREV CUR MEDS BY ELIG CLIN: HCPCS | Performed by: INTERNAL MEDICINE

## 2023-01-17 PROCEDURE — 1036F TOBACCO NON-USER: CPT | Performed by: INTERNAL MEDICINE

## 2023-01-17 PROCEDURE — 99214 OFFICE O/P EST MOD 30 MIN: CPT | Performed by: INTERNAL MEDICINE

## 2023-01-17 PROCEDURE — G8484 FLU IMMUNIZE NO ADMIN: HCPCS | Performed by: INTERNAL MEDICINE

## 2023-01-17 NOTE — PROGRESS NOTES
[address] [facility] [address] [address] [facility] [address]  Dept: [phone]  Dept Fax: [phone]  Loc: [phone]    Visit Date: 1/17/2023    Mr. Sanket Tai is a 79 y.o. male  who presented for:  A Fib  CHF  HPI:   RHODA Colón is a pleasant 79year old male patient who  has a past medical history of AAA (abdominal aortic aneurysm), CAD (coronary artery disease), Cancer ([facility] [address] Cancer of kidney ([facility] [address] Heart attack ([facility] [address] History of placement of chest tube, Hx of blood clots, Hyperlipidemia, Hypertension, Kidney stones, Obesity, Osteoarthritis, and Stroke ([facility] [address] He had prior CEA, AAA repair. In 11/2019, he was found to have severe ISR in RCA, underwent successful PCI. Nuclear stress test 5/2021 was negative for ischemia. Chest CTA that was done 5/2021 while patient was hospitalized revealed distal thoracic aorta aneurysm, diameter 4.7 cm (stable on chest CTA on 5/2022). On 5/2022, patient was admitted to [facility] for COVID PNA, respiratory failure. He was subsequently transferred to [place]. While at [address], cardiology was consulted for A Fib with evidence for 2-2.5 second pauses. Cardiology recommended stopping AV blocking agents, continuing Eliquis. Echo 5/2022 revealed mild LVH, EF 55%. Holter monitor on 7/2022 revealed persistent atrial fibrillation, average HR 88, minimal HR 59, no pauses, no profound bradycardia. Patient was seen at CHF clinic. On 12/2022, patient was admitted for UTI, Sepsis, ? TO/CKD. The patient states that since his discharge, he is getting better. Patient denies chest pain, shortness of breath, dyspnea on exertion. Patient has chronic mild leg swelling.        Current Outpatient Medications:     amLODIPine (NORVASC) 5 MG tablet, TAKE 1 TABLET BY MOUTH  DAILY, Disp: 90 tablet, Rfl: 0    furosemide (LASIX) 40 MG tablet, Take 1 tablet by mouth daily Resume on Dec 17, 2022, Disp: 60 tablet, Rfl: 3    cephALEXin (KEFLEX) 500 MG capsule, Take 1 capsule by mouth 2 times daily, Disp: 14 capsule, Rfl: 0    clopidogrel (PLAVIX) 75 MG tablet, TAKE 1 TABLET BY MOUTH  DAILY, Disp: 90 tablet, Rfl: 3    metoprolol tartrate (LOPRESSOR) 25 MG tablet, Take 1 tablet by mouth in the morning and 1 tablet before bedtime. , Disp: , Rfl:     glucose monitoring (FREESTYLE FREEDOM) kit, 1 kit by Does not apply route daily (Patient not taking: Reported on 12/12/2022), Disp: 1 kit, Rfl: 0    Lancets MISC, 1 each by Does not apply route 4 times daily (Patient not taking: Reported on 12/12/2022), Disp: 200 each, Rfl: 0    Gauze Pads & Dressings 2\"X2\" PADS, 1 each by Does not apply route daily as needed (finger sticks) (Patient not taking: Reported on 12/12/2022), Disp: 60 each, Rfl: 0    ELIQUIS 2.5 MG TABS tablet, TAKE 1 TABLET BY MOUTH  TWICE DAILY, Disp: 180 tablet, Rfl: 2    atorvastatin (LIPITOR) 40 MG tablet, TAKE 1 TABLET BY MOUTH AT  NIGHT, Disp: 90 tablet, Rfl: 2    Zinc Sulfate (ZINC 15 PO), Take by mouth, Disp: , Rfl:     Turmeric Curcumin 500 MG CAPS, Take by mouth, Disp: , Rfl:     Omega-3 Fatty Acids (FISH OIL PO), Take by mouth, Disp: , Rfl:     OXYGEN, Inhale 3 L into the lungs nightly, Disp: , Rfl:     acetaminophen (TYLENOL) 500 MG tablet, Take 1,000 mg by mouth every 6 hours as needed for Pain, Disp: , Rfl:     Coenzyme Q10 (CO Q-10) 100 MG CAPS, Take by mouth, Disp: , Rfl:     Cholecalciferol (VITAMIN D3) 125 MCG (5000 UT) TABS, Take by mouth, Disp: , Rfl:     APPLE CIDER VINEGAR PO, Take 450 mg by mouth, Disp: , Rfl:     pantoprazole (PROTONIX) 40 MG tablet, TAKE 1 TABLET BY MOUTH  DAILY, Disp: 90 tablet, Rfl: 3    PARoxetine (PAXIL) 20 MG tablet, Take 20 mg by mouth every morning, Disp: , Rfl:     Magnesium 500 MG TABS, Take by mouth daily , Disp: , Rfl:     CINNAMON PO, Take 1,000 mg by mouth daily, Disp: , Rfl:     Probiotic Product (PROBIOTIC DAILY PO), Take by mouth daily, Disp: , Rfl:     Ascorbic Acid (VITAMIN C) 1000 MG tablet, Take 1,000 mg by mouth daily , Disp: , Rfl:     Past Medical History  Gonzalez Ibarra  has a past medical history of AAA (abdominal aortic aneurysm), CAD (coronary artery disease), Cancer ([facility] [address] Cancer of kidney ([facility] [address] Heart attack ([facility] [address] History of placement of chest tube, Hx of blood clots, Hyperlipidemia, Hypertension, Kidney stones, Obesity, Osteoarthritis, and Stroke ([facility] [address] Social History  Gonzalez Ibarra  reports that he quit smoking about 52 years ago. His smoking use included pipe. He started smoking about 68 years ago. He has a 15.00 pack-year smoking history. He has never used smokeless tobacco. He reports that he does not drink alcohol and does not use drugs. Family History  Gonzalez Ibarra family history includes Alzheimer's Disease in his mother; Heart Disease in his father. Past Surgical History   Past Surgical History:   Procedure Laterality Date    ABDOMEN SURGERY      AAA    ABDOMINAL AORTIC ANEURYSM REPAIR  2005    CARDIAC SURGERY  2008, 1996    stents    CARDIAC SURGERY  2005    AAA repair    CAROTID ENDARTERECTOMY  2006    FINGER AMPUTATION      JOINT REPLACEMENT  2007    knees bilateral    KNEE SURGERY Left 6-2013    PARTIAL NEPHRECTOMY  2010    Right    PTCA      [facility]    VASCULAR SURGERY         Review of Systems   Constitutional: Negative for chills and fever  HENT: Negative for congestion, sinus pressure, sneezing and sore throat. Eyes: Negative for pain, discharge, redness and itching. Respiratory: Negative for apnea, cough  Gastrointestinal: Negative for blood in stool, constipation, diarrhea   Endocrine: Negative for cold intolerance, heat intolerance, polydipsia. Genitourinary: Negative for dysuria, enuresis, flank pain and hematuria. Musculoskeletal: Negative for arthralgias, joint swelling and neck pain. Neurological: Negative for numbness and headaches.    Psychiatric/Behavioral: Negative for agitation, confusion, decreased concentration and dysphoric mood. Objective: There were no vitals taken for this visit. Wt Readings from Last 3 Encounters:   12/14/22 250 lb 10.6 oz (113.7 kg)   11/09/22 250 lb (113.4 kg)   10/05/22 252 lb 9.6 oz (114.6 kg)     BP Readings from Last 3 Encounters:   12/15/22 115/65   11/09/22 125/74   10/05/22 118/62       Nursing note and vitals reviewed. Physical Exam   Constitutional: Oriented to person, place, and time. Appears well-developed and well-nourished. ENT: Moist mucous membranes. No bleeding. Tongue is midline. Head: Normocephalic and atraumatic. Eyes: EOM are normal. Pupils are equal, round, and reactive to light. Neck: Normal range of motion. Neck supple. No JVD present. Cardiovascular: Normal rate, regular rhythm, no murmur, no rubs, and intact distal pulses. Pulmonary/Chest: Effort normal and breath sounds normal. No respiratory distress. No wheezes. No rales. Abdominal: Soft. Bowel sounds are normal. No distension. There is no tenderness. Musculoskeletal: Normal range of motion. trace edema. Neurological: Alert and oriented to person, place, and time. No cranial nerve deficit. Coordination normal.   Skin: Skin is warm and dry. Psychiatric: Normal mood and affect.        Lab Results   Component Value Date/Time    CKTOTAL 60 04/28/2015 08:42 AM    CKTOTAL 78 11/04/2014 08:41 AM       Lab Results   Component Value Date/Time    WBC 5.5 12/15/2022 05:56 AM    RBC 4.03 12/15/2022 05:56 AM    RBC 5.22 01/20/2022 08:55 AM    HGB 12.6 12/15/2022 05:56 AM    HCT 39.0 12/15/2022 05:56 AM    MCV 96.8 12/15/2022 05:56 AM    MCH 31.3 12/15/2022 05:56 AM    MCHC 32.3 12/15/2022 05:56 AM    RDW 14.3 01/20/2022 08:55 AM     12/15/2022 05:56 AM    MPV 10.0 12/15/2022 05:56 AM       Lab Results   Component Value Date/Time     12/15/2022 05:56 AM    K 4.6 12/15/2022 05:56 AM     12/15/2022 05:56 AM    CO2 28 12/15/2022 05:56 AM    BUN 22 12/15/2022 05:56 AM    LABALBU 3.8 11/09/2022 07:44 PM    CREATININE 1.3 12/15/2022 05:56 AM    CALCIUM 8.2 12/15/2022 05:56 AM    LABGLOM 54 12/15/2022 05:56 AM    GLUCOSE 124 12/15/2022 05:56 AM    GLUCOSE 155 10/19/2022 08:31 AM       Lab Results   Component Value Date/Time    ALKPHOS 80 11/09/2022 07:44 PM    ALT 14 11/09/2022 07:44 PM    AST 15 11/09/2022 07:44 PM    PROT 5.6 11/09/2022 07:44 PM    BILITOT 0.6 11/09/2022 07:44 PM    BILIDIR <0.2 06/02/2022 06:48 AM    LABALBU 3.8 11/09/2022 07:44 PM       Lab Results   Component Value Date/Time    MG 2.0 08/22/2022 09:34 AM       Lab Results   Component Value Date    INR 0.95 11/29/2019    INR 0.93 11/27/2019    INR 0.94 01/31/2012         Lab Results   Component Value Date/Time    LABA1C 7.8 06/13/2022 07:20 AM       Lab Results   Component Value Date/Time    TRIG 179 01/20/2022 08:55 AM    HDL 32 01/20/2022 08:55 AM    LDLCALC 69 01/20/2022 08:55 AM    LDLDIRECT 141 04/19/2018 08:41 AM    LABVLDL 36 01/20/2022 08:55 AM       Lab Results   Component Value Date/Time    TSH 0.494 06/03/2022 04:57 AM         Testing Reviewed:      I have individually reviewed the cardiac test below:    ECHO:   Summary  Left ventricle size is normal.  Mild concentric left ventricular hypertrophy. There were no regional wall motion abnormalities. Ejection fraction is visually estimated at 55%.      Signature     ----------------------------------------------------------------  Electronically signed by James Das MD (Interpreting  physician) on 05/20/2022 at 07:05 PM  ----------------------------------------------------------------    Holter Monitor:12/2021  CONCLUSION:  Atrial fibrillation , persistent  Patient was in atrial fibrillaiton in 100% of the study duration  Minimum heart rate 38, average heart rate 66, maximum heart rate of 96  Multiple pauses noted, longest fot 2.72 seconds  Premature ventricular complexes , occasional  One 5 beat run of nonsustained ventricular tachycrdia     Confirmed by MELHEM, KAVIN ([id number]) on 12/28/2021 4:00:15 PM     Stress Test:5/2021   Summary   There is mild attenuation artifact noted in the inferior wall seems to be   related to diaphragm artifact. The nuclear images is not suggestive for myocardial ischemia. Recommendation   Clinical correlation is recommended. Signatures      ----------------------------------------------------------------   Electronically signed by Marianna Beard MD (Interpreting   Cardiologist) on 05/17/2021 at 14:20   ----------------------------------------------------------------        Cath:11/2019 11/29/19  FINDINGS:  HEMODYNAMICS:  LVEDP 15 mmHg. LEFT VENTRICULOGRAM:  No significant wall motion abnormality noted. Ejection fraction estimated to be at 60%. CORONARY ANGIOGRAM:  1. RCA:  Right coronary artery was noticed to have severe 90% to 95%  in-stent restenosis within the proximal RCA. Mid RCA has mild diffuse  disease. Distal RCA with mild diffuse disease. RCA is a dominant  vessel and bifurcates into RPDA and RPL. Both RPL and RPDA with mild  diffuse disease without any obstructive lesions. 2.  Left main:  Left main coronary artery has no significant stenosis  and is widely patent. Vessel bifurcates into LAD and LCX. 3.  Left anterior descending artery:  Left anterior descending artery  has 30% diffuse stenosis in the proximal LAD. There seems to be a high  first diagonal branch versus possibly ramus branch that has 30% to 50%  proximal stenosis. Mid LAD has mild diffuse disease, distal LAD with  mild diffuse disease. 4.  Left circumflex artery:  LCX has 30% stenosis in the proximal  segment. OM1 is a large branching vessel with mild diffuse disease. Distal LCX has 30% to 50% lesion. CONCLUSION:  1. Unstable angina. 2.  Coronary artery disease. 3.  Severe in-stent restenosis within previously placed stent in the  proximal RCA, status post successful PCI stenting. RECOMMENDATIONS:  1. Continue aspirin.   2. Continue Plavix. 3.  The patient receives renal dose of Plavix of 300.  4. We will increase Lipitor from 10 mg p.o. daily to 40 mg p.o. daily. 5.  Further optimization of coronary artery disease and risk factor  modification. 6.  Outpatient followup with Cardiology. Findings and plan of care were discussed with the patient and his family  in detail. Questions were answered. They are agreeable to the plan. Rosalia Rosario MD     Narrative   CT of the chest after administration of IV contrast.       Technique: Axial CT images from the lung apices through the adrenal glands   after administration of IV contrast.  Sagittal and coronal reconstruction   images provided. Comparison:    CR,SR  - XR CHEST PORTABLE  - 05/06/2021 02:41 PM EDT       Findings: Moderate atherosclerotic vessel disease within the thoracic   aorta. Aneurysm of the distal thoracic aorta measuring 4.7 cm anterior to   posterior. Moderate atherosclerotic vessel disease within the thoracic   aorta. No dissection. No mediastinal or axillary adenopathy. Normal thyroid. Low lung volumes. No pulmonary nodules. No areas of consolidation. No   pneumothorax. Degenerative changes within the spine. Scarring right kidney. Hernia in the epigastric region containing   nonobstructed colon. Simple left renal cyst.           Impression   Impression:   Aneurysm of the distal thoracic aorta. This document has been electronically signed by: Lazaro Morgan MD on   05/06/2021 06:49 PM       All CTs at this facility use dose modulation techniques and iterative   reconstructions, and/or weight-based dosing   when appropriate to reduce radiation to a low as reasonably achievable.       HOLTER 7/2022  Narrative & Impression     HOLTER MONITOR:  Hours  48     INDICATION FOR STUDY:  Irregular Heart Rate     CONCLUSION:  Persistent Atrial Fibrillation  Average Heart Rate 88 Range from 59 bpm to 143 bpm  No long pause or profound bradycardia  Rare Premature ventricular complexes       AssessmentPlan:     Ree Barry is a pleasant 79year old male patient who  has a past medical history of AAA (abdominal aortic aneurysm), CAD (coronary artery disease), Cancer ([address] Cancer of kidney ([address] Heart attack ([address] History of placement of chest tube, Hx of blood clots, Hyperlipidemia, Hypertension, Kidney stones, Obesity, Osteoarthritis, and Stroke ([address] He had prior CEA, AAA repair. In 11/2019, he was found to have severe ISR in RCA, underwent successful PCI. Nuclear stress test 5/2021 was negative for ischemia. Chest CTA that was done 5/2021 while patient was hospitalized revealed distal thoracic aorta aneurysm, diameter 4.7 cm (stable on chest CTA on 5/2022). On 5/2022, patient was admitted to [facility] for COVID PNA, respiratory failure. He was subsequently transferred to [place]. While at [address], cardiology was consulted for A Fib with evidence for 2-2.5 second pauses. Cardiology recommended stopping AV blocking agents, continuing Eliquis. Echo 5/2022 revealed mild LVH, EF 55%. Holter monitor on 7/2022 revealed persistent atrial fibrillation, average HR 88, minimal HR 59, no pauses, no profound bradycardia. Patient was seen at CHF clinic. On 12/2022, patient was admitted for UTI, Sepsis, ? TO/CKD. The patient states that since his discharge, he is getting better. Patient denies chest pain, shortness of breath, dyspnea on exertion. Patient has chronic mild leg swelling.    Chronic HFpEF  Severe RCA ISR, s/p PCI/Stenting 11/2019  Paroxysmal atrial fibrillation  PAD, S/p CEA  AAA, s/p repair 2005 (surgical repair, at [facility])   Thoracic aortic aneurysm (4.7 cm on CTA 5/2021)  Hypertension  Dyslipidemia  BENJAMIN    Followed at CHF clinic  Has no significant signs of volume overload, only mild swelling (stable)  Currently on lasix  Daily weight  Limit Na  Call office if swelling increase, weight   Has CKD   H/O A Fib  On Eliquis   Denies bleeding issues, melena   Aggressive medical therapy and risk factor modification for CAD is indicated   Lipitor  Hyperlipidemia: on statins, followed periodically. Patient need periodic lipid and liver profile  Plavix   On metoprolol   The patient was instructed to check the blood pressure at home, and record the readings. Patient will call office with blood pressure readings, will adjust patient's antihypertensive medications as needed accordingly   The patient is advised to begin progressive daily aerobic exercise program and attempt to lose weight    Above findings and plan of care were discussed with patient in details, patient's questions were answered.      Disposition:  RTC in 6  months             Electronically signed by Imani Olvera MD, [facility]    1/17/2023 at 7:42 AM EST

## 2023-02-14 RX ORDER — ATORVASTATIN CALCIUM 40 MG/1
TABLET, FILM COATED ORAL
Qty: 90 TABLET | Refills: 1 | Status: SHIPPED | OUTPATIENT
Start: 2023-02-14

## 2023-02-16 ENCOUNTER — TELEPHONE (OUTPATIENT)
Dept: CARDIOLOGY CLINIC | Age: 83
End: 2023-02-16

## 2023-02-16 NOTE — TELEPHONE ENCOUNTER
Pre op Risk Assessment    Procedure Cystolithalopaxy  Physician Dr. Kelly Murry  Date of surgery/procedure TBD    Last OV 1-17-23  Last Stress 5-17-21  Last Echo 5-20-22  Last Cath 11-27-19  Last Stent 11-27-19  Is patient on blood thinners Plavix & Eliquis  Hold Meds/how many days ?     Fax: [phone]

## 2023-03-20 RX ORDER — AMLODIPINE BESYLATE 5 MG/1
5 TABLET ORAL DAILY
Qty: 90 TABLET | Refills: 1 | Status: SHIPPED | OUTPATIENT
Start: 2023-03-20

## 2023-05-09 ENCOUNTER — OFFICE VISIT (OUTPATIENT)
Dept: CARDIOLOGY CLINIC | Age: 83
End: 2023-05-09
Payer: MEDICARE

## 2023-05-09 VITALS
DIASTOLIC BLOOD PRESSURE: 62 MMHG | HEART RATE: 67 BPM | OXYGEN SATURATION: 98 % | BODY MASS INDEX: 38.77 KG/M2 | SYSTOLIC BLOOD PRESSURE: 118 MMHG | WEIGHT: 255 LBS

## 2023-05-09 DIAGNOSIS — L03.116 CELLULITIS OF LEFT LOWER EXTREMITY: ICD-10-CM

## 2023-05-09 DIAGNOSIS — R60.0 EDEMA OF BOTH LOWER LEGS: ICD-10-CM

## 2023-05-09 DIAGNOSIS — I50.32 CHRONIC DIASTOLIC CONGESTIVE HEART FAILURE, NYHA CLASS 2 (HCC): Primary | ICD-10-CM

## 2023-05-09 DIAGNOSIS — I48.0 PAROXYSMAL ATRIAL FIBRILLATION (HCC): ICD-10-CM

## 2023-05-09 PROCEDURE — 3078F DIAST BP <80 MM HG: CPT | Performed by: NURSE PRACTITIONER

## 2023-05-09 PROCEDURE — 99214 OFFICE O/P EST MOD 30 MIN: CPT | Performed by: NURSE PRACTITIONER

## 2023-05-09 PROCEDURE — G8427 DOCREV CUR MEDS BY ELIG CLIN: HCPCS | Performed by: NURSE PRACTITIONER

## 2023-05-09 PROCEDURE — 1123F ACP DISCUSS/DSCN MKR DOCD: CPT | Performed by: NURSE PRACTITIONER

## 2023-05-09 PROCEDURE — 3074F SYST BP LT 130 MM HG: CPT | Performed by: NURSE PRACTITIONER

## 2023-05-09 PROCEDURE — 1036F TOBACCO NON-USER: CPT | Performed by: NURSE PRACTITIONER

## 2023-05-09 PROCEDURE — G8417 CALC BMI ABV UP PARAM F/U: HCPCS | Performed by: NURSE PRACTITIONER

## 2023-05-09 RX ORDER — METOLAZONE 2.5 MG/1
2.5 TABLET ORAL DAILY
Qty: 2 TABLET | Refills: 0 | Status: SHIPPED | OUTPATIENT
Start: 2023-05-09

## 2023-05-09 RX ORDER — CEPHALEXIN 500 MG/1
500 CAPSULE ORAL 4 TIMES DAILY
Qty: 20 CAPSULE | Refills: 0 | Status: SHIPPED | OUTPATIENT
Start: 2023-05-09 | End: 2023-05-11 | Stop reason: SDUPTHER

## 2023-05-09 RX ORDER — POTASSIUM CHLORIDE 20 MEQ/1
20 TABLET, EXTENDED RELEASE ORAL DAILY
Qty: 2 TABLET | Refills: 0 | Status: SHIPPED | OUTPATIENT
Start: 2023-05-09 | End: 2023-05-11 | Stop reason: SDUPTHER

## 2023-05-09 ASSESSMENT — ENCOUNTER SYMPTOMS
SHORTNESS OF BREATH: 0
NAUSEA: 0
ABDOMINAL DISTENTION: 0
VOMITING: 0
COUGH: 0

## 2023-05-09 NOTE — PROGRESS NOTES
Heart Failure Clinic       Visit Date: 5/9/2023  Cardiologist:  Dr. Aditi Cantor  Primary Care Physician: Dr. Byron Mcdermott is a 79 y.o. male who presents today for:  No chief complaint on file. HPI:     TYPE HF: HFpEF 55%, mild LV hypertrophy   Cause:   Device:   HX: AAA s/p repair, Afib (eliquis), CAD  Cancer , Cancer of kidney, Heart attack , Hx of blood clots, Hyperlipidemia, Hypertension, Kidney stones, Obesity, Osteoarthritis, and Stroke   Dry Wt:  258 on 8/30/22, 252 on 10/5/22, 253 on 4/11/23, 255 on 5/9/23      Marquis Grider is a 79 y.o. male who presents to the office for a f/u patient visit in the heart failure clinic. Concerns today: here today as an add on pt. Weight is only up 2lbs but has noted more lower leg swelling with blisters x 1 week. He also notes more SOB. He is taking more Lasix and does note more urination. He is following his diet pretty well. Denies bloating or orthopnea. Visit on 4/11/23: here today for her 6 month f/u. He has been admitted multiple times for UTIs and a kidney stone - removed in march. Today he denies swelling, bloating or SOB. Still urinating well on his lasix. Visit on 10/5/23: pt here today for a  week f/u. Doing well. Weight is down on our scale by 6lbs. Urinating well, swelling is down. Does note some blisters that formed on left lower leg that he also had during [place]. He does have an appointment tomorrow w/ wound care    Visit on 8/30/22: pt referred by Dr. Aditi Cantor d/t LE swelling. Noted pt did not take his diuretic 7 days and did note a 15lb weight gain w/ worsening swelling. Pt urinating well on diuretics, repeat labs are ordered d/t small jump in Cr. Some improvement of SOB. Patient follows:      Hospitalization:        Activity: ADLs performed w/o limitations some hip pain  Diet: educated today - does add some salt.  Staying under 2l/day    Patient has:  Chest Pain: no  SOB: MULLINS w/ exertion   Orthopnea/PND: sleeps

## 2023-05-09 NOTE — PATIENT INSTRUCTIONS
You may receive a survey regarding the care you received during your visit. Your input is valuable to us. We encourage you to complete and return your survey. We hope you will choose us in the future for your healthcare needs.     Continue:  Continue current medications  Daily weights and record  Fluid restriction of 2 Liters per day  Limit sodium in diet to around 4650-2329 mg/day  Monitor BP  Activity as tolerated     Call the Heart Failure Clinic for any of the following symptoms: [phone]  Weight gain of 2-3 pounds in 1 day or 5 pounds in 1 week  Increased shortness of breath  Shortness of breath while laying down  Cough  Chest pain  Swelling in feet, ankles or legs  Tenderness or bloating in the abdomen  Fatigue   Decreased appetite or nausea   Confusion

## 2023-05-11 DIAGNOSIS — L03.116 CELLULITIS OF LEFT LOWER EXTREMITY: ICD-10-CM

## 2023-05-11 RX ORDER — CEPHALEXIN 500 MG/1
500 CAPSULE ORAL 4 TIMES DAILY
Qty: 20 CAPSULE | Refills: 0 | Status: SHIPPED | OUTPATIENT
Start: 2023-05-11 | End: 2023-05-16

## 2023-05-11 RX ORDER — POTASSIUM CHLORIDE 20 MEQ/1
20 TABLET, EXTENDED RELEASE ORAL DAILY
Qty: 2 TABLET | Refills: 0 | Status: SHIPPED | OUTPATIENT
Start: 2023-05-11

## 2023-05-12 DIAGNOSIS — I50.32 CHRONIC DIASTOLIC CONGESTIVE HEART FAILURE, NYHA CLASS 2 (HCC): Primary | ICD-10-CM

## 2023-05-15 RX ORDER — POTASSIUM CHLORIDE 20 MEQ/1
TABLET, EXTENDED RELEASE ORAL
Qty: 135 TABLET | Refills: 3 | Status: SHIPPED | OUTPATIENT
Start: 2023-05-15 | End: 2023-05-15 | Stop reason: SDUPTHER

## 2023-05-15 RX ORDER — FUROSEMIDE 40 MG/1
TABLET ORAL
Qty: 135 TABLET | Refills: 3 | Status: SHIPPED | OUTPATIENT
Start: 2023-05-15

## 2023-05-15 RX ORDER — POTASSIUM CHLORIDE 20 MEQ/1
TABLET, EXTENDED RELEASE ORAL
Qty: 135 TABLET | Refills: 3 | Status: SHIPPED | OUTPATIENT
Start: 2023-05-15

## 2023-05-15 NOTE — TELEPHONE ENCOUNTER
Sat 248   No wt from Sun or today yet  Leg still the same   SOB still same  Will call back with wt from today  Is going to call PCP today regarding leg

## 2023-05-15 NOTE — TELEPHONE ENCOUNTER
Ok weight is down with the metolazone. Increasing baseline Lasix by adding an additional 20mg of lasix in the late afternoon. To give an addition 10meq of potassium.      BMP two weeks    If still no improvements need to consider stress test.

## 2023-05-23 RX ORDER — APIXABAN 2.5 MG/1
TABLET, FILM COATED ORAL
Qty: 180 TABLET | Refills: 3 | Status: SHIPPED | OUTPATIENT
Start: 2023-05-23

## 2023-06-06 ENCOUNTER — ANESTHESIA (OUTPATIENT)
Dept: OPERATING ROOM | Age: 83
End: 2023-06-06
Payer: MEDICARE

## 2023-06-06 ENCOUNTER — APPOINTMENT (OUTPATIENT)
Dept: CT IMAGING | Age: 83
End: 2023-06-06
Payer: MEDICARE

## 2023-06-06 ENCOUNTER — HOSPITAL ENCOUNTER (INPATIENT)
Age: 83
LOS: 4 days | Discharge: HOME | End: 2023-06-10
Attending: SURGERY | Admitting: SURGERY
Payer: MEDICARE

## 2023-06-06 ENCOUNTER — ANESTHESIA EVENT (OUTPATIENT)
Dept: OPERATING ROOM | Age: 83
End: 2023-06-06
Payer: MEDICARE

## 2023-06-06 DIAGNOSIS — K46.0 INCARCERATED HERNIA: ICD-10-CM

## 2023-06-06 DIAGNOSIS — K43.9 HERNIA OF ABDOMINAL WALL: Primary | ICD-10-CM

## 2023-06-06 PROBLEM — K56.690 OTHER PARTIAL INTESTINAL OBSTRUCTION (HCC): Status: ACTIVE | Noted: 2023-06-06

## 2023-06-06 PROBLEM — Z79.01 ANTICOAGULATED: Status: ACTIVE | Noted: 2023-06-06

## 2023-06-06 PROBLEM — K43.6 INCARCERATED VENTRAL HERNIA: Status: ACTIVE | Noted: 2023-06-06

## 2023-06-06 LAB
ALBUMIN SERPL BCG-MCNC: 3.9 G/DL (ref 3.5–5.1)
ALP SERPL-CCNC: 94 U/L (ref 38–126)
ALT SERPL W/O P-5'-P-CCNC: 36 U/L (ref 11–66)
ANION GAP SERPL CALC-SCNC: 11 MEQ/L (ref 8–16)
ANION GAP SERPL CALCULATED.3IONS-SCNC: 9 MEQ/L (ref 7–16)
AST SERPL-CCNC: 30 U/L (ref 5–40)
BACTERIA URNS QL MICRO: ABNORMAL /HPF
BASOPHILS ABSOLUTE: 0.1 THOU/MM3 (ref 0–0.1)
BASOPHILS NFR BLD AUTO: 0.7 %
BILIRUB CONJ SERPL-MCNC: < 0.2 MG/DL (ref 0–0.3)
BILIRUB SERPL-MCNC: 0.7 MG/DL (ref 0.3–1.2)
BILIRUB UR QL STRIP.AUTO: NEGATIVE
BUN BLDV-MCNC: 23 MG/DL (ref 8–23)
BUN SERPL-MCNC: 21 MG/DL (ref 7–22)
CALCIUM SERPL-MCNC: 9 MG/DL (ref 8.5–10.5)
CALCIUM SERPL-MCNC: 9.7 MG/DL (ref 8.5–10.5)
CASTS #/AREA URNS LPF: ABNORMAL /LPF
CASTS 2: ABNORMAL /LPF
CHARACTER UR: CLEAR
CHLORIDE BLD-SCNC: 100 MEQ/L (ref 95–107)
CHLORIDE SERPL-SCNC: 101 MEQ/L (ref 98–111)
CO2 SERPL-SCNC: 28 MEQ/L (ref 23–33)
CO2: 30 MEQ/L (ref 19–31)
COLOR: YELLOW
CREAT SERPL-MCNC: 1.7 MG/DL (ref 0.4–1.2)
CREAT SERPL-MCNC: 1.83 MG/DL (ref 0.8–1.4)
CRYSTALS URNS MICRO: ABNORMAL
DEPRECATED RDW RBC AUTO: 60.3 FL (ref 35–45)
EGFR IF NONAFRICAN AMERICAN: 36 ML/MIN/1.73
EKG Q-T INTERVAL: 440 MS
EKG QRS DURATION: 124 MS
EKG QTC CALCULATION (BAZETT): 457 MS
EKG R AXIS: 20 DEGREES
EKG T AXIS: -10 DEGREES
EKG VENTRICULAR RATE: 65 BPM
EOSINOPHIL NFR BLD AUTO: 3.9 %
EOSINOPHILS ABSOLUTE: 0.3 THOU/MM3 (ref 0–0.4)
EPITHELIAL CELLS, UA: ABNORMAL /HPF
ERYTHROCYTE [DISTWIDTH] IN BLOOD BY AUTOMATED COUNT: 16.6 % (ref 11.5–14.5)
GFR SERPL CREATININE-BSD FRML MDRD: 39 ML/MIN/1.73M2
GLUCOSE SERPL-MCNC: 243 MG/DL (ref 70–108)
GLUCOSE UR QL STRIP.AUTO: NEGATIVE MG/DL
GLUCOSE: 178 MG/DL (ref 70–99)
HCT VFR BLD AUTO: 44.8 % (ref 42–52)
HGB BLD-MCNC: 14.4 GM/DL (ref 14–18)
HGB UR QL STRIP.AUTO: ABNORMAL
IMM GRANULOCYTES # BLD AUTO: 0.11 THOU/MM3 (ref 0–0.07)
IMM GRANULOCYTES NFR BLD AUTO: 1.5 %
INR PPP: 0.94 (ref 0.85–1.13)
KETONES UR QL STRIP.AUTO: NEGATIVE
LACTATE SERPL-SCNC: 1.9 MMOL/L (ref 0.5–2)
LIPASE SERPL-CCNC: 38.6 U/L (ref 5.6–51.3)
LYMPHOCYTES ABSOLUTE: 0.7 THOU/MM3 (ref 1–4.8)
LYMPHOCYTES NFR BLD AUTO: 9.4 %
MCH RBC QN AUTO: 33.3 PG (ref 26–33)
MCHC RBC AUTO-ENTMCNC: 32.1 GM/DL (ref 32.2–35.5)
MCV RBC AUTO: 103.7 FL (ref 80–94)
MISCELLANEOUS 2: ABNORMAL
MONOCYTES ABSOLUTE: 0.6 THOU/MM3 (ref 0.4–1.3)
MONOCYTES NFR BLD AUTO: 8.1 %
NEUTROPHILS NFR BLD AUTO: 76.4 %
NITRITE UR QL STRIP: NEGATIVE
NRBC BLD AUTO-RTO: 0 /100 WBC
PH UR STRIP.AUTO: 6.5 [PH] (ref 5–9)
PLATELET # BLD AUTO: 123 THOU/MM3 (ref 130–400)
PMV BLD AUTO: 10 FL (ref 9.4–12.4)
POTASSIUM SERPL-SCNC: 4.3 MEQ/L (ref 3.5–5.4)
POTASSIUM SERPL-SCNC: 4.4 MEQ/L (ref 3.5–5.2)
PROT SERPL-MCNC: 6.2 G/DL (ref 6.1–8)
PROT UR STRIP.AUTO-MCNC: ABNORMAL MG/DL
RBC # BLD AUTO: 4.32 MILL/MM3 (ref 4.7–6.1)
RBC URINE: ABNORMAL /HPF
RENAL EPI CELLS #/AREA URNS HPF: ABNORMAL /[HPF]
SEGMENTED NEUTROPHILS ABSOLUTE COUNT: 5.6 THOU/MM3 (ref 1.8–7.7)
SODIUM BLD-SCNC: 139 MEQ/L (ref 133–146)
SODIUM SERPL-SCNC: 140 MEQ/L (ref 135–145)
SP GR UR REFRACT.AUTO: 1.01 (ref 1–1.03)
UROBILINOGEN, URINE: 0.2 EU/DL (ref 0–1)
WBC # BLD AUTO: 7.3 THOU/MM3 (ref 4.8–10.8)
WBC #/AREA URNS HPF: ABNORMAL /HPF
WBC #/AREA URNS HPF: NEGATIVE /[HPF]
YEAST LIKE FUNGI URNS QL MICRO: ABNORMAL

## 2023-06-06 PROCEDURE — 85025 COMPLETE CBC W/AUTO DIFF WBC: CPT

## 2023-06-06 PROCEDURE — 82248 BILIRUBIN DIRECT: CPT

## 2023-06-06 PROCEDURE — 7100000000 HC PACU RECOVERY - FIRST 15 MIN: Performed by: SURGERY

## 2023-06-06 PROCEDURE — C1781 MESH (IMPLANTABLE): HCPCS | Performed by: SURGERY

## 2023-06-06 PROCEDURE — 2060000000 HC ICU INTERMEDIATE R&B

## 2023-06-06 PROCEDURE — 3600000014 HC SURGERY LEVEL 4 ADDTL 15MIN: Performed by: SURGERY

## 2023-06-06 PROCEDURE — 2580000003 HC RX 258: Performed by: SURGERY

## 2023-06-06 PROCEDURE — 85610 PROTHROMBIN TIME: CPT

## 2023-06-06 PROCEDURE — 2500000003 HC RX 250 WO HCPCS: Performed by: SURGERY

## 2023-06-06 PROCEDURE — 88304 TISSUE EXAM BY PATHOLOGIST: CPT

## 2023-06-06 PROCEDURE — 99285 EMERGENCY DEPT VISIT HI MDM: CPT

## 2023-06-06 PROCEDURE — 80053 COMPREHEN METABOLIC PANEL: CPT

## 2023-06-06 PROCEDURE — 93005 ELECTROCARDIOGRAM TRACING: CPT | Performed by: SURGERY

## 2023-06-06 PROCEDURE — 2709999900 HC NON-CHARGEABLE SUPPLY: Performed by: SURGERY

## 2023-06-06 PROCEDURE — 3600000004 HC SURGERY LEVEL 4 BASE: Performed by: SURGERY

## 2023-06-06 PROCEDURE — 6370000000 HC RX 637 (ALT 250 FOR IP): Performed by: SURGERY

## 2023-06-06 PROCEDURE — 2500000003 HC RX 250 WO HCPCS: Performed by: NURSE ANESTHETIST, CERTIFIED REGISTERED

## 2023-06-06 PROCEDURE — 7100000001 HC PACU RECOVERY - ADDTL 15 MIN: Performed by: SURGERY

## 2023-06-06 PROCEDURE — 6360000002 HC RX W HCPCS: Performed by: ANESTHESIOLOGY

## 2023-06-06 PROCEDURE — C1713 ANCHOR/SCREW BN/BN,TIS/BN: HCPCS | Performed by: SURGERY

## 2023-06-06 PROCEDURE — 74177 CT ABD & PELVIS W/CONTRAST: CPT

## 2023-06-06 PROCEDURE — 83690 ASSAY OF LIPASE: CPT

## 2023-06-06 PROCEDURE — 49596 RPR AA HRN 1ST > 10 NCR/STRN: CPT | Performed by: SURGERY

## 2023-06-06 PROCEDURE — 3700000001 HC ADD 15 MINUTES (ANESTHESIA): Performed by: SURGERY

## 2023-06-06 PROCEDURE — 81001 URINALYSIS AUTO W/SCOPE: CPT

## 2023-06-06 PROCEDURE — 3700000000 HC ANESTHESIA ATTENDED CARE: Performed by: SURGERY

## 2023-06-06 PROCEDURE — 93010 ELECTROCARDIOGRAM REPORT: CPT | Performed by: INTERNAL MEDICINE

## 2023-06-06 PROCEDURE — 87070 CULTURE OTHR SPECIMN AEROBIC: CPT

## 2023-06-06 PROCEDURE — 2580000003 HC RX 258: Performed by: NURSE PRACTITIONER

## 2023-06-06 PROCEDURE — 99223 1ST HOSP IP/OBS HIGH 75: CPT | Performed by: SURGERY

## 2023-06-06 PROCEDURE — 83605 ASSAY OF LACTIC ACID: CPT

## 2023-06-06 PROCEDURE — 6360000002 HC RX W HCPCS: Performed by: NURSE ANESTHETIST, CERTIFIED REGISTERED

## 2023-06-06 PROCEDURE — 0WUF0JZ SUPPLEMENT ABDOMINAL WALL WITH SYNTHETIC SUBSTITUTE, OPEN APPROACH: ICD-10-PCS | Performed by: SURGERY

## 2023-06-06 PROCEDURE — 6360000002 HC RX W HCPCS: Performed by: SURGERY

## 2023-06-06 PROCEDURE — 0DBE0ZZ EXCISION OF LARGE INTESTINE, OPEN APPROACH: ICD-10-PCS | Performed by: SURGERY

## 2023-06-06 PROCEDURE — 6360000004 HC RX CONTRAST MEDICATION: Performed by: NURSE PRACTITIONER

## 2023-06-06 PROCEDURE — 87641 MR-STAPH DNA AMP PROBE: CPT

## 2023-06-06 PROCEDURE — 36415 COLL VENOUS BLD VENIPUNCTURE: CPT

## 2023-06-06 DEVICE — DEVICE FIX L37CM PEEK SMOOTH CANN 30 ABSRB FAST FOR LAP: Type: IMPLANTABLE DEVICE | Site: ABDOMEN | Status: FUNCTIONAL

## 2023-06-06 DEVICE — PATCH HERN L W5.4XL7IN UNCOATED MFIL PROPYLENE OVL ABSRB: Type: IMPLANTABLE DEVICE | Site: ABDOMEN | Status: FUNCTIONAL

## 2023-06-06 RX ORDER — ATORVASTATIN CALCIUM 40 MG/1
40 TABLET, FILM COATED ORAL NIGHTLY
Status: DISCONTINUED | OUTPATIENT
Start: 2023-06-06 | End: 2023-06-10 | Stop reason: HOSPADM

## 2023-06-06 RX ORDER — BUPIVACAINE HYDROCHLORIDE AND EPINEPHRINE 2.5; 5 MG/ML; UG/ML
INJECTION, SOLUTION EPIDURAL; INFILTRATION; INTRACAUDAL; PERINEURAL PRN
Status: DISCONTINUED | OUTPATIENT
Start: 2023-06-06 | End: 2023-06-06 | Stop reason: ALTCHOICE

## 2023-06-06 RX ORDER — CEFOXITIN 1 G/1
INJECTION, POWDER, FOR SOLUTION INTRAVENOUS PRN
Status: DISCONTINUED | OUTPATIENT
Start: 2023-06-06 | End: 2023-06-06 | Stop reason: SDUPTHER

## 2023-06-06 RX ORDER — 0.9 % SODIUM CHLORIDE 0.9 %
500 INTRAVENOUS SOLUTION INTRAVENOUS ONCE
Status: COMPLETED | OUTPATIENT
Start: 2023-06-06 | End: 2023-06-06

## 2023-06-06 RX ORDER — SODIUM CHLORIDE 9 MG/ML
INJECTION, SOLUTION INTRAVENOUS PRN
Status: DISCONTINUED | OUTPATIENT
Start: 2023-06-06 | End: 2023-06-10 | Stop reason: HOSPADM

## 2023-06-06 RX ORDER — HYDROCODONE BITARTRATE AND ACETAMINOPHEN 5; 325 MG/1; MG/1
1 TABLET ORAL EVERY 4 HOURS PRN
Status: DISCONTINUED | OUTPATIENT
Start: 2023-06-06 | End: 2023-06-10 | Stop reason: HOSPADM

## 2023-06-06 RX ORDER — HYDROCODONE BITARTRATE AND ACETAMINOPHEN 5; 325 MG/1; MG/1
2 TABLET ORAL EVERY 4 HOURS PRN
Status: DISCONTINUED | OUTPATIENT
Start: 2023-06-06 | End: 2023-06-10 | Stop reason: HOSPADM

## 2023-06-06 RX ORDER — FENTANYL CITRATE 50 UG/ML
INJECTION, SOLUTION INTRAMUSCULAR; INTRAVENOUS PRN
Status: DISCONTINUED | OUTPATIENT
Start: 2023-06-06 | End: 2023-06-06 | Stop reason: SDUPTHER

## 2023-06-06 RX ORDER — ONDANSETRON 2 MG/ML
INJECTION INTRAMUSCULAR; INTRAVENOUS PRN
Status: DISCONTINUED | OUTPATIENT
Start: 2023-06-06 | End: 2023-06-06 | Stop reason: SDUPTHER

## 2023-06-06 RX ORDER — ROCURONIUM BROMIDE 10 MG/ML
INJECTION, SOLUTION INTRAVENOUS PRN
Status: DISCONTINUED | OUTPATIENT
Start: 2023-06-06 | End: 2023-06-06 | Stop reason: SDUPTHER

## 2023-06-06 RX ORDER — EPHEDRINE SULFATE/0.9% NACL/PF 50 MG/5 ML
SYRINGE (ML) INTRAVENOUS PRN
Status: DISCONTINUED | OUTPATIENT
Start: 2023-06-06 | End: 2023-06-06 | Stop reason: SDUPTHER

## 2023-06-06 RX ORDER — GABAPENTIN 300 MG/1
CAPSULE ORAL
Status: ON HOLD | COMMUNITY
Start: 2023-06-01 | End: 2023-06-07

## 2023-06-06 RX ORDER — ONDANSETRON 2 MG/ML
4 INJECTION INTRAMUSCULAR; INTRAVENOUS EVERY 6 HOURS PRN
Status: DISCONTINUED | OUTPATIENT
Start: 2023-06-06 | End: 2023-06-10 | Stop reason: HOSPADM

## 2023-06-06 RX ORDER — FENTANYL CITRATE 50 UG/ML
25 INJECTION, SOLUTION INTRAMUSCULAR; INTRAVENOUS EVERY 5 MIN PRN
Status: COMPLETED | OUTPATIENT
Start: 2023-06-06 | End: 2023-06-06

## 2023-06-06 RX ORDER — DEXAMETHASONE SODIUM PHOSPHATE 10 MG/ML
INJECTION, EMULSION INTRAMUSCULAR; INTRAVENOUS PRN
Status: DISCONTINUED | OUTPATIENT
Start: 2023-06-06 | End: 2023-06-06 | Stop reason: SDUPTHER

## 2023-06-06 RX ORDER — PROPOFOL 10 MG/ML
INJECTION, EMULSION INTRAVENOUS PRN
Status: DISCONTINUED | OUTPATIENT
Start: 2023-06-06 | End: 2023-06-06 | Stop reason: SDUPTHER

## 2023-06-06 RX ORDER — FUROSEMIDE 40 MG/1
40 TABLET ORAL EVERY MORNING
Status: DISCONTINUED | OUTPATIENT
Start: 2023-06-07 | End: 2023-06-10 | Stop reason: HOSPADM

## 2023-06-06 RX ORDER — SODIUM CHLORIDE 9 MG/ML
INJECTION, SOLUTION INTRAVENOUS CONTINUOUS
Status: DISCONTINUED | OUTPATIENT
Start: 2023-06-06 | End: 2023-06-06

## 2023-06-06 RX ORDER — SODIUM CHLORIDE 0.9 % (FLUSH) 0.9 %
5-40 SYRINGE (ML) INJECTION PRN
Status: DISCONTINUED | OUTPATIENT
Start: 2023-06-06 | End: 2023-06-10 | Stop reason: HOSPADM

## 2023-06-06 RX ORDER — SODIUM CHLORIDE 0.9 % (FLUSH) 0.9 %
5-40 SYRINGE (ML) INJECTION EVERY 12 HOURS SCHEDULED
Status: DISCONTINUED | OUTPATIENT
Start: 2023-06-06 | End: 2023-06-06

## 2023-06-06 RX ORDER — SODIUM CHLORIDE 0.9 % (FLUSH) 0.9 %
5-40 SYRINGE (ML) INJECTION EVERY 12 HOURS SCHEDULED
Status: DISCONTINUED | OUTPATIENT
Start: 2023-06-06 | End: 2023-06-10 | Stop reason: HOSPADM

## 2023-06-06 RX ORDER — LIDOCAINE HCL/PF 100 MG/5ML
SYRINGE (ML) INJECTION PRN
Status: DISCONTINUED | OUTPATIENT
Start: 2023-06-06 | End: 2023-06-06 | Stop reason: SDUPTHER

## 2023-06-06 RX ORDER — VALACYCLOVIR HYDROCHLORIDE 1 G/1
TABLET, FILM COATED ORAL
COMMUNITY
Start: 2023-05-24 | End: 2023-07-25

## 2023-06-06 RX ORDER — FUROSEMIDE 20 MG/1
20 TABLET ORAL EVERY EVENING
Status: DISCONTINUED | OUTPATIENT
Start: 2023-06-06 | End: 2023-06-10 | Stop reason: HOSPADM

## 2023-06-06 RX ORDER — AMLODIPINE BESYLATE 5 MG/1
5 TABLET ORAL DAILY
Status: DISCONTINUED | OUTPATIENT
Start: 2023-06-07 | End: 2023-06-10 | Stop reason: HOSPADM

## 2023-06-06 RX ORDER — METOLAZONE 2.5 MG/1
2.5 TABLET ORAL DAILY
Status: DISCONTINUED | OUTPATIENT
Start: 2023-06-07 | End: 2023-06-07

## 2023-06-06 RX ORDER — SODIUM CHLORIDE 9 MG/ML
50 INJECTION, SOLUTION INTRAVENOUS ONCE
Status: COMPLETED | OUTPATIENT
Start: 2023-06-06 | End: 2023-06-06

## 2023-06-06 RX ORDER — ONDANSETRON 4 MG/1
4 TABLET, ORALLY DISINTEGRATING ORAL EVERY 8 HOURS PRN
Status: DISCONTINUED | OUTPATIENT
Start: 2023-06-06 | End: 2023-06-10 | Stop reason: HOSPADM

## 2023-06-06 RX ORDER — SODIUM CHLORIDE 9 MG/ML
INJECTION, SOLUTION INTRAVENOUS CONTINUOUS
Status: DISCONTINUED | OUTPATIENT
Start: 2023-06-06 | End: 2023-06-09

## 2023-06-06 RX ORDER — SODIUM CHLORIDE 0.9 % (FLUSH) 0.9 %
5-40 SYRINGE (ML) INJECTION PRN
Status: DISCONTINUED | OUTPATIENT
Start: 2023-06-06 | End: 2023-06-06

## 2023-06-06 RX ADMIN — IOPAMIDOL 80 ML: 755 INJECTION, SOLUTION INTRAVENOUS at 10:47

## 2023-06-06 RX ADMIN — FENTANYL CITRATE 25 MCG: 50 INJECTION, SOLUTION INTRAMUSCULAR; INTRAVENOUS at 18:15

## 2023-06-06 RX ADMIN — SUGAMMADEX 200 MG: 100 INJECTION, SOLUTION INTRAVENOUS at 17:26

## 2023-06-06 RX ADMIN — PROPOFOL 100 MG: 10 INJECTION, EMULSION INTRAVENOUS at 16:41

## 2023-06-06 RX ADMIN — SODIUM CHLORIDE: 9 INJECTION, SOLUTION INTRAVENOUS at 23:16

## 2023-06-06 RX ADMIN — FENTANYL CITRATE 25 MCG: 50 INJECTION, SOLUTION INTRAMUSCULAR; INTRAVENOUS at 18:05

## 2023-06-06 RX ADMIN — DEXAMETHASONE SODIUM PHOSPHATE 10 MG: 10 INJECTION, EMULSION INTRAMUSCULAR; INTRAVENOUS at 16:47

## 2023-06-06 RX ADMIN — CEFOXITIN SODIUM 2000 MG: 2 POWDER, FOR SOLUTION INTRAVENOUS at 16:43

## 2023-06-06 RX ADMIN — FENTANYL CITRATE 25 MCG: 50 INJECTION, SOLUTION INTRAMUSCULAR; INTRAVENOUS at 18:00

## 2023-06-06 RX ADMIN — Medication 80 MG: at 16:41

## 2023-06-06 RX ADMIN — FENTANYL CITRATE 50 MCG: 50 INJECTION, SOLUTION INTRAMUSCULAR; INTRAVENOUS at 16:46

## 2023-06-06 RX ADMIN — FENTANYL CITRATE 25 MCG: 50 INJECTION, SOLUTION INTRAMUSCULAR; INTRAVENOUS at 18:10

## 2023-06-06 RX ADMIN — ONDANSETRON 4 MG: 2 INJECTION INTRAMUSCULAR; INTRAVENOUS at 17:26

## 2023-06-06 RX ADMIN — SODIUM CHLORIDE 500 ML: 9 INJECTION, SOLUTION INTRAVENOUS at 10:07

## 2023-06-06 RX ADMIN — HYDROMORPHONE HYDROCHLORIDE 0.5 MG: 1 INJECTION, SOLUTION INTRAMUSCULAR; INTRAVENOUS; SUBCUTANEOUS at 17:46

## 2023-06-06 RX ADMIN — SODIUM CHLORIDE, PRESERVATIVE FREE 10 ML: 5 INJECTION INTRAVENOUS at 23:14

## 2023-06-06 RX ADMIN — SODIUM CHLORIDE: 9 INJECTION, SOLUTION INTRAVENOUS at 16:30

## 2023-06-06 RX ADMIN — Medication 25 MG: at 17:06

## 2023-06-06 RX ADMIN — ROCURONIUM BROMIDE 40 MG: 10 INJECTION INTRAVENOUS at 16:41

## 2023-06-06 RX ADMIN — Medication 3000 UNITS: at 14:31

## 2023-06-06 RX ADMIN — HYDROCODONE BITARTRATE AND ACETAMINOPHEN 2 TABLET: 5; 325 TABLET ORAL at 23:17

## 2023-06-06 RX ADMIN — HYDROMORPHONE HYDROCHLORIDE 0.5 MG: 1 INJECTION, SOLUTION INTRAMUSCULAR; INTRAVENOUS; SUBCUTANEOUS at 17:51

## 2023-06-06 ASSESSMENT — PAIN - FUNCTIONAL ASSESSMENT
PAIN_FUNCTIONAL_ASSESSMENT: 0-10
PAIN_FUNCTIONAL_ASSESSMENT: PREVENTS OR INTERFERES SOME ACTIVE ACTIVITIES AND ADLS
PAIN_FUNCTIONAL_ASSESSMENT: PREVENTS OR INTERFERES SOME ACTIVE ACTIVITIES AND ADLS
PAIN_FUNCTIONAL_ASSESSMENT: 0-10
PAIN_FUNCTIONAL_ASSESSMENT: ACTIVITIES ARE NOT PREVENTED
PAIN_FUNCTIONAL_ASSESSMENT: 0-10

## 2023-06-06 ASSESSMENT — PAIN DESCRIPTION - FREQUENCY
FREQUENCY: INTERMITTENT
FREQUENCY: INTERMITTENT
FREQUENCY: CONTINUOUS

## 2023-06-06 ASSESSMENT — PAIN DESCRIPTION - ONSET
ONSET: GRADUAL
ONSET: GRADUAL
ONSET: ON-GOING

## 2023-06-06 ASSESSMENT — PAIN SCALES - GENERAL
PAINLEVEL_OUTOF10: 4
PAINLEVEL_OUTOF10: 7
PAINLEVEL_OUTOF10: 6
PAINLEVEL_OUTOF10: 9
PAINLEVEL_OUTOF10: 8
PAINLEVEL_OUTOF10: 4
PAINLEVEL_OUTOF10: 8
PAINLEVEL_OUTOF10: 6

## 2023-06-06 ASSESSMENT — PAIN DESCRIPTION - LOCATION
LOCATION: ABDOMEN

## 2023-06-06 ASSESSMENT — PAIN DESCRIPTION - PAIN TYPE
TYPE: SURGICAL PAIN
TYPE: ACUTE PAIN;SURGICAL PAIN
TYPE: ACUTE PAIN;SURGICAL PAIN

## 2023-06-06 ASSESSMENT — PAIN DESCRIPTION - ORIENTATION
ORIENTATION: MID;LOWER
ORIENTATION: MID
ORIENTATION: MID;LOWER

## 2023-06-06 ASSESSMENT — ENCOUNTER SYMPTOMS: SHORTNESS OF BREATH: 1

## 2023-06-06 ASSESSMENT — PAIN DESCRIPTION - DESCRIPTORS
DESCRIPTORS: SHARP;DISCOMFORT
DESCRIPTORS: SHARP;DISCOMFORT
DESCRIPTORS: SORE

## 2023-06-06 NOTE — PROGRESS NOTES
1600 patient nose swabs completed as per policy. Oral temp of 96.3.   Urine amount 575ml pre-op in urinal

## 2023-06-06 NOTE — ANESTHESIA PRE PROCEDURE
as needed for Pain    Historical Provider, MD   Coenzyme Q10 (CO Q-10) 100 MG CAPS Take by mouth    Historical Provider, MD   Cholecalciferol (VITAMIN D3) 125 MCG (5000 UT) TABS Take by mouth    Historical Provider, MD   APPLE CIDER VINEGAR PO Take 450 mg by mouth    Historical Provider, MD   pantoprazole (PROTONIX) 40 MG tablet TAKE 1 TABLET BY MOUTH  DAILY 9/22/20   Thomas Arenas MD   PARoxetine (PAXIL) 20 MG tablet Take 1 tablet by mouth every morning    Historical Provider, MD   Magnesium 500 MG TABS Take by mouth daily     Historical Provider, MD   CINNAMON PO Take 1,000 mg by mouth daily    Historical Provider, MD   Probiotic Product (PROBIOTIC DAILY PO) Take by mouth daily    Historical Provider, MD   Ascorbic Acid (VITAMIN C) 1000 MG tablet Take 1 tablet by mouth daily    Historical Provider, MD       Current medications:    Current Facility-Administered Medications   Medication Dose Route Frequency Provider Last Rate Last Admin    0.9 % sodium chloride infusion  50 mL IntraVENous Once Hood Nieves MD         Current Outpatient Medications   Medication Sig Dispense Refill    ELIQUIS 2.5 MG TABS tablet TAKE 1 TABLET BY MOUTH  TWICE DAILY 180 tablet 3    gabapentin (NEURONTIN) 300 MG capsule       valACYclovir (VALTREX) 1 g tablet TAKE 1 TABLET BY MOUTH EVERY 8 HOURS FOR 21 DAYS      furosemide (LASIX) 40 MG tablet Take 1 tablet by mouth every morning AND 0.5 tablets every evening. 135 tablet 3    potassium chloride (KLOR-CON M) 20 MEQ extended release tablet Take 1 tablet by mouth every morning AND 0.5 tablets every evening.  135 tablet 3    metOLazone (ZAROXOLYN) 2.5 MG tablet Take 1 tablet by mouth daily 2 tablet 0    amLODIPine (NORVASC) 5 MG tablet TAKE 1 TABLET BY MOUTH DAILY 90 tablet 1    atorvastatin (LIPITOR) 40 MG tablet TAKE 1 TABLET BY MOUTH AT  NIGHT 90 tablet 1    clopidogrel (PLAVIX) 75 MG tablet TAKE 1 TABLET BY MOUTH  DAILY 90 tablet 3    metoprolol tartrate (LOPRESSOR) 25

## 2023-06-06 NOTE — ANESTHESIA POSTPROCEDURE EVALUATION
Department of Anesthesiology  Postprocedure Note    Patient: Juan Figueroa  MRN: [id number]  YOB: 1940  Date of evaluation: 6/6/2023      Procedure Summary     Date: 06/06/23 Room / Location: [facility]    Anesthesia Start: 1630 Anesthesia Stop: 6069    Procedure: EXPLORATORY LAPAROTOMY REPAIR OF multipleINCARCERATED ABDOMINAL WALL HERNIAs, removal of tour epifloica (Abdomen) Diagnosis:       Incarcerated hernia      (Incarcerated hernia [K46.0])    Surgeons: Wendy Boykin MD Responsible Provider: Tiffany Delvalle DO    Anesthesia Type: general ASA Status: 3 - Emergent          Anesthesia Type: No value filed.     Jamie Phase I: Jamie Score: 7    Jamie Phase II:        Anesthesia Post Evaluation    Patient location during evaluation: PACU  Patient participation: complete - patient participated  Level of consciousness: awake  Airway patency: patent  Nausea & Vomiting: no nausea  Complications: no  Cardiovascular status: hemodynamically stable  Respiratory status: acceptable  Hydration status: stable

## 2023-06-06 NOTE — ED NOTES
Pt. Resting in bed with even and unlabored respirations. Pt. States pain is a 4/10 at this time. Pt. Updated about plan of care and treatment. Pt ambulatory to the bathroom independently. Family at bedside. Pt. Has no further concerns, questions or needs at this time. Call light within reach.         Preet Mckenna RN  06/06/23 1200

## 2023-06-06 NOTE — PROGRESS NOTES
1815: Care taken over at this time. Pt sleeping, easily awakens to voice. States pain tolerable. 1835: Pt sleeping, no signs of distress. Awaiting room to be ready  1850: No change  1905: Family brought to bedside to see patient  1930: Pt sleeping, awaiting room  1945: No change  2005: Report given to [facility]  2020: Room ready, awaiting transport. Updated spouse and daughter   2032: Pt transported to  in stable condition.  VSS

## 2023-06-06 NOTE — OP NOTE
[address] [id number]  Operative Report    PATIENT NAME: Landy Thapa  MEDICAL RECORD NO. [id number]  SURGEON: Anson Briggs MD MD FACS  Primary Care Physician: Rj Kraus  Date: 6/6/2023, 5:32 PM     PROCEDURE PERFORMED: 1. 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 Repair of incarcerated ventral hernia with 13.8 x 17.8 cm Ventralex patch to repair a 11 cm greatest dimension defect and an old midline incision  PREOPERATIVE DIAGNOSIS:   Active Hospital Problems    Diagnosis Date Noted    Paroxysmal atrial fibrillation ([address] [I48.0]      Priority: High    Incarcerated ventral hernia [K43.6] 06/06/2023    CAD (coronary artery disease) [I25.10]     Hypertension [I10]       POSTOPERATIVE DIAGNOSIS: Same, path pending  SURGEON:  Anson Briggs MD MD FACS  ANESTHESIA:  General endotracheal anesthesia and local  ANESTHESIA:  30 OF 0.5% Marcaine and 1% xylocaine in equal parts  ESTIMATED BLOOD LOSS:  60  ml  SPECIMEN: Infarcted epiploica  COMPLICATIONS:  None; patient tolerated the procedure well. DRAINS: none  DISPOSITION: Recovery Room  CONDITION: stable      Narrative:    Patient brought operating placed spine    Antibiotics given signed consent on chart. He been given Kcentra to reverse his Xarelto which he just took at midnight. His previous midline incision after adequate prep and drape was incised dissecting down there was a midline defect there was a large amount of colon protruding out they went out and then and headed over to the right side. I was able to free up the hernia sac from subcutaneous tissue there was dark fluid inside and a darker structure was not sure what it was it appeared to be fat in nature. At this point the sac was opened and there was an infarcted epiploica on the colon which was resected with cautery.   The colon itself looked viable the defect there was actually 2 defects 1 about 4 cm below the other but this colon opening was

## 2023-06-06 NOTE — ED NOTES
Pt. Resting in bed with even and unlabored respirations. Pt. States pain is a 4/10 at this time. Pt. Updated about plan for surgery and kcentra. Family at bedside. Pt. Has no further concerns, questions or needs at this time. Call light within reach.         Abimbola Valiente RN  06/06/23 7914

## 2023-06-06 NOTE — ED NOTES
Pt. Resting in bed with even and unlabored respirations. Pt. States pain is a 4/10 at this time. Pt. Updated about plan of care and treatment. Family at bedside. Pt. Has no further concerns, questions or needs at this time.  Call light within reach.   ]     Beth Salgado RN  06/06/23 7287

## 2023-06-06 NOTE — ED NOTES
Pt arrives to the ED for lower abdominal pain that has been occurring since yesterday. Pt states he has a hernia that has been causing him pain especially when bending over. Pt has noted redness and warmth over the area. Pt states his pain is a 4/10. Pt denies any chest pain, sob, fever, cough or n/v. Pt respirations are unlabored.  GURDEEP Olivas RN  06/06/23 315 [address], RN  06/06/23 5452

## 2023-06-06 NOTE — PROGRESS NOTES
1735 Patient to PACU, alert to voice. Resp easy and unlabored on room air. 18 3L NC applied. 1745 Patient complaining of pain, rating it 9/10.    1746  Medicated for pain at this time. 1751 Medicated for pain at this time. 1800 Medicated for pain at this time. 1805 Medicated for pain at this time. 1815 Patient states pain is improving. [facility]. Hip Rehabilitation Referral    Patient Name: Domi Bates      YOB: 1952    Diagnosis: Bilateral Trochanteric Bursitis / Hip pain     Precautions: N/A    Evaluate and Treat          Stretching and soft tissue mobs:  Strengthening:  [x] IT Band     [x] Core stabilization  [x] Adductors     [x] Glute eccentric progressing to concentric  [x] Hip Flexors     [] Pelvic and trunk strengthening  [x] Gluteals     [] Abductors     [x] Iliopsoas complex    [] Flexors  [x] Rectus femoris     [x] Progressive resistive exercises  [] TFL        [] Sartorius                     Activities:       [x] Kinematic Retraining       [x] Activity modification     [x] Patient education to avoid continued irritation with ADLs  [x] Normalization of gait    Modalities:     Return to Sport:  [x] Ultrasound     [] Plyometrics  [] Iontophoresis    [] Rhythmic stabilization  [x] Moist heat     [] Core strengthening   [x] Massage     [] Sports specific program:      [x] Cryotherapy      [] Electrical stimulation     [] Paraffin  [] Whirlpool  [] TENS  [x] Per therapist discretion  [x] Home exercise program (copy to patient). Perform exercises for:  30    minutes   2- 3      times/day  [x] Supervised physical therapy  Frequency: []  1x week  [x] 2x week  [] 3x week  [] Other:   Duration: [] 2 weeks   [] 4 weeks  [x] 6 weeks  [] Other:     Additional Instructions:       Hip pain   Focus on core stabilization, glute & eccentric hip flexor strengthening progressing to concentric. Soft tissue mobs focus on hip adductors, rectus femoris, iliopsoas, TFL, & gluteals. Kinematic re-training and activity modification as indicated. Trochanteric Bursitis/Glutes Medius Tendinitis/tendinopathy:   Soft tissue mobs to Glutes,  Adductors,  ITB, primary and secondary hip flexors. Core stabilization, pelvic and trunk control exercise, Glute strengthening.   Activity modification and Pt education to avoid continued irritation with ADLs. Normalize of gait. AVOID LONG LEVER SIDE or STRAIGHT LEG RAISE.

## 2023-06-06 NOTE — ED NOTES
Pt. Resting in bed with even and unlabored respirations. Pt. States pain is a 4/10 at this time. Pt ambulatory to bathroom again. Pt. Updated about plan of care and treatment. Family at bedside. Pt. Has no further concerns, questions or needs at this time. Call light within reach.         Maryam Mead RN  06/06/23 8027

## 2023-06-07 LAB
ANION GAP SERPL CALC-SCNC: 13 MEQ/L (ref 8–16)
BASOPHILS ABSOLUTE: 0 THOU/MM3 (ref 0–0.1)
BASOPHILS NFR BLD AUTO: 0.2 %
BUN SERPL-MCNC: 23 MG/DL (ref 7–22)
CALCIUM SERPL-MCNC: 8 MG/DL (ref 8.5–10.5)
CHLORIDE SERPL-SCNC: 103 MEQ/L (ref 98–111)
CO2 SERPL-SCNC: 24 MEQ/L (ref 23–33)
CREAT SERPL-MCNC: 1.7 MG/DL (ref 0.4–1.2)
DEPRECATED RDW RBC AUTO: 61.9 FL (ref 35–45)
EOSINOPHIL NFR BLD AUTO: 0 %
EOSINOPHILS ABSOLUTE: 0 THOU/MM3 (ref 0–0.4)
ERYTHROCYTE [DISTWIDTH] IN BLOOD BY AUTOMATED COUNT: 16.3 % (ref 11.5–14.5)
GFR SERPL CREATININE-BSD FRML MDRD: 39 ML/MIN/1.73M2
GLUCOSE SERPL-MCNC: 231 MG/DL (ref 70–108)
HCT VFR BLD AUTO: 42.4 % (ref 42–52)
HGB BLD-MCNC: 13.6 GM/DL (ref 14–18)
IMM GRANULOCYTES # BLD AUTO: 0.12 THOU/MM3 (ref 0–0.07)
IMM GRANULOCYTES NFR BLD AUTO: 0.9 %
LYMPHOCYTES ABSOLUTE: 0.3 THOU/MM3 (ref 1–4.8)
LYMPHOCYTES NFR BLD AUTO: 2.4 %
MCH RBC QN AUTO: 33.3 PG (ref 26–33)
MCHC RBC AUTO-ENTMCNC: 32.1 GM/DL (ref 32.2–35.5)
MCV RBC AUTO: 103.7 FL (ref 80–94)
MONOCYTES ABSOLUTE: 0.2 THOU/MM3 (ref 0.4–1.3)
MONOCYTES NFR BLD AUTO: 1.3 %
MRSA DNA SPEC QL NAA+PROBE: NEGATIVE
NEUTROPHILS NFR BLD AUTO: 95.2 %
NRBC BLD AUTO-RTO: 0 /100 WBC
PLATELET # BLD AUTO: 126 THOU/MM3 (ref 130–400)
PMV BLD AUTO: 10.5 FL (ref 9.4–12.4)
POTASSIUM SERPL-SCNC: 4.9 MEQ/L (ref 3.5–5.2)
RBC # BLD AUTO: 4.09 MILL/MM3 (ref 4.7–6.1)
SEGMENTED NEUTROPHILS ABSOLUTE COUNT: 13.2 THOU/MM3 (ref 1.8–7.7)
SODIUM SERPL-SCNC: 140 MEQ/L (ref 135–145)
WBC # BLD AUTO: 13.9 THOU/MM3 (ref 4.8–10.8)

## 2023-06-07 PROCEDURE — 2060000000 HC ICU INTERMEDIATE R&B

## 2023-06-07 PROCEDURE — 80048 BASIC METABOLIC PNL TOTAL CA: CPT

## 2023-06-07 PROCEDURE — 6370000000 HC RX 637 (ALT 250 FOR IP): Performed by: SURGERY

## 2023-06-07 PROCEDURE — 36415 COLL VENOUS BLD VENIPUNCTURE: CPT

## 2023-06-07 PROCEDURE — 6360000002 HC RX W HCPCS: Performed by: SURGERY

## 2023-06-07 PROCEDURE — 2580000003 HC RX 258: Performed by: SURGERY

## 2023-06-07 PROCEDURE — 99024 POSTOP FOLLOW-UP VISIT: CPT | Performed by: SURGERY

## 2023-06-07 PROCEDURE — 85025 COMPLETE CBC W/AUTO DIFF WBC: CPT

## 2023-06-07 RX ORDER — CLOPIDOGREL BISULFATE 75 MG/1
75 TABLET ORAL DAILY
Status: DISCONTINUED | OUTPATIENT
Start: 2023-06-07 | End: 2023-06-10 | Stop reason: HOSPADM

## 2023-06-07 RX ADMIN — CLOPIDOGREL BISULFATE 75 MG: 75 TABLET ORAL at 11:51

## 2023-06-07 RX ADMIN — HYDROCODONE BITARTRATE AND ACETAMINOPHEN 2 TABLET: 5; 325 TABLET ORAL at 14:55

## 2023-06-07 RX ADMIN — ATORVASTATIN CALCIUM 40 MG: 40 TABLET, FILM COATED ORAL at 00:26

## 2023-06-07 RX ADMIN — HYDROCODONE BITARTRATE AND ACETAMINOPHEN 2 TABLET: 5; 325 TABLET ORAL at 06:36

## 2023-06-07 RX ADMIN — APIXABAN 2.5 MG: 2.5 TABLET, FILM COATED ORAL at 12:53

## 2023-06-07 RX ADMIN — FUROSEMIDE 20 MG: 40 TABLET ORAL at 00:26

## 2023-06-07 RX ADMIN — FUROSEMIDE 20 MG: 40 TABLET ORAL at 18:36

## 2023-06-07 RX ADMIN — SODIUM CHLORIDE, PRESERVATIVE FREE 10 ML: 5 INJECTION INTRAVENOUS at 22:08

## 2023-06-07 RX ADMIN — METOPROLOL TARTRATE 25 MG: 25 TABLET, FILM COATED ORAL at 22:07

## 2023-06-07 RX ADMIN — FUROSEMIDE 40 MG: 40 TABLET ORAL at 11:48

## 2023-06-07 RX ADMIN — METOPROLOL TARTRATE 25 MG: 25 TABLET, FILM COATED ORAL at 11:48

## 2023-06-07 RX ADMIN — SODIUM CHLORIDE: 9 INJECTION, SOLUTION INTRAVENOUS at 03:55

## 2023-06-07 RX ADMIN — HYDROCODONE BITARTRATE AND ACETAMINOPHEN 2 TABLET: 5; 325 TABLET ORAL at 10:42

## 2023-06-07 RX ADMIN — METOPROLOL TARTRATE 25 MG: 25 TABLET, FILM COATED ORAL at 00:25

## 2023-06-07 RX ADMIN — APIXABAN 2.5 MG: 2.5 TABLET, FILM COATED ORAL at 22:07

## 2023-06-07 RX ADMIN — AMLODIPINE BESYLATE 5 MG: 5 TABLET ORAL at 11:49

## 2023-06-07 RX ADMIN — HYDROMORPHONE HYDROCHLORIDE 0.5 MG: 1 INJECTION, SOLUTION INTRAMUSCULAR; INTRAVENOUS; SUBCUTANEOUS at 08:59

## 2023-06-07 ASSESSMENT — PAIN SCALES - GENERAL
PAINLEVEL_OUTOF10: 5
PAINLEVEL_OUTOF10: 6
PAINLEVEL_OUTOF10: 6
PAINLEVEL_OUTOF10: 4
PAINLEVEL_OUTOF10: 5
PAINLEVEL_OUTOF10: 8
PAINLEVEL_OUTOF10: 5
PAINLEVEL_OUTOF10: 3
PAINLEVEL_OUTOF10: 7
PAINLEVEL_OUTOF10: 8
PAINLEVEL_OUTOF10: 6
PAINLEVEL_OUTOF10: 7
PAINLEVEL_OUTOF10: 6
PAINLEVEL_OUTOF10: 3
PAINLEVEL_OUTOF10: 6
PAINLEVEL_OUTOF10: 2

## 2023-06-07 ASSESSMENT — PAIN - FUNCTIONAL ASSESSMENT
PAIN_FUNCTIONAL_ASSESSMENT: PREVENTS OR INTERFERES SOME ACTIVE ACTIVITIES AND ADLS

## 2023-06-07 ASSESSMENT — PAIN DESCRIPTION - PAIN TYPE
TYPE: ACUTE PAIN;SURGICAL PAIN
TYPE: SURGICAL PAIN
TYPE: ACUTE PAIN;SURGICAL PAIN
TYPE: ACUTE PAIN;SURGICAL PAIN
TYPE: SURGICAL PAIN
TYPE: ACUTE PAIN;SURGICAL PAIN
TYPE: ACUTE PAIN;SURGICAL PAIN
TYPE: SURGICAL PAIN

## 2023-06-07 ASSESSMENT — PAIN DESCRIPTION - DESCRIPTORS
DESCRIPTORS: ACHING
DESCRIPTORS: SHARP
DESCRIPTORS: DISCOMFORT;SORE
DESCRIPTORS: SHARP;DISCOMFORT
DESCRIPTORS: ACHING;SORE;TENDER
DESCRIPTORS: SHARP
DESCRIPTORS: SHARP
DESCRIPTORS: ACHING
DESCRIPTORS: SORE;DISCOMFORT
DESCRIPTORS: SHARP
DESCRIPTORS: ACHING;SORE;TENDER
DESCRIPTORS: ACHING

## 2023-06-07 ASSESSMENT — PAIN DESCRIPTION - LOCATION
LOCATION: ABDOMEN

## 2023-06-07 ASSESSMENT — PAIN DESCRIPTION - ONSET
ONSET: GRADUAL
ONSET: ON-GOING
ONSET: ON-GOING
ONSET: GRADUAL
ONSET: GRADUAL
ONSET: ON-GOING
ONSET: GRADUAL
ONSET: GRADUAL
ONSET: ON-GOING
ONSET: ON-GOING
ONSET: GRADUAL

## 2023-06-07 ASSESSMENT — PAIN DESCRIPTION - ORIENTATION
ORIENTATION: MID;LOWER
ORIENTATION: MID;LOWER
ORIENTATION: LOWER;MID
ORIENTATION: MID;LOWER
ORIENTATION: LOWER;MID
ORIENTATION: MID;LOWER

## 2023-06-07 ASSESSMENT — PAIN DESCRIPTION - FREQUENCY
FREQUENCY: INTERMITTENT

## 2023-06-07 NOTE — PLAN OF CARE
Problem: Discharge Planning  Goal: Discharge to home or other facility with appropriate resources  Outcome: Progressing  Flowsheets (Taken 6/6/2023 2043)  Discharge to home or other facility with appropriate resources:   Identify discharge learning needs (meds, wound care, etc)   Identify barriers to discharge with patient and caregiver   Refer to discharge planning if patient needs post-hospital services based on physician order or complex needs related to functional status, cognitive ability or social support system     Problem: Pain  Goal: Verbalizes/displays adequate comfort level or baseline comfort level  Outcome: Progressing  Flowsheets (Taken 6/7/2023 0157)  Verbalizes/displays adequate comfort level or baseline comfort level:   Encourage patient to monitor pain and request assistance   Assess pain using appropriate pain scale   Administer analgesics based on type and severity of pain and evaluate response   Implement non-pharmacological measures as appropriate and evaluate response   Consider cultural and social influences on pain and pain management   Notify Licensed Independent Practitioner if interventions unsuccessful or patient reports new pain     Problem: Skin/Tissue Integrity  Goal: Absence of new skin breakdown  Description: 1. Monitor for areas of redness and/or skin breakdown  2. Assess vascular access sites hourly  3. Every 4-6 hours minimum:  Change oxygen saturation probe site  4. Every 4-6 hours:  If on nasal continuous positive airway pressure, respiratory therapy assess nares and determine need for appliance change or resting period. Outcome: Progressing  Note: Assess skin integrity and implement interventions to prevent further breakdown.       Problem: Respiratory - Adult  Goal: Achieves optimal ventilation and oxygenation  Outcome: Progressing  Flowsheets (Taken 6/7/2023 0157)  Achieves optimal ventilation and oxygenation:   Assess for changes in respiratory status   Assess for changes in mentation and behavior   Position to facilitate oxygenation and minimize respiratory effort   Oxygen supplementation based on oxygen saturation or arterial blood gases   Assess and instruct to report shortness of breath or any respiratory difficulty   Respiratory therapy support as indicated     Problem: Cardiovascular - Adult  Goal: Maintains optimal cardiac output and hemodynamic stability  Outcome: Progressing  Flowsheets (Taken 6/7/2023 0157)  Maintains optimal cardiac output and hemodynamic stability:   Monitor blood pressure and heart rate   Monitor urine output and notify Licensed Independent Practitioner for values outside of normal range   Assess for signs of decreased cardiac output     Problem: Cardiovascular - Adult  Goal: Absence of cardiac dysrhythmias or at baseline  Outcome: Progressing  Flowsheets (Taken 6/7/2023 0157)  Absence of cardiac dysrhythmias or at baseline:   Monitor cardiac rate and rhythm   Assess for signs of decreased cardiac output     Problem: Skin/Tissue Integrity - Adult  Goal: Skin integrity remains intact  Outcome: Progressing  Flowsheets (Taken 6/7/2023 0157)  Skin Integrity Remains Intact:   Monitor for areas of redness and/or skin breakdown   Assess vascular access sites hourly     Problem: Skin/Tissue Integrity - Adult  Goal: Incisions, wounds, or drain sites healing without S/S of infection  Outcome: Progressing  Flowsheets (Taken 6/7/2023 0157)  Incisions, Wounds, or Drain Sites Healing Without Sign and Symptoms of Infection:   ADMISSION and DAILY: Assess and document risk factors for pressure ulcer development   TWICE DAILY: Assess and document skin integrity   TWICE DAILY: Assess and document dressing/incision, wound bed, drain sites and surrounding tissue     Problem: Musculoskeletal - Adult  Goal: Return mobility to safest level of function  Outcome: Progressing  Flowsheets (Taken 6/7/2023 0157)  Return Mobility to Safest Level of Function:   Assess patient

## 2023-06-07 NOTE — CARE COORDINATION
Case Management Assessment  Initial Evaluation    Date/Time of Evaluation: 6/7/2023 11:22 AM  Assessment Completed by: Marshall Ramsey RN    If patient is discharged prior to next notation, then this note serves as note for discharge by case management. Patient Name: Brook Lugo                   YOB: 1940  Diagnosis: Incarcerated ventral hernia [K43.6]  Hernia of abdominal wall [K43.9]  Incarcerated hernia [K46.0]                   Date / Time: 6/6/2023  9:32 AM  Location: [facility]03/003     Patient Admission Status: Inpatient   Readmission Risk Low 0-14, Mod 15-19), High > 20: Readmission Risk Score: 19.3    Current PCP: Ifeanyi Quintana  PCP verified by CM? Yes    Chart Reviewed: Yes      History Provided by: Patient  Patient Orientation: Alert and Oriented    Patient Cognition: Alert    Hospitalization in the last 30 days (Readmission):  No    If yes, Readmission Assessment in CM Navigator will be completed. Advance Directives:      Code Status: Full Code   Patient's Primary Decision Maker is: Named in [address] [place] [address]    Primary Decision Maker: Giuseppe Granado - Spouse - [phone]    Secondary Decision Maker: Melissa Pearl - Child - [phone]    Discharge Planning:    Patient lives with: Spouse/Significant Other Type of Home: House  Primary Care Giver: Self  Patient Support Systems include: Spouse/Significant Other   Current Financial resources: Medicare  Current community resources: None  Current services prior to admission: Durable Medical Equipment, Oxygen Therapy            Current DME: Other (Comment) (none)            Type of Home Care services:  None    ADLS  Prior functional level: Independent in ADLs/IADLs  Current functional level: Independent in ADLs/IADLs    Family can provide assistance at DC: Yes  Would you like Case Management to discuss the discharge plan with any other family members/significant others, and if so, who?  No  Plans to Return to Present
16-Aug-2018

## 2023-06-07 NOTE — PROGRESS NOTES
Ziyad Hartman MD  [facility]  General Surgery Postoperative Progress Note    Pt Name: Orville Fritz Record Number: [id number]  Date of Birth 1940   Today's Date: 2023    CC:  Chief Complaint   Patient presents with    Abdominal Pain       ASSESSMENT   POD # 1  HD # 1  PLAN   Hemoglobin stable restart his Eliquis and Plavix  Start him on a diet  Decrease IV fluid  Neoma Stakes is doing well. He denies any nausea or vomiting, has not passed flatus or had a bowel movement. His pain is well controlled on current medications. He has been ambulating in the halls. CURRENT MEDICATIONS   Scheduled Meds:   clopidogrel  75 mg Oral Daily    apixaban  2.5 mg Oral BID    amLODIPine  5 mg Oral Daily    atorvastatin  40 mg Oral Nightly    furosemide  40 mg Oral QAM    furosemide  20 mg Oral QPM    metOLazone  2.5 mg Oral Daily    metoprolol tartrate  25 mg Oral BID    sodium chloride flush  5-40 mL IntraVENous 2 times per day     Continuous Infusions:   sodium chloride      sodium chloride      sodium chloride 125 mL/hr at 23 0355     PRN Meds:.sodium chloride, sodium chloride flush, sodium chloride, HYDROmorphone, ondansetron **OR** ondansetron, HYDROcodone 5 mg - acetaminophen **OR** HYDROcodone 5 mg - acetaminophen, HYDROmorphone  ROS:   History obtained from chart review and the patient,   OBJECTIVE   CURRENT VITALS:  height is 5' 8\" (1.727 m) and weight is 252 lb 14.4 oz (114.7 kg). His oral temperature is 97.6 °F (36.4 °C). His blood pressure is 108/61 and his pulse is 73. His respiration is 50 (abnormal) and oxygen saturation is 91%. Body mass index is 38.45 kg/m².   Temperature Range (24h):Temp: 97.6 °F (36.4 °C) Temp  Av.5 °F (36.4 °C)  Min: 97.2 °F (36.2 °C)  Max: 97.6 °F (36.4 °C)  BP Range (67C): Systolic (82LTF), OVQ:563 , Min:102 , RJF:217     Diastolic (03PUM), RZU:50, Min:55, Max:78    Pulse Range (24h): Pulse  Av.9  Min: 63  Max: 81  Respiration Range

## 2023-06-07 NOTE — PROGRESS NOTES
This Virtual RN completed admission requirements. Pt resting comfortably in bed, call light within reach. Wife and daughter at bedside. No questions or concerns voiced at this time.

## 2023-06-07 NOTE — PLAN OF CARE
Problem: Discharge Planning  Goal: Discharge to home or other facility with appropriate resources  6/7/2023 1549 by Denna Kocher, RN  Outcome: Progressing  Flowsheets (Taken 6/7/2023 1549)  Discharge to home or other facility with appropriate resources:   Identify barriers to discharge with patient and caregiver   Identify discharge learning needs (meds, wound care, etc)   Refer to discharge planning if patient needs post-hospital services based on physician order or complex needs related to functional status, cognitive ability or social support system   Arrange for needed discharge resources and transportation as appropriate     Problem: Pain  Goal: Verbalizes/displays adequate comfort level or baseline comfort level  6/7/2023 1549 by Denna Kocher, RN  Outcome: Progressing  Flowsheets (Taken 6/7/2023 1549)  Verbalizes/displays adequate comfort level or baseline comfort level:   Encourage patient to monitor pain and request assistance   Administer analgesics based on type and severity of pain and evaluate response   Consider cultural and social influences on pain and pain management   Assess pain using appropriate pain scale   Implement non-pharmacological measures as appropriate and evaluate response     Problem: Skin/Tissue Integrity  Goal: Absence of new skin breakdown  Description: 1. Monitor for areas of redness and/or skin breakdown  2. Assess vascular access sites hourly  3. Every 4-6 hours minimum:  Change oxygen saturation probe site  4. Every 4-6 hours:  If on nasal continuous positive airway pressure, respiratory therapy assess nares and determine need for appliance change or resting period.   6/7/2023 1549 by Denna Kocher, RN  Outcome: Progressing  Note: Assess and monitor      Problem: Respiratory - Adult  Goal: Achieves optimal ventilation and oxygenation  6/7/2023 1549 by Denna Kocher, RN  Outcome: Progressing  Flowsheets (Taken 6/7/2023 1549)  Achieves optimal ventilation and and assess patient for signs and symptoms of electrolyte imbalances   Administer electrolyte replacement as ordered   Monitor response to electrolyte replacements, including repeat lab results as appropriate     Problem: Chronic Conditions and Co-morbidities  Goal: Patient's chronic conditions and co-morbidity symptoms are monitored and maintained or improved  Outcome: Progressing  Flowsheets (Taken 6/7/2023 5938)  Care Plan - Patient's Chronic Conditions and Co-Morbidity Symptoms are Monitored and Maintained or Improved:   Monitor and assess patient's chronic conditions and comorbid symptoms for stability, deterioration, or improvement   Collaborate with multidisciplinary team to address chronic and comorbid conditions and prevent exacerbation or deterioration   Update acute care plan with appropriate goals if chronic or comorbid symptoms are exacerbated and prevent overall improvement and discharge     Care plan reviewed with patient and family. Patient and family verbalize understanding of the plan of care and contribute to goal setting.

## 2023-06-07 NOTE — PROGRESS NOTES
Pt admitted to  [facility] via bed. Complaints: Postoperative ventral hernia repair. IV normal saline infusing into the antecubital left, condition patent and no redness at a rate of 100 mls/ hour with about 700 mls in the bag still. IV site free of s/s of infection or infiltration. Vital signs obtained. Assessment and data collection initiated. Two nurse skin assessment performed by Liliya Wells RN and Josselyn Main RN. Oriented to room. Policies and procedures for  explained. All questions answered with no further questions at this time. Fall prevention and safety brochure discussed with patient. Bed alarm on. Call light in reach.

## 2023-06-07 NOTE — PROGRESS NOTES
Pharmacy Medication History Note      List of current medications patient is taking is complete. Source of information: Patient's wife     Changes made to medication list:  Medications removed (include reason, ex. therapy complete or physician discontinued):  Removed gabapentin - pt was taking for shingles pain, no longer taking  Removed metolazone - short course therapy, no longer taking     Medications added/doses adjusted:  N/A    Other notes (ex. Recent course of antibiotics, Coumadin dosing):  Taking Valacyclovir for shingles (21 day therapy)- filled on 5/24/23  Wife reports pt still taking clopidogrel (had not been filled since 11/22)  Denies use of other OTC or herbal medications.       Allergies reviewed      Electronically signed by Trino Zuleta on 6/7/2023 at 10:38 AM

## 2023-06-08 LAB
BACTERIA SPEC AEROBE CULT: NORMAL
EKG Q-T INTERVAL: 424 MS
EKG QRS DURATION: 120 MS
EKG QTC CALCULATION (BAZETT): 448 MS
EKG R AXIS: 0 DEGREES
EKG T AXIS: -20 DEGREES
EKG VENTRICULAR RATE: 67 BPM

## 2023-06-08 PROCEDURE — 2060000000 HC ICU INTERMEDIATE R&B

## 2023-06-08 PROCEDURE — 6370000000 HC RX 637 (ALT 250 FOR IP): Performed by: SURGERY

## 2023-06-08 PROCEDURE — 93005 ELECTROCARDIOGRAM TRACING: CPT | Performed by: SURGERY

## 2023-06-08 PROCEDURE — 99024 POSTOP FOLLOW-UP VISIT: CPT | Performed by: SURGERY

## 2023-06-08 PROCEDURE — 2580000003 HC RX 258: Performed by: SURGERY

## 2023-06-08 PROCEDURE — 93010 ELECTROCARDIOGRAM REPORT: CPT | Performed by: INTERNAL MEDICINE

## 2023-06-08 RX ORDER — BISACODYL 5 MG/1
5 TABLET, DELAYED RELEASE ORAL DAILY PRN
Status: DISCONTINUED | OUTPATIENT
Start: 2023-06-08 | End: 2023-06-10 | Stop reason: HOSPADM

## 2023-06-08 RX ORDER — POLYETHYLENE GLYCOL 3350 17 G/17G
17 POWDER, FOR SOLUTION ORAL DAILY PRN
Status: DISCONTINUED | OUTPATIENT
Start: 2023-06-08 | End: 2023-06-10 | Stop reason: HOSPADM

## 2023-06-08 RX ORDER — HYDROCODONE BITARTRATE AND ACETAMINOPHEN 5; 325 MG/1; MG/1
1 TABLET ORAL EVERY 6 HOURS PRN
Qty: 28 TABLET | Refills: 0 | Status: SHIPPED | OUTPATIENT
Start: 2023-06-08 | End: 2023-06-15

## 2023-06-08 RX ADMIN — NALOXEGOL OXALATE 12.5 MG: 12.5 TABLET, FILM COATED ORAL at 09:47

## 2023-06-08 RX ADMIN — CLOPIDOGREL BISULFATE 75 MG: 75 TABLET ORAL at 08:34

## 2023-06-08 RX ADMIN — HYDROCODONE BITARTRATE AND ACETAMINOPHEN 2 TABLET: 5; 325 TABLET ORAL at 15:30

## 2023-06-08 RX ADMIN — AMLODIPINE BESYLATE 5 MG: 5 TABLET ORAL at 08:34

## 2023-06-08 RX ADMIN — HYDROCODONE BITARTRATE AND ACETAMINOPHEN 2 TABLET: 5; 325 TABLET ORAL at 08:34

## 2023-06-08 RX ADMIN — POLYETHYLENE GLYCOL (3350) 17 G: 17 POWDER, FOR SOLUTION ORAL at 16:43

## 2023-06-08 RX ADMIN — FUROSEMIDE 40 MG: 40 TABLET ORAL at 08:34

## 2023-06-08 RX ADMIN — ATORVASTATIN CALCIUM 40 MG: 40 TABLET, FILM COATED ORAL at 01:24

## 2023-06-08 RX ADMIN — APIXABAN 2.5 MG: 2.5 TABLET, FILM COATED ORAL at 08:34

## 2023-06-08 RX ADMIN — SODIUM CHLORIDE, PRESERVATIVE FREE 10 ML: 5 INJECTION INTRAVENOUS at 20:36

## 2023-06-08 RX ADMIN — ATORVASTATIN CALCIUM 40 MG: 40 TABLET, FILM COATED ORAL at 20:36

## 2023-06-08 RX ADMIN — FUROSEMIDE 20 MG: 40 TABLET ORAL at 18:46

## 2023-06-08 RX ADMIN — SODIUM CHLORIDE, PRESERVATIVE FREE 10 ML: 5 INJECTION INTRAVENOUS at 08:34

## 2023-06-08 RX ADMIN — HYDROCODONE BITARTRATE AND ACETAMINOPHEN 1 TABLET: 5; 325 TABLET ORAL at 23:25

## 2023-06-08 RX ADMIN — METOPROLOL TARTRATE 25 MG: 25 TABLET, FILM COATED ORAL at 08:34

## 2023-06-08 RX ADMIN — APIXABAN 2.5 MG: 2.5 TABLET, FILM COATED ORAL at 20:36

## 2023-06-08 ASSESSMENT — PAIN SCALES - GENERAL
PAINLEVEL_OUTOF10: 3
PAINLEVEL_OUTOF10: 4
PAINLEVEL_OUTOF10: 4
PAINLEVEL_OUTOF10: 7
PAINLEVEL_OUTOF10: 7
PAINLEVEL_OUTOF10: 6
PAINLEVEL_OUTOF10: 7

## 2023-06-08 ASSESSMENT — PAIN DESCRIPTION - ONSET
ONSET: GRADUAL

## 2023-06-08 ASSESSMENT — PAIN DESCRIPTION - PAIN TYPE
TYPE: SURGICAL PAIN

## 2023-06-08 ASSESSMENT — PAIN DESCRIPTION - FREQUENCY
FREQUENCY: INTERMITTENT

## 2023-06-08 ASSESSMENT — PAIN DESCRIPTION - ORIENTATION
ORIENTATION: LOWER;MID
ORIENTATION: MID;LOWER

## 2023-06-08 ASSESSMENT — PAIN DESCRIPTION - DESCRIPTORS
DESCRIPTORS: SHARP
DESCRIPTORS: SHARP
DESCRIPTORS: DISCOMFORT
DESCRIPTORS: SORE

## 2023-06-08 ASSESSMENT — PAIN - FUNCTIONAL ASSESSMENT
PAIN_FUNCTIONAL_ASSESSMENT: PREVENTS OR INTERFERES SOME ACTIVE ACTIVITIES AND ADLS

## 2023-06-08 ASSESSMENT — PAIN DESCRIPTION - LOCATION
LOCATION: ABDOMEN

## 2023-06-08 NOTE — PLAN OF CARE
surrounding tissue     Problem: Musculoskeletal - Adult  Goal: Return mobility to safest level of function  6/8/2023 0136 by Mell Krishna RN  Outcome: Progressing  Flowsheets (Taken 6/7/2023 2150)  Return Mobility to Safest Level of Function:   Assess patient stability and activity tolerance for standing, transferring and ambulating with or without assistive devices   Ensure adequate protection for wounds/incisions during mobilization   Assist with transfers and ambulation using safe patient handling equipment as needed   Instruct patient/family in ordered activity level     Problem: Musculoskeletal - Adult  Goal: Return ADL status to a safe level of function  6/8/2023 0136 by Mell Krishna RN  Outcome: Progressing  Flowsheets (Taken 6/7/2023 2150)  Return ADL Status to a Safe Level of Function:   Administer medication as ordered   Assess activities of daily living deficits and provide assistive devices as needed   Assist and instruct patient to increase activity and self care as tolerated     Problem: Infection - Adult  Goal: Absence of infection at discharge  Outcome: Progressing  Flowsheets (Taken 6/7/2023 2150)  Absence of infection at discharge:   Assess and monitor for signs and symptoms of infection   Monitor lab/diagnostic results   Monitor all insertion sites i.e., indwelling lines, tubes and drains   Instruct and encourage patient and family to use good hand hygiene technique   Administer medications as ordered     Problem: Infection - Adult  Goal: Absence of infection during hospitalization  6/8/2023 0136 by Mell Krishna RN  Outcome: Progressing  Flowsheets (Taken 6/7/2023 2150)  Absence of infection during hospitalization:   Monitor all insertion sites i.e., indwelling lines, tubes and drains   Monitor lab/diagnostic results   Assess and monitor for signs and symptoms of infection   Administer medications as ordered   Instruct and encourage patient and family to use good hand hygiene 7408)  Maintains or returns to baseline bowel function:   Assess bowel function   Encourage oral fluids to ensure adequate hydration   Nutrition consult to assist patient with appropriate food choices   Encourage mobilization and activity   Administer ordered medications as needed    Care plan reviewed with patient. Patient verbalize understanding of the plan of care and contribute to goal setting.

## 2023-06-08 NOTE — DISCHARGE INSTRUCTIONS
[address] [facility]    Pt Name: [address] [place] [address] Record Number: [id number]  Today's Date: 6/8/2023           GENERAL ANESTHESIA OR SEDATION:    [x]  Do not drive or operate hazardous machinery for 24 hours. [x] Do not make important business or personal decisions for 24 hours. [x] Do not drink alcoholic beverages or use tobacco for 24 hours. ACTIVITY INSTRUCTIONS:    [] Rest today. Resume light to normal activity tomorrow. [x] You may resume normal activity tomorrow. Do not engage in strenuous activity that may place stress on your incision. [x] Do not drive  UNTIL NO LONGER TAKING NARCOTICS AND DAILY ACTIVITIES  ARE NEARLY NORMAL     [x]Avoid heavy lifting, tugging, pullings greater  than  25 lbs until seen in the office. DIET INSTRUCTIONS:    []Begin with clear liquids. If not nauseated, may increase to a low-fat diet when you desire. Greasy and spicy foods are not advised. [x]Regular diet as tolerated. []Other:          MEDICATIONS  [x]Prescription sent with you to be used as directed. []Lortab   [x]Norco   []Percocet   []Tylenol #3   []NONE              []Antibiotic              []Tramadol       Do not drink alcohol or drive while taking these medications. You may experience dizziness or drowsiness with these medications. You may also experience constipation which can be relieved with stool softners or laxatives. [x]You may resume your daily prescription medication schedule unless otherwise specified. []Do not take 325mg Aspirin or other blood thinners such as Coumadin or Plavix for 5 days. WOUND/DRESSING INSTRUCTIONS:    Always ensure you and your care giver clean hands before and after caring for the wound. [] Keep dressing clean and dry for 24 hours. [] Allow steri-strips to fall off on their own.   [] Ice operative site for 20 minutes 4 times a day.      [x] May wash over incision in shower, calves, or legs. Dark urine, light stools, or evidence of jaundice (yellowing of the skin or eyes). In case of an emergency, CALL 9-1-1 immediately       [facility]   []1 week   [x]6/20/2023   []NONE    Call my office if you have any problem that concerns you [phone]. After hours, you can reach the answering service via the office phone number. IF YOU NEED IMMEDIATE ATTENTION, GO TO THE EMERGENCY ROOM AND YOUR DOCTOR WILL BE CONTACTED. Maria C Castellanos MD M.D. [facility]  [address]  [address]  [place], [address]  Electronically signed 6/8/2023 at 7:23 AM

## 2023-06-09 PROBLEM — K43.9 HERNIA OF ABDOMINAL WALL: Status: ACTIVE | Noted: 2023-06-09

## 2023-06-09 PROCEDURE — 6370000000 HC RX 637 (ALT 250 FOR IP): Performed by: SURGERY

## 2023-06-09 PROCEDURE — 6370000000 HC RX 637 (ALT 250 FOR IP): Performed by: NURSE PRACTITIONER

## 2023-06-09 PROCEDURE — 99223 1ST HOSP IP/OBS HIGH 75: CPT | Performed by: INTERNAL MEDICINE

## 2023-06-09 PROCEDURE — 2060000000 HC ICU INTERMEDIATE R&B

## 2023-06-09 PROCEDURE — 2580000003 HC RX 258: Performed by: SURGERY

## 2023-06-09 PROCEDURE — APPSS30 APP SPLIT SHARED TIME 16-30 MINUTES: Performed by: NURSE PRACTITIONER

## 2023-06-09 RX ORDER — BISACODYL 10 MG
10 SUPPOSITORY, RECTAL RECTAL DAILY PRN
Status: DISCONTINUED | OUTPATIENT
Start: 2023-06-09 | End: 2023-06-10 | Stop reason: HOSPADM

## 2023-06-09 RX ORDER — PAROXETINE HYDROCHLORIDE 20 MG/1
20 TABLET, FILM COATED ORAL EVERY MORNING
Status: DISCONTINUED | OUTPATIENT
Start: 2023-06-09 | End: 2023-06-10 | Stop reason: HOSPADM

## 2023-06-09 RX ORDER — HYDROXYZINE HYDROCHLORIDE 10 MG/1
10 TABLET, FILM COATED ORAL 3 TIMES DAILY PRN
Status: DISCONTINUED | OUTPATIENT
Start: 2023-06-09 | End: 2023-06-10 | Stop reason: HOSPADM

## 2023-06-09 RX ADMIN — NALOXEGOL OXALATE 12.5 MG: 12.5 TABLET, FILM COATED ORAL at 05:40

## 2023-06-09 RX ADMIN — BISACODYL 5 MG: 5 TABLET, COATED ORAL at 01:31

## 2023-06-09 RX ADMIN — FUROSEMIDE 20 MG: 40 TABLET ORAL at 18:02

## 2023-06-09 RX ADMIN — HYDROCODONE BITARTRATE AND ACETAMINOPHEN 1 TABLET: 5; 325 TABLET ORAL at 23:14

## 2023-06-09 RX ADMIN — METOPROLOL TARTRATE 25 MG: 25 TABLET, FILM COATED ORAL at 21:10

## 2023-06-09 RX ADMIN — HYDROXYZINE HYDROCHLORIDE 10 MG: 10 TABLET ORAL at 23:14

## 2023-06-09 RX ADMIN — SODIUM CHLORIDE, PRESERVATIVE FREE 10 ML: 5 INJECTION INTRAVENOUS at 21:07

## 2023-06-09 RX ADMIN — METOPROLOL TARTRATE 25 MG: 25 TABLET, FILM COATED ORAL at 08:14

## 2023-06-09 RX ADMIN — BISACODYL 10 MG: 10 SUPPOSITORY RECTAL at 17:59

## 2023-06-09 RX ADMIN — FUROSEMIDE 40 MG: 40 TABLET ORAL at 08:15

## 2023-06-09 RX ADMIN — AMLODIPINE BESYLATE 5 MG: 5 TABLET ORAL at 08:15

## 2023-06-09 RX ADMIN — POLYETHYLENE GLYCOL (3350) 17 G: 17 POWDER, FOR SOLUTION ORAL at 09:30

## 2023-06-09 RX ADMIN — ATORVASTATIN CALCIUM 40 MG: 40 TABLET, FILM COATED ORAL at 21:10

## 2023-06-09 RX ADMIN — APIXABAN 2.5 MG: 2.5 TABLET, FILM COATED ORAL at 21:10

## 2023-06-09 RX ADMIN — HYDROXYZINE HYDROCHLORIDE 10 MG: 10 TABLET ORAL at 13:52

## 2023-06-09 RX ADMIN — CLOPIDOGREL BISULFATE 75 MG: 75 TABLET ORAL at 08:14

## 2023-06-09 RX ADMIN — PAROXETINE HYDROCHLORIDE 20 MG: 20 TABLET, FILM COATED ORAL at 13:52

## 2023-06-09 RX ADMIN — APIXABAN 2.5 MG: 2.5 TABLET, FILM COATED ORAL at 08:15

## 2023-06-09 RX ADMIN — SODIUM CHLORIDE, PRESERVATIVE FREE 10 ML: 5 INJECTION INTRAVENOUS at 08:18

## 2023-06-09 ASSESSMENT — PAIN SCALES - GENERAL
PAINLEVEL_OUTOF10: 0
PAINLEVEL_OUTOF10: 8
PAINLEVEL_OUTOF10: 0
PAINLEVEL_OUTOF10: 8
PAINLEVEL_OUTOF10: 6

## 2023-06-09 ASSESSMENT — PAIN DESCRIPTION - DESCRIPTORS: DESCRIPTORS: ACHING

## 2023-06-09 ASSESSMENT — PAIN DESCRIPTION - LOCATION: LOCATION: ABDOMEN;INCISION

## 2023-06-09 ASSESSMENT — PAIN DESCRIPTION - PAIN TYPE: TYPE: ACUTE PAIN;SURGICAL PAIN

## 2023-06-09 NOTE — PLAN OF CARE
Problem: Discharge Planning  Goal: Discharge to home or other facility with appropriate resources  Outcome: Progressing  Flowsheets (Taken 6/8/2023 2034)  Discharge to home or other facility with appropriate resources:   Identify barriers to discharge with patient and caregiver   Identify discharge learning needs (meds, wound care, etc)   Refer to discharge planning if patient needs post-hospital services based on physician order or complex needs related to functional status, cognitive ability or social support system     Problem: Pain  Goal: Verbalizes/displays adequate comfort level or baseline comfort level  Outcome: Progressing  Flowsheets (Taken 6/9/2023 0219)  Verbalizes/displays adequate comfort level or baseline comfort level:   Encourage patient to monitor pain and request assistance   Assess pain using appropriate pain scale   Administer analgesics based on type and severity of pain and evaluate response   Implement non-pharmacological measures as appropriate and evaluate response   Consider cultural and social influences on pain and pain management   Notify Licensed Independent Practitioner if interventions unsuccessful or patient reports new pain     Problem: Skin/Tissue Integrity  Goal: Absence of new skin breakdown  Description: 1. Monitor for areas of redness and/or skin breakdown  2. Assess vascular access sites hourly  3. Every 4-6 hours minimum:  Change oxygen saturation probe site  4. Every 4-6 hours:  If on nasal continuous positive airway pressure, respiratory therapy assess nares and determine need for appliance change or resting period. Outcome: Progressing  Note: Assess skin integrity and implement interventions to prevent further breakdown.       Problem: Respiratory - Adult  Goal: Achieves optimal ventilation and oxygenation  Outcome: Progressing  Flowsheets (Taken 6/8/2023 2034)  Achieves optimal ventilation and oxygenation:   Assess for changes in respiratory status   Assess for contribute to goal setting.

## 2023-06-09 NOTE — CONSULTS
[facility] [address]    Patient's Name/Date of Birth: Juan Antonio Brock / 1940 (12 y.o.)    Date: June 9, 2023     Referring Provider: Odette Thomas MD    CHIEF COMPLAINT: AF      HPI: This is a pleasant 79 y.o. male presents with abdominal pain. 3 days ago Pt had ex lap with repair of an incarcerated ventral hernia. Cardiology was consulted for concern of EKG changes. In general Pt states he is doing well but is feeling sore. He's been in persistent AF for years, is anticoagulated. At night he sleeps with 3L of O2 b/c he can't tolerate CPAP. During the day he is able to do yard work without difficulty. He does c/o leg swelling and orthopnea. Pt denies syncope, chest pain, SOB at rest, PND. Echo: No results found for this or any previous visit.        All labs, EKG's, diagnostic testing and images as well as cardiac cath, stress testing were reviewed during this encounter    Past Medical History:   Diagnosis Date    AAA (abdominal aortic aneurysm) ([address])     CAD (coronary artery disease)     Cancer ([address])     cheek and nose     Cancer of kidney ([address])     Heart attack ([address])     History of placement of chest tube     Due to pt falling and breaking 4 ribs and puncturing his lung    Hx of blood clots     Hyperlipidemia     Hypertension     Kidney stones     Obesity     Osteoarthritis     Stroke [facility])      Past Surgical History:   Procedure Laterality Date    ABDOMEN SURGERY      AAA    ABDOMINAL AORTIC ANEURYSM REPAIR  2005    CARDIAC SURGERY  2008, [facility]  2005    AAA repair    CAROTID ENDARTERECTOMY  2006    FINGER AMPUTATION      JOINT REPLACEMENT  2007    knees bilateral    KNEE SURGERY Left 6-2013    LAPAROTOMY N/A 6/6/2023    EXPLORATORY LAPAROTOMY REPAIR OF multipleINCARCERATED ABDOMINAL WALL HERNIAs, removal of tour epifloica performed by Odette Thomas MD at [address] [facility]  2010    Right    PTCA      [address] TONSILLECTOMY  1948    VASCULAR SURGERY       Current Facility-Administered Medications   Medication Dose Route Frequency Provider Last Rate Last Admin    bisacodyl (DULCOLAX) EC tablet 5 mg  5 mg Oral Daily PRN Brittney Pat MD   5 mg at 06/09/23 0131    naloxegol (MOVANTIK) tablet 12.5 mg  12.5 mg Oral QAM AC Brittney Pat MD   12.5 mg at 06/09/23 0540    polyethylene glycol (GLYCOLAX) packet 17 g  17 g Oral Daily PRN Marisol Esparza DO   17 g at 06/08/23 1643    clopidogrel (PLAVIX) tablet 75 mg  75 mg Oral Daily Brittney Pat MD   75 mg at 06/09/23 8131    apixaban (ELIQUIS) tablet 2.5 mg  2.5 mg Oral BID Brittney Pat MD   2.5 mg at 06/09/23 0815    0.9 % sodium chloride infusion   IntraVENous PRN Brittney Pat MD        amLODIPine (NORVASC) tablet 5 mg  5 mg Oral Daily Brittney Pat MD   5 mg at 06/09/23 0815    atorvastatin (LIPITOR) tablet 40 mg  40 mg Oral Nightly Brittney Pat MD   40 mg at 06/08/23 2036    furosemide (LASIX) tablet 40 mg  40 mg Oral QAM Brittney Pat MD   40 mg at 06/09/23 0815    furosemide (LASIX) tablet 20 mg  20 mg Oral QPM Brittney Pat MD   20 mg at 06/08/23 1846    metoprolol tartrate (LOPRESSOR) tablet 25 mg  25 mg Oral BID Brittney Pat MD   25 mg at 06/09/23 3950    sodium chloride flush 0.9 % injection 5-40 mL  5-40 mL IntraVENous 2 times per day Brittney Pat MD   10 mL at 06/09/23 0818    sodium chloride flush 0.9 % injection 5-40 mL  5-40 mL IntraVENous PRN Brittney Pat MD        0.9 % sodium chloride infusion   IntraVENous PRN Brittney Pat MD        0.9 % sodium chloride infusion   IntraVENous Continuous Brittney Pat MD 50 mL/hr at 06/07/23 0701 Rate Change at 06/07/23 0701    ondansetron (ZOFRAN-ODT) disintegrating tablet 4 mg  4 mg Oral Q8H PRN Brittney Pat MD        Or    ondansetron [facility]) injection 4 mg  4 mg IntraVENous Q6H PRN Brittney Pat MD        HYDROcodone-acetaminophen

## 2023-06-09 NOTE — PROGRESS NOTES
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 PLAN   Movantik and Colace, Dulcolax PRN   follow-up in the office on June 20 for staple removal   hemoglobin stable continue  Eliquis and Plavix  Reg diet carb control, low sodium and fluid restriction noted   Dc IV fluid  Pain and nausea control   Okay to discharge when his bowels move and okay with cardiology services   Leland Garvin is doing well. He denies any nausea or vomiting. He is tolerating reg diet. He is passing flatus has not had a bowel movement. His pain is well controlled on current medications. He has been ambulating in the halls. EKG changes on monitor, cardiology consulted, denies chest pain or SOB.    CURRENT MEDICATIONS   Scheduled Meds:   naloxegol  12.5 mg Oral QAM AC    clopidogrel  75 mg Oral Daily    apixaban  2.5 mg Oral BID    amLODIPine  5 mg Oral Daily    atorvastatin  40 mg Oral Nightly    furosemide  40 mg Oral QAM    furosemide  20 mg Oral QPM    metoprolol tartrate  25 mg Oral BID    sodium chloride flush  5-40 mL IntraVENous 2 times per day     Continuous Infusions:   sodium chloride      sodium chloride      sodium chloride 50 mL/hr at 06/07/23 0701     PRN Meds:.bisacodyl, polyethylene glycol, sodium 8:55 AM    Patient seen and examined independently by me 6/9/2023     I personally supervised the PA/NP in the evaluation, management and development of the treatment plan for Radha Villagran  on the same date of service as above. I personally interviewed Radha Villagran   and  discussed his review of symptoms as able due to the patient's condition, as well as performed an individual physical exam on the same   date of service as above. In addition I discussed the patient's condition and treatment options with the patient, if able, and/or designated family if available. I have also reviewed and agree with the past medical,  family and social history updates as well as care plans unless otherwise noted below. All questions were answered. I examined independently and reviewed relevant data myself and may have done so in the context of team rounds. A full chart review was performed by me. I attest that this medical record entry accurately reflects signatures and notations that I made in my capacity as an M. D. when I treated and diagnosed Radha Villagran on the date of service above     I was responsible for all medical decision making involving this encounter. I identified and/or confirmed all problems associated with this patient encounter by my own direct physical examination of this patient and review of all radiology studies and labwork  that were ordered and available. Active Hospital Problems    Diagnosis     Hernia of abdominal wall [K43.9]      Priority: High    Paroxysmal atrial fibrillation (HCC) [I48.0]      Priority: High    Incarcerated ventral hernia [K43.6]     Other partial intestinal obstruction ([facility] [address] [K56.690]     Anticoagulated [Z79.01]     CAD (coronary artery disease) [I25.10]     Hypertension [I10]         I  discussed the management of all of the identified problems with the APN or PA.       I formulated the treatment plan for all identified problems and

## 2023-06-09 NOTE — PLAN OF CARE
Problem: Discharge Planning  Goal: Discharge to home or other facility with appropriate resources  6/9/2023 1157 by Krystle Jorge RN  Outcome: Progressing  Flowsheets (Taken 6/9/2023 1157)  Discharge to home or other facility with appropriate resources:   Identify barriers to discharge with patient and caregiver   Arrange for needed discharge resources and transportation as appropriate   Identify discharge learning needs (meds, wound care, etc)     Problem: Pain  Goal: Verbalizes/displays adequate comfort level or baseline comfort level  6/9/2023 1157 by Krystle Jorge RN  Outcome: Progressing  Flowsheets (Taken 6/9/2023 1157)  Verbalizes/displays adequate comfort level or baseline comfort level:   Encourage patient to monitor pain and request assistance   Assess pain using appropriate pain scale     Problem: Skin/Tissue Integrity  Goal: Absence of new skin breakdown  Description: 1. Monitor for areas of redness and/or skin breakdown  2. Assess vascular access sites hourly  3. Every 4-6 hours minimum:  Change oxygen saturation probe site  4. Every 4-6 hours:  If on nasal continuous positive airway pressure, respiratory therapy assess nares and determine need for appliance change or resting period. 6/9/2023 1157 by Krystle Jorge RN  Outcome: Progressing  Note: No new skin breakdown this shift. Patient is assisted with turning every two hours and as needed.         Problem: Respiratory - Adult  Goal: Achieves optimal ventilation and oxygenation  6/9/2023 1157 by Krystle Jorge RN  Outcome: Progressing  Flowsheets (Taken 6/9/2023 1157)  Achieves optimal ventilation and oxygenation:   Assess for changes in respiratory status   Position to facilitate oxygenation and minimize respiratory effort   Assess for changes in mentation and behavior   Assess the need for suctioning and aspirate as needed   Assess and instruct to report shortness of breath or any respiratory difficulty   Respiratory therapy support

## 2023-06-10 VITALS
HEIGHT: 68 IN | OXYGEN SATURATION: 96 % | WEIGHT: 248.68 LBS | BODY MASS INDEX: 37.69 KG/M2 | RESPIRATION RATE: 18 BRPM | SYSTOLIC BLOOD PRESSURE: 134 MMHG | DIASTOLIC BLOOD PRESSURE: 71 MMHG | TEMPERATURE: 98.4 F | HEART RATE: 96 BPM

## 2023-06-10 PROCEDURE — 6370000000 HC RX 637 (ALT 250 FOR IP): Performed by: SURGERY

## 2023-06-10 PROCEDURE — 2580000003 HC RX 258: Performed by: SURGERY

## 2023-06-10 PROCEDURE — 6370000000 HC RX 637 (ALT 250 FOR IP): Performed by: NURSE PRACTITIONER

## 2023-06-10 RX ADMIN — HYDROCODONE BITARTRATE AND ACETAMINOPHEN 2 TABLET: 5; 325 TABLET ORAL at 09:17

## 2023-06-10 RX ADMIN — FUROSEMIDE 40 MG: 40 TABLET ORAL at 09:16

## 2023-06-10 RX ADMIN — AMLODIPINE BESYLATE 5 MG: 5 TABLET ORAL at 09:17

## 2023-06-10 RX ADMIN — METOPROLOL TARTRATE 25 MG: 25 TABLET, FILM COATED ORAL at 09:16

## 2023-06-10 RX ADMIN — HYDROCODONE BITARTRATE AND ACETAMINOPHEN 2 TABLET: 5; 325 TABLET ORAL at 16:40

## 2023-06-10 RX ADMIN — NALOXEGOL OXALATE 12.5 MG: 12.5 TABLET, FILM COATED ORAL at 06:48

## 2023-06-10 RX ADMIN — BISACODYL 5 MG: 5 TABLET, COATED ORAL at 09:16

## 2023-06-10 RX ADMIN — POLYETHYLENE GLYCOL (3350) 17 G: 17 POWDER, FOR SOLUTION ORAL at 09:19

## 2023-06-10 RX ADMIN — APIXABAN 2.5 MG: 2.5 TABLET, FILM COATED ORAL at 09:16

## 2023-06-10 RX ADMIN — SODIUM CHLORIDE, PRESERVATIVE FREE 10 ML: 5 INJECTION INTRAVENOUS at 09:17

## 2023-06-10 RX ADMIN — PAROXETINE HYDROCHLORIDE 20 MG: 20 TABLET, FILM COATED ORAL at 09:16

## 2023-06-10 RX ADMIN — CLOPIDOGREL BISULFATE 75 MG: 75 TABLET ORAL at 09:17

## 2023-06-10 ASSESSMENT — PAIN DESCRIPTION - DESCRIPTORS: DESCRIPTORS: ACHING

## 2023-06-10 ASSESSMENT — PAIN DESCRIPTION - ORIENTATION: ORIENTATION: MID

## 2023-06-10 ASSESSMENT — PAIN DESCRIPTION - LOCATION: LOCATION: ABDOMEN

## 2023-06-10 ASSESSMENT — PAIN SCALES - GENERAL
PAINLEVEL_OUTOF10: 7
PAINLEVEL_OUTOF10: 0

## 2023-06-10 NOTE — PLAN OF CARE
Problem: Discharge Planning  Goal: Discharge to home or other facility with appropriate resources  6/10/2023 0012 by Theron Min RN  Outcome: Progressing  Flowsheets (Taken 6/10/2023 0012)  Discharge to home or other facility with appropriate resources:   Identify barriers to discharge with patient and caregiver   Arrange for needed discharge resources and transportation as appropriate   Identify discharge learning needs (meds, wound care, etc)  6/9/2023 1157 by Jerry Ruiz RN  Outcome: Progressing  Flowsheets (Taken 6/9/2023 1157)  Discharge to home or other facility with appropriate resources:   Identify barriers to discharge with patient and caregiver   Arrange for needed discharge resources and transportation as appropriate   Identify discharge learning needs (meds, wound care, etc)     Problem: Pain  Goal: Verbalizes/displays adequate comfort level or baseline comfort level  6/10/2023 0012 by Theron Min RN  Outcome: Progressing  Flowsheets (Taken 6/10/2023 0012)  Verbalizes/displays adequate comfort level or baseline comfort level:   Encourage patient to monitor pain and request assistance   Assess pain using appropriate pain scale   Administer analgesics based on type and severity of pain and evaluate response   Implement non-pharmacological measures as appropriate and evaluate response  6/9/2023 1157 by Jerry Ruiz RN  Outcome: Progressing  Flowsheets (Taken 6/9/2023 1157)  Verbalizes/displays adequate comfort level or baseline comfort level:   Encourage patient to monitor pain and request assistance   Assess pain using appropriate pain scale     Problem: Skin/Tissue Integrity  Goal: Absence of new skin breakdown  Description: 1. Monitor for areas of redness and/or skin breakdown  2. Assess vascular access sites hourly  3. Every 4-6 hours minimum:  Change oxygen saturation probe site  4.   Every 4-6 hours:  If on nasal continuous positive airway pressure, respiratory therapy assess nares and determine need for appliance change or resting period. 6/10/2023 0012 by Lyndsay Mosqueda RN  Outcome: Progressing  6/9/2023 1157 by Giulia Reyes RN  Outcome: Progressing  Note: No new skin breakdown this shift. Patient is assisted with turning every two hours and as needed.         Problem: Respiratory - Adult  Goal: Achieves optimal ventilation and oxygenation  6/10/2023 0012 by Lyndsay Mosqueda RN  Outcome: Progressing  Flowsheets (Taken 6/10/2023 0012)  Achieves optimal ventilation and oxygenation:   Assess for changes in respiratory status   Assess for changes in mentation and behavior   Position to facilitate oxygenation and minimize respiratory effort   Oxygen supplementation based on oxygen saturation or arterial blood gases   Encourage broncho-pulmonary hygiene including cough, deep breathe, incentive spirometry   Assess the need for suctioning and aspirate as needed   Assess and instruct to report shortness of breath or any respiratory difficulty   Respiratory therapy support as indicated  6/9/2023 1157 by Giulia Reyes RN  Outcome: Progressing  Flowsheets (Taken 6/9/2023 1157)  Achieves optimal ventilation and oxygenation:   Assess for changes in respiratory status   Position to facilitate oxygenation and minimize respiratory effort   Assess for changes in mentation and behavior   Assess the need for suctioning and aspirate as needed   Assess and instruct to report shortness of breath or any respiratory difficulty   Respiratory therapy support as indicated   Encourage broncho-pulmonary hygiene including cough, deep breathe, incentive spirometry     Problem: Cardiovascular - Adult  Goal: Maintains optimal cardiac output and hemodynamic stability  6/10/2023 0012 by Lyndsay Mosqueda RN  Outcome: Progressing  Flowsheets (Taken 6/10/2023 0012)  Maintains optimal cardiac output and hemodynamic stability:   Monitor blood pressure and heart rate   Monitor urine output and notify Licensed Independent

## 2023-06-10 NOTE — PROGRESS NOTES
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 PLAN   Movantik and Colace, Dulcolax PRN   follow-up in the office on June 20 for staple removal   hemoglobin stable continue  Eliquis and Plavix  Reg diet carb control, low sodium and fluid restriction noted   Dc IV fluid  Pain and nausea control   7.pt had BM would like d/c  SUBJECTIVE   Illona Rail is doing well. He denies any nausea or vomiting. He is tolerating reg diet. He had a BM today His pain is well controlled on current medications. He has been ambulating in the halls. EKG changes on monitor, cardiology consulted, denies chest pain or SOB.    CURRENT MEDICATIONS   Scheduled Meds:   PARoxetine  20 mg Oral QAM    naloxegol  12.5 mg Oral QAM AC    clopidogrel  75 mg Oral Daily    apixaban  2.5 mg Oral BID    amLODIPine  5 mg Oral Daily    atorvastatin  40 mg Oral Nightly    furosemide  40 mg Oral QAM    furosemide  20 mg Oral QPM    metoprolol tartrate  25 mg Oral BID    sodium chloride flush  5-40 mL IntraVENous 2 times per day     Continuous Infusions:   sodium chloride      sodium chloride       PRN Meds:.hydrOXYzine HCl, bisacodyl, bisacodyl, polyethylene glycol, sodium chloride, sodium chloride flush, sodium chloride, ondansetron **OR** hours.    Invalid input(s): PT    No results for input(s): AST, ALT, BILITOT, BILIDIR, AMYLASE, LIPASE, LDH, LACTA in the last 72 hours. No results for input(s): TROPONINT in the last 72 hours. RADIOLOGY     CT ABDOMEN PELVIS W IV CONTRAST Additional Contrast? None   Final Result   1. No acute bowel obstruction or acute inflammatory bowel process   2. A large anterior abdominal wall hernia containing portions of the transverse colon and fat. Below this is a small to moderate fat-containing supraumbilical hernia. 3. Distal descending thoracic aortic aneurysm. Stenosis in the suprarenal abdominal aorta as a result of atherosclerotic disease. 4. Ectasia of the infrarenal abdominal aorta at 2.7 cm. **This report has been created using voice recognition software. It may contain minor errors which are inherent in voice recognition technology. **      Final report electronically signed by Dr Dallas Rawls on 6/6/2023 11:15 AM        CT ABDOMEN PELVIS W IV CONTRAST Additional Contrast? None    Result Date: 6/6/2023  1. No acute bowel obstruction or acute inflammatory bowel process 2. A large anterior abdominal wall hernia containing portions of the transverse colon and fat. Below this is a small to moderate fat-containing supraumbilical hernia. 3. Distal descending thoracic aortic aneurysm. Stenosis in the suprarenal abdominal aorta as a result of atherosclerotic disease. 4. Ectasia of the infrarenal abdominal aorta at 2.7 cm. **This report has been created using voice recognition software. It may contain minor errors which are inherent in voice recognition technology. ** Final report electronically signed by Dr Dallas Rawls on 6/6/2023 11:15 AM  Will D/C today f/u   Electronically signed by Tip Ibarra MD on 6/10/2023 at 4:07 PM

## 2023-06-10 NOTE — PROGRESS NOTES
Discharge teaching and instructions for diagnosis/procedure of colon resection completed with patient using teachback method. AVS reviewed. Printed prescriptions given to patient. Patient voiced understanding regarding prescriptions, follow up appointments, and care of self at home. Discharged in a wheelchair to  home with support per family.

## 2023-06-19 PROBLEM — Z98.890 S/P REPAIR OF VENTRAL HERNIA: Status: ACTIVE | Noted: 2023-06-06

## 2023-06-19 PROBLEM — Z87.19 S/P REPAIR OF VENTRAL HERNIA: Status: ACTIVE | Noted: 2023-06-06

## 2023-06-19 NOTE — PROGRESS NOTES
Usha Gaytan MD [facility]  General Surgery  Postprocedure Evaluation in Office  Pt Name: Lucia Alberts  Date of Birth 1940   Today's Date: 6/20/2023  Medical Record Number: [id number]  Referring Provider: No ref. provider found  Primary Care Provider: Janes Magallon  Chief Complaint   Patient presents with    Post-Op Check     S/p 1. 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 All questions were answered. New Prescriptions    No medications on file     Patient Instructions   No lifting, pushing or pulling more than 25 lbs for 4 weeks. No restrictions after 4 weeks. Removed staples  Follow up: Return if symptoms worsen or fail to improve. Orders Placed This Encounter:  No orders of the defined types were placed in this encounter. Vandana Berman is seen today for post-op follow-up. He is 2 week(s) status post open incisional hernia repair . He is tolerating a regular diet, having regular bowel movements. Symptoms and activity have gradually improved compared to preoperative. The surgical site is clean and has no drainage. Pain is controlled without any medications. . He has compliant with postoperative instructions.   Past Medical History  Past Medical History:   Diagnosis Date    AAA (abdominal aortic aneurysm) (HCC)     CAD (coronary artery disease)     Cancer (HCC)     cheek and nose     Cancer of kidney ([address]     Heart attack ([address]     History of placement of chest tube     Due to pt falling and breaking 4 ribs and puncturing his lung    Hx of blood clots     Hyperlipidemia     Hypertension     Kidney stones

## 2023-06-20 ENCOUNTER — OFFICE VISIT (OUTPATIENT)
Dept: SURGERY | Age: 83
End: 2023-06-20

## 2023-06-20 VITALS
BODY MASS INDEX: 36.54 KG/M2 | HEART RATE: 89 BPM | SYSTOLIC BLOOD PRESSURE: 140 MMHG | HEIGHT: 68 IN | OXYGEN SATURATION: 93 % | DIASTOLIC BLOOD PRESSURE: 72 MMHG | WEIGHT: 241.1 LBS | TEMPERATURE: 96.4 F

## 2023-06-20 DIAGNOSIS — Z87.19 S/P REPAIR OF VENTRAL HERNIA: ICD-10-CM

## 2023-06-20 DIAGNOSIS — Z98.890 S/P REPAIR OF VENTRAL HERNIA: ICD-10-CM

## 2023-06-20 PROCEDURE — 99024 POSTOP FOLLOW-UP VISIT: CPT | Performed by: SURGERY

## 2023-06-20 RX ORDER — PAROXETINE 30 MG/1
30 TABLET, FILM COATED ORAL EVERY MORNING
COMMUNITY
Start: 2023-06-12

## 2023-06-29 ENCOUNTER — TELEPHONE (OUTPATIENT)
Dept: SURGERY | Age: 83
End: 2023-06-29

## 2023-07-11 ENCOUNTER — OFFICE VISIT (OUTPATIENT)
Dept: SURGERY | Age: 83
End: 2023-07-11

## 2023-07-11 VITALS
BODY MASS INDEX: 36.98 KG/M2 | HEART RATE: 63 BPM | WEIGHT: 244 LBS | HEIGHT: 68 IN | RESPIRATION RATE: 18 BRPM | OXYGEN SATURATION: 94 % | TEMPERATURE: 97.5 F | DIASTOLIC BLOOD PRESSURE: 72 MMHG | SYSTOLIC BLOOD PRESSURE: 124 MMHG

## 2023-07-11 DIAGNOSIS — Z98.890 S/P REPAIR OF VENTRAL HERNIA: Primary | ICD-10-CM

## 2023-07-11 DIAGNOSIS — Z87.19 S/P REPAIR OF VENTRAL HERNIA: Primary | ICD-10-CM

## 2023-07-11 PROCEDURE — 99024 POSTOP FOLLOW-UP VISIT: CPT | Performed by: SURGERY

## 2023-07-11 NOTE — PROGRESS NOTES
Tyra Rubio. [place], [address]  [facility] [address]  [phone]  Post Procedure Evaluation in Office    Pt Name: Rima Ramos  Date of Birth 1940   Today's Date: 7/11/2023  Medical Record Number: [id number]  Referring Provider: No ref. 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 Imer Brooks     ASSESSMENT       Diagnosis Orders   1. S/P repair of ventral hernia      6/6/23      Incision is clean, dry and intact or healing as expected   PLANS      Pathology reviewed with the patient who understands. All questions were answered. Patient Instructions   No lifting, pushing or pulling more than 50 lbs for 2 weeks, then 80 lbs for 2 weeks. No restrictions after 4 weeks. Resolving hematoma left of midline without current infection. Follow up: Return for As needed. Instructed to call if any concerns. Johnny Best is seen today for post-op follow-up. He is repair of incarcerated incisional hernia with mesh. Alvaro Sheffield He is tolerating a regular diet, having regular bowel movements. Symptoms and activity have gradually improved compared to preoperative. The surgical site is clean and has no drainage. Pain is controlled without any narcotic pain medications. He has compliant with postoperative instructions.   Past Medical History  Past Medical History:   Diagnosis Date    AAA (abdominal aortic aneurysm) (HCC)     CAD (coronary artery disease)     Cancer (HCC)     cheek and nose     Cancer of kidney ([address])     Heart attack ([address])     History of placement of chest tube     Due to pt falling and breaking 4 ribs and puncturing his lung    Hx of blood clots

## 2023-07-11 NOTE — DISCHARGE SUMMARY
[facility]    Discharge Summary    Patient:  Virgil Rodrigues  YOB: 1940  MRN: [id number]   Acct: [de-identified]    Primary Care Physician: Gael Sanders         Admit date:  6/6/2023  9:32 AM        Discharge date:  6/10/2023  4:56 PM      Admitting Diagnosis:   Active Hospital Problems    Diagnosis Date Noted    Hernia of abdominal wall [K43.9] 06/09/2023     Priority: High    Paroxysmal atrial fibrillation ([address]) [I48.0]      Priority: High    Incarcerated ventral hernia [K43.6] 06/06/2023    Other partial intestinal obstruction ([address]) [K56.690] 06/06/2023    Anticoagulated [Z79.01] 06/06/2023    CAD (coronary artery disease) [I25.10]     Hypertension [I10]          Discharge Diagnosis:   Active Hospital Problems    Diagnosis Date Noted    Hernia of abdominal wall [K43.9] 06/09/2023     Priority: High    Paroxysmal atrial fibrillation ([address]) [I48.0]      Priority: High    Incarcerated ventral hernia [K43.6] 06/06/2023    Other partial intestinal obstruction ([address]) [K56.690] 06/06/2023    Anticoagulated [Z79.01] 06/06/2023    CAD (coronary artery disease) [I25.10]     Hypertension [I10]          Consultants:  Cardiology       Procedures/Diagnostic Test:  1. 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  Repair of incarcerated ventral hernia with 13.8 x 17.8 cm Ventralex patch to repair a 11 cm greatest dimension defect and an old midline incision         Hospital Course: He presented to the emergency department with a longstanding ventral hernia but was having increasing pain was told in the past not to have repaired and skin overlying it was red. CT scan abdomen pelvis revealed incarcerated hernia with edema of the mesentery and fluid and he was taken emergently to surgery where he was found to have incarcerated hernia and infarcted epiploica of the colon and had this repaired.   Postoperatively he was seen by

## 2023-07-25 ENCOUNTER — OFFICE VISIT (OUTPATIENT)
Dept: CARDIOLOGY CLINIC | Age: 83
End: 2023-07-25
Payer: MEDICARE

## 2023-07-25 VITALS
SYSTOLIC BLOOD PRESSURE: 125 MMHG | WEIGHT: 241.25 LBS | HEART RATE: 75 BPM | HEIGHT: 68 IN | DIASTOLIC BLOOD PRESSURE: 81 MMHG | BODY MASS INDEX: 36.56 KG/M2

## 2023-07-25 DIAGNOSIS — I25.10 ASCVD (ARTERIOSCLEROTIC CARDIOVASCULAR DISEASE): ICD-10-CM

## 2023-07-25 PROCEDURE — G8417 CALC BMI ABV UP PARAM F/U: HCPCS | Performed by: INTERNAL MEDICINE

## 2023-07-25 PROCEDURE — 3079F DIAST BP 80-89 MM HG: CPT | Performed by: INTERNAL MEDICINE

## 2023-07-25 PROCEDURE — 1036F TOBACCO NON-USER: CPT | Performed by: INTERNAL MEDICINE

## 2023-07-25 PROCEDURE — G8427 DOCREV CUR MEDS BY ELIG CLIN: HCPCS | Performed by: INTERNAL MEDICINE

## 2023-07-25 PROCEDURE — 99214 OFFICE O/P EST MOD 30 MIN: CPT | Performed by: INTERNAL MEDICINE

## 2023-07-25 PROCEDURE — 3074F SYST BP LT 130 MM HG: CPT | Performed by: INTERNAL MEDICINE

## 2023-07-25 PROCEDURE — 1123F ACP DISCUSS/DSCN MKR DOCD: CPT | Performed by: INTERNAL MEDICINE

## 2023-07-25 RX ORDER — AMLODIPINE BESYLATE 5 MG/1
5 TABLET ORAL DAILY
Qty: 90 TABLET | Refills: 3 | Status: SHIPPED | OUTPATIENT
Start: 2023-07-25

## 2023-07-25 RX ORDER — CLOPIDOGREL BISULFATE 75 MG/1
75 TABLET ORAL DAILY
Qty: 90 TABLET | Refills: 3 | Status: SHIPPED | OUTPATIENT
Start: 2023-07-25

## 2023-07-25 RX ORDER — ATORVASTATIN CALCIUM 40 MG/1
40 TABLET, FILM COATED ORAL NIGHTLY
Qty: 90 TABLET | Refills: 3 | Status: SHIPPED | OUTPATIENT
Start: 2023-07-25

## 2023-07-25 NOTE — PROGRESS NOTES
Pt here for 6 mo check up     Pt continues with swelling , heart palpitations, sob 3 liters nightly     Pt brought weight log and b/p log for review

## 2023-07-25 NOTE — PROGRESS NOTES
[address] [place]  Dept: [phone]  Dept Fax: [phone]  Loc: [phone]    Visit Date: 7/25/2023    Mr. Lady Bright is a 79 y.o. male  who presented for:  Chief Complaint   Patient presents with    Check-Up    Congestive Heart Failure     HPI:   RHODA Pulido is a pleasant 79year old male patient who  has a past medical history of AAA (abdominal aortic aneurysm) ([address]), CAD (coronary artery disease), Cancer ([address]), Cancer of kidney ([address]), Heart attack ([address]), History of placement of chest tube, Hx of blood clots, Hyperlipidemia, Hypertension, Kidney stones, Obesity, Osteoarthritis, and Stroke ([address]). He had prior CEA, AAA repair. In 11/2019, he was found to have severe ISR in RCA, underwent successful PCI. Nuclear stress test 5/2021 was negative for ischemia. Chest CTA that was done 5/2021 while patient was hospitalized revealed distal thoracic aorta aneurysm, diameter 4.7 cm (stable on chest CTA on 5/2022). . Echo 5/2022 revealed mild LVH, EF 55%. Holter monitor on 7/2022 revealed persistent atrial fibrillation, average HR 88, minimal HR 59, no pauses, no profound bradycardia. Patient is s/p hernia repair, followed by surgery. Recent CT scan of the abdomen reveals distal descending thoracic aorta measuring 4.4 cm, 40% stenosis of the suprarenal abdominal aorta. The patient has mild leg swelling, stable. Denies weight gain. Patient denies chest pain, shortness of breath, dyspnea on exertion.        Current Outpatient Medications:     PARoxetine (PAXIL) 30 MG tablet, Take 20 mg by mouth every morning, Disp: , Rfl:     ELIQUIS 2.5 MG TABS tablet, TAKE 1 TABLET BY MOUTH  TWICE DAILY, Disp: 180 tablet, Rfl: 3    furosemide (LASIX) 40 MG tablet, Take 1 tablet by mouth every morning AND 0.5 tablets every evening., Disp: 135 tablet, Rfl: 3    amLODIPine (NORVASC) 5 MG tablet, TAKE 1 TABLET BY MOUTH DAILY,

## 2023-07-26 ENCOUNTER — TELEPHONE (OUTPATIENT)
Dept: SURGERY | Age: 83
End: 2023-07-26

## 2023-07-26 NOTE — TELEPHONE ENCOUNTER
Pt spouse called stating pt site is red, warm to touch,and some pain, did have some drainage a couple days ago. Pt had appt with Cardio and the provider stated pt needs to have that wound looked at.

## 2023-07-28 ENCOUNTER — OFFICE VISIT (OUTPATIENT)
Dept: SURGERY | Age: 83
End: 2023-07-28

## 2023-07-28 VITALS
RESPIRATION RATE: 16 BRPM | BODY MASS INDEX: 36.63 KG/M2 | OXYGEN SATURATION: 97 % | HEART RATE: 76 BPM | TEMPERATURE: 97.5 F | HEIGHT: 68 IN | SYSTOLIC BLOOD PRESSURE: 124 MMHG | DIASTOLIC BLOOD PRESSURE: 62 MMHG | WEIGHT: 241.7 LBS

## 2023-07-28 DIAGNOSIS — Z98.890 S/P REPAIR OF VENTRAL HERNIA: Primary | ICD-10-CM

## 2023-07-28 DIAGNOSIS — Z87.19 S/P REPAIR OF VENTRAL HERNIA: Primary | ICD-10-CM

## 2023-07-28 PROCEDURE — 99024 POSTOP FOLLOW-UP VISIT: CPT | Performed by: SURGERY

## 2023-07-28 NOTE — PROGRESS NOTES
Mild tenderness to palpation over right side of the resolving hematoma without active signs of infection. NEUROLOGIC: No sensory or motor nerve irritation       Thank you for the interesting evaluation. Further recommendations as listed above.        Electronically signed by Janice Morrow DO on 7/28/2023 at 1:43 PM

## 2023-08-09 ENCOUNTER — OFFICE VISIT (OUTPATIENT)
Dept: SURGERY | Age: 83
End: 2023-08-09

## 2023-08-09 VITALS
RESPIRATION RATE: 18 BRPM | BODY MASS INDEX: 36.83 KG/M2 | OXYGEN SATURATION: 92 % | SYSTOLIC BLOOD PRESSURE: 130 MMHG | HEART RATE: 68 BPM | TEMPERATURE: 97.6 F | HEIGHT: 68 IN | DIASTOLIC BLOOD PRESSURE: 70 MMHG | WEIGHT: 243 LBS

## 2023-08-09 DIAGNOSIS — Z87.19 S/P REPAIR OF VENTRAL HERNIA: Primary | ICD-10-CM

## 2023-08-09 DIAGNOSIS — Z98.890 S/P REPAIR OF VENTRAL HERNIA: Primary | ICD-10-CM

## 2023-08-22 ENCOUNTER — OFFICE VISIT (OUTPATIENT)
Dept: CARDIOTHORACIC SURGERY | Age: 83
End: 2023-08-22
Payer: MEDICARE

## 2023-08-22 VITALS
DIASTOLIC BLOOD PRESSURE: 73 MMHG | WEIGHT: 240 LBS | HEART RATE: 76 BPM | HEIGHT: 68 IN | BODY MASS INDEX: 36.37 KG/M2 | SYSTOLIC BLOOD PRESSURE: 142 MMHG

## 2023-08-22 DIAGNOSIS — I71.20 THORACIC AORTIC ANEURYSM WITHOUT RUPTURE, UNSPECIFIED PART (HCC): Primary | ICD-10-CM

## 2023-08-22 DIAGNOSIS — Z98.890 HISTORY OF AAA (ABDOMINAL AORTIC ANEURYSM) REPAIR: ICD-10-CM

## 2023-08-22 PROCEDURE — 99204 OFFICE O/P NEW MOD 45 MIN: CPT | Performed by: THORACIC SURGERY (CARDIOTHORACIC VASCULAR SURGERY)

## 2023-08-22 PROCEDURE — G8417 CALC BMI ABV UP PARAM F/U: HCPCS | Performed by: THORACIC SURGERY (CARDIOTHORACIC VASCULAR SURGERY)

## 2023-08-22 PROCEDURE — G8427 DOCREV CUR MEDS BY ELIG CLIN: HCPCS | Performed by: THORACIC SURGERY (CARDIOTHORACIC VASCULAR SURGERY)

## 2023-08-22 PROCEDURE — 1036F TOBACCO NON-USER: CPT | Performed by: THORACIC SURGERY (CARDIOTHORACIC VASCULAR SURGERY)

## 2023-08-22 PROCEDURE — 3078F DIAST BP <80 MM HG: CPT | Performed by: THORACIC SURGERY (CARDIOTHORACIC VASCULAR SURGERY)

## 2023-08-22 PROCEDURE — 1123F ACP DISCUSS/DSCN MKR DOCD: CPT | Performed by: THORACIC SURGERY (CARDIOTHORACIC VASCULAR SURGERY)

## 2023-08-22 PROCEDURE — 3077F SYST BP >= 140 MM HG: CPT | Performed by: THORACIC SURGERY (CARDIOTHORACIC VASCULAR SURGERY)

## 2023-08-22 NOTE — PROGRESS NOTES
CT/CV Surgery New Patient Office Visit      Patient's Name/Date of Birth: Jose Rehman / 1940 (69 y.o.)      PCP: Earlene Mckeon    Date: August 22, 2023     CC:   Chief Complaint   Patient presents with    New Patient     Abdominal aortic aneurysm, referral from Dr. Nj Carbone post open repair of infrarenal abdominal aortic aneurysm at the [facility] in [place], [place] in 2005. CT scan shows aneurysm of descending thoracic aorta and 40% stenosis of the suprarenal abdominal aorta. HPI:   We had the pleasure of seeing Jose Rehman in the office today, as you know this is a very pleasant 79y.o. year old male with a history of hypertension, hyperlipidemia, coronary disease, status post coronary artery stent, CVA in 2006, status post left carotid endarterectomy, arthritis, abdominal aortic aneurysm, status post open repair of abdominal aortic aneurysm in 2005, postoperative ventral hernia, atrial fibrillation, on Eliquis, status post bilateral shoulder joint repair, and status post right partial nephrectomy for kidney cancer. He was found to have distal descending thoracic aortic aneurysm and 40% stenosis of the suprarenal abdominal aorta on CT scan during the work-up for incarcerated incisional hernia. He came to the cardiovascular surgery clinic for evaluation. He denied any claudication or evidence of poor circulation to the lower extremity. He states that he can walk holding quarter of a mile without any difficulty. PastMedical History:  Miracle Dawson  has a past medical history of AAA (abdominal aortic aneurysm) ([address]), CAD (coronary artery disease), Cancer ([address]), Cancer of kidney ([address]), Heart attack ([address]), History of placement of chest tube, Hx of blood clots, Hyperlipidemia, Hypertension, Kidney stones, Obesity, Osteoarthritis, and Stroke ([address] Past Surgical History:  The patient  has a past surgical history that includes joint replacement (2007);  Carotid endarterectomy

## 2023-08-22 NOTE — PATIENT INSTRUCTIONS
If you receive a survey asking about your care experience, please respond. Your answers will help ensure you receive high-quality care at this office. Thank you! Your Medical Assistant today: Earnstine Oyster. Thank you for coming to our office! It was a pleasure to serve you.

## 2023-08-29 LAB
ALBUMIN SERPL-MCNC: 4.4 G/DL
ALP BLD-CCNC: 73 U/L
ALT SERPL-CCNC: 25 U/L
ANION GAP SERPL CALCULATED.3IONS-SCNC: 14 MMOL/L
AST SERPL-CCNC: 30 U/L
BILIRUB SERPL-MCNC: 1.2 MG/DL (ref 0.1–1.4)
BUN BLDV-MCNC: 18 MG/DL
CALCIUM SERPL-MCNC: 9.5 MG/DL
CHLORIDE BLD-SCNC: 100 MMOL/L
CO2: 27 MMOL/L
CREAT SERPL-MCNC: 1.92 MG/DL
EGFR: 34
GLUCOSE BLD-MCNC: 142 MG/DL
POTASSIUM SERPL-SCNC: 3.8 MMOL/L
SODIUM BLD-SCNC: 141 MMOL/L
TOTAL PROTEIN: 6.5

## 2023-09-12 ENCOUNTER — TELEPHONE (OUTPATIENT)
Dept: CARDIOTHORACIC SURGERY | Age: 83
End: 2023-09-12

## 2023-09-12 DIAGNOSIS — I71.40 ABDOMINAL AORTIC ANEURYSM (AAA), UNSPECIFIED PART, UNSPECIFIED WHETHER RUPTURED (HCC): Primary | ICD-10-CM

## 2023-09-12 NOTE — TELEPHONE ENCOUNTER
Hilaria from [address] [place] [address] radiology called stating patient is scheduled to have a CTA CHEST/ ABD/ PELVIS today but his creatinine is 2.1, and they don't inject contrast over 2.0. Dr. Arpita Chowdhury asked if MRA w/o contrast of chest, abd, pelvis can be done today instead, if not patient will have to be r/s. Patient is claustrophobic, and would need sedation for an MRA, either with a medication, or via IV. If done IV this would need to be done with interventional radiology. I told patient's wife we will need to r/s procedure, and either pre hydrate for cta and recheck creatinine, or sedate for MRA.

## 2023-09-13 NOTE — TELEPHONE ENCOUNTER
Spoke with pt's wife Lilo Caroline- she would like to forgo the MRI's at this time- she does not think it would be a good idea d/t pt's claustrophobia. Advised that we can reschedule CTA's but that I would feel most comfortable getting clearance from either a nephrologist or PCP. Lilo Velazquze states pt does not see a nephrologist.  Pt sees Florence Newman CNP in [facility]- will contact their office to obtain clearance and call her back to reschedule. Appt with Dr. Leon Burden on 9/19 postponed at this time.

## 2023-09-15 NOTE — TELEPHONE ENCOUNTER
Spoke with Ronn Mata from [employer] [facility] [employer]- she is sending a message to Ashley Cervantes CNP for clearance. They will call our office back. Will need to be rescheduled with IV hydration prior to- Dr. Emily Ramirez, please advise.

## 2023-09-18 NOTE — TELEPHONE ENCOUNTER
Spoke with Abiel Brooks from [employer] [facility] [address] [place] [address]- pt's PCP advised the IV hydration prior to the test and STAT recheck of the creatinine.  If less the 2.0, ok to proceed with test.    Dr. Bel Egan- please advise on hydration orders prior to exam. How long before test?

## 2023-09-21 ENCOUNTER — TELEPHONE (OUTPATIENT)
Dept: CARDIOLOGY CLINIC | Age: 83
End: 2023-09-21

## 2023-09-21 NOTE — TELEPHONE ENCOUNTER
We will need to let pt's wife know as well. She states patient stopped Plavix today as this would be 7 days.

## 2023-09-21 NOTE — TELEPHONE ENCOUNTER
Pre op Risk Assessment    Procedure Colonoscopy  Physician Dr. Kayden Caballero  Date of surgery/procedure 9/27/2023    Last OV 7/25/2023  Last Stress 5/17/2021  Last Echo 5/20/2022  Last Cath 12/2/2019  Last Stent 1/20/2012  Is patient on blood thinners Eliquis and Plavix  Hold Meds/how many days plavix 7 days and eliquis 2 days     Wants US of AAA faxed over with clearance.      Fax: [phone]

## 2023-10-10 ENCOUNTER — OFFICE VISIT (OUTPATIENT)
Dept: CARDIOLOGY CLINIC | Age: 83
End: 2023-10-10
Payer: MEDICARE

## 2023-10-10 VITALS
WEIGHT: 246 LBS | OXYGEN SATURATION: 94 % | BODY MASS INDEX: 37.4 KG/M2 | HEART RATE: 65 BPM | SYSTOLIC BLOOD PRESSURE: 136 MMHG | DIASTOLIC BLOOD PRESSURE: 60 MMHG

## 2023-10-10 DIAGNOSIS — I50.32 CHRONIC DIASTOLIC CONGESTIVE HEART FAILURE, NYHA CLASS 2 (HCC): Primary | ICD-10-CM

## 2023-10-10 DIAGNOSIS — R60.0 EDEMA OF BOTH LOWER LEGS: ICD-10-CM

## 2023-10-10 DIAGNOSIS — I48.0 PAROXYSMAL ATRIAL FIBRILLATION (HCC): ICD-10-CM

## 2023-10-10 PROCEDURE — 3075F SYST BP GE 130 - 139MM HG: CPT | Performed by: NURSE PRACTITIONER

## 2023-10-10 PROCEDURE — G8417 CALC BMI ABV UP PARAM F/U: HCPCS | Performed by: NURSE PRACTITIONER

## 2023-10-10 PROCEDURE — G8427 DOCREV CUR MEDS BY ELIG CLIN: HCPCS | Performed by: NURSE PRACTITIONER

## 2023-10-10 PROCEDURE — 1123F ACP DISCUSS/DSCN MKR DOCD: CPT | Performed by: NURSE PRACTITIONER

## 2023-10-10 PROCEDURE — G8484 FLU IMMUNIZE NO ADMIN: HCPCS | Performed by: NURSE PRACTITIONER

## 2023-10-10 PROCEDURE — 3078F DIAST BP <80 MM HG: CPT | Performed by: NURSE PRACTITIONER

## 2023-10-10 PROCEDURE — 99214 OFFICE O/P EST MOD 30 MIN: CPT | Performed by: NURSE PRACTITIONER

## 2023-10-10 PROCEDURE — 1036F TOBACCO NON-USER: CPT | Performed by: NURSE PRACTITIONER

## 2023-10-10 RX ORDER — POTASSIUM CHLORIDE 20MEQ/15ML
30 LIQUID (ML) ORAL DAILY
Qty: 500 ML | Refills: 15 | Status: SHIPPED | OUTPATIENT
Start: 2023-10-10

## 2023-10-10 RX ORDER — MAGNESIUM GLUCONATE 30 MG(550)
6 TABLET ORAL DAILY
COMMUNITY
End: 2023-10-10 | Stop reason: ALTCHOICE

## 2023-10-10 ASSESSMENT — ENCOUNTER SYMPTOMS
NAUSEA: 0
ABDOMINAL DISTENTION: 0
VOMITING: 0
SHORTNESS OF BREATH: 0
COUGH: 0

## 2023-10-10 NOTE — PROGRESS NOTES
negative    Start taking 30meq of potassium daily   Blood work in two weeks     Continue diet/fluid adherence  Continue daily wts. F/U w/ Cardiology  F/U in clinic in 6 month f/u      Tolerating above noted HF meds, no ill side effects noted. Will continue to monitor kidney function and electrolytes. Will optimize as tolerated. Pt is compliant w/ medications. Total visit time of 25 minutes has been spent with patient on education of symptoms, management, medication, and plan of care; as well as review of chart: labs, ECHO, radiology reports, etc.   I personally spent more then 50% of the appt time face to face with the patient. Daily weights  Fluid restriction of 2 Liters per day  Limit sodium in diet to around 3987-7874 mg/day  Monitor BP  Activity as tolerated     Patient was instructed to call the [address] for any changes in symptoms as noted in AVS.      Return in about 6 months (around 4/10/2024). or sooner if needed     Patient given educational materials - see patient instructions. We discussed the importance of weighing oneself and recording daily. We also discussed the importance of a low sodium diet, higher sodium foods to avoid and better low sodium food options. Patient verbalizes understanding of plan of care using teach back method, and is agreeable to the treatment plan.        Electronically signed by MARTIN Kuhn CNP on 10/10/2023 at 3:21 PM

## 2023-10-10 NOTE — PATIENT INSTRUCTIONS
You may receive a survey regarding the care you received during your visit. Your input is valuable to us. We encourage you to complete and return your survey. We hope you will choose us in the future for your healthcare needs. Your nurses today were Darcy and [employer]. Office hours:   Mon-Thurs 8-4:30  Friday 8-12  Phone: [phone]    Continue:  Continue current medications  Daily weights and record  Fluid restriction of 2 Liters per day  Limit sodium in diet to around 1928-1121 mg/day  Monitor BP  Activity as tolerated     Call the [address] for any of the following symptoms:   Weight gain of 2-3 pounds in 1 day or 5 pounds in 1 week  Increased shortness of breath  Shortness of breath while laying down  Cough  Chest pain  Swelling in feet, ankles or legs  Bloating in abdomen  Fatigue        Start taking 30meq of potassium daily   Blood work in two weeks     Continue diet/fluid adherence  Continue daily wts.   F/U w/ Cardiology  F/U in clinic in 6 month f/u

## 2023-10-25 ENCOUNTER — HOSPITAL ENCOUNTER (OUTPATIENT)
Age: 83
Setting detail: OBSERVATION
Discharge: HOME | End: 2023-10-27
Attending: EMERGENCY MEDICINE | Admitting: INTERNAL MEDICINE
Payer: MEDICARE

## 2023-10-25 ENCOUNTER — APPOINTMENT (OUTPATIENT)
Dept: CT IMAGING | Age: 83
End: 2023-10-25
Payer: MEDICARE

## 2023-10-25 DIAGNOSIS — G45.9 TIA (TRANSIENT ISCHEMIC ATTACK): Primary | ICD-10-CM

## 2023-10-25 DIAGNOSIS — G47.33 OSA (OBSTRUCTIVE SLEEP APNEA): ICD-10-CM

## 2023-10-25 LAB
ANION GAP SERPL CALC-SCNC: 12 MEQ/L (ref 8–16)
BASOPHILS ABSOLUTE: 0 THOU/MM3 (ref 0–0.1)
BASOPHILS NFR BLD AUTO: 0.4 %
BUN SERPL-MCNC: 23 MG/DL (ref 7–22)
CALCIUM SERPL-MCNC: 9.1 MG/DL (ref 8.5–10.5)
CHLORIDE SERPL-SCNC: 101 MEQ/L (ref 98–111)
CO2 SERPL-SCNC: 28 MEQ/L (ref 23–33)
CREAT SERPL-MCNC: 1.8 MG/DL (ref 0.4–1.2)
DEPRECATED RDW RBC AUTO: 47 FL (ref 35–45)
EOSINOPHIL NFR BLD AUTO: 2.8 %
EOSINOPHILS ABSOLUTE: 0.3 THOU/MM3 (ref 0–0.4)
ERYTHROCYTE [DISTWIDTH] IN BLOOD BY AUTOMATED COUNT: 12.9 % (ref 11.5–14.5)
GFR SERPL CREATININE-BSD FRML MDRD: 37 ML/MIN/1.73M2
GLUCOSE SERPL-MCNC: 124 MG/DL (ref 70–108)
HCT VFR BLD AUTO: 46.8 % (ref 42–52)
HGB BLD-MCNC: 15.5 GM/DL (ref 14–18)
IMM GRANULOCYTES # BLD AUTO: 0.09 THOU/MM3 (ref 0–0.07)
IMM GRANULOCYTES NFR BLD AUTO: 1 %
LYMPHOCYTES ABSOLUTE: 1.1 THOU/MM3 (ref 1–4.8)
LYMPHOCYTES NFR BLD AUTO: 11.5 %
MCH RBC QN AUTO: 33.2 PG (ref 26–33)
MCHC RBC AUTO-ENTMCNC: 33.1 GM/DL (ref 32.2–35.5)
MCV RBC AUTO: 100.2 FL (ref 80–94)
MONOCYTES ABSOLUTE: 0.6 THOU/MM3 (ref 0.4–1.3)
MONOCYTES NFR BLD AUTO: 6.7 %
NEUTROPHILS NFR BLD AUTO: 77.6 %
NRBC BLD AUTO-RTO: 0 /100 WBC
OSMOLALITY SERPL CALC.SUM OF ELEC: 286.4 MOSMOL/KG (ref 275–300)
PLATELET # BLD AUTO: 139 THOU/MM3 (ref 130–400)
PMV BLD AUTO: 9.7 FL (ref 9.4–12.4)
POTASSIUM SERPL-SCNC: 4.1 MEQ/L (ref 3.5–5.2)
RBC # BLD AUTO: 4.67 MILL/MM3 (ref 4.7–6.1)
SEGMENTED NEUTROPHILS ABSOLUTE COUNT: 7.3 THOU/MM3 (ref 1.8–7.7)
SODIUM SERPL-SCNC: 141 MEQ/L (ref 135–145)
WBC # BLD AUTO: 9.4 THOU/MM3 (ref 4.8–10.8)

## 2023-10-25 PROCEDURE — 80048 BASIC METABOLIC PNL TOTAL CA: CPT

## 2023-10-25 PROCEDURE — 99285 EMERGENCY DEPT VISIT HI MDM: CPT

## 2023-10-25 PROCEDURE — 85025 COMPLETE CBC W/AUTO DIFF WBC: CPT

## 2023-10-25 PROCEDURE — 6360000004 HC RX CONTRAST MEDICATION: Performed by: EMERGENCY MEDICINE

## 2023-10-25 PROCEDURE — 70496 CT ANGIOGRAPHY HEAD: CPT

## 2023-10-25 PROCEDURE — 2580000003 HC RX 258: Performed by: EMERGENCY MEDICINE

## 2023-10-25 PROCEDURE — 70498 CT ANGIOGRAPHY NECK: CPT

## 2023-10-25 PROCEDURE — 36415 COLL VENOUS BLD VENIPUNCTURE: CPT

## 2023-10-25 PROCEDURE — G0378 HOSPITAL OBSERVATION PER HR: HCPCS

## 2023-10-25 PROCEDURE — 70450 CT HEAD/BRAIN W/O DYE: CPT

## 2023-10-25 PROCEDURE — 99223 1ST HOSP IP/OBS HIGH 75: CPT

## 2023-10-25 RX ORDER — 0.9 % SODIUM CHLORIDE 0.9 %
1000 INTRAVENOUS SOLUTION INTRAVENOUS ONCE
Status: COMPLETED | OUTPATIENT
Start: 2023-10-25 | End: 2023-10-25

## 2023-10-25 RX ADMIN — IOPAMIDOL 80 ML: 755 INJECTION, SOLUTION INTRAVENOUS at 21:17

## 2023-10-25 RX ADMIN — SODIUM CHLORIDE 1000 ML: 9 INJECTION, SOLUTION INTRAVENOUS at 21:40

## 2023-10-25 ASSESSMENT — PAIN - FUNCTIONAL ASSESSMENT: PAIN_FUNCTIONAL_ASSESSMENT: NONE - DENIES PAIN

## 2023-10-25 NOTE — ED TRIAGE NOTES
Pt presents to ED as a transfer from [facility] with complaints of aphasia that began around 2pm, but has since resolved. Patient has a history of strokes with a right facial droop. Pt had a colonoscopy today at 7am, went home and took a nap and woke up with new aphasia according to wife. Pt alert and oriented x4 at this time, respirations even and unlabored, pt ambulated to the cot.

## 2023-10-25 NOTE — ED NOTES
Pt in bed, eyes open with family at bedside. Vital signs stable. Respirations even and unlabored. No needs voiced.       José Miguel Duarte  10/25/23 1930

## 2023-10-25 NOTE — ED NOTES
Pt in bed, eyes open with spouse at bedside. Vital signs stable. Respirations even and unlabored. Pt provided blanket at this time. No further needs voiced.       Eleazar Winston  10/25/23 1950

## 2023-10-26 ENCOUNTER — APPOINTMENT (OUTPATIENT)
Dept: MRI IMAGING | Age: 83
End: 2023-10-26
Payer: MEDICARE

## 2023-10-26 LAB
CHOLEST SERPL-MCNC: 145 MG/DL (ref 100–199)
DEPRECATED MEAN GLUCOSE BLD GHB EST-ACNC: 153 MG/DL (ref 70–126)
DEPRECATED RDW RBC AUTO: 47.8 FL (ref 35–45)
ERYTHROCYTE [DISTWIDTH] IN BLOOD BY AUTOMATED COUNT: 13.1 % (ref 11.5–14.5)
HBA1C MFR BLD HPLC: 7.1 % (ref 4.4–6.4)
HCT VFR BLD AUTO: 45.5 % (ref 42–52)
HDLC SERPL-MCNC: 32 MG/DL
HGB BLD-MCNC: 14.9 GM/DL (ref 14–18)
LDLC SERPL CALC-MCNC: 91 MG/DL
MCH RBC QN AUTO: 32.8 PG (ref 26–33)
MCHC RBC AUTO-ENTMCNC: 32.7 GM/DL (ref 32.2–35.5)
MCV RBC AUTO: 100.2 FL (ref 80–94)
MRSA DNA SPEC QL NAA+PROBE: NEGATIVE
PLATELET # BLD AUTO: 123 THOU/MM3 (ref 130–400)
PMV BLD AUTO: 9.9 FL (ref 9.4–12.4)
RBC # BLD AUTO: 4.54 MILL/MM3 (ref 4.7–6.1)
TRIGL SERPL-MCNC: 109 MG/DL (ref 0–199)
WBC # BLD AUTO: 9.3 THOU/MM3 (ref 4.8–10.8)

## 2023-10-26 PROCEDURE — 93010 ELECTROCARDIOGRAM REPORT: CPT | Performed by: INTERNAL MEDICINE

## 2023-10-26 PROCEDURE — 6370000000 HC RX 637 (ALT 250 FOR IP)

## 2023-10-26 PROCEDURE — 99232 SBSQ HOSP IP/OBS MODERATE 35: CPT | Performed by: INTERNAL MEDICINE

## 2023-10-26 PROCEDURE — 6360000002 HC RX W HCPCS

## 2023-10-26 PROCEDURE — 85027 COMPLETE CBC AUTOMATED: CPT

## 2023-10-26 PROCEDURE — A9579 GAD-BASE MR CONTRAST NOS,1ML: HCPCS

## 2023-10-26 PROCEDURE — 83036 HEMOGLOBIN GLYCOSYLATED A1C: CPT

## 2023-10-26 PROCEDURE — 97161 PT EVAL LOW COMPLEX 20 MIN: CPT

## 2023-10-26 PROCEDURE — G0378 HOSPITAL OBSERVATION PER HR: HCPCS

## 2023-10-26 PROCEDURE — 93005 ELECTROCARDIOGRAM TRACING: CPT

## 2023-10-26 PROCEDURE — 96374 THER/PROPH/DIAG INJ IV PUSH: CPT

## 2023-10-26 PROCEDURE — 87070 CULTURE OTHR SPECIMN AEROBIC: CPT

## 2023-10-26 PROCEDURE — 70553 MRI BRAIN STEM W/O & W/DYE: CPT

## 2023-10-26 PROCEDURE — 2580000003 HC RX 258

## 2023-10-26 PROCEDURE — 97116 GAIT TRAINING THERAPY: CPT

## 2023-10-26 PROCEDURE — 96375 TX/PRO/DX INJ NEW DRUG ADDON: CPT

## 2023-10-26 PROCEDURE — 87641 MR-STAPH DNA AMP PROBE: CPT

## 2023-10-26 PROCEDURE — 99223 1ST HOSP IP/OBS HIGH 75: CPT

## 2023-10-26 PROCEDURE — 6360000004 HC RX CONTRAST MEDICATION

## 2023-10-26 PROCEDURE — 36415 COLL VENOUS BLD VENIPUNCTURE: CPT

## 2023-10-26 PROCEDURE — 80061 LIPID PANEL: CPT

## 2023-10-26 RX ORDER — FUROSEMIDE 40 MG/1
40 TABLET ORAL DAILY
Status: DISCONTINUED | OUTPATIENT
Start: 2023-10-26 | End: 2023-10-27 | Stop reason: HOSPADM

## 2023-10-26 RX ORDER — POLYETHYLENE GLYCOL 3350 17 G/17G
17 POWDER, FOR SOLUTION ORAL DAILY PRN
Status: DISCONTINUED | OUTPATIENT
Start: 2023-10-26 | End: 2023-10-27 | Stop reason: HOSPADM

## 2023-10-26 RX ORDER — SODIUM CHLORIDE 9 MG/ML
INJECTION, SOLUTION INTRAVENOUS PRN
Status: DISCONTINUED | OUTPATIENT
Start: 2023-10-26 | End: 2023-10-27 | Stop reason: HOSPADM

## 2023-10-26 RX ORDER — ATORVASTATIN CALCIUM 80 MG/1
80 TABLET, FILM COATED ORAL NIGHTLY
Status: DISCONTINUED | OUTPATIENT
Start: 2023-10-26 | End: 2023-10-27 | Stop reason: HOSPADM

## 2023-10-26 RX ORDER — PAROXETINE 30 MG/1
30 TABLET, FILM COATED ORAL EVERY MORNING
Status: DISCONTINUED | OUTPATIENT
Start: 2023-10-26 | End: 2023-10-27 | Stop reason: HOSPADM

## 2023-10-26 RX ORDER — ONDANSETRON 2 MG/ML
4 INJECTION INTRAMUSCULAR; INTRAVENOUS EVERY 6 HOURS PRN
Status: DISCONTINUED | OUTPATIENT
Start: 2023-10-26 | End: 2023-10-27 | Stop reason: HOSPADM

## 2023-10-26 RX ORDER — ONDANSETRON 4 MG/1
4 TABLET, ORALLY DISINTEGRATING ORAL EVERY 8 HOURS PRN
Status: DISCONTINUED | OUTPATIENT
Start: 2023-10-26 | End: 2023-10-27 | Stop reason: HOSPADM

## 2023-10-26 RX ORDER — FUROSEMIDE 10 MG/ML
20 INJECTION INTRAMUSCULAR; INTRAVENOUS EVERY EVENING
Status: DISCONTINUED | OUTPATIENT
Start: 2023-10-26 | End: 2023-10-27 | Stop reason: HOSPADM

## 2023-10-26 RX ORDER — ATORVASTATIN CALCIUM 40 MG/1
40 TABLET, FILM COATED ORAL NIGHTLY
Status: DISCONTINUED | OUTPATIENT
Start: 2023-10-26 | End: 2023-10-26

## 2023-10-26 RX ORDER — LACTOBACILLUS RHAMNOSUS GG 10B CELL
1 CAPSULE ORAL DAILY
Status: DISCONTINUED | OUTPATIENT
Start: 2023-10-26 | End: 2023-10-27 | Stop reason: HOSPADM

## 2023-10-26 RX ORDER — SODIUM CHLORIDE 0.9 % (FLUSH) 0.9 %
5-40 SYRINGE (ML) INJECTION PRN
Status: DISCONTINUED | OUTPATIENT
Start: 2023-10-26 | End: 2023-10-27 | Stop reason: HOSPADM

## 2023-10-26 RX ORDER — PANTOPRAZOLE SODIUM 40 MG/1
40 TABLET, DELAYED RELEASE ORAL
Status: DISCONTINUED | OUTPATIENT
Start: 2023-10-26 | End: 2023-10-27 | Stop reason: HOSPADM

## 2023-10-26 RX ORDER — ACETAMINOPHEN 325 MG/1
650 TABLET ORAL EVERY 4 HOURS PRN
Status: DISCONTINUED | OUTPATIENT
Start: 2023-10-26 | End: 2023-10-27 | Stop reason: HOSPADM

## 2023-10-26 RX ORDER — CLOPIDOGREL BISULFATE 75 MG/1
75 TABLET ORAL DAILY
Status: DISCONTINUED | OUTPATIENT
Start: 2023-10-26 | End: 2023-10-27 | Stop reason: HOSPADM

## 2023-10-26 RX ORDER — LORAZEPAM 2 MG/ML
1 INJECTION INTRAMUSCULAR ONCE
Status: COMPLETED | OUTPATIENT
Start: 2023-10-26 | End: 2023-10-26

## 2023-10-26 RX ORDER — ACETAMINOPHEN 650 MG/1
650 SUPPOSITORY RECTAL EVERY 4 HOURS PRN
Status: DISCONTINUED | OUTPATIENT
Start: 2023-10-26 | End: 2023-10-27 | Stop reason: HOSPADM

## 2023-10-26 RX ORDER — AMLODIPINE BESYLATE 2.5 MG/1
2.5 TABLET ORAL DAILY
Status: DISCONTINUED | OUTPATIENT
Start: 2023-10-27 | End: 2023-10-27 | Stop reason: HOSPADM

## 2023-10-26 RX ORDER — SODIUM CHLORIDE 0.9 % (FLUSH) 0.9 %
5-40 SYRINGE (ML) INJECTION EVERY 12 HOURS SCHEDULED
Status: DISCONTINUED | OUTPATIENT
Start: 2023-10-26 | End: 2023-10-27 | Stop reason: HOSPADM

## 2023-10-26 RX ORDER — AMLODIPINE BESYLATE 5 MG/1
5 TABLET ORAL DAILY
Status: DISCONTINUED | OUTPATIENT
Start: 2023-10-26 | End: 2023-10-26

## 2023-10-26 RX ADMIN — APIXABAN 2.5 MG: 2.5 TABLET, FILM COATED ORAL at 22:03

## 2023-10-26 RX ADMIN — GADOTERIDOL 20 ML: 279.3 INJECTION, SOLUTION INTRAVENOUS at 17:54

## 2023-10-26 RX ADMIN — APIXABAN 2.5 MG: 2.5 TABLET, FILM COATED ORAL at 10:25

## 2023-10-26 RX ADMIN — PAROXETINE 30 MG: 30 TABLET, FILM COATED ORAL at 10:23

## 2023-10-26 RX ADMIN — ATORVASTATIN CALCIUM 80 MG: 80 TABLET, FILM COATED ORAL at 22:03

## 2023-10-26 RX ADMIN — METOPROLOL TARTRATE 25 MG: 25 TABLET, FILM COATED ORAL at 22:03

## 2023-10-26 RX ADMIN — Medication 1 CAPSULE: at 10:23

## 2023-10-26 RX ADMIN — LORAZEPAM 1 MG: 2 INJECTION INTRAMUSCULAR; INTRAVENOUS at 17:10

## 2023-10-26 RX ADMIN — PANTOPRAZOLE SODIUM 40 MG: 40 TABLET, DELAYED RELEASE ORAL at 10:24

## 2023-10-26 RX ADMIN — SODIUM CHLORIDE, PRESERVATIVE FREE 10 ML: 5 INJECTION INTRAVENOUS at 10:25

## 2023-10-26 RX ADMIN — METOPROLOL TARTRATE 25 MG: 25 TABLET, FILM COATED ORAL at 10:23

## 2023-10-26 RX ADMIN — SODIUM CHLORIDE, PRESERVATIVE FREE 10 ML: 5 INJECTION INTRAVENOUS at 22:03

## 2023-10-26 RX ADMIN — FUROSEMIDE 20 MG: 10 INJECTION, SOLUTION INTRAMUSCULAR; INTRAVENOUS at 17:10

## 2023-10-26 RX ADMIN — CLOPIDOGREL BISULFATE 75 MG: 75 TABLET ORAL at 10:24

## 2023-10-26 ASSESSMENT — ENCOUNTER SYMPTOMS
SORE THROAT: 0
RHINORRHEA: 0
SHORTNESS OF BREATH: 0
VOMITING: 0
ABDOMINAL PAIN: 0
NAUSEA: 0
COUGH: 0

## 2023-10-26 ASSESSMENT — PAIN SCALES - GENERAL
PAINLEVEL_OUTOF10: 0

## 2023-10-26 NOTE — ED NOTES
ED to inpatient nurses report    Chief Complaint   Patient presents with    Aphasia      Present to ED from home  LOC: alert and orientated to name, place, date  Vital signs   Vitals:    10/25/23 2037 10/25/23 2104 10/25/23 2141 10/25/23 2220   BP: 124/67 (!) 155/85 117/61 (!) 118/52   Pulse: 67 64 68 65   Resp: 22 21 24 25   Temp:       TempSrc:       SpO2: 99% 95% 93% 95%   Weight:       Height:          Oxygen Baseline room air    Current needs required none   LDAs:   Peripheral IV 10/25/23 Left Antecubital (Active)   Site Assessment Clean, dry & intact 10/25/23 2225   Line Status Normal saline locked 10/25/23 2225   Phlebitis Assessment No symptoms 10/25/23 2225   Infiltration Assessment 0 10/25/23 2225     Mobility: Independent  Pending ED orders: none  Present condition: stable    C-SSRS Risk of Suicide: No Risk  Preferred Language: Omayra     Electronically signed by Angel Thomson RN on 10/25/2023 at 10:54 PM       Mary Gotti  10/25/23 2256

## 2023-10-26 NOTE — PROGRESS NOTES
Pt admitted to  3A009 from ED. Complaints: TIA. IV into the antecubital right,condition patent and no redness IV site free of s/s of infection or infiltration. Vital signs obtained. Assessment and data collection initiated. Two nurse skin assessment performed by Erika Hou and Zakiya YOUNG. Swallow Screen completed and documented for all patients ages 39 and older. Passed/    Policies and procedures for 3A explained. All questions answered with no further questions at this time. Fall prevention and safety brochure discussed with patient. Bed alarm on. Call light in reach. Oriented to room.

## 2023-10-26 NOTE — ED NOTES
Pt in bed, eyes open, respirations even and unlabored. Vital signs stable. No needs voiced at this time.       Annette Aschoff  10/25/23 5888

## 2023-10-26 NOTE — ED NOTES
Pt in chair with wife at bedside. VSS. Respirations even and unlabored. Updated on POC. No needs voiced at time.      Holly Cannon  10/25/23 4158

## 2023-10-26 NOTE — CARE COORDINATION
Case Management Assessment  Initial Evaluation    Date/Time of Evaluation: 10/26/2023 11:47 AM  Assessment Completed by: Evangelina Solis RN    If patient is discharged prior to next notation, then this note serves as note for discharge by case management. Patient Name: Epimenio Cabot                   YOB: 1940  Diagnosis: TIA (transient ischemic attack) [G45.9]                   Date / Time: 10/25/2023  6:05 PM  Location: [address] [place] [address]     Patient Admission Status: Observation   Readmission Risk Low 0-14, Mod 15-19), High > 20: Readmission Risk Score: 14.9    Current PCP: Shaina Boggs  PCP verified by CM? Yes    Chart Reviewed: Yes      History Provided by: Patient, Spouse  Patient Orientation: Alert and Oriented    Patient Cognition: Alert    Hospitalization in the last 30 days (Readmission):  No    If yes, Readmission Assessment in CM Navigator will be completed. Advance Directives:      Code Status: Full Code   Patient's Primary Decision Maker is: Named in Scanned ACP Document    Primary Decision Maker: Dinora Guthrie - Spouse - [phone]    Secondary Decision Maker: Rashad Rouse - Child - [phone]    Discharge Planning:    Patient lives with: Spouse/Significant Other Type of Home: House  Primary Care Giver: Self  Patient Support Systems include: Spouse/Significant Other, Children, Family Members   Current Financial resources: Medicare, Other (Comment) (secondary)  Current community resources: Other (Comment) (HF clinic; [facility])  Current services prior to admission: Durable Medical Equipment            Current DME: Shower Chair, Walker, Cane, Oxygen Therapy (Comment), Cpap (O2 3L at night, DASCO; CPAP does not use)            Type of Home Care services:  None    ADLS  Prior functional level: Independent in ADLs/IADLs  Current functional level: Independent in ADLs/IADLs    Family can provide assistance at DC:  Yes  Would you like Case Management to discuss the discharge plan with any other family members/significant others, and if so, who? Yes (Wife)  Plans to Return to Present Housing: Yes  Other Identified Issues/Barriers to RETURNING to current housing: none  Potential Assistance needed at discharge: N/A            Potential DME:    Patient expects to discharge to: [address] [place] [address] for transportation at discharge: Family    Financial    Payor: MEDICARE / Plan: MEDICARE PART A AND B / Product Type: *No Product type* /     Does insurance require precert for SNF: No    Potential assistance Purchasing Medications: No  Meds-to-Beds request: Yes      [facility] ([address] [place] [address]) - [facility] [address] [place] [address] [phone] - f [phone] [address] [facility]  [address]  [address] [facility] [address]  Phone: [phone] Fax: [phone]    Janie Horner [id number] [place], [place] - [address] [place] [address] [phone] - F [phone]  [address] [place] [address]  Phone: [phone] Fax: [phone] [address] [address] [phone] - F [phone]  [address] [facility] [address]  Phone: [phone] Fax: [phone], [address] [facility],[address] [facility] [phone] Marnie Joseph [phone]  [address] [place] [address]  Phone: [phone] Fax: [phone]      Notes:    Factors facilitating achievement of predicted outcomes: Family support, Cooperative, Pleasant, Sense of humor, and Has needed Durable Medical Equipment at home    Barriers to discharge: Neuro work up    Additional Case Management Notes: Admitted through ED as observation with expressive aphasia. Was transferred from [facility] to our ED. Pt had colonoscopy done yesterday, went home took a nap and woke up unable to communicate with his wife. Lasix 20mg iv daily. BUN 23, creatinine 1.8. Consulting Neurology. PT/OT.      Procedure:   10/25 CTA head/neck:

## 2023-10-26 NOTE — ED NOTES
Pt in bed, eyes open with family at bedside. Respirations even and unlabored, VSS. No needs voiced at this time.      Ashley Bussing  10/25/23 2044

## 2023-10-26 NOTE — PROGRESS NOTES
Virtual nurse completed admission documentation questions with patient and patient's wife at bedside. Bedside nurse notified. Thank you.

## 2023-10-26 NOTE — PLAN OF CARE
Problem: Safety - Adult  Goal: Free from fall injury  Outcome: Progressing  Flowsheets (Taken 10/26/2023 0631)  Free From Fall Injury:   Instruct family/caregiver on patient safety   Based on caregiver fall risk screen, instruct family/caregiver to ask for assistance with transferring infant if caregiver noted to have fall risk factors  Note: Patient remains free from falls d/t hourly rouding, bed/chair alarm activated, and patient calls out appropriately. Will continue to monitor.

## 2023-10-26 NOTE — PROGRESS NOTES
PROGRESS NOTE      Patient:  Re Ambrosio  Unit/Bed:4A-18/018-A  YOB: 1940  MRN: [id number]   Acct: [de-identified]    PCP: George Ahumada    Date of Admission: 10/25/2023 LOS: 0    Date of Evaluation:  10/26/2023      Assessment/Plan:  Transient Ischemic Attack: Patient  with one episode of expressive aphasia for one hour which self resolved. CT of head  and neck noted no acute findings chronic bilateral parietal lobe infarcts. CTA head and Neck revealed 40% stenosis of Left ICA. MRI of imaging ordered 10/26/23; Neurology followed and cleared patient to be discharged with no further workup recommendations. Continue Eliquis Plavix Lipitor    Paroxsymal AFIB rate controlled to date  EKG:  Atrial fibrillation with a competing junctional pacemaker   Right bundle branch block. Negative for septal and inferior infarct. CHADVASC Score: 6  Continue Eliquis, Lopressor    CAD  Continue Home meds    Hypertension   Resume Home meds    Hyperlipidemia  Lipid panel LDL: 91; Cholesterol:145; TA HDL:32   Patient with H/o of DM, Stroke, HTN, HLD, BENJAMIN, CAD  Adjusted to Lipitor 80mg    GERD  PPI    Depression  Resume home medication     Obesity  BMI 36.9    Chief Complaint: TIA     Hospital Course:     Subjective/HPI:   78 y/o male with PMH of Stroke, P-AFIB, BENJAMIN, CAD, HTN, HLD, Cancer, GERD , Lt. endarterectomy was admitted following an episode of TIA with expressive aphasia. Symptoms lasted for one hour before resolution of symptoms. Patient denies any headaches, dysphagia, headaches, vision changes, SOB,  Chest pain, palpitation, presyncope, syncope, seizures, fecal, urinary incontinence, numbness, tingling, and focal neurologic deficit. Prior to episode, Patient states he recently off his [address] due to a recent colonoscopy following an abnormal colo guard test.    Further evaluation with CT of head  and neck noted no acute findings chronic bilateral parietal lobe infarcts.  CTA head and Neck revealed 40% General: Normal range of motion. Neurological:      General: No focal deficit present. Mental Status: He is alert and oriented to person, place, and time. Mental status is at baseline. Cranial Nerves: No cranial nerve deficit. Sensory: Sensation is intact. Motor: Motor function is intact. No pronator drift. Coordination: Coordination normal. Finger-Nose-Finger Test and Heel to Howard Test normal.      Gait: Gait is intact. Psychiatric:         Mood and Affect: Mood normal.         Behavior: Behavior normal.         Thought Content: Thought content normal.         Judgment: Judgment normal.        All labs reviewed and interpreted by me:  Labs:   Recent Labs     10/25/23  2018 10/26/23  0321   WBC 9.4 9.3   HGB 15.5 14.9   HCT 46.8 45.5    123*     Recent Labs     10/25/23  2018      K 4.1      CO2 28   BUN 23*   CREATININE 1.8*   CALCIUM 9.1     No results for input(s): \"AST\", \"ALT\", \"BILIDIR\", \"BILITOT\", \"ALKPHOS\" in the last 72 hours. No results for input(s): \"INR\" in the last 72 hours. No results for input(s): \"CKTOTAL\", \"TROPONINT\" in the last 72 hours. Urinalysis:      Lab Results   Component Value Date/Time    NITRU NEGATIVE 06/06/2023 10:25 AM    WBCUA 2-4 06/06/2023 10:25 AM    BACTERIA NONE SEEN 06/06/2023 10:25 AM    RBCUA 10-15 06/06/2023 10:25 AM    BLOODU SMALL 06/06/2023 10:25 AM    SPECGRAV >1.030 05/29/2022 11:30 PM    GLUCOSEU NEGATIVE 06/06/2023 10:25 AM       All radiology images and reports reviewed and interpreted by me:  Radiology:  CTA HEAD W WO CONTRAST   Final Result   Impression:   No arterial occlusion or stenosis. This document has been electronically signed by: Susie Winn MD on    10/25/2023 10:21 PM      All CTs at this facility use dose modulation techniques and iterative    reconstructions, and/or weight-based dosing   when appropriate to reduce radiation to a low as reasonably achievable.       3D Post-processing was

## 2023-10-26 NOTE — ED NOTES
Pt in bed, eyes open with spouse at bedside. Fluids started on pt per [facility] at this time. Vital signs stable, respirations even and unlabored. No needs voiced at this time.      Joelle Gamble  10/25/23 3882

## 2023-10-26 NOTE — SIGNIFICANT EVENT
Patient has been previously assessed to have sleep apnea. He has been unable to tolerate PAP therapy secondary to known history of claustrophobia. His wife endorses sleep disordered breathing. He has multiple comorbidities that could be exacerbated by untreated sleep apnea including but not limited to TIA, history of CVA, paroxysmal A-Fib, CAD, HTN, unspecified depressive disorder. He was educated extensively on the dangers of untreated sleep apnea. He verbalized understanding and is agreeable to further evaluation for alternative treatment options. He is interested in exploring candidacy for hypoglossal nerve stimulation. Referral placed to Dr. Louis Potts as well as [facility] for further evaluation and treatment.      Electronically signed by Ladean Schwab, MD on 10/26/23 at 10:42 AM EDT

## 2023-10-26 NOTE — FLOWSHEET NOTE
10/26/23 1741   Safe Environment   Safety Measures Standard Safety Measures  (virtual safety round completed)     Patient not in room at the moment

## 2023-10-26 NOTE — CONSULTS
normal    CN XII   CN XII normal.   Tongue: not atrophic  Fasciculations: absent  Tongue deviation: none    Motor Exam   Muscle bulk: normal  Overall muscle tone: normal  Right arm tone: normal  Left arm tone: normal  Right arm pronator drift: absent  Left arm pronator drift: absent  Right leg tone: normal  Left leg tone: normal  Generalized weakness noted throughout- 4/5     Sensory Exam   Light touch normal.     Gait, Coordination, and Reflexes     Coordination   Finger to nose coordination: normal  Heel to shin coordination: normal       Labs/Imaging/Diagnostics   Labs:  CBC:  Recent Labs     10/25/23  2018 10/26/23  0321   WBC 9.4 9.3   RBC 4.67* 4.54*   HGB 15.5 14.9   HCT 46.8 45.5   .2* 100.2*    123*     CHEMISTRIES:  Recent Labs     10/25/23  2018      K 4.1      CO2 28   BUN 23*   CREATININE 1.8*   GLUCOSE 124*     COAGULATION STUDIES:No results for input(s): \"PROTIME\", \"INR\", \"APTT\" in the last 72 hours. LIVER PROFILE:No results for input(s): \"AST\", \"ALT\", \"BILIDIR\", \"BILITOT\", \"ALKPHOS\" in the last 72 hours. CHOLESTEROL AND A1C:  Recent Labs     10/26/23  0321   LDLCALC 91   HDL 32   CHOL 145   TRIG 109   LABA1C 7.1*      Imaging Last 24 Hours:  CTA HEAD W WO CONTRAST    Result Date: 10/25/2023  CT angiography head with contrast. 3-D postprocessing Comparison: CT/SR - CT HEAD WO CONTRAST - 10/25/2023 09:24 PM EDT CT/SR - CTA NECK W WO CONTRAST - 10/25/2023 09:24 PM EDT Findings: The basilar artery is patent without stenosis. The anterior, middle, and posterior cerebral arteries are patent without stenosis. There is no aneurysm. There is no evidence of dural venous sinus thrombosis. Impression: No arterial occlusion or stenosis.  This document has been electronically signed by: Earl Kelly MD on 10/25/2023 10:21 PM All CTs at this facility use dose modulation techniques and iterative reconstructions, and/or weight-based dosing when appropriate to reduce radiation to a Kesha Osullivan MD

## 2023-10-26 NOTE — H&P
Hospitalist History and Physical          Patient Info:   Name: Epimenio Cabot, : 1940, PCP: Rikki Clark      Admitted on: 10/25/2023  Date of Service: Pt seen/examined on 10/25/23 and Admitted to Observation with expected LOS less than two midnights due to medical therapy. Assessment and Plan:  TIA, in setting of history of CVA: Patient did have an episode of expressive aphasia earlier in the day, which has since resolved. See CT results below. Likely TIA due to symptoms resolving in 1 hour. Consider MRI of brain pending clinical course  Stat CT of head wo contrast if symptoms return or worsen overnight. Consult neurology for further guidance on importance of MRI due to patient's extreme claustrophobia. Lipid panel and hemoglobin a1c pending  telemetry    Paroxysmal A-fib, rate controlled:   Telemetry  Resume home lopressor at this time for rate control  Resume home eliquis    CAD: resume home medications with hold parameters    Hypertension: resume home medications after allowing for permissive hypertension. Hyperlipidemia: resume statin    GERD: resume home PPI    Depression: Resume home medication    Obesity: BMP 36.49. Patient educated on importance of following diet      Data reviewed  EKG:  Pending, not done in ER  Imaging: I have reviewed CT and CTA of head with the following interpretation: No acute processes      ===================================================================      Chief Complaint:  expressive aphasia    History Of Present Illness:  Epimenio Cabot is a 79 y.o. male with PMHx of Stroke, CAD, hypertension, hyperlipidemia, cancer, obesity who presents to [address] with expressive aphasia. Patient states that he had a colonoscopy earlier today and had been off his anticoagulation for a couple of days so he could have the procedure. The procedure was uneventful and went back home.  While at home he woke up from a nap and was MD   amLODIPine (NORVASC) 5 MG tablet Take 1 tablet by mouth daily 7/25/23   Elba Javed MD   PARoxetine (PAXIL) 30 MG tablet Take 20 mg by mouth every morning 6/12/23   Iam Carter MD   ELIQUIS 2.5 MG TABS tablet TAKE 1 TABLET BY MOUTH  TWICE DAILY 5/23/23   Marcy Hill MD   furosemide (LASIX) 40 MG tablet Take 1 tablet by mouth every morning AND 0.5 tablets every evening.  5/15/23   MARTIN Garcia - EMEKA   metoprolol tartrate (LOPRESSOR) 25 MG tablet Take 1 tablet by mouth 2 times daily 7/26/22   Elba Javed MD   Turmeric Curcumin 500 MG CAPS Take 1 capsule by mouth daily    Iam Carter MD   Omega-3 Fatty Acids (FISH OIL PO) Take 2 capsules by mouth daily    Iam Carter MD   OXYGEN Inhale 3 L into the lungs nightly    Iam Carter MD   acetaminophen (TYLENOL) 500 MG tablet Take 2 tablets by mouth every 6 hours as needed for Pain    Iam Carter MD   Coenzyme Q10 (CO Q-10) 100 MG CAPS Take 1 capsule by mouth daily    Iam Carter MD   Cholecalciferol (VITAMIN D3) 125 MCG (5000 UT) TABS Take 1 tablet by mouth daily    Iam Carter MD   pantoprazole (PROTONIX) 40 MG tablet TAKE 1 TABLET BY MOUTH  DAILY 9/22/20   Helio Rhodes MD   Magnesium 500 MG TABS Take 1 tablet by mouth daily    Iam Carter MD   CINNAMON PO Take 1,000 mg by mouth daily    Iam Carter MD   Probiotic Product (PROBIOTIC DAILY PO) Take 1 capsule by mouth daily    Iam Carter MD   Ascorbic Acid (VITAMIN C) 1000 MG tablet Take 1 tablet by mouth daily    Iam Carter MD       Medical History      Diagnosis Date    AAA (abdominal aortic aneurysm) ([address])     CAD (coronary artery disease)     Cancer ([address])     cheek and nose     Cancer of kidney ([address])     Heart attack ([address])     History of placement of chest tube     Due to pt falling and breaking 4 ribs and puncturing his lung    Hx of blood clots     Hyperlipidemia

## 2023-10-26 NOTE — PROGRESS NOTES
[facility]  OCCUPATIONAL THERAPY MISSED TREATMENT NOTE  [facility] 4A  4A-18/018-A      Date: 10/26/2023  Patient Name: Re Ambrosio        CSN: [id number]   : 1940  (79 y.o.)  Gender: male                REASON FOR MISSED TREATMENT:  OT eval/tx orders received. Per discussion with PT, pt is independent with mobility, no difficulty with ADLs noted. Will discontinue orders at this time as pt is at his baseline status and OT services not warranted. Please reorder if pt has change in status.

## 2023-10-26 NOTE — ED NOTES
Pt assisted to bed from chair at this time. Updated on POC. Vital signs stable, respirations even and unlabored. No needs voiced at this time.      Kenia Amaya  10/26/23 0003

## 2023-10-26 NOTE — PROGRESS NOTES
Patient/family has been educated on their personal risk factors of:  Hypertension  Previous stroke  previous TIA  Atrial fibrillation      They have been given hand outs on the following medications:  Plavix  statins(Lipitor)  antihypertensive(metoprolol)    Treatment for stroke includes:  Risk factor modifications  Following the medication regime prescribed by physician      Educated on FAST-Face-Arm-Speech-Time    A stroke is a brain attack. Stroke is a brain injury. It occurs when the brain's blood supply is interrupted. Blood carries oxygen and nutrients to the brain. Without oxygen and nutrients from blood, brain tissue starts to die rapidly. This can happen in less than 10 minutes. A stroke occurs when blood flow to the brain is blocked (called ischemic stroke). This is caused by one of the following:   Sudden decreased blood flow   Damage to a blood vessel supplying blood to the brain can occur suddenly from either:   Injury   A clot that forms and breaks off from another part of the body (such as the heart or neck)   There are certain conditions which predispose people to form blood clots, such as:   Cancer   Pregnancy   Atrial fibrillation   Certain autoimmune diseases   Local blood clot   A build-up of fatty substances ( atherosclerotic plaque ) along the inner lining of the artery causes:   Narrowing of artery   Reduced elasticity   Local inflammation   Blood protein defects leading to increased clotting tendency   Decreased blood flow in the artery   Clot in an artery supplying the brain   Inflammatory conditions in the blood vessels (vasculitis)   A stroke may also occur if a blood vessel breaks and bleeds into or around the brain. This is called hemorrhagic stroke. This condition needs to be monitored closely. Be sure to keep all appointments. Have exams and blood tests done as directed.      Call 911 If Any of the Following Occurs   It is important that you and those around you know the warning

## 2023-10-26 NOTE — ED NOTES
Pt in bed, eyes closed with wife at bedside. Vital signs stable, respirations even and unlabored. No needs voiced at this time.      Pattie Lucas  10/26/23 0046

## 2023-10-26 NOTE — PROGRESS NOTES
[address] PHYSICAL THERAPY  EVALUATION  [facility] CCU 3A - 3A-06/006-A    Time In: 3916  Time Out: 0439  Timed Code Treatment Minutes: 8 Minutes  Minutes: 15          Date: 10/26/2023  Patient Name: Juan Antonio Brock,  Gender:  male        MRN: [id number]  : 1940  (79 y.o.)      Referring Practitioner: Xiomara Marquez CNP  Diagnosis: TIA  Additional Pertinent Hx: History Of Present Illness:  Juan Antonio Brock is a 79 y.o. male with PMHx of Stroke, CAD, hypertension, hyperlipidemia, cancer, obesity who presents to [facility] with expressive aphasia. Patient states that he had a colonoscopy earlier today and had been off his anticoagulation for a couple of days so he could have the procedure. The procedure was uneventful and went back home. While at home he woke up from a nap and was unable to communicate with his wife. Denies any focal deficits, but does elicit a history of stroke in the past. Denies any difficulties speaking, swallowing, facial droop, lightheadedness, shortness of breath, dizziness, chest pain, nausea, vomiting and diarrhea.  CT and CTA of head with the following interpretation: No acute processes     Restrictions/Precautions:  Restrictions/Precautions: General Precautions    Subjective:  Chart Reviewed: Yes  Patient assessed for rehabilitation services?: Yes  Subjective: pleasant and cooperative, spouse present, pt declines any further PT needs    General:        Hearing: Exceptions to [facility]  Hearing Exceptions: Bilateral hearing aid       Pain: no pain per pt    Vitals: Vitals not assessed per clinical judgement, see nursing flowsheet    Social/Functional History:    Lives With: Spouse  Type of Home: House  Home Layout: One level (recreational area in the basement)  Home Access: Stairs to enter with rails  Entrance Stairs - Number of Steps: 2, can have 1UE support on washing machine  Home Equipment: Oxygen (O2 at night)                   Ambulation Assistance: Home independently    Patient Education:      . Patient Education  Education Given To: Patient  Education Provided: Role of Therapy, Plan of Care  Education Method: Verbal  Barriers to Learning: Hearing  Education Outcome: Verbalized understanding       Equipment Recommendations:  Equipment Needed: No    Plan:  General Plan:  (NA)    Goals:  Patient Goals : go home  Short Term Goals  Time Frame for Short Term Goals: NA  Long Term Goals  Time Frame for Long Term Goals : NA    Following session, patient left in safe position with all fall risk precautions in place.

## 2023-10-27 VITALS
WEIGHT: 240 LBS | HEART RATE: 62 BPM | SYSTOLIC BLOOD PRESSURE: 143 MMHG | TEMPERATURE: 98 F | BODY MASS INDEX: 36.37 KG/M2 | HEIGHT: 68 IN | OXYGEN SATURATION: 97 % | DIASTOLIC BLOOD PRESSURE: 103 MMHG | RESPIRATION RATE: 16 BRPM

## 2023-10-27 LAB
CA-I BLD ISE-SCNC: 1.12 MMOL/L (ref 1.12–1.32)
EKG Q-T INTERVAL: 458 MS
EKG QRS DURATION: 128 MS
EKG QTC CALCULATION (BAZETT): 487 MS
EKG R AXIS: 60 DEGREES
EKG T AXIS: -41 DEGREES
EKG VENTRICULAR RATE: 68 BPM
MAGNESIUM SERPL-MCNC: 2 MG/DL (ref 1.6–2.4)
PHOSPHATE SERPL-MCNC: 3.4 MG/DL (ref 2.4–4.7)

## 2023-10-27 PROCEDURE — 93270 REMOTE 30 DAY ECG REV/REPORT: CPT

## 2023-10-27 PROCEDURE — 94761 N-INVAS EAR/PLS OXIMETRY MLT: CPT

## 2023-10-27 PROCEDURE — 36415 COLL VENOUS BLD VENIPUNCTURE: CPT

## 2023-10-27 PROCEDURE — 6370000000 HC RX 637 (ALT 250 FOR IP): Performed by: INTERNAL MEDICINE

## 2023-10-27 PROCEDURE — G0378 HOSPITAL OBSERVATION PER HR: HCPCS

## 2023-10-27 PROCEDURE — 99238 HOSP IP/OBS DSCHRG MGMT 30/<: CPT | Performed by: INTERNAL MEDICINE

## 2023-10-27 PROCEDURE — 6370000000 HC RX 637 (ALT 250 FOR IP)

## 2023-10-27 PROCEDURE — 84100 ASSAY OF PHOSPHORUS: CPT

## 2023-10-27 PROCEDURE — 82330 ASSAY OF CALCIUM: CPT

## 2023-10-27 PROCEDURE — 83735 ASSAY OF MAGNESIUM: CPT

## 2023-10-27 RX ORDER — FUROSEMIDE 40 MG/1
40 TABLET ORAL EVERY MORNING
Qty: 135 TABLET | Refills: 0 | Status: SHIPPED | OUTPATIENT
Start: 2023-10-27

## 2023-10-27 RX ORDER — AMLODIPINE BESYLATE 2.5 MG/1
2.5 TABLET ORAL DAILY
Qty: 30 TABLET | Refills: 3 | Status: SHIPPED | OUTPATIENT
Start: 2023-10-27

## 2023-10-27 RX ORDER — ATORVASTATIN CALCIUM 80 MG/1
80 TABLET, FILM COATED ORAL NIGHTLY
Qty: 30 TABLET | Refills: 3 | Status: SHIPPED | OUTPATIENT
Start: 2023-10-27

## 2023-10-27 RX ADMIN — Medication 1 CAPSULE: at 11:45

## 2023-10-27 RX ADMIN — PANTOPRAZOLE SODIUM 40 MG: 40 TABLET, DELAYED RELEASE ORAL at 06:20

## 2023-10-27 RX ADMIN — CLOPIDOGREL BISULFATE 75 MG: 75 TABLET ORAL at 11:45

## 2023-10-27 RX ADMIN — PAROXETINE 30 MG: 30 TABLET, FILM COATED ORAL at 11:45

## 2023-10-27 RX ADMIN — AMLODIPINE BESYLATE 2.5 MG: 2.5 TABLET ORAL at 11:45

## 2023-10-27 RX ADMIN — APIXABAN 2.5 MG: 2.5 TABLET, FILM COATED ORAL at 11:45

## 2023-10-27 NOTE — FLOWSHEET NOTE
10/27/23 1025   Safe Environment   Safety Measures Standard Safety Measures; Family at bedside  (Virtual Nurse Safety Round Complete)     Call placed to patients room, patient did not respond to audio, video turned on for safety purposes. Patient is resting in chair with eyes closed, call light within reach, no signs or symtpoms of distress noted. Family is bedside. No response from anyone.

## 2023-10-27 NOTE — CARE COORDINATION
10/27/23, 11:59 AM EDT    Patient goals/plan/ treatment preferences discussed by  and . Patient goals/plan/ treatment preferences reviewed with patient/ family. Patient/ family verbalize understanding of discharge plan and are in agreement with goal/plan/treatment preferences. Understanding was demonstrated using the teach back method. AVS provided by RN at time of discharge, which includes all necessary medical information pertaining to the patients current course of illness, treatment, post-discharge goals of care, and treatment preferences.      Services At/After Discharge: None       IMM Letter  Observation Status Letter date given[de-identified] 10/25/23  Observation Status Letter time given[de-identified] 5676  Observation Status Letter given to Patient/Family/Significant other/Guardian/POA/by[de-identified] Pt Access

## 2023-10-27 NOTE — DISCHARGE SUMMARY
week(s)      Van Vilchis  [address] [place] [id number] [phone]    Schedule an appointment as soon as possible for a visit  Please call your PCP to make aware of hospital stay and to make a hospital follow-up         Discharge Medications:        Medication List        CHANGE how you take these medications      amLODIPine 2.5 MG tablet  Commonly known as: NORVASC  Take 1 tablet by mouth daily  What changed:   medication strength  how much to take     atorvastatin 80 MG tablet  Commonly known as: LIPITOR  Take 1 tablet by mouth nightly  What changed:   medication strength  how much to take     furosemide 40 MG tablet  Commonly known as: LASIX  Take 1 tablet by mouth every morning  What changed: See the new instructions.             CONTINUE taking these medications      acetaminophen 500 MG tablet  Commonly known as: TYLENOL     CINNAMON PO     clopidogrel 75 MG tablet  Commonly known as: PLAVIX  Take 1 tablet by mouth daily     Co Q-10 100 MG Caps     CRANBERRY EXTRACT PO     Eliquis 2.5 MG Tabs tablet  Generic drug: apixaban  TAKE 1 TABLET BY MOUTH  TWICE DAILY     FISH OIL PO     Magnesium 500 MG Tabs     OXYGEN     pantoprazole 40 MG tablet  Commonly known as: PROTONIX  TAKE 1 TABLET BY MOUTH  DAILY     PARoxetine 30 MG tablet  Commonly known as: PAXIL     potassium chloride 20 MEQ/15ML (10%) oral solution  Take 22.5 mLs by mouth daily     PROBIOTIC DAILY PO     Turmeric Curcumin 500 MG Caps     vitamin C 1000 MG tablet     Vitamin D3 125 MCG (5000 UT) Tabs            STOP taking these medications      metoprolol tartrate 25 MG tablet  Commonly known as: LOPRESSOR               Where to Get Your Medications        These medications were sent to [place] [facility] [address], [address] [facility] [address] [id number]      Phone: [phone]   amLODIPine 2.5 MG tablet  atorvastatin 80 MG tablet  furosemide 40 MG tablet         Time Spent on discharge is more than 20 minutes in the examination, evaluation, counseling and review of medications and discharge plan. Signed: Thank you Morales Melendez for the opportunity to be involved in this patient's care.     Electronically signed by Alice De La O MD PGY1 on 10/27/2023 at 2:11 PM

## 2023-10-27 NOTE — PROGRESS NOTES
A home oxygen evaluation has been completed. [x]Patient is an inpatient. It is expected that the patient will be discharged within the next 48 hours. Qualified provider to write orders for possible sleep study or home oxygen prescription. Social service/care managers will arrange for home oxygen if ordered. If patient is active, arrange for Home Medical supplier to assess for Oxygen Conserving Device per pulse oximetry. []Patient is an outpatient. Results will be faxed to the ordering provider. Qualified provider to write orders for possible sleep study or home oxygen prescription. Patient was placed on room air for 25 minutes. SpO2 was 92 % on room air at rest. Patients SpO2 was 89% or above and did not qualify for home oxygen. Patient was walked for 5 minutes. SpO2 was 94 % during walking. Patients SpO2 was 89% or above and did not qualify for home oxygen. Patient does not have a positive pressure airway device at home(was not able to tolerate mask. Has appt with ENT coming up soon). Patient is  diagnosed with Obstructive Sleep Apnea. Patient will not be set up/instructed on a nocturnal study. Results will be given to qualified provider. Pt wears 2lpm at HS at home currently. Results given to [place], [place]. Note: For any SpO2 at 34% see policy and procedure for possible qualifications.

## 2023-10-27 NOTE — PROGRESS NOTES
Discharge instructions reviewed with patient and his wife. All questions answered. Patient verbalized understanding. Copies of AVS given. Stroke Folder given. What is Stroke/CVA  Signs and Symptoms of stroke (BEFAST)  Treatments for Stroke  Personal Risk Factors for Stroke discussed  Education--Call 911   IV removed per policy with tip intact. Telemetry discontinued. Patient waits for cardiac event monitor to be placed. 1345:Patient leaves in stable condition via wheelchair to family vehicle. He states he has all personal belongings.

## 2023-10-27 NOTE — PROGRESS NOTES
Patient experienced a three second pause on telemetry. Patient asymptomatic. Dr. Rupa Silverio notified. No new orders at this time.

## 2023-10-27 NOTE — PLAN OF CARE
Problem: Discharge Planning  Goal: Discharge to home or other facility with appropriate resources  Outcome: Progressing  Flowsheets (Taken 10/27/2023 0458)  Discharge to home or other facility with appropriate resources:   Identify barriers to discharge with patient and caregiver   Arrange for needed discharge resources and transportation as appropriate   Identify discharge learning needs (meds, wound care, etc)   Refer to discharge planning if patient needs post-hospital services based on physician order or complex needs related to functional status, cognitive ability or social support system     Problem: Safety - Adult  Goal: Free from fall injury  Outcome: Progressing  Flowsheets (Taken 10/27/2023 0458)  Free From Fall Injury:   Instruct family/caregiver on patient safety   Based on caregiver fall risk screen, instruct family/caregiver to ask for assistance with transferring infant if caregiver noted to have fall risk factors     Problem: Chronic Conditions and Co-morbidities  Goal: Patient's chronic conditions and co-morbidity symptoms are monitored and maintained or improved  Outcome: Progressing  Flowsheets (Taken 10/27/2023 0458)  Care Plan - Patient's Chronic Conditions and Co-Morbidity Symptoms are Monitored and Maintained or Improved:   Monitor and assess patient's chronic conditions and comorbid symptoms for stability, deterioration, or improvement   Update acute care plan with appropriate goals if chronic or comorbid symptoms are exacerbated and prevent overall improvement and discharge   Collaborate with multidisciplinary team to address chronic and comorbid conditions and prevent exacerbation or deterioration     Problem: Pain  Goal: Verbalizes/displays adequate comfort level or baseline comfort level  Outcome: Progressing  Flowsheets (Taken 10/27/2023 0458)  Verbalizes/displays adequate comfort level or baseline comfort level:   Encourage patient to monitor pain and request assistance   Assess pain

## 2023-10-28 LAB — BACTERIA SPEC AEROBE CULT: NORMAL

## 2023-10-30 ENCOUNTER — TELEPHONE (OUTPATIENT)
Dept: CARDIOLOGY CLINIC | Age: 83
End: 2023-10-30

## 2023-10-30 NOTE — TELEPHONE ENCOUNTER
Lasix was decreased at hospital discharge - to monitor weight and symptoms and to call office if needed    Lopressor was also stopped - monitor HR at home as well and call if consistently > 80

## 2023-11-17 ENCOUNTER — OFFICE VISIT (OUTPATIENT)
Dept: PULMONOLOGY | Age: 83
End: 2023-11-17
Payer: MEDICARE

## 2023-11-17 VITALS
DIASTOLIC BLOOD PRESSURE: 72 MMHG | HEART RATE: 69 BPM | TEMPERATURE: 98.1 F | SYSTOLIC BLOOD PRESSURE: 120 MMHG | OXYGEN SATURATION: 96 %

## 2023-11-17 DIAGNOSIS — I10 PRIMARY HYPERTENSION: ICD-10-CM

## 2023-11-17 DIAGNOSIS — E66.9 OBESITY (BMI 30-39.9): ICD-10-CM

## 2023-11-17 DIAGNOSIS — G45.9 TIA (TRANSIENT ISCHEMIC ATTACK): ICD-10-CM

## 2023-11-17 DIAGNOSIS — R09.02 HYPOXIA: ICD-10-CM

## 2023-11-17 DIAGNOSIS — G47.33 OSA (OBSTRUCTIVE SLEEP APNEA): Primary | ICD-10-CM

## 2023-11-17 DIAGNOSIS — I48.0 PAROXYSMAL ATRIAL FIBRILLATION (HCC): ICD-10-CM

## 2023-11-17 DIAGNOSIS — C64.2 MALIGNANT NEOPLASM OF LEFT KIDNEY (HCC): ICD-10-CM

## 2023-11-17 PROCEDURE — 1123F ACP DISCUSS/DSCN MKR DOCD: CPT | Performed by: SPECIALIST

## 2023-11-17 PROCEDURE — G8417 CALC BMI ABV UP PARAM F/U: HCPCS | Performed by: SPECIALIST

## 2023-11-17 PROCEDURE — 3078F DIAST BP <80 MM HG: CPT | Performed by: SPECIALIST

## 2023-11-17 PROCEDURE — G8427 DOCREV CUR MEDS BY ELIG CLIN: HCPCS | Performed by: SPECIALIST

## 2023-11-17 PROCEDURE — G8484 FLU IMMUNIZE NO ADMIN: HCPCS | Performed by: SPECIALIST

## 2023-11-17 PROCEDURE — 99205 OFFICE O/P NEW HI 60 MIN: CPT | Performed by: SPECIALIST

## 2023-11-17 PROCEDURE — 1036F TOBACCO NON-USER: CPT | Performed by: SPECIALIST

## 2023-11-17 PROCEDURE — 3074F SYST BP LT 130 MM HG: CPT | Performed by: SPECIALIST

## 2023-11-17 NOTE — PROGRESS NOTES
New Sleep Patient H/P    Presentation:  Charli Moran is referred by Mundo Zapien MD  for    Chief Complaint   Patient presents with    New Patient     Sleep consult, BENJAMIN per wife sleep studies completed 3 years ago at a stand alone building in [place], no current CPAP usage. Echo 5/20/22   He had  h/o TIA and hospitalized and recommended to get the sleep study. He accompanied by his wife. Time in Bed:   Bedtime: 11:30 PM  Awakens 7:30 AM and gets sleep about 8 hours  Different on weekends? Same     Vasiliy falls asleep in 15 minutes. Any awakenings? The  Patient had a sleep study done in [facility] on October 19, 2020 and had decreased sleep efficiency and absent delta sleep and REM sleep. With a total AHI of 16.6 and supine position with significant leg movements patient oxygen saturation sleep down to 86%. Patient started on CPAP and he tried to give because he is severely claustrophobic. His wife states that he is sleeping in a recliner and when she notices that patient stop breathing and snoring. Sometimes will fall asleep while watching the TV and reading and listening to lectures. Decreased ability to function and productivity. Decreased ability to concentrate and cannot finish his tasks and decreased memory and difficulty of learning new information. He gets the wake up unrefreshed with extra daytime sleepiness and fatigue. Frequent urination and has history of chronic insufficient sleep He denies any history of sleep walking or sleep talking. No history of seizures activity. No history suggestive of restless legs syndrome. No history of bruxism. No history of head injury. Naps:  Any naps? No and are they helpful no    Snoring and Apneas:  Do you snore or been told you a snore? Yes  How long have known about your snoring? Longtime  Any witnessed apneas? Yes  Any awakenings with choking or gasping? Yes    Dreams:  Any recurring dreams?

## 2023-11-21 ENCOUNTER — HOSPITAL ENCOUNTER (OUTPATIENT)
Dept: SLEEP CENTER | Age: 83
Discharge: HOME | End: 2023-11-23
Payer: MEDICARE

## 2023-11-21 DIAGNOSIS — E66.9 OBESITY (BMI 30-39.9): ICD-10-CM

## 2023-11-21 DIAGNOSIS — R09.02 HYPOXIA: ICD-10-CM

## 2023-11-21 DIAGNOSIS — G47.33 OSA (OBSTRUCTIVE SLEEP APNEA): ICD-10-CM

## 2023-11-21 PROCEDURE — 95806 SLEEP STUDY UNATT&RESP EFFT: CPT

## 2023-11-21 NOTE — PROGRESS NOTES
Rupali Nation presents today for a HST instruction and demonstration on unit # [id number]. Questions were asked and answers given. He was able to return demonstration and verbalized understanding. The sleep center control room phone number was provided in case questions arise during the study. Informed patient to call 911 in case of an emergency. He states he will return the unit tomorrow before 1000. Title:  Home Sleep Apnea Testing (HSAT)     Approved by:  Moody Melendez MD        Approval Date: December, 2021  Next Review: December, 2023       Responsible Party:  Jose Martin [facility]  Institution/Entities Applies to:    [address] Number:  None      Document Type:  Such as Guideline, Policy,   Policy & Procedure, or Procedure, Instructions   Manual:  Policy and Procedures     Section: IV  Policy Start Date: June, 2011       HOME SLEEP APNEA TESTING (HSAT)      PURPOSE: To ensure home sleep apnea testing (HSAT) conducted by the sleep facility adheres to the current AASM practice parameters, clinical practice guidelines, best practice and clinical guidelines in regard to the diagnosis of BENJAMIN in adults. POLICY:      HSAT is a method of recording certain parameters which will target and measure, minimally, heart rate, oxygen saturation, respiratory airflow, respiratory effort and snoring for the purpose of evaluating a patient for BENJAMIN. HSAT will be performed in conjunction with a comprehensive sleep evaluation by an appropriately licensed sleep facility medical staff member. All portable monitoring equipment will be FDA-approved and appropriately maintained to ensure patient safety and efficiency of the test.       PROCEDURE:      An order from a licensed sleep physician along with appropriate medical history documenting the indication for HSAT that complies with the AASM practice parameters.     All tests will be performed, and records will

## 2023-11-22 LAB — STATUS: NORMAL

## 2023-11-23 NOTE — PROGRESS NOTES
[place] [address]                               SLEEP STUDY REPORT    PATIENT NAME: Deon Bergeron                   :        1940  MED REC NO:   [id number]                           ROOM:  ACCOUNT NO:   [de-identified]                           ADMIT DATE: 2023  PROVIDER:     Kelly August MD    DATE OF STUDY:  2023    REFERRING PROVIDER:  Michelle Gamez MD    Patient height is 68 inches, weight is 240 pounds with a BMI of 36.5. HISTORY:  The patient is an 79-year-old gentleman weighing 240 pounds  who was initially evaluated by Michelle Gamez MD, on 2023. The  patient is currently suffering with hypersomnia with [place] Sleepiness  Score of 11. The patient had a class IV Mallampati airway. The patient  had associated comorbidities including transient ischemic attacks,  paroxysmal atrial fibrillation, and hypertension. The patient is  scheduled for a portable/home sleep study to evaluate for sleep apnea as  an etiology for hypersomnia. METHODS:  The patient underwent digital polysomnography in compliance  with the standards and specifications from the AASM Manual including the  simultaneous recording of 3 EEG channels (F4-M1, C4-M1, and O2-M1 with  back up electrodes F3-M2, C3-M2, and O1-M2), 2 EOG channels (E1-M2, and  E2-M1,), EMG (chin, left & right leg), EKG, Nonin pulse oximetry with   less than 2 second averaging time, body position, airflow recorded by  oral-nasal thermal sensor and nasal air pressure transducer, plus  respiratory effort recorded by calibrated respiratory inductance  plethysmography (RIP), flow volume loop, sound and video. Sleep staging  and scoring followed the standard put forth by the American Academy of  Sleep Medicine and utilized the 1B obstructive hypopnea event  desaturation of 4 percent or greater.     INTERPRETATION:  This is a portable/home sleep

## 2023-12-05 ENCOUNTER — OFFICE VISIT (OUTPATIENT)
Dept: PULMONOLOGY | Age: 83
End: 2023-12-05
Payer: MEDICARE

## 2023-12-05 VITALS
HEIGHT: 68 IN | WEIGHT: 245.6 LBS | TEMPERATURE: 97.5 F | BODY MASS INDEX: 37.22 KG/M2 | OXYGEN SATURATION: 97 % | SYSTOLIC BLOOD PRESSURE: 136 MMHG | DIASTOLIC BLOOD PRESSURE: 78 MMHG | HEART RATE: 68 BPM

## 2023-12-05 DIAGNOSIS — G45.9 TIA (TRANSIENT ISCHEMIC ATTACK): ICD-10-CM

## 2023-12-05 DIAGNOSIS — R09.02 HYPOXIA: ICD-10-CM

## 2023-12-05 DIAGNOSIS — F40.240 CLAUSTROPHOBIA: ICD-10-CM

## 2023-12-05 DIAGNOSIS — G47.33 OSA (OBSTRUCTIVE SLEEP APNEA): Primary | ICD-10-CM

## 2023-12-05 DIAGNOSIS — E66.9 OBESITY (BMI 30-39.9): ICD-10-CM

## 2023-12-05 PROCEDURE — 1036F TOBACCO NON-USER: CPT | Performed by: SPECIALIST

## 2023-12-05 PROCEDURE — 3075F SYST BP GE 130 - 139MM HG: CPT | Performed by: SPECIALIST

## 2023-12-05 PROCEDURE — G8484 FLU IMMUNIZE NO ADMIN: HCPCS | Performed by: SPECIALIST

## 2023-12-05 PROCEDURE — G8417 CALC BMI ABV UP PARAM F/U: HCPCS | Performed by: SPECIALIST

## 2023-12-05 PROCEDURE — 3078F DIAST BP <80 MM HG: CPT | Performed by: SPECIALIST

## 2023-12-05 PROCEDURE — 99213 OFFICE O/P EST LOW 20 MIN: CPT | Performed by: SPECIALIST

## 2023-12-05 PROCEDURE — G8427 DOCREV CUR MEDS BY ELIG CLIN: HCPCS | Performed by: SPECIALIST

## 2023-12-05 PROCEDURE — 1123F ACP DISCUSS/DSCN MKR DOCD: CPT | Performed by: SPECIALIST

## 2023-12-05 NOTE — PROGRESS NOTES
[facility]    Valente Bhardwaj, 79 y.o.  [id number]      Pt of dr. Milagros Acevedo  Nurses Notes   Hst f/u   ESS: 10    SAQLI:93  MAL:4   NECK:17  Study Results  Initial Study Date -  11.21.23  AHI -  59.8    TotalEvents - 371  (Apneas  371  Hypopneas 0  Central  0)  (Total Sleep Time - 372.4 min)  Time with Sats below 88% - 105.9 min  Interval History       Valente Bhardwaj is a 79 y.o. old male who comes in to review the results of his recent sleep study, to answer questions and to explore options for treatment.    Allergies  Allergies   Allergen Reactions    Adhesive Tape      Meds  Current Outpatient Medications   Medication Sig Dispense Refill    Multiple Vitamins-Minerals (PRESERVISION AREDS 2 PO) Take by mouth      metoprolol tartrate (LOPRESSOR) 25 MG tablet Take 1 tablet by mouth 2 times daily 180 tablet 3    atorvastatin (LIPITOR) 80 MG tablet Take 1 tablet by mouth nightly 30 tablet 3    amLODIPine (NORVASC) 2.5 MG tablet Take 1 tablet by mouth daily 30 tablet 3    furosemide (LASIX) 40 MG tablet Take 1 tablet by mouth every morning 135 tablet 0    CRANBERRY EXTRACT PO Take by mouth      potassium chloride 20 MEQ/15ML (10%) oral solution Take 22.5 mLs by mouth daily 500 mL 15    clopidogrel (PLAVIX) 75 MG tablet Take 1 tablet by mouth daily 90 tablet 3    PARoxetine (PAXIL) 30 MG tablet Take 20 mg by mouth every morning      ELIQUIS 2.5 MG TABS tablet TAKE 1 TABLET BY MOUTH  TWICE DAILY 180 tablet 3    Turmeric Curcumin 500 MG CAPS Take 1 capsule by mouth daily      Omega-3 Fatty Acids (FISH OIL PO) Take 2 capsules by mouth daily      OXYGEN Inhale 3 L into the lungs nightly      acetaminophen (TYLENOL) 500 MG tablet Take 2 tablets by mouth every 6 hours as needed for Pain      Coenzyme Q10 (CO Q-10) 100 MG CAPS Take 1 capsule by mouth daily      Cholecalciferol (VITAMIN D3) 125 MCG (5000 UT) TABS Take 1 tablet by mouth daily      pantoprazole (PROTONIX) 40 MG tablet

## 2023-12-07 ENCOUNTER — TELEPHONE (OUTPATIENT)
Dept: CARDIOLOGY CLINIC | Age: 83
End: 2023-12-07

## 2023-12-07 LAB — POTASSIUM SERPL-SCNC: 4.1 MEQ/L (ref 3.5–5.4)

## 2023-12-07 RX ORDER — POTASSIUM CHLORIDE 20MEQ/15ML
20 LIQUID (ML) ORAL DAILY
Qty: 500 ML | Refills: 15
Start: 2023-12-07

## 2023-12-07 NOTE — TELEPHONE ENCOUNTER
Patient wife, Spring Hernandez on HIPAA, notified and verbalized understanding. Said she actually had to go down on his K+ liquid to 15ml/20meq d/t having diarrhea. Instructed would let Aleyda know and call back only if any changes.

## 2023-12-07 NOTE — TELEPHONE ENCOUNTER
----- Message from MARTIN Light CNP sent at 12/7/2023  7:20 AM EST -----  K level is good no further changes

## 2024-01-23 RX ORDER — ATORVASTATIN CALCIUM 80 MG/1
80 TABLET, FILM COATED ORAL NIGHTLY
Qty: 90 TABLET | Refills: 3 | OUTPATIENT
Start: 2024-01-23

## 2024-01-26 ENCOUNTER — OFFICE VISIT (OUTPATIENT)
Dept: CARDIOLOGY CLINIC | Age: 84
End: 2024-01-26
Payer: MEDICARE

## 2024-01-26 VITALS
WEIGHT: 247.8 LBS | HEART RATE: 80 BPM | DIASTOLIC BLOOD PRESSURE: 76 MMHG | SYSTOLIC BLOOD PRESSURE: 173 MMHG | HEIGHT: 68 IN | BODY MASS INDEX: 37.56 KG/M2

## 2024-01-26 DIAGNOSIS — I50.32 CHRONIC DIASTOLIC CONGESTIVE HEART FAILURE, NYHA CLASS 2 (HCC): Primary | ICD-10-CM

## 2024-01-26 PROCEDURE — 3078F DIAST BP <80 MM HG: CPT | Performed by: INTERNAL MEDICINE

## 2024-01-26 PROCEDURE — G8428 CUR MEDS NOT DOCUMENT: HCPCS | Performed by: INTERNAL MEDICINE

## 2024-01-26 PROCEDURE — 1123F ACP DISCUSS/DSCN MKR DOCD: CPT | Performed by: INTERNAL MEDICINE

## 2024-01-26 PROCEDURE — G8484 FLU IMMUNIZE NO ADMIN: HCPCS | Performed by: INTERNAL MEDICINE

## 2024-01-26 PROCEDURE — 1036F TOBACCO NON-USER: CPT | Performed by: INTERNAL MEDICINE

## 2024-01-26 PROCEDURE — 99214 OFFICE O/P EST MOD 30 MIN: CPT | Performed by: INTERNAL MEDICINE

## 2024-01-26 PROCEDURE — 3077F SYST BP >= 140 MM HG: CPT | Performed by: INTERNAL MEDICINE

## 2024-01-26 PROCEDURE — G8417 CALC BMI ABV UP PARAM F/U: HCPCS | Performed by: INTERNAL MEDICINE

## 2024-01-26 RX ORDER — CIPROFLOXACIN 500 MG/1
TABLET, FILM COATED ORAL
COMMUNITY
Start: 2024-01-25

## 2024-01-26 NOTE — PROGRESS NOTES
Pt C/O sob      Pt denies CP,  Headache, dizziness, heart palpitations, swelling, fatigue        
by mouth daily, Disp: 30 tablet, Rfl: 3    furosemide (LASIX) 40 MG tablet, Take 1 tablet by mouth every morning, Disp: 135 tablet, Rfl: 0    CRANBERRY EXTRACT PO, Take by mouth, Disp: , Rfl:     clopidogrel (PLAVIX) 75 MG tablet, Take 1 tablet by mouth daily, Disp: 90 tablet, Rfl: 3    PARoxetine (PAXIL) 30 MG tablet, Take 20 mg by mouth every morning, Disp: , Rfl:     ELIQUIS 2.5 MG TABS tablet, TAKE 1 TABLET BY MOUTH  TWICE DAILY, Disp: 180 tablet, Rfl: 3    Turmeric Curcumin 500 MG CAPS, Take 1 capsule by mouth daily, Disp: , Rfl:     Omega-3 Fatty Acids (FISH OIL PO), Take 2 capsules by mouth daily, Disp: , Rfl:     OXYGEN, Inhale 3 L into the lungs nightly, Disp: , Rfl:     acetaminophen (TYLENOL) 500 MG tablet, Take 2 tablets by mouth every 6 hours as needed for Pain, Disp: , Rfl:     Coenzyme Q10 (CO Q-10) 100 MG CAPS, Take 1 capsule by mouth daily, Disp: , Rfl:     Cholecalciferol (VITAMIN D3) 125 MCG (5000 UT) TABS, Take 1 tablet by mouth daily, Disp: , Rfl:     pantoprazole (PROTONIX) 40 MG tablet, TAKE 1 TABLET BY MOUTH  DAILY, Disp: 90 tablet, Rfl: 3    Magnesium 500 MG TABS, Take 1 tablet by mouth daily, Disp: , Rfl:     CINNAMON PO, Take 1,000 mg by mouth daily, Disp: , Rfl:     Probiotic Product (PROBIOTIC DAILY PO), Take 1 capsule by mouth daily, Disp: , Rfl:     Ascorbic Acid (VITAMIN C) 1000 MG tablet, Take 1 tablet by mouth daily, Disp: , Rfl:     Past Medical History  Vasiliy  has a past medical history of AAA (abdominal aortic aneurysm) (HCC), CAD (coronary artery disease), Cancer (HCC), Cancer of kidney (HCC), Heart attack (HCC), History of placement of chest tube, Hx of blood clots, Hyperlipidemia, Hypertension, Kidney stones, Obesity, Osteoarthritis, and Stroke (HCC).    Social History  Vasiliy  reports that he quit smoking about 53 years ago. His smoking use included pipe and cigarettes. He started smoking about 69 years ago. He has a 15.4 pack-year smoking history. He has never used 
5
0

## 2024-02-07 RX ORDER — FUROSEMIDE 40 MG/1
40 TABLET ORAL EVERY MORNING
Qty: 90 TABLET | Refills: 3 | OUTPATIENT
Start: 2024-02-07

## 2024-02-12 RX ORDER — ATORVASTATIN CALCIUM 80 MG/1
80 TABLET, FILM COATED ORAL NIGHTLY
Qty: 90 TABLET | Refills: 3 | OUTPATIENT
Start: 2024-02-12

## 2024-03-01 RX ORDER — AMLODIPINE BESYLATE 2.5 MG/1
2.5 TABLET ORAL DAILY
Qty: 90 TABLET | Refills: 3 | Status: SHIPPED | OUTPATIENT
Start: 2024-03-01

## 2024-03-23 ENCOUNTER — HOSPITAL ENCOUNTER (OUTPATIENT)
Dept: CT IMAGING | Age: 84
Discharge: HOME | End: 2024-03-23
Attending: RADIOLOGY

## 2024-03-23 ENCOUNTER — HOSPITAL ENCOUNTER (OUTPATIENT)
Dept: GENERAL RADIOLOGY | Age: 84
Discharge: HOME | End: 2024-03-23

## 2024-03-23 DIAGNOSIS — Z00.6 ENCOUNTER FOR EXAMINATION FOR NORMAL COMPARISON OR CONTROL IN CLINICAL RESEARCH PROGRAM: ICD-10-CM

## 2024-03-25 RX ORDER — ATORVASTATIN CALCIUM 80 MG/1
80 TABLET, FILM COATED ORAL NIGHTLY
Qty: 90 TABLET | Refills: 1 | Status: SHIPPED | OUTPATIENT
Start: 2024-03-25

## 2024-04-04 ENCOUNTER — OFFICE VISIT (OUTPATIENT)
Age: 84
End: 2024-04-04
Payer: MEDICARE

## 2024-04-04 ENCOUNTER — HOSPITAL ENCOUNTER (OUTPATIENT)
Dept: CT IMAGING | Age: 84
Discharge: HOME | End: 2024-04-04
Attending: RADIOLOGY

## 2024-04-04 ENCOUNTER — HOSPITAL ENCOUNTER (OUTPATIENT)
Dept: GENERAL RADIOLOGY | Age: 84
Discharge: HOME | End: 2024-04-04
Attending: RADIOLOGY

## 2024-04-04 VITALS
SYSTOLIC BLOOD PRESSURE: 151 MMHG | WEIGHT: 248 LBS | HEIGHT: 68 IN | BODY MASS INDEX: 37.59 KG/M2 | HEART RATE: 68 BPM | DIASTOLIC BLOOD PRESSURE: 76 MMHG

## 2024-04-04 DIAGNOSIS — I71.43 INFRARENAL ABDOMINAL AORTIC ANEURYSM (AAA) WITHOUT RUPTURE (HCC): Primary | ICD-10-CM

## 2024-04-04 DIAGNOSIS — R69 DIAGNOSIS UNKNOWN: ICD-10-CM

## 2024-04-04 DIAGNOSIS — I65.23 BILATERAL CAROTID ARTERY STENOSIS: ICD-10-CM

## 2024-04-04 PROCEDURE — 1123F ACP DISCUSS/DSCN MKR DOCD: CPT | Performed by: THORACIC SURGERY (CARDIOTHORACIC VASCULAR SURGERY)

## 2024-04-04 PROCEDURE — 3077F SYST BP >= 140 MM HG: CPT | Performed by: THORACIC SURGERY (CARDIOTHORACIC VASCULAR SURGERY)

## 2024-04-04 PROCEDURE — 3078F DIAST BP <80 MM HG: CPT | Performed by: THORACIC SURGERY (CARDIOTHORACIC VASCULAR SURGERY)

## 2024-04-04 PROCEDURE — 1036F TOBACCO NON-USER: CPT | Performed by: THORACIC SURGERY (CARDIOTHORACIC VASCULAR SURGERY)

## 2024-04-04 PROCEDURE — 99215 OFFICE O/P EST HI 40 MIN: CPT | Performed by: THORACIC SURGERY (CARDIOTHORACIC VASCULAR SURGERY)

## 2024-04-04 PROCEDURE — G8417 CALC BMI ABV UP PARAM F/U: HCPCS | Performed by: THORACIC SURGERY (CARDIOTHORACIC VASCULAR SURGERY)

## 2024-04-04 PROCEDURE — G8427 DOCREV CUR MEDS BY ELIG CLIN: HCPCS | Performed by: THORACIC SURGERY (CARDIOTHORACIC VASCULAR SURGERY)

## 2024-04-04 RX ORDER — ASPIRIN 81 MG/1
81 TABLET, COATED ORAL DAILY
COMMUNITY
Start: 2024-03-07

## 2024-04-04 RX ORDER — AMLODIPINE BESYLATE 10 MG/1
10 TABLET ORAL DAILY
COMMUNITY
Start: 2024-03-08

## 2024-04-04 RX ORDER — ONDANSETRON HYDROCHLORIDE 8 MG/1
8 TABLET, FILM COATED ORAL EVERY 8 HOURS PRN
COMMUNITY
Start: 2024-03-13

## 2024-04-04 NOTE — PATIENT INSTRUCTIONS
If you receive a survey asking about your care experience, please respond. Your answers will help ensure you receive high-quality care at this office. Thank you!    Your Medical Assistant today: Kezia  Thank you for coming to our office! It was a pleasure to serve you.

## 2024-04-05 ENCOUNTER — HOSPITAL ENCOUNTER (OUTPATIENT)
Dept: ULTRASOUND IMAGING | Age: 84
Discharge: HOME | End: 2024-04-05
Attending: RADIOLOGY

## 2024-04-05 DIAGNOSIS — Z00.6 EXAMINATION FOR NORMAL COMPARISON FOR CLINICAL RESEARCH: ICD-10-CM

## 2024-04-16 ENCOUNTER — OFFICE VISIT (OUTPATIENT)
Dept: CARDIOLOGY CLINIC | Age: 84
End: 2024-04-16
Payer: MEDICARE

## 2024-04-16 VITALS
HEART RATE: 93 BPM | HEIGHT: 68 IN | SYSTOLIC BLOOD PRESSURE: 124 MMHG | BODY MASS INDEX: 34.14 KG/M2 | OXYGEN SATURATION: 93 % | WEIGHT: 225.25 LBS | DIASTOLIC BLOOD PRESSURE: 50 MMHG

## 2024-04-16 DIAGNOSIS — I50.32 CHRONIC DIASTOLIC CONGESTIVE HEART FAILURE, NYHA CLASS 2 (HCC): Primary | ICD-10-CM

## 2024-04-16 DIAGNOSIS — N18.6 END STAGE RENAL DISEASE (HCC): ICD-10-CM

## 2024-04-16 DIAGNOSIS — I48.0 PAROXYSMAL ATRIAL FIBRILLATION (HCC): ICD-10-CM

## 2024-04-16 DIAGNOSIS — R60.0 EDEMA OF BOTH LOWER LEGS: ICD-10-CM

## 2024-04-16 PROCEDURE — 99213 OFFICE O/P EST LOW 20 MIN: CPT | Performed by: NURSE PRACTITIONER

## 2024-04-16 PROCEDURE — 1123F ACP DISCUSS/DSCN MKR DOCD: CPT | Performed by: NURSE PRACTITIONER

## 2024-04-16 PROCEDURE — G8427 DOCREV CUR MEDS BY ELIG CLIN: HCPCS | Performed by: NURSE PRACTITIONER

## 2024-04-16 PROCEDURE — 3078F DIAST BP <80 MM HG: CPT | Performed by: NURSE PRACTITIONER

## 2024-04-16 PROCEDURE — G8417 CALC BMI ABV UP PARAM F/U: HCPCS | Performed by: NURSE PRACTITIONER

## 2024-04-16 PROCEDURE — 3074F SYST BP LT 130 MM HG: CPT | Performed by: NURSE PRACTITIONER

## 2024-04-16 PROCEDURE — 1036F TOBACCO NON-USER: CPT | Performed by: NURSE PRACTITIONER

## 2024-04-16 ASSESSMENT — ENCOUNTER SYMPTOMS
NAUSEA: 0
COUGH: 0
ABDOMINAL DISTENTION: 0
VOMITING: 0
SHORTNESS OF BREATH: 0

## 2024-04-16 NOTE — PATIENT INSTRUCTIONS
You may receive a survey regarding the care you received during your visit.  Your input is valuable to us.  We encourage you to complete and return your survey.  We hope you will choose us in the future for your healthcare needs.    Your nurses today were Sonja.  Office hours:   Mon-Thurs 8-4:30  Friday 8-12  Phone: [phone]    Continue:  Continue current medications  Daily weights and record  Fluid restriction of 2 Liters per day  Limit sodium in diet to around 8701-7151 mg/day  Monitor BP  Activity as tolerated     Call the Heart Failure Clinic for any of the following symptoms:   Weight gain of 2-3 pounds in 1 day or 5 pounds in 1 week  Increased shortness of breath  Shortness of breath while laying down  Cough  Chest pain  Swelling in feet, ankles or legs  Bloating in abdomen  Fatigue      Compression socks daily    Continue diet/fluid adherence  Continue daily wts.  F/U w/ Cardiology

## 2024-04-16 NOTE — PROGRESS NOTES
Heart Failure Clinic       Visit Date: 4/16/2024  Cardiologist:  Dr. Hill  Primary Care Physician: Maris Galloway, APRN - NP    Vasiliy Jiang is a 83 y.o. male who presents today for:  Chief Complaint   Patient presents with    Congestive Heart Failure           HPI:     TYPE HF: HFpEF 55%, mild LV hypertrophy   Cause:   Device:   HX: AAA s/p repair, Afib (eliquis), CAD, Cancer of kidney, Heart attack , Hx of blood clots, Hyperlipidemia, Hypertension, Kidney stones, Obesity, Osteoarthritis, and Stroke   Dry Wt:  258 on 8/30/22, 252 on 10/5/22, 253 on 4/11/23, 255 on 5/9/23, 246 on 10/10/23, 225 on 4/16/24      Vasiliy Jiang is a 83 y.o. male who presents to the office for a f/u patient visit in the heart failure clinic.    Concerns today: 6 month f/u. Since last visit he has been dx with bladder cancer. His is s/p bladder tumor resection in January and after that he developed another UTI and since has been on HD at [facility]. He has since be nauseated and unable to eat. He denies any SOB, leg swelling, bloating or orthopnea.     Visit on 10/10/23:  here today for his 6 month f/u. Weight is down 9lbs since last visit. Urinating well on his Lasix. Denies worsening lower leg swelling, bloating, SOB, and nocturnal dyspnea. Still not wearing CPAP, only wearing oxygen at night.     Visit on 5/9/23: here today as an add on pt. Weight is only up 2lbs but has noted more lower leg swelling with blisters x 1 week. He also notes more SOB. He is taking more Lasix and does note more urination. He is following his diet pretty well. Denies bloating or orthopnea.     Visit on 4/11/23: here today for her 6 month f/u. He has been admitted multiple times for UTIs and a kidney stone - removed in march. Today he denies swelling, bloating or SOB. Still urinating well on his lasix.     Visit on 10/5/23: pt here today for a  week f/u. Doing well. Weight is down on our scale by 6lbs. Urinating well, swelling is

## 2024-04-23 ENCOUNTER — TRANSCRIBE ORDERS (OUTPATIENT)
Dept: ADMINISTRATIVE | Age: 84
End: 2024-04-23

## 2024-04-23 DIAGNOSIS — C67.9 MALIGNANT NEOPLASM OF BLADDER WALL (HCC): Primary | ICD-10-CM

## 2024-05-02 ENCOUNTER — HOSPITAL ENCOUNTER (OUTPATIENT)
Dept: INTERVENTIONAL RADIOLOGY/VASCULAR | Age: 84
Discharge: HOME | End: 2024-05-02
Payer: MEDICARE

## 2024-05-02 VITALS
RESPIRATION RATE: 20 BRPM | TEMPERATURE: 97.7 F | SYSTOLIC BLOOD PRESSURE: 145 MMHG | OXYGEN SATURATION: 90 % | HEART RATE: 81 BPM | DIASTOLIC BLOOD PRESSURE: 66 MMHG

## 2024-05-02 DIAGNOSIS — C67.9 MALIGNANT NEOPLASM OF URINARY BLADDER, UNSPECIFIED SITE (HCC): ICD-10-CM

## 2024-05-02 PROCEDURE — 2709999900 IR PORT PLACEMENT > 5 YEARS

## 2024-05-02 PROCEDURE — 77001 FLUOROGUIDE FOR VEIN DEVICE: CPT

## 2024-05-02 PROCEDURE — 6360000002 HC RX W HCPCS: Performed by: RADIOLOGY

## 2024-05-02 PROCEDURE — 76937 US GUIDE VASCULAR ACCESS: CPT

## 2024-05-02 PROCEDURE — 2580000003 HC RX 258: Performed by: RADIOLOGY

## 2024-05-02 PROCEDURE — 36561 INSERT TUNNELED CV CATH: CPT

## 2024-05-02 PROCEDURE — 2500000003 HC RX 250 WO HCPCS: Performed by: RADIOLOGY

## 2024-05-02 RX ORDER — SODIUM CHLORIDE 450 MG/100ML
INJECTION, SOLUTION INTRAVENOUS CONTINUOUS
Status: DISCONTINUED | OUTPATIENT
Start: 2024-05-02 | End: 2024-05-03 | Stop reason: HOSPADM

## 2024-05-02 RX ORDER — LIDOCAINE HYDROCHLORIDE AND EPINEPHRINE 10; 10 MG/ML; UG/ML
20 INJECTION, SOLUTION INFILTRATION; PERINEURAL ONCE
Status: COMPLETED | OUTPATIENT
Start: 2024-05-02 | End: 2024-05-02

## 2024-05-02 RX ORDER — MIDAZOLAM HYDROCHLORIDE 1 MG/ML
0.5 INJECTION INTRAMUSCULAR; INTRAVENOUS ONCE
Status: COMPLETED | OUTPATIENT
Start: 2024-05-02 | End: 2024-05-02

## 2024-05-02 RX ORDER — FENTANYL CITRATE 50 UG/ML
25 INJECTION, SOLUTION INTRAMUSCULAR; INTRAVENOUS ONCE
Status: COMPLETED | OUTPATIENT
Start: 2024-05-02 | End: 2024-05-02

## 2024-05-02 RX ORDER — MIDAZOLAM HYDROCHLORIDE 1 MG/ML
1 INJECTION INTRAMUSCULAR; INTRAVENOUS ONCE
Status: COMPLETED | OUTPATIENT
Start: 2024-05-02 | End: 2024-05-02

## 2024-05-02 RX ORDER — FENTANYL CITRATE 50 UG/ML
50 INJECTION, SOLUTION INTRAMUSCULAR; INTRAVENOUS ONCE
Status: COMPLETED | OUTPATIENT
Start: 2024-05-02 | End: 2024-05-02

## 2024-05-02 RX ORDER — HEPARIN 100 UNIT/ML
500 SYRINGE INTRAVENOUS ONCE
Status: COMPLETED | OUTPATIENT
Start: 2024-05-02 | End: 2024-05-02

## 2024-05-02 RX ORDER — LEVOTHYROXINE SODIUM 0.03 MG/1
25 TABLET ORAL DAILY
COMMUNITY

## 2024-05-02 RX ADMIN — SODIUM CHLORIDE: 4.5 INJECTION, SOLUTION INTRAVENOUS at 10:43

## 2024-05-02 RX ADMIN — MIDAZOLAM 0.5 MG: 1 INJECTION INTRAMUSCULAR; INTRAVENOUS at 11:08

## 2024-05-02 RX ADMIN — FENTANYL CITRATE 25 MCG: 50 INJECTION INTRAMUSCULAR; INTRAVENOUS at 11:08

## 2024-05-02 RX ADMIN — HEPARIN 500 UNITS: 100 SYRINGE at 11:23

## 2024-05-02 RX ADMIN — WATER 2000 MG: 1 INJECTION INTRAMUSCULAR; INTRAVENOUS; SUBCUTANEOUS at 10:52

## 2024-05-02 RX ADMIN — MIDAZOLAM 0.5 MG: 1 INJECTION INTRAMUSCULAR; INTRAVENOUS at 11:03

## 2024-05-02 RX ADMIN — FENTANYL CITRATE 25 MCG: 50 INJECTION INTRAMUSCULAR; INTRAVENOUS at 11:03

## 2024-05-02 RX ADMIN — LIDOCAINE HYDROCHLORIDE,EPINEPHRINE BITARTRATE 20 ML: 10; .01 INJECTION, SOLUTION INFILTRATION; PERINEURAL at 11:32

## 2024-05-02 ASSESSMENT — PAIN SCALES - GENERAL
PAINLEVEL_OUTOF10: 0
PAINLEVEL_OUTOF10: 0

## 2024-05-02 NOTE — PROGRESS NOTES
0935 Patient in wheelchair with wife to [facility] for Mediport insert. Patient confirms NPO, and last dose of Aspirin, Plavix and Eliquis on Friday. Patient has dialysis catheter present to right chest. Wife states he started Dialysis in March of this year. PT RIGHTS AND RESPONSIBILITIES OFFERED TO PT.    1147 Patient back to room post procedure. Awake in bed. Denies any complaints. Dressing to left chest dry and intact. Oxygen 85% on room air. Patient was 90% on admission. Wife states patient has history of lung puncture post fall in 2020 was on home O2 continues. Had Covid and was on oxygen. But has been off oxygen since covid. Placed 2L on patient.     1215 Patient resting in bed. Repositioned in bed. Dressing dry and intact.     1230 Food given to patient. O2 removed.     1245 Patient doing well sitting on edge of bed. Denies any shortness of breath. Oxygen 90-91% Dressing dry and intact to left chest.     1300 AVS reviewed with patient and wife. Verbalizes understanding. Patient discharged in wheelchair to [facility].         _M___ Safety:       (Environmental)  [place] to environment  Ensure ID band is correct and in place/ allergy band as needed  Assess for fall risk  Initiate fall precautions as applicable (fall band, side rails, etc.)  Call light within reach  Bed in low position/ wheels locked    _M___ Pain:       Assess pain level and characteristics  Administer analgesics as ordered  Assess effectiveness of pain management and report to MD as needed    __M__ Knowledge Deficit:  Assess baseline knowledge  Provide teaching at level of understanding  Provide teaching via preferred learning method  Evaluate teaching effectiveness    _M___ Hemodynamic/Respiratory Status:       (Pre and Post Procedure Monitoring)  Assess/Monitor vital signs and LOC  Assess Baseline SpO2 prior to any sedation  Obtain weight/height  Assess vital signs/ LOC until patient meets discharge criteria  Monitor procedure site and notify MD of any

## 2024-05-02 NOTE — PROGRESS NOTES
1042 Pt arrived to special procedure room 2 for Mediport placement under conscious sedation. Wife Shira shown to waiting area.   1043 Procedure explained using teach back method. Pt states understanding.  1052 Dr King into assess patient and explain procedure. This procedure has been fully reviewed with the patient and written informed consent has been obtained.   1055 Patient assisted to table in supine position. Monitor attached to patient. Comfort ensured.  1058 Pre-procedure images obtained.  1109 Procedure begins.  1129 Procedure complete. Post-procedure images obtained.  1131 Sterile dressing applied to left chest per HARLAN Alvarez RT.  1132 Monitor removed. Patient assisted to stretcher in semi fowlers position. Ice pack applied to left chest. Comfort ensured.  1136 Patient transported to [facility] in stable condition. Writer and wife Shira at bedside.

## 2024-05-02 NOTE — H&P
Formulation and discussion of sedation / procedure plans, risks, benefits, side effects and alternatives with patient and/or responsible adult completed.    History and Physical reviewed and unchanged.    Electronically signed by Antoine King MD on 5/2/24 at 10:11 AM EDT

## 2024-05-02 NOTE — OP NOTE
Department of Radiology  Post Procedure Progress Note      Pre-Procedure Diagnosis:    Renal cancer     Procedure Performed: mediport  insertion     Anesthesia: local , versed and fentanyl    Findings: successful    Immediate Complications:  None    Estimated Blood Loss: minimal    SEE DICTATED PROCEDURE NOTE FOR COMPLETE DETAILS.      Antoine King MD   5/2/2024 11:30 AM

## 2024-05-02 NOTE — H&P
[facility]  Sedation/Analgesia History & Physical    Pt Name: Vasiliy Jiang  MRN: [id number]  YOB: 1940  Provider Performing Procedure: Antoine King MD, MD  Primary Care Physician: Maris Eubanks APRN - NP    Formulation and discussion of sedation / procedure plans, risks, benefits, side effects and alternatives with patient and/or responsible adult completed.    PRE-PROCEDURE   DNR-CCA/DNR-CC []Yes [x]No  Brief History/Pre-Procedure Diagnosis: renal cancer           MEDICAL HISTORY  []CAD/Valve  []Liver Disease  []Lung Disease []Diabetes  []Hypertension []Renal Disease  []Additional information:       has a past medical history of AAA (abdominal aortic aneurysm) (HCC), CAD (coronary artery disease), Cancer (HCC), Cancer of kidney (HCC), Heart attack (HCC), History of placement of chest tube, Hx of blood clots, Hyperlipidemia, Hypertension, Kidney stones, Obesity, Osteoarthritis, and Stroke (HCC).    SURGICAL HISTORY   has a past surgical history that includes joint replacement (2007); Carotid endarterectomy (2006); Abdominal aortic aneurysm repair (2005); shoulder surgery (1997); Tonsillectomy (1948); partial nephrectomy (2010); Cardiac surgery (2008, 1996); Cardiac surgery (2005); Abdomen surgery; vascular surgery; knee surgery (Left, 06/2013); Finger amputation; Percutaneous Transluminal Coronary Angio; laparotomy (N/A, 06/06/2023); Hernia repair (06/06/2023); and Colonoscopy.  Additional information:       ALLERGIES   Allergies as of 05/02/2024 - Fully Reviewed 05/02/2024   Allergen Reaction Noted    Adhesive tape  11/08/2023     Additional information:       MEDICATIONS   Coumadin Use Last 5 Days [x]No []Yes  Antiplatelet drug therapy use last 5 days  [x]No []Yes  Other anticoagulant use last 5 days  [x]No []Yes    Current Outpatient Medications:     levothyroxine (SYNTHROID) 25 MCG tablet, Take 1 tablet by mouth Daily, Disp: , Rfl:     amLODIPine (NORVASC) 10 MG tablet,

## 2024-05-02 NOTE — DISCHARGE INSTRUCTIONS
INSERTION/REMOVAL OF CAPD/MEDIPORT/POWER PORT DISCHARGE INSTRUCTION SHEET    Diet: As before  Care of catheter site:  Keep dressing clean and dry  Ice to catheter site for next 4 hours (20 minutes every hour)  Limit activity to surgical site (no pushing, pulling, or lifting) for next 2 days  Sponge bath only for next 5 days - then may shower, remove dressing and leave open to air.  Steri strips will fall off on their own - do not remove  No driving today    Return to nearest Emergency Room if any of the following:  Symptoms of bleeding, drainage, swelling  Increase in pain  Elevated temperature over 101 degrees    If you have any problems call your doctor or return to the nearest Emergency Room.        SEDATION/ANALGESIA INFORMATION HOME GOING ADVICE    Review the following information with the patient prior to the procedure.  Sedation/agalgesia is used during short medical procedures under controlled supervision.  The medication will produce a strong relaxation.  You will be able to hear, speak and follow instructions, but you memory and alertness will be decreased.  You will be able to swallow and breathe on your own.  During sedation/analgesia you blood pressure, hear and breathing will be watched closely.  After the procedure, you may not remember what was said or done.    Procedure:      Date:  You may have the following effects from the medication.  Drowsiness, dizziness, sleepiness or confusion.  Difficulty remembering or delayed reaction times.  Loss of fine muscle control or difficulty with your balance especially while walking.  Difficulty focusing or blurred vision.  You may not be aware of slight changes in your behavior and/or your reaction time because of the medication used during the procedure.  Therefore you should follow these instructions.  Have someone responsible help you with your care.  Do not drive for 24 hours.  Do not operate equipment for 24 hours ([employer], power tools, kitchen

## 2024-05-21 ENCOUNTER — HOSPITAL ENCOUNTER (OUTPATIENT)
Dept: CT IMAGING | Age: 84
Discharge: HOME | End: 2024-05-21
Attending: INTERNAL MEDICINE
Payer: MEDICARE

## 2024-05-21 ENCOUNTER — HOSPITAL ENCOUNTER (OUTPATIENT)
Dept: NUCLEAR MEDICINE | Age: 84
Discharge: HOME | End: 2024-05-21
Attending: INTERNAL MEDICINE
Payer: MEDICARE

## 2024-05-21 DIAGNOSIS — C67.9 MALIGNANT NEOPLASM OF BLADDER WALL (HCC): ICD-10-CM

## 2024-05-21 PROCEDURE — 3430000000 HC RX DIAGNOSTIC RADIOPHARMACEUTICAL: Performed by: INTERNAL MEDICINE

## 2024-05-21 PROCEDURE — A9503 TC99M MEDRONATE: HCPCS | Performed by: INTERNAL MEDICINE

## 2024-05-21 PROCEDURE — 71250 CT THORAX DX C-: CPT

## 2024-05-21 PROCEDURE — 78306 BONE IMAGING WHOLE BODY: CPT | Performed by: INTERNAL MEDICINE

## 2024-05-21 RX ORDER — TC 99M MEDRONATE 20 MG/10ML
26 INJECTION, POWDER, LYOPHILIZED, FOR SOLUTION INTRAVENOUS
Status: COMPLETED | OUTPATIENT
Start: 2024-05-21 | End: 2024-05-21

## 2024-05-21 RX ADMIN — TC 99M MEDRONATE 26 MILLICURIE: 20 INJECTION, POWDER, LYOPHILIZED, FOR SOLUTION INTRAVENOUS at 10:52

## 2024-07-14 ENCOUNTER — HOSPITAL ENCOUNTER (EMERGENCY)
Age: 84
Discharge: HOME | End: 2024-07-15
Attending: EMERGENCY MEDICINE
Payer: MEDICARE

## 2024-07-14 ENCOUNTER — APPOINTMENT (OUTPATIENT)
Dept: GENERAL RADIOLOGY | Age: 84
End: 2024-07-14
Payer: MEDICARE

## 2024-07-14 DIAGNOSIS — R07.89 ATYPICAL CHEST PAIN: Primary | ICD-10-CM

## 2024-07-14 LAB
ALBUMIN SERPL BCG-MCNC: 3.9 G/DL (ref 3.5–5.1)
ALP SERPL-CCNC: 68 U/L (ref 38–126)
ALT SERPL W/O P-5'-P-CCNC: 13 U/L (ref 11–66)
ANION GAP SERPL CALC-SCNC: 12 MEQ/L (ref 8–16)
AST SERPL-CCNC: 19 U/L (ref 5–40)
BASOPHILS ABSOLUTE: 0 THOU/MM3 (ref 0–0.1)
BASOPHILS NFR BLD AUTO: 0.4 %
BILIRUB SERPL-MCNC: 0.4 MG/DL (ref 0.3–1.2)
BUN SERPL-MCNC: 59 MG/DL (ref 7–22)
CALCIUM SERPL-MCNC: 9.7 MG/DL (ref 8.5–10.5)
CHLORIDE SERPL-SCNC: 98 MEQ/L (ref 98–111)
CO2 SERPL-SCNC: 29 MEQ/L (ref 23–33)
CREAT SERPL-MCNC: 2.8 MG/DL (ref 0.4–1.2)
DEPRECATED RDW RBC AUTO: 53.9 FL (ref 35–45)
EOSINOPHIL NFR BLD AUTO: 7.6 %
EOSINOPHILS ABSOLUTE: 0.3 THOU/MM3 (ref 0–0.4)
ERYTHROCYTE [DISTWIDTH] IN BLOOD BY AUTOMATED COUNT: 14.2 % (ref 11.5–14.5)
GFR SERPL CREATININE-BSD FRML MDRD: 21 ML/MIN/1.73M2
GLUCOSE SERPL-MCNC: 147 MG/DL (ref 70–108)
HCT VFR BLD AUTO: 27 % (ref 42–52)
HGB BLD-MCNC: 9 GM/DL (ref 14–18)
IMM GRANULOCYTES # BLD AUTO: 0.08 THOU/MM3 (ref 0–0.07)
IMM GRANULOCYTES NFR BLD AUTO: 1.7 %
INR PPP: 1.09 (ref 0.85–1.13)
LACTIC ACID, SEPSIS: 1.1 MMOL/L (ref 0.5–1.9)
LIPASE SERPL-CCNC: 34.5 U/L (ref 5.6–51.3)
LYMPHOCYTES ABSOLUTE: 0.4 THOU/MM3 (ref 1–4.8)
LYMPHOCYTES NFR BLD AUTO: 8.5 %
MAGNESIUM SERPL-MCNC: 1.5 MG/DL (ref 1.6–2.4)
MCH RBC QN AUTO: 34.6 PG (ref 26–33)
MCHC RBC AUTO-ENTMCNC: 33.3 GM/DL (ref 32.2–35.5)
MCV RBC AUTO: 103.8 FL (ref 80–94)
MONOCYTES ABSOLUTE: 0.4 THOU/MM3 (ref 0.4–1.3)
MONOCYTES NFR BLD AUTO: 8.9 %
NEUTROPHILS ABSOLUTE: 3.4 THOU/MM3 (ref 1.8–7.7)
NEUTROPHILS NFR BLD AUTO: 72.9 %
NRBC BLD AUTO-RTO: 0 /100 WBC
NT-PROBNP SERPL IA-MCNC: 2585 PG/ML (ref 0–449)
OSMOLALITY SERPL CALC.SUM OF ELEC: 296.8 MOSMOL/KG (ref 275–300)
PLATELET # BLD AUTO: 119 THOU/MM3 (ref 130–400)
PMV BLD AUTO: 10.1 FL (ref 9.4–12.4)
POTASSIUM SERPL-SCNC: 3.7 MEQ/L (ref 3.5–5.2)
PROT SERPL-MCNC: 6.2 G/DL (ref 6.1–8)
RBC # BLD AUTO: 2.6 MILL/MM3 (ref 4.7–6.1)
SODIUM SERPL-SCNC: 139 MEQ/L (ref 135–145)
TROPONIN, HIGH SENSITIVITY: 51 NG/L (ref 0–12)
TROPONIN, HIGH SENSITIVITY: 52 NG/L (ref 0–12)
WBC # BLD AUTO: 4.6 THOU/MM3 (ref 4.8–10.8)

## 2024-07-14 PROCEDURE — 83880 ASSAY OF NATRIURETIC PEPTIDE: CPT

## 2024-07-14 PROCEDURE — 93005 ELECTROCARDIOGRAM TRACING: CPT | Performed by: EMERGENCY MEDICINE

## 2024-07-14 PROCEDURE — 84484 ASSAY OF TROPONIN QUANT: CPT

## 2024-07-14 PROCEDURE — 87147 CULTURE TYPE IMMUNOLOGIC: CPT

## 2024-07-14 PROCEDURE — 83605 ASSAY OF LACTIC ACID: CPT

## 2024-07-14 PROCEDURE — 85025 COMPLETE CBC W/AUTO DIFF WBC: CPT

## 2024-07-14 PROCEDURE — 83690 ASSAY OF LIPASE: CPT

## 2024-07-14 PROCEDURE — 2500000003 HC RX 250 WO HCPCS: Performed by: EMERGENCY MEDICINE

## 2024-07-14 PROCEDURE — 6360000002 HC RX W HCPCS: Performed by: EMERGENCY MEDICINE

## 2024-07-14 PROCEDURE — 83735 ASSAY OF MAGNESIUM: CPT

## 2024-07-14 PROCEDURE — 96375 TX/PRO/DX INJ NEW DRUG ADDON: CPT

## 2024-07-14 PROCEDURE — 96365 THER/PROPH/DIAG IV INF INIT: CPT

## 2024-07-14 PROCEDURE — 36415 COLL VENOUS BLD VENIPUNCTURE: CPT

## 2024-07-14 PROCEDURE — 85610 PROTHROMBIN TIME: CPT

## 2024-07-14 PROCEDURE — 2580000003 HC RX 258: Performed by: EMERGENCY MEDICINE

## 2024-07-14 PROCEDURE — 87040 BLOOD CULTURE FOR BACTERIA: CPT

## 2024-07-14 PROCEDURE — 80053 COMPREHEN METABOLIC PANEL: CPT

## 2024-07-14 PROCEDURE — 87801 DETECT AGNT MULT DNA AMPLI: CPT

## 2024-07-14 PROCEDURE — 71045 X-RAY EXAM CHEST 1 VIEW: CPT

## 2024-07-14 PROCEDURE — 99285 EMERGENCY DEPT VISIT HI MDM: CPT

## 2024-07-14 RX ORDER — MAGNESIUM SULFATE IN WATER 40 MG/ML
2000 INJECTION, SOLUTION INTRAVENOUS ONCE
Status: COMPLETED | OUTPATIENT
Start: 2024-07-14 | End: 2024-07-15

## 2024-07-14 RX ADMIN — MAGNESIUM SULFATE HEPTAHYDRATE 2000 MG: 40 INJECTION, SOLUTION INTRAVENOUS at 23:18

## 2024-07-14 RX ADMIN — FAMOTIDINE 20 MG: 10 INJECTION, SOLUTION INTRAVENOUS at 22:14

## 2024-07-14 ASSESSMENT — PAIN - FUNCTIONAL ASSESSMENT: PAIN_FUNCTIONAL_ASSESSMENT: NONE - DENIES PAIN

## 2024-07-15 VITALS
DIASTOLIC BLOOD PRESSURE: 72 MMHG | TEMPERATURE: 97.9 F | RESPIRATION RATE: 16 BRPM | OXYGEN SATURATION: 93 % | SYSTOLIC BLOOD PRESSURE: 124 MMHG | HEART RATE: 80 BPM

## 2024-07-15 LAB
EKG Q-T INTERVAL: 426 MS
EKG QRS DURATION: 134 MS
EKG QTC CALCULATION (BAZETT): 469 MS
EKG R AXIS: 24 DEGREES
EKG T AXIS: -20 DEGREES
EKG VENTRICULAR RATE: 73 BPM

## 2024-07-15 PROCEDURE — 93010 ELECTROCARDIOGRAM REPORT: CPT | Performed by: INTERNAL MEDICINE

## 2024-07-15 PROCEDURE — 6360000002 HC RX W HCPCS: Performed by: EMERGENCY MEDICINE

## 2024-07-15 RX ORDER — HEPARIN 100 UNIT/ML
300 SYRINGE INTRAVENOUS ONCE
Status: COMPLETED | OUTPATIENT
Start: 2024-07-15 | End: 2024-07-15

## 2024-07-15 RX ADMIN — HEPARIN 300 UNITS: 100 SYRINGE at 01:05

## 2024-07-15 ASSESSMENT — HEART SCORE: ECG: NORMAL

## 2024-07-15 NOTE — ED TRIAGE NOTES
Patient presents to ED with c/o chest pain x24 hours. Patient states the pain worsened today and feels sharper. Patient describes pain in left side of chest down to rib cage. Patient denies any SOB. Patient alert and oriented x4.

## 2024-07-15 NOTE — DISCHARGE INSTRUCTIONS
You were seen for chest pain and abdominal pain.  No evidence of ischemia, infection, or obstructive process was found on evaluation today.  Please go to dialysis as scheduled.  Continue your radiation treatments as scheduled.  If you develop worsening chest pain, difficulty breathing, or any other concerning symptoms please return to emergency room for evaluation.

## 2024-07-15 NOTE — ED PROVIDER NOTES
prospective validation of the HEART score for chest pain patients at the emergency department.\" Int J Cardiol. 2013 Oct 3;168(3):2153-8. doi: 10.1016/[email or web address].2013.01.255. Epub 2013 Mar 7.              External Documentation Reviewed:         Previous patient encounter documents & history available on EMR was reviewed              See Formal Diagnostic Results above for the lab and radiology tests and orders.    3)  Treatment and Disposition         ED Reassessment: See ED course           Shared Decision-Making was performed and disposition discussed with the        Patient/Family and questions answered          Social determinants of health impacting treatment or disposition: None         Code Status: Full      Summary of Patient Presentation:      MDM  Number of Diagnoses or Management Options  Atypical chest pain  Diagnosis management comments: 83-year-old male presents emergency room for abdominal pain and chest pain.  Will evaluate with CBC, CMP, troponin, BNP, UA, blood cultures, lipase, chest x-ray.  Patient is not having significant pain at this time.  Will give Pepcid for additional pain relief.    /  ED Course as of 07/15/24 0723   Sun Jul 14, 2024 2244 Mild leukopenia at 4.6.  Hemoglobin is 9.0.  Patient has a normal differential.  Hemoglobin is significantly different from previous however this was over 5 months ago and he has been actively treated for cancer. [DD]   2245 Electrolytes are within normal notes.  Creatinine is elevated at 2.8.  He is currently receiving dialysis and received dialysis on Friday.  BUN is mildly elevated at 59.  Hepatic function panel is within normal limits.  Patient has mildly low magnesium.  Will replace here. [DD]   2246 Lipase is within normal limits. [DD]   2246 High-sensitivity troponin is 52.  I have nothing to compare this to.  I would expect some mild elevation given his CKD.  Will plan to do a repeat troponin. [DD]   2246 BNP is elevated at 2585 however this is  [UNKNOWN]

## 2024-07-15 NOTE — H&P
"Daily Note     Today's date: 7/15/2024  Patient name: Simi Alejandro  : 1972  MRN: [id number]  Referring provider: Yara Parra MD  Dx: No diagnosis found.               Subjective: Patient reports good tolerance to home program. She does note difficulty with glut med activation at wall due to home setup.       Objective: See treatment diary below      Assessment: Tolerated treatment well. Patient demonstrated fatigue post treatment, exhibited good technique with therapeutic exercises, and would benefit from continued PT      Plan: Continue per plan of care.      Precautions: n/a      Manuals 7/9 7/15           Mercado tape EB            ESWT patellar tendon  2500 p 21 Hz small metal 1.8           ESWT quad tendon                          Neuro Re-Ed             Squat with TRX 10x            Squat with TB resist at knee 10x            TRX pistol             Sidestep CLX  2 laps black           Glut med iso@ wall 10x 10\"            BFR Step up  NV           Side plank from knee w/ hip abd 4 x 15\"            LAQ BFR  30 1 x 15 blue TB           Legpress BFR  10# 30 3 x 15           BFR mini lunge             Ther Ex             bike               Sciatic nerve glide 10 x 10\"                                                                                            Ther Activity                                       Gait Training                                       Modalities                                            " [id number]  General Surgery Consult Note  Dr. Isi Bliss MD  Pt Name: Emily Medina  MRN: [id number]  YOB: 1940  Date of evaluation: 6/6/2023  Primary Care Physician: Leigh Ann Saravia  Patient evaluated at the request of  Dr. 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 I did discuss with he and his wife that is unclear what part of suffering and this hernia as he has mesenteric fat: Small bowel but the skin is red he has a tender painful mass. He understands the urgency and need to proceed and possibly will have to have a bowel resection and a colostomy but at this point we have no choice  They understand that he has high cardiac risk based on his history that we have to stop his anticoagulation and that A-fib does put him at risk for stroke we will certainly consult Dr. Ruben Aden his cardiologist for postop management. SUBJECTIVE:   History of Chief Complaint:    Emily Medina is a 79 y.o. male who presents with abdominal pain. He has had it for a couple of days progressively gotten worse has a known hernia which she was told in the past not to get it fixed until it started giving him problems but has been giving him problems. Has been present since about 2005 and is from a midline aortic aneurysm repair.   He states that he had a painful lump there and then over the past 24 hours has become red warm to touch and

## 2024-07-18 LAB
ACB COMPLEX DNA BLD POS QL NAA+NON-PROBE: NOT DETECTED
B FRAGILIS DNA BLD POS QL NAA+NON-PROBE: NOT DETECTED
BLACTX-M ISLT/SPM QL: ABNORMAL
BLAIMP ISLT/SPM QL: ABNORMAL
BLAKPC ISLT/SPM QL: ABNORMAL
BLAOXA-48-LIKE ISLT/SPM QL: ABNORMAL
BLAVIM ISLT/SPM QL: ABNORMAL
BOTTLE TYPE: ABNORMAL
C ALBICANS DNA BLD POS QL NAA+NON-PROBE: NOT DETECTED
C AURIS DNA BLD POS QL NAA+NON-PROBE: NOT DETECTED
C GATTII+NEOFOR DNA BLD POS QL NAA+N-PRB: NOT DETECTED
C GLABRATA DNA BLD POS QL NAA+NON-PROBE: NOT DETECTED
C KRUSEI DNA BLD POS QL NAA+NON-PROBE: NOT DETECTED
C PARAP DNA BLD POS QL NAA+NON-PROBE: NOT DETECTED
C TROPICLS DNA BLD POS QL NAA+NON-PROBE: NOT DETECTED
COAG NEG STAPH DNA BLD QL NAA+PROBE: DETECTED
COLISTIN RES MCR-1 ISLT/SPM QL: ABNORMAL
E CLOAC COMP DNA BLD POS NAA+NON-PROBE: NOT DETECTED
E COLI DNA BLD POS QL NAA+NON-PROBE: NOT DETECTED
E FAECALIS DNA BLD POS QL NAA+NON-PROBE: NOT DETECTED
E FAECIUM DNA BLD POS QL NAA+NON-PROBE: NOT DETECTED
ENTEROBACTERALES DNA BLD POS NAA+N-PRB: NOT DETECTED
GP B STREP DNA SPEC QL NAA+PROBE: NOT DETECTED
GP B STREP DNA SPEC QL NAA+PROBE: NOT DETECTED
HAEM INFLU DNA BLD POS QL NAA+NON-PROBE: NOT DETECTED
K OXYTOCA DNA BLD POS QL NAA+NON-PROBE: NOT DETECTED
K OXYTOCA DNA BLD POS QL NAA+NON-PROBE: NOT DETECTED
KLEBSIELLA SP DNA BLD POS QL NAA+NON-PRB: NOT DETECTED
L MONOCYTOG DNA BLD POS QL NAA+NON-PROBE: NOT DETECTED
MECA ISLT/SPM QL: ABNORMAL
MECA+MECC+MREJ ISLT/SPM QL: ABNORMAL
N MEN DNA BLD POS QL NAA+NON-PROBE: NOT DETECTED
NDM: ABNORMAL
P AERUGINOSA DNA BLD POS NAA+NON-PROBE: NOT DETECTED
PROTEUS SPP: NOT DETECTED
S AUREUS DNA BLD POS QL NAA+NON-PROBE: NOT DETECTED
S EPIDERMIDIS DNA BLD POS QL NAA+NON-PRB: NOT DETECTED
S LUGDUNENSIS DNA BLD POS QL NAA+NON-PRB: NOT DETECTED
S MALTOPHILIA DNA BLD POS QL NAA+NON-PRB: NOT DETECTED
S MARCESCENS DNA BLD POS NAA+NON-PROBE: NOT DETECTED
S PYO DNA THROAT QL NAA+PROBE: NOT DETECTED
SALMONELLA DNA BLD POS QL NAA+NON-PROBE: NOT DETECTED
SOURCE OF BLOOD CULTURE: ABNORMAL
STREPTOCOCCUS DNA BLD QL NAA+PROBE: NOT DETECTED
VANA+VANB ISLT/SPM QL: ABNORMAL

## 2024-07-19 LAB — BACTERIA BLD AEROBE CULT: NORMAL

## 2024-07-21 LAB
BACTERIA BLD AEROBE CULT: ABNORMAL
ORGANISM: ABNORMAL
ORGANISM: ABNORMAL

## 2024-07-23 ENCOUNTER — OFFICE VISIT (OUTPATIENT)
Dept: CARDIOLOGY CLINIC | Age: 84
End: 2024-07-23
Payer: MEDICARE

## 2024-07-23 ENCOUNTER — HOSPITAL ENCOUNTER (OUTPATIENT)
Age: 84
Discharge: HOME | End: 2024-07-23
Payer: MEDICARE

## 2024-07-23 VITALS
DIASTOLIC BLOOD PRESSURE: 63 MMHG | BODY MASS INDEX: 28.95 KG/M2 | WEIGHT: 191 LBS | HEART RATE: 74 BPM | SYSTOLIC BLOOD PRESSURE: 138 MMHG | HEIGHT: 68 IN

## 2024-07-23 DIAGNOSIS — I25.10 ASCVD (ARTERIOSCLEROTIC CARDIOVASCULAR DISEASE): ICD-10-CM

## 2024-07-23 LAB
DEPRECATED RDW RBC AUTO: 54 FL (ref 35–45)
ERYTHROCYTE [DISTWIDTH] IN BLOOD BY AUTOMATED COUNT: 14.5 % (ref 11.5–14.5)
HCT VFR BLD AUTO: 31.9 % (ref 42–52)
HGB BLD-MCNC: 10.8 GM/DL (ref 14–18)
MCH RBC QN AUTO: 35.3 PG (ref 26–33)
MCHC RBC AUTO-ENTMCNC: 33.9 GM/DL (ref 32.2–35.5)
MCV RBC AUTO: 104.2 FL (ref 80–94)
PLATELET # BLD AUTO: 120 THOU/MM3 (ref 130–400)
PMV BLD AUTO: 9.8 FL (ref 9.4–12.4)
RBC # BLD AUTO: 3.06 MILL/MM3 (ref 4.7–6.1)
WBC # BLD AUTO: 7.1 THOU/MM3 (ref 4.8–10.8)

## 2024-07-23 PROCEDURE — 85027 COMPLETE CBC AUTOMATED: CPT

## 2024-07-23 PROCEDURE — 36415 COLL VENOUS BLD VENIPUNCTURE: CPT

## 2024-07-23 PROCEDURE — 1123F ACP DISCUSS/DSCN MKR DOCD: CPT | Performed by: INTERNAL MEDICINE

## 2024-07-23 PROCEDURE — G8427 DOCREV CUR MEDS BY ELIG CLIN: HCPCS | Performed by: INTERNAL MEDICINE

## 2024-07-23 PROCEDURE — 3075F SYST BP GE 130 - 139MM HG: CPT | Performed by: INTERNAL MEDICINE

## 2024-07-23 PROCEDURE — G8417 CALC BMI ABV UP PARAM F/U: HCPCS | Performed by: INTERNAL MEDICINE

## 2024-07-23 PROCEDURE — 3078F DIAST BP <80 MM HG: CPT | Performed by: INTERNAL MEDICINE

## 2024-07-23 PROCEDURE — 1036F TOBACCO NON-USER: CPT | Performed by: INTERNAL MEDICINE

## 2024-07-23 PROCEDURE — 99214 OFFICE O/P EST MOD 30 MIN: CPT | Performed by: INTERNAL MEDICINE

## 2024-07-23 RX ORDER — ATORVASTATIN CALCIUM 80 MG/1
80 TABLET, FILM COATED ORAL NIGHTLY
Qty: 90 TABLET | Refills: 1 | Status: SHIPPED | OUTPATIENT
Start: 2024-07-23

## 2024-07-23 RX ORDER — CLOPIDOGREL BISULFATE 75 MG/1
75 TABLET ORAL DAILY
Qty: 90 TABLET | Refills: 3 | Status: SHIPPED | OUTPATIENT
Start: 2024-07-23

## 2024-07-23 NOTE — PROGRESS NOTES
6 month follow up  Med list up to date and pharmacy verified.   Has had a couple hospital visits for UTIs  Denies chest pain, SOB, or palpitations. \  EKG last completed 07/14/2024    Has been dx with bladder cancer in January 2024 and is doing radiation.     
needed             Electronically signed by Nohemy Hill MD, FACC, [facility]    7/22/2024 at 6:53 PM EDT

## 2024-07-24 ENCOUNTER — TELEPHONE (OUTPATIENT)
Dept: CARDIOLOGY CLINIC | Age: 84
End: 2024-07-24

## 2024-07-24 NOTE — TELEPHONE ENCOUNTER
Result note from CBC    Nohemy Hill MD  P [facility] Clinical Staff  Hemoglobin improved to 10.8  Inform patient  He needs to follow up with Dr Lama as scheduled

## 2024-07-29 ENCOUNTER — LAB (OUTPATIENT)
Dept: LAB | Age: 84
End: 2024-07-29

## 2024-07-29 LAB
ALBUMIN SERPL BCG-MCNC: 4.3 G/DL (ref 3.5–5.1)
ALP SERPL-CCNC: 63 U/L (ref 38–126)
ALT SERPL W/O P-5'-P-CCNC: 16 U/L (ref 11–66)
ANION GAP SERPL CALC-SCNC: 14 MEQ/L (ref 8–16)
AST SERPL-CCNC: 20 U/L (ref 5–40)
BASOPHILS ABSOLUTE: 0 THOU/MM3 (ref 0–0.1)
BASOPHILS NFR BLD AUTO: 0.3 %
BILIRUB SERPL-MCNC: 0.4 MG/DL (ref 0.3–1.2)
BUN SERPL-MCNC: 99 MG/DL (ref 7–22)
CALCIUM SERPL-MCNC: 10.3 MG/DL (ref 8.5–10.5)
CHLORIDE SERPL-SCNC: 105 MEQ/L (ref 98–111)
CO2 SERPL-SCNC: 23 MEQ/L (ref 23–33)
CREAT SERPL-MCNC: 3.1 MG/DL (ref 0.4–1.2)
DEPRECATED RDW RBC AUTO: 56.5 FL (ref 35–45)
EOSINOPHIL NFR BLD AUTO: 8.3 %
EOSINOPHILS ABSOLUTE: 0.6 THOU/MM3 (ref 0–0.4)
ERYTHROCYTE [DISTWIDTH] IN BLOOD BY AUTOMATED COUNT: 14.6 % (ref 11.5–14.5)
GFR SERPL CREATININE-BSD FRML MDRD: 19 ML/MIN/1.73M2
GLUCOSE SERPL-MCNC: 116 MG/DL (ref 70–108)
HCT VFR BLD AUTO: 32.4 % (ref 42–52)
HGB BLD-MCNC: 10.8 GM/DL (ref 14–18)
IMM GRANULOCYTES # BLD AUTO: 0.12 THOU/MM3 (ref 0–0.07)
IMM GRANULOCYTES NFR BLD AUTO: 1.8 %
LYMPHOCYTES ABSOLUTE: 0.3 THOU/MM3 (ref 1–4.8)
LYMPHOCYTES NFR BLD AUTO: 5.1 %
MCH RBC QN AUTO: 35.2 PG (ref 26–33)
MCHC RBC AUTO-ENTMCNC: 33.3 GM/DL (ref 32.2–35.5)
MCV RBC AUTO: 105.5 FL (ref 80–94)
MONOCYTES ABSOLUTE: 0.5 THOU/MM3 (ref 0.4–1.3)
MONOCYTES NFR BLD AUTO: 8 %
NEUTROPHILS ABSOLUTE: 5.1 THOU/MM3 (ref 1.8–7.7)
NEUTROPHILS NFR BLD AUTO: 76.5 %
NRBC BLD AUTO-RTO: 0 /100 WBC
PLATELET # BLD AUTO: 116 THOU/MM3 (ref 130–400)
PMV BLD AUTO: 10.2 FL (ref 9.4–12.4)
POTASSIUM SERPL-SCNC: 4.3 MEQ/L (ref 3.5–5.2)
PROT SERPL-MCNC: 6.8 G/DL (ref 6.1–8)
RBC # BLD AUTO: 3.07 MILL/MM3 (ref 4.7–6.1)
SODIUM SERPL-SCNC: 142 MEQ/L (ref 135–145)
T4 FREE SERPL-MCNC: 1.2 NG/DL (ref 0.93–1.68)
TSH SERPL DL<=0.005 MIU/L-ACNC: 3.36 UIU/ML (ref 0.4–4.2)
WBC # BLD AUTO: 6.7 THOU/MM3 (ref 4.8–10.8)

## 2024-10-11 ENCOUNTER — HOSPITAL ENCOUNTER (OUTPATIENT)
Dept: CT IMAGING | Age: 84
Discharge: HOME | End: 2024-10-11
Attending: INTERNAL MEDICINE
Payer: MEDICARE

## 2024-10-11 DIAGNOSIS — C67.9 MALIGNANT NEOPLASM OF URINARY BLADDER, UNSPECIFIED SITE (HCC): ICD-10-CM

## 2024-10-11 PROCEDURE — 74176 CT ABD & PELVIS W/O CONTRAST: CPT

## 2024-11-11 RX ORDER — METOPROLOL TARTRATE 25 MG/1
25 TABLET, FILM COATED ORAL 2 TIMES DAILY
Qty: 180 TABLET | Refills: 3 | Status: SHIPPED | OUTPATIENT
Start: 2024-11-11

## 2024-12-30 RX ORDER — APIXABAN 2.5 MG/1
2.5 TABLET, FILM COATED ORAL 2 TIMES DAILY
Qty: 180 TABLET | Refills: 2 | Status: SHIPPED | OUTPATIENT
Start: 2024-12-30

## 2025-01-21 ENCOUNTER — TRANSCRIBE ORDERS (OUTPATIENT)
Dept: ADMINISTRATIVE | Age: 85
End: 2025-01-21

## 2025-01-21 DIAGNOSIS — C67.9 MALIGNANT PAPILLARY CARCINOMA OF BLADDER (HCC): Primary | ICD-10-CM

## 2025-02-11 ENCOUNTER — HOSPITAL ENCOUNTER (OUTPATIENT)
Dept: CT IMAGING | Age: 85
Discharge: HOME | End: 2025-02-11
Attending: INTERNAL MEDICINE
Payer: MEDICARE

## 2025-02-11 DIAGNOSIS — C67.9 MALIGNANT PAPILLARY CARCINOMA OF BLADDER (HCC): ICD-10-CM

## 2025-02-11 PROCEDURE — 74176 CT ABD & PELVIS W/O CONTRAST: CPT

## 2025-03-06 RX ORDER — ATORVASTATIN CALCIUM 80 MG/1
80 TABLET, FILM COATED ORAL NIGHTLY
Qty: 90 TABLET | Refills: 0 | Status: SHIPPED | OUTPATIENT
Start: 2025-03-06

## 2025-04-04 ENCOUNTER — HOSPITAL ENCOUNTER (OUTPATIENT)
Dept: CT IMAGING | Age: 85
Discharge: HOME | End: 2025-04-04
Attending: THORACIC SURGERY (CARDIOTHORACIC VASCULAR SURGERY)
Payer: MEDICARE

## 2025-04-04 ENCOUNTER — HOSPITAL ENCOUNTER (OUTPATIENT)
Dept: INTERVENTIONAL RADIOLOGY/VASCULAR | Age: 85
Discharge: HOME | End: 2025-04-04
Attending: THORACIC SURGERY (CARDIOTHORACIC VASCULAR SURGERY)
Payer: MEDICARE

## 2025-04-04 DIAGNOSIS — I65.23 BILATERAL CAROTID ARTERY STENOSIS: ICD-10-CM

## 2025-04-04 DIAGNOSIS — I71.43 INFRARENAL ABDOMINAL AORTIC ANEURYSM (AAA) WITHOUT RUPTURE: ICD-10-CM

## 2025-04-04 PROCEDURE — 93880 EXTRACRANIAL BILAT STUDY: CPT

## 2025-04-04 PROCEDURE — 71250 CT THORAX DX C-: CPT

## 2025-04-15 ENCOUNTER — TRANSCRIBE ORDERS (OUTPATIENT)
Dept: ADMINISTRATIVE | Age: 85
End: 2025-04-15

## 2025-04-15 DIAGNOSIS — C67.9 MALIGNANT NEOPLASM OF BLADDER WALL (HCC): Primary | ICD-10-CM

## 2025-05-06 ENCOUNTER — OFFICE VISIT (OUTPATIENT)
Age: 85
End: 2025-05-06
Payer: MEDICARE

## 2025-05-06 VITALS
HEART RATE: 79 BPM | DIASTOLIC BLOOD PRESSURE: 69 MMHG | SYSTOLIC BLOOD PRESSURE: 124 MMHG | BODY MASS INDEX: 29.1 KG/M2 | HEIGHT: 68 IN | WEIGHT: 192 LBS

## 2025-05-06 DIAGNOSIS — C80.1 CANCER (HCC): Primary | ICD-10-CM

## 2025-05-06 DIAGNOSIS — I71.23 ANEURYSM OF DESCENDING THORACIC AORTA WITHOUT RUPTURE: ICD-10-CM

## 2025-05-06 PROCEDURE — 3078F DIAST BP <80 MM HG: CPT | Performed by: SURGERY

## 2025-05-06 PROCEDURE — G8417 CALC BMI ABV UP PARAM F/U: HCPCS | Performed by: SURGERY

## 2025-05-06 PROCEDURE — 1123F ACP DISCUSS/DSCN MKR DOCD: CPT | Performed by: SURGERY

## 2025-05-06 PROCEDURE — 1036F TOBACCO NON-USER: CPT | Performed by: SURGERY

## 2025-05-06 PROCEDURE — 3074F SYST BP LT 130 MM HG: CPT | Performed by: SURGERY

## 2025-05-06 PROCEDURE — 99213 OFFICE O/P EST LOW 20 MIN: CPT | Performed by: SURGERY

## 2025-05-06 PROCEDURE — 1159F MED LIST DOCD IN RCRD: CPT | Performed by: SURGERY

## 2025-05-06 PROCEDURE — G8427 DOCREV CUR MEDS BY ELIG CLIN: HCPCS | Performed by: SURGERY

## 2025-05-06 RX ORDER — GABAPENTIN 100 MG/1
200 CAPSULE ORAL 3 TIMES DAILY
COMMUNITY
Start: 2025-02-06

## 2025-05-06 NOTE — PATIENT INSTRUCTIONS
If you receive a survey asking about your care experience, please respond. Your answers will help ensure you receive high-quality care at this office. Thank you!    Your Medical Assistant today: Brenda HERNANDEZ  Thank you for coming to our office! It was a pleasure to serve you.

## 2025-05-06 NOTE — PROGRESS NOTES
[facility] [place] [facility]  [facility] VASCULAR SURGERY  [address], [address]  [place] [address]  Dept: [phone]  Loc: [phone]     Patient: Vasiliy Jiang  : 1940  MRN: [id number]  DOS: 2025            HPI:  Vasiliy Jiang is a 84 y.o. male who returns to the office regarding his distal thoracic aortic aneurysm.  His aneurysm was evaluated with CT without contrast.  It measures approximately 48 mm in its greatest diameter.  He has no chest pain.  He has no back pain.  He has no epigastric pain.  He is ambulating without claudication.  He has no rest pain.  His carotid duplex revealed no evidence of significant stenosis.    Review of Systems    Vitals:    25 1339   BP: 124/69   BP Site: Left Upper Arm   Patient Position: Sitting   BP Cuff Size: Medium Adult   Pulse: 79   Weight: 87.1 kg (192 lb)   Height: 1.727 m (5' 8\")          Physical Exam  On examination I cannot palpate distal pulses in either lower extremity.  He does have edema consistent with CHF or lymphatic edema.  I do not think it is venous in nature.  His feet are warm and well-perfused without ulceration or gangrene.  He has palpable femoral and popliteal pulses bilaterally.    Assessment:  1. Cancer (HCC)    2. 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  There is no need for contrast to investigate the size of this aneurysm as it does not come close to the celiac vessels and it is in the distal thoracic aorta.  It would be a relatively straightforward repair with an endograft if he ever needed this.  He will need CT of the abdomen pelvis if it came to repair.    Electronically signed by:  Hever Leyva MD

## 2025-05-07 ENCOUNTER — HOSPITAL ENCOUNTER (OUTPATIENT)
Dept: CT IMAGING | Age: 85
Discharge: HOME | End: 2025-05-07
Attending: INTERNAL MEDICINE
Payer: MEDICARE

## 2025-05-07 ENCOUNTER — HOSPITAL ENCOUNTER (OUTPATIENT)
Age: 85
Discharge: HOME | End: 2025-05-07
Payer: MEDICARE

## 2025-05-07 DIAGNOSIS — C67.9 MALIGNANT NEOPLASM OF BLADDER WALL (HCC): ICD-10-CM

## 2025-05-07 LAB
ALBUMIN SERPL BCG-MCNC: 3.8 G/DL (ref 3.4–4.9)
ALP SERPL-CCNC: 91 U/L (ref 40–129)
ALT SERPL W/O P-5'-P-CCNC: 18 U/L (ref 10–50)
ANION GAP SERPL CALC-SCNC: 13 MEQ/L (ref 8–16)
AST SERPL-CCNC: 24 U/L (ref 10–50)
BASOPHILS ABSOLUTE: 0 THOU/MM3 (ref 0–0.1)
BASOPHILS NFR BLD AUTO: 0.4 %
BILIRUB SERPL-MCNC: 0.3 MG/DL (ref 0.3–1.2)
BUN SERPL-MCNC: 61 MG/DL (ref 8–23)
CALCIUM SERPL-MCNC: 10.4 MG/DL (ref 8.8–10.2)
CHLORIDE SERPL-SCNC: 106 MEQ/L (ref 98–111)
CO2 SERPL-SCNC: 22 MEQ/L (ref 22–29)
CREAT SERPL-MCNC: 1.9 MG/DL (ref 0.7–1.2)
DEPRECATED RDW RBC AUTO: 48.6 FL (ref 35–45)
EOSINOPHIL NFR BLD AUTO: 5.5 %
EOSINOPHILS ABSOLUTE: 0.4 THOU/MM3 (ref 0–0.4)
ERYTHROCYTE [DISTWIDTH] IN BLOOD BY AUTOMATED COUNT: 13.2 % (ref 11.5–14.5)
GFR SERPL CREATININE-BSD FRML MDRD: 34 ML/MIN/1.73M2
GLUCOSE SERPL-MCNC: 128 MG/DL (ref 74–109)
HCT VFR BLD AUTO: 39.2 % (ref 42–52)
HGB BLD-MCNC: 12.6 GM/DL (ref 14–18)
IMM GRANULOCYTES # BLD AUTO: 0.16 THOU/MM3 (ref 0–0.07)
IMM GRANULOCYTES NFR BLD AUTO: 2.1 %
LYMPHOCYTES ABSOLUTE: 0.4 THOU/MM3 (ref 1–4.8)
LYMPHOCYTES NFR BLD AUTO: 5.9 %
MCH RBC QN AUTO: 32.7 PG (ref 26–33)
MCHC RBC AUTO-ENTMCNC: 32.1 GM/DL (ref 32.2–35.5)
MCV RBC AUTO: 101.8 FL (ref 80–94)
MONOCYTES ABSOLUTE: 0.6 THOU/MM3 (ref 0.4–1.3)
MONOCYTES NFR BLD AUTO: 8.5 %
NEUTROPHILS ABSOLUTE: 5.9 THOU/MM3 (ref 1.8–7.7)
NEUTROPHILS NFR BLD AUTO: 77.6 %
NRBC BLD AUTO-RTO: 0 /100 WBC
PLATELET # BLD AUTO: 116 THOU/MM3 (ref 130–400)
PMV BLD AUTO: 9.4 FL (ref 9.4–12.4)
POTASSIUM SERPL-SCNC: 4.1 MEQ/L (ref 3.5–5.2)
PROT SERPL-MCNC: 6.7 G/DL (ref 6.4–8.3)
RBC # BLD AUTO: 3.85 MILL/MM3 (ref 4.7–6.1)
SODIUM SERPL-SCNC: 141 MEQ/L (ref 135–145)
T4 FREE SERPL-MCNC: 1.2 NG/DL (ref 0.92–1.68)
TSH SERPL DL<=0.05 MIU/L-ACNC: 2.34 UIU/ML (ref 0.27–4.2)
WBC # BLD AUTO: 7.6 THOU/MM3 (ref 4.8–10.8)

## 2025-05-07 PROCEDURE — 80053 COMPREHEN METABOLIC PANEL: CPT

## 2025-05-07 PROCEDURE — 84439 ASSAY OF FREE THYROXINE: CPT

## 2025-05-07 PROCEDURE — 84443 ASSAY THYROID STIM HORMONE: CPT

## 2025-05-07 PROCEDURE — 74176 CT ABD & PELVIS W/O CONTRAST: CPT

## 2025-05-07 PROCEDURE — 85025 COMPLETE CBC W/AUTO DIFF WBC: CPT

## 2025-05-07 PROCEDURE — 36415 COLL VENOUS BLD VENIPUNCTURE: CPT

## 2025-05-13 ENCOUNTER — TRANSCRIBE ORDERS (OUTPATIENT)
Dept: ADMINISTRATIVE | Age: 85
End: 2025-05-13

## 2025-05-13 DIAGNOSIS — C67.9 MALIGNANT NEOPLASM OF URINARY BLADDER, UNSPECIFIED SITE (HCC): Primary | ICD-10-CM

## 2025-06-11 RX ORDER — ATORVASTATIN CALCIUM 80 MG/1
80 TABLET, FILM COATED ORAL NIGHTLY
Qty: 90 TABLET | Refills: 0 | Status: SHIPPED | OUTPATIENT
Start: 2025-06-11

## 2025-06-23 NOTE — PATIENT INSTRUCTIONS
You may receive a survey regarding the care you received during your visit. We encourage you to complete and return your survey, as your input is valuable to us. We hope you will choose us in the future for your healthcare needs. Thank you!    Your Medical Assistant today: MARY Palacios & MAZIN Blair  Thank you for coming to our office! It was a pleasure to serve you.

## 2025-06-23 NOTE — PROGRESS NOTES
[facility] [place] [facility]  [facility] CARDIOLOGY  [address]  [address]  [place] [address]  Dept: [phone]  Dept Fax: [phone]  Loc: [phone]    Visit Date: 6/24/2025  Mr. Jiang is a 84 y.o. male who presented for:  CHF  Leg swelling   HPI:   Vasiliy Jiang is a pleasant 84 y.o. male who  has a past medical history of AAA (abdominal aortic aneurysm), CAD (coronary artery disease), Cancer (HCC), Cancer of kidney (HCC), Heart attack (HCC), History of placement of chest tube, Hx of blood clots, Hyperlipidemia, Hypertension, Kidney stones, Obesity, Osteoarthritis, and Stroke (HCC). On 11/2019, he was found to have severe ISR in RCA, underwent successful PCI. Nuclear stress test 5/2021 was negative for ischemia. Chest CTA that was done 5/2024 revealed distal thoracic aorta aneurysm, diameter 4.7 cm (was 4.4 cm on prior study). Echo 5/2022 revealed mild LVH, EF 55%. Holter monitor on 7/2022 revealed persistent atrial fibrillation, average HR 88, minimal HR 59, no pauses, no profound bradycardia. Repeat CT scan of the abdomen reveals distal descending thoracic aorta measuring 4.4 cm, 40% stenosis of the suprarenal abdominal aorta. The patient had bilateral leg swelling, increased in the past months. Patient denies chest pain, shortness of breath. Has chronic hip pain that limits his ability to ambulate.       Current Outpatient Medications:     atorvastatin (LIPITOR) 80 MG tablet, TAKE 1 TABLET BY MOUTH EVERY  NIGHT, Disp: 90 tablet, Rfl: 0    gabapentin (NEURONTIN) 100 MG capsule, Take 2 capsules by mouth 3 times daily., Disp: , Rfl:     ELIQUIS 2.5 MG TABS tablet, TAKE 1 TABLET BY MOUTH TWICE  DAILY, Disp: 180 tablet, Rfl: 2    metoprolol tartrate (LOPRESSOR) 25 MG tablet, TAKE 1 TABLET BY MOUTH TWICE  DAILY, Disp: 180 tablet, Rfl: 3    clopidogrel (PLAVIX) 75 MG tablet, Take 1 tablet by mouth daily, Disp: 90 tablet, Rfl: 3    amLODIPine (NORVASC) 10 MG tablet, Take 1  English

## 2025-06-24 ENCOUNTER — OFFICE VISIT (OUTPATIENT)
Dept: CARDIOLOGY CLINIC | Age: 85
End: 2025-06-24
Payer: MEDICARE

## 2025-06-24 VITALS
HEIGHT: 68 IN | BODY MASS INDEX: 29.34 KG/M2 | WEIGHT: 193.6 LBS | HEART RATE: 70 BPM | DIASTOLIC BLOOD PRESSURE: 68 MMHG | SYSTOLIC BLOOD PRESSURE: 116 MMHG

## 2025-06-24 DIAGNOSIS — I48.0 PAROXYSMAL ATRIAL FIBRILLATION (HCC): Primary | ICD-10-CM

## 2025-06-24 PROCEDURE — 1036F TOBACCO NON-USER: CPT | Performed by: INTERNAL MEDICINE

## 2025-06-24 PROCEDURE — 93000 ELECTROCARDIOGRAM COMPLETE: CPT | Performed by: INTERNAL MEDICINE

## 2025-06-24 PROCEDURE — 3074F SYST BP LT 130 MM HG: CPT | Performed by: INTERNAL MEDICINE

## 2025-06-24 PROCEDURE — G8427 DOCREV CUR MEDS BY ELIG CLIN: HCPCS | Performed by: INTERNAL MEDICINE

## 2025-06-24 PROCEDURE — 3078F DIAST BP <80 MM HG: CPT | Performed by: INTERNAL MEDICINE

## 2025-06-24 PROCEDURE — 1159F MED LIST DOCD IN RCRD: CPT | Performed by: INTERNAL MEDICINE

## 2025-06-24 PROCEDURE — 99214 OFFICE O/P EST MOD 30 MIN: CPT | Performed by: INTERNAL MEDICINE

## 2025-06-24 PROCEDURE — 1123F ACP DISCUSS/DSCN MKR DOCD: CPT | Performed by: INTERNAL MEDICINE

## 2025-06-24 PROCEDURE — G8417 CALC BMI ABV UP PARAM F/U: HCPCS | Performed by: INTERNAL MEDICINE

## 2025-06-24 RX ORDER — FUROSEMIDE 20 MG/1
20 TABLET ORAL DAILY PRN
Qty: 30 TABLET | Refills: 3 | Status: SHIPPED | OUTPATIENT
Start: 2025-06-24

## 2025-08-23 DIAGNOSIS — I25.10 ASCVD (ARTERIOSCLEROTIC CARDIOVASCULAR DISEASE): ICD-10-CM

## 2025-08-25 RX ORDER — CLOPIDOGREL BISULFATE 75 MG/1
75 TABLET ORAL DAILY
Qty: 90 TABLET | Refills: 3 | Status: ON HOLD | OUTPATIENT
Start: 2025-08-25

## 2025-08-25 RX ORDER — FUROSEMIDE 20 MG/1
20 TABLET ORAL DAILY PRN
Qty: 90 TABLET | Refills: 3 | Status: ON HOLD | OUTPATIENT
Start: 2025-08-25

## 2025-08-25 RX ORDER — ATORVASTATIN CALCIUM 80 MG/1
80 TABLET, FILM COATED ORAL NIGHTLY
Qty: 90 TABLET | Refills: 3 | Status: ON HOLD | OUTPATIENT
Start: 2025-08-25

## 2025-08-30 ENCOUNTER — HOSPITAL ENCOUNTER (INPATIENT)
Age: 85
LOS: 3 days | Discharge: HOME | DRG: 394 | End: 2025-09-02
Attending: STUDENT IN AN ORGANIZED HEALTH CARE EDUCATION/TRAINING PROGRAM
Payer: MEDICARE

## 2025-08-30 PROBLEM — R10.9 ABDOMINAL PAIN: Status: ACTIVE | Noted: 2025-08-30

## 2025-08-30 PROCEDURE — 1200000000 HC SEMI PRIVATE

## 2025-08-30 ASSESSMENT — PAIN DESCRIPTION - LOCATION: LOCATION: ABDOMEN

## 2025-08-30 ASSESSMENT — PAIN - FUNCTIONAL ASSESSMENT: PAIN_FUNCTIONAL_ASSESSMENT: ACTIVITIES ARE NOT PREVENTED

## 2025-08-30 ASSESSMENT — PAIN DESCRIPTION - ORIENTATION: ORIENTATION: RIGHT;LEFT;LOWER

## 2025-08-30 ASSESSMENT — PAIN DESCRIPTION - DESCRIPTORS: DESCRIPTORS: CRAMPING

## 2025-08-30 ASSESSMENT — PAIN SCALES - GENERAL: PAINLEVEL_OUTOF10: 2

## 2025-08-30 ASSESSMENT — PAIN DESCRIPTION - PAIN TYPE: TYPE: ACUTE PAIN

## 2025-08-31 ENCOUNTER — APPOINTMENT (OUTPATIENT)
Dept: ULTRASOUND IMAGING | Age: 85
DRG: 394 | End: 2025-08-31
Attending: STUDENT IN AN ORGANIZED HEALTH CARE EDUCATION/TRAINING PROGRAM
Payer: MEDICARE

## 2025-08-31 ENCOUNTER — APPOINTMENT (OUTPATIENT)
Dept: CT IMAGING | Age: 85
DRG: 394 | End: 2025-08-31
Attending: STUDENT IN AN ORGANIZED HEALTH CARE EDUCATION/TRAINING PROGRAM
Payer: MEDICARE

## 2025-08-31 PROBLEM — K52.9 ENTERITIS: Status: ACTIVE | Noted: 2025-08-31

## 2025-08-31 PROBLEM — R11.2 NAUSEA AND VOMITING: Status: ACTIVE | Noted: 2025-08-31

## 2025-08-31 PROBLEM — K40.30 INCARCERATED LEFT INGUINAL HERNIA: Status: ACTIVE | Noted: 2023-06-06

## 2025-08-31 LAB
ALBUMIN SERPL BCG-MCNC: 3.7 G/DL (ref 3.4–4.9)
ALP SERPL-CCNC: 91 U/L (ref 40–129)
ALT SERPL W/O P-5'-P-CCNC: 15 U/L (ref 10–50)
ANION GAP SERPL CALC-SCNC: 10 MEQ/L (ref 8–16)
APTT PPP: 31.4 SECONDS (ref 22–38)
APTT PPP: 79 SECONDS (ref 22–38)
APTT PPP: > 200 SECONDS (ref 22–38)
AST SERPL-CCNC: 25 U/L (ref 10–50)
BASOPHILS ABSOLUTE: 0 THOU/MM3 (ref 0–0.1)
BASOPHILS NFR BLD AUTO: 0.2 %
BILIRUB SERPL-MCNC: 0.5 MG/DL (ref 0.3–1.2)
BUN SERPL-MCNC: 32 MG/DL (ref 8–23)
CALCIUM SERPL-MCNC: 9 MG/DL (ref 8.5–10.5)
CHLORIDE SERPL-SCNC: 104 MEQ/L (ref 98–111)
CO2 SERPL-SCNC: 28 MEQ/L (ref 22–29)
CREAT SERPL-MCNC: 2 MG/DL (ref 0.7–1.2)
CRP SERPL-MCNC: < 0.3 MG/DL (ref 0–0.5)
DEPRECATED RDW RBC AUTO: 53.7 FL (ref 35–45)
EKG Q-T INTERVAL: 406 MS
EKG QRS DURATION: 88 MS
EKG QTC CALCULATION (BAZETT): 429 MS
EKG R AXIS: 12 DEGREES
EKG T AXIS: -27 DEGREES
EKG VENTRICULAR RATE: 67 BPM
EOSINOPHIL NFR BLD AUTO: 0.2 %
EOSINOPHILS ABSOLUTE: 0 THOU/MM3 (ref 0–0.4)
ERYTHROCYTE [DISTWIDTH] IN BLOOD BY AUTOMATED COUNT: 13.5 % (ref 11.5–14.5)
ERYTHROCYTE [SEDIMENTATION RATE] IN BLOOD BY WESTERGREN METHOD: 20 MM/HR (ref 0–20)
GFR SERPL CREATININE-BSD FRML MDRD: 32 ML/MIN/1.73M2
GLUCOSE BLD STRIP.AUTO-MCNC: 113 MG/DL (ref 70–108)
GLUCOSE BLD STRIP.AUTO-MCNC: 127 MG/DL (ref 70–108)
GLUCOSE BLD STRIP.AUTO-MCNC: 143 MG/DL (ref 70–108)
GLUCOSE BLD STRIP.AUTO-MCNC: 84 MG/DL (ref 70–108)
GLUCOSE BLD STRIP.AUTO-MCNC: 98 MG/DL (ref 70–108)
GLUCOSE SERPL-MCNC: 126 MG/DL (ref 74–109)
HAV IGM SER QL: NONREACTIVE
HBV CORE IGM SERPL QL IA: NONREACTIVE
HBV SURFACE AG SERPL QL IA: NONREACTIVE
HCT VFR BLD AUTO: 35.7 % (ref 42–52)
HCV IGG SERPL QL IA: NONREACTIVE
HGB BLD-MCNC: 11.7 GM/DL (ref 14–18)
IMM GRANULOCYTES # BLD AUTO: 0.06 THOU/MM3 (ref 0–0.07)
IMM GRANULOCYTES NFR BLD AUTO: 0.7 %
INR PPP: 1.2 (ref 0.85–1.13)
LACTATE SERPL-SCNC: 1 MMOL/L (ref 0.5–2.2)
LIPASE SERPL-CCNC: 31 U/L (ref 13–60)
LYMPHOCYTES ABSOLUTE: 0.4 THOU/MM3 (ref 1–4.8)
LYMPHOCYTES NFR BLD AUTO: 4 %
MAGNESIUM SERPL-MCNC: 2.3 MG/DL (ref 1.6–2.6)
MCH RBC QN AUTO: 35.5 PG (ref 26–33)
MCHC RBC AUTO-ENTMCNC: 32.8 GM/DL (ref 32.2–35.5)
MCV RBC AUTO: 108.2 FL (ref 80–94)
MONOCYTES ABSOLUTE: 0.4 THOU/MM3 (ref 0.4–1.3)
MONOCYTES NFR BLD AUTO: 4.8 %
NEUTROPHILS ABSOLUTE: 8.1 THOU/MM3 (ref 1.8–7.7)
NEUTROPHILS NFR BLD AUTO: 90.1 %
NRBC BLD AUTO-RTO: 0 /100 WBC
PHOSPHATE SERPL-MCNC: 3.4 MG/DL (ref 2.5–4.5)
PLATELET # BLD AUTO: 107 THOU/MM3 (ref 130–400)
PMV BLD AUTO: 9.4 FL (ref 9.4–12.4)
POTASSIUM SERPL-SCNC: 4.4 MEQ/L (ref 3.5–5.2)
PROT SERPL-MCNC: 5.9 G/DL (ref 6.4–8.3)
PROTHROMBIN TIME: 14 SECONDS (ref 10–13.5)
RBC # BLD AUTO: 3.3 MILL/MM3 (ref 4.7–6.1)
SODIUM SERPL-SCNC: 142 MEQ/L (ref 135–145)
WBC # BLD AUTO: 9 THOU/MM3 (ref 4.8–10.8)

## 2025-08-31 PROCEDURE — 85651 RBC SED RATE NONAUTOMATED: CPT

## 2025-08-31 PROCEDURE — 80074 ACUTE HEPATITIS PANEL: CPT

## 2025-08-31 PROCEDURE — 83735 ASSAY OF MAGNESIUM: CPT

## 2025-08-31 PROCEDURE — 99223 1ST HOSP IP/OBS HIGH 75: CPT | Performed by: SURGERY

## 2025-08-31 PROCEDURE — 85025 COMPLETE CBC W/AUTO DIFF WBC: CPT

## 2025-08-31 PROCEDURE — 6370000000 HC RX 637 (ALT 250 FOR IP)

## 2025-08-31 PROCEDURE — 36415 COLL VENOUS BLD VENIPUNCTURE: CPT

## 2025-08-31 PROCEDURE — 85610 PROTHROMBIN TIME: CPT

## 2025-08-31 PROCEDURE — 80053 COMPREHEN METABOLIC PANEL: CPT

## 2025-08-31 PROCEDURE — 1200000000 HC SEMI PRIVATE

## 2025-08-31 PROCEDURE — 93975 VASCULAR STUDY: CPT

## 2025-08-31 PROCEDURE — 93010 ELECTROCARDIOGRAM REPORT: CPT | Performed by: INTERNAL MEDICINE

## 2025-08-31 PROCEDURE — 93005 ELECTROCARDIOGRAM TRACING: CPT

## 2025-08-31 PROCEDURE — 86140 C-REACTIVE PROTEIN: CPT

## 2025-08-31 PROCEDURE — 83605 ASSAY OF LACTIC ACID: CPT

## 2025-08-31 PROCEDURE — 82948 REAGENT STRIP/BLOOD GLUCOSE: CPT

## 2025-08-31 PROCEDURE — 84100 ASSAY OF PHOSPHORUS: CPT

## 2025-08-31 PROCEDURE — 6360000002 HC RX W HCPCS

## 2025-08-31 PROCEDURE — 99223 1ST HOSP IP/OBS HIGH 75: CPT

## 2025-08-31 PROCEDURE — 85730 THROMBOPLASTIN TIME PARTIAL: CPT

## 2025-08-31 PROCEDURE — 83690 ASSAY OF LIPASE: CPT

## 2025-08-31 PROCEDURE — 82105 ALPHA-FETOPROTEIN SERUM: CPT

## 2025-08-31 PROCEDURE — 76705 ECHO EXAM OF ABDOMEN: CPT

## 2025-08-31 RX ORDER — SODIUM CHLORIDE 0.9 % (FLUSH) 0.9 %
5-40 SYRINGE (ML) INJECTION EVERY 12 HOURS SCHEDULED
Status: DISCONTINUED | OUTPATIENT
Start: 2025-08-31 | End: 2025-09-02 | Stop reason: HOSPADM

## 2025-08-31 RX ORDER — SODIUM CHLORIDE 0.9 % (FLUSH) 0.9 %
5-40 SYRINGE (ML) INJECTION PRN
Status: DISCONTINUED | OUTPATIENT
Start: 2025-08-31 | End: 2025-09-02 | Stop reason: HOSPADM

## 2025-08-31 RX ORDER — ACETAMINOPHEN 325 MG/1
650 TABLET ORAL EVERY 6 HOURS PRN
Status: DISCONTINUED | OUTPATIENT
Start: 2025-08-31 | End: 2025-09-02 | Stop reason: HOSPADM

## 2025-08-31 RX ORDER — ATORVASTATIN CALCIUM 80 MG/1
80 TABLET, FILM COATED ORAL NIGHTLY
Status: DISCONTINUED | OUTPATIENT
Start: 2025-08-31 | End: 2025-09-02 | Stop reason: HOSPADM

## 2025-08-31 RX ORDER — HEPARIN SODIUM 1000 [USP'U]/ML
4000 INJECTION, SOLUTION INTRAVENOUS; SUBCUTANEOUS ONCE
Status: COMPLETED | OUTPATIENT
Start: 2025-08-31 | End: 2025-08-31

## 2025-08-31 RX ORDER — ONDANSETRON 2 MG/ML
4 INJECTION INTRAMUSCULAR; INTRAVENOUS EVERY 6 HOURS PRN
Status: DISCONTINUED | OUTPATIENT
Start: 2025-08-31 | End: 2025-09-02 | Stop reason: HOSPADM

## 2025-08-31 RX ORDER — MAGNESIUM SULFATE IN WATER 40 MG/ML
2000 INJECTION, SOLUTION INTRAVENOUS PRN
Status: DISCONTINUED | OUTPATIENT
Start: 2025-08-31 | End: 2025-08-31

## 2025-08-31 RX ORDER — ACETAMINOPHEN 650 MG/1
650 SUPPOSITORY RECTAL EVERY 6 HOURS PRN
Status: DISCONTINUED | OUTPATIENT
Start: 2025-08-31 | End: 2025-09-02 | Stop reason: HOSPADM

## 2025-08-31 RX ORDER — HEPARIN SODIUM 1000 [USP'U]/ML
4000 INJECTION, SOLUTION INTRAVENOUS; SUBCUTANEOUS PRN
Status: DISCONTINUED | OUTPATIENT
Start: 2025-08-31 | End: 2025-08-31

## 2025-08-31 RX ORDER — HEPARIN SODIUM 1000 [USP'U]/ML
2000 INJECTION, SOLUTION INTRAVENOUS; SUBCUTANEOUS PRN
Status: DISCONTINUED | OUTPATIENT
Start: 2025-08-31 | End: 2025-09-02 | Stop reason: HOSPADM

## 2025-08-31 RX ORDER — ONDANSETRON 4 MG/1
4 TABLET, ORALLY DISINTEGRATING ORAL EVERY 8 HOURS PRN
Status: DISCONTINUED | OUTPATIENT
Start: 2025-08-31 | End: 2025-09-02 | Stop reason: HOSPADM

## 2025-08-31 RX ORDER — PAROXETINE 30 MG/1
30 TABLET, FILM COATED ORAL EVERY MORNING
Status: DISCONTINUED | OUTPATIENT
Start: 2025-08-31 | End: 2025-09-02 | Stop reason: HOSPADM

## 2025-08-31 RX ORDER — POTASSIUM CHLORIDE 7.45 MG/ML
10 INJECTION INTRAVENOUS PRN
Status: DISCONTINUED | OUTPATIENT
Start: 2025-08-31 | End: 2025-08-31

## 2025-08-31 RX ORDER — HEPARIN SODIUM 1000 [USP'U]/ML
2000 INJECTION, SOLUTION INTRAVENOUS; SUBCUTANEOUS PRN
Status: DISCONTINUED | OUTPATIENT
Start: 2025-08-31 | End: 2025-08-31

## 2025-08-31 RX ORDER — HEPARIN SODIUM 10000 [USP'U]/100ML
5-30 INJECTION, SOLUTION INTRAVENOUS CONTINUOUS
Status: DISCONTINUED | OUTPATIENT
Start: 2025-08-31 | End: 2025-09-02 | Stop reason: HOSPADM

## 2025-08-31 RX ORDER — PANTOPRAZOLE SODIUM 40 MG/10ML
40 INJECTION, POWDER, LYOPHILIZED, FOR SOLUTION INTRAVENOUS DAILY
Status: DISCONTINUED | OUTPATIENT
Start: 2025-08-31 | End: 2025-09-02 | Stop reason: HOSPADM

## 2025-08-31 RX ORDER — METOPROLOL TARTRATE 25 MG/1
25 TABLET, FILM COATED ORAL 2 TIMES DAILY
Status: DISCONTINUED | OUTPATIENT
Start: 2025-08-31 | End: 2025-09-02 | Stop reason: HOSPADM

## 2025-08-31 RX ORDER — SODIUM CHLORIDE 9 MG/ML
INJECTION, SOLUTION INTRAVENOUS PRN
Status: DISCONTINUED | OUTPATIENT
Start: 2025-08-31 | End: 2025-09-02 | Stop reason: HOSPADM

## 2025-08-31 RX ORDER — CLOPIDOGREL BISULFATE 75 MG/1
75 TABLET ORAL DAILY
Status: DISCONTINUED | OUTPATIENT
Start: 2025-08-31 | End: 2025-09-02 | Stop reason: HOSPADM

## 2025-08-31 RX ORDER — HEPARIN SODIUM 1000 [USP'U]/ML
4000 INJECTION, SOLUTION INTRAVENOUS; SUBCUTANEOUS PRN
Status: DISCONTINUED | OUTPATIENT
Start: 2025-08-31 | End: 2025-09-02 | Stop reason: HOSPADM

## 2025-08-31 RX ORDER — HEPARIN SODIUM 1000 [USP'U]/ML
4000 INJECTION, SOLUTION INTRAVENOUS; SUBCUTANEOUS ONCE
Status: DISCONTINUED | OUTPATIENT
Start: 2025-08-31 | End: 2025-08-31

## 2025-08-31 RX ORDER — MORPHINE SULFATE 2 MG/ML
1 INJECTION, SOLUTION INTRAMUSCULAR; INTRAVENOUS
Status: DISCONTINUED | OUTPATIENT
Start: 2025-08-31 | End: 2025-09-02 | Stop reason: HOSPADM

## 2025-08-31 RX ORDER — HEPARIN SODIUM 10000 [USP'U]/100ML
5-30 INJECTION, SOLUTION INTRAVENOUS CONTINUOUS
Status: DISCONTINUED | OUTPATIENT
Start: 2025-08-31 | End: 2025-08-31

## 2025-08-31 RX ORDER — AMLODIPINE BESYLATE 10 MG/1
10 TABLET ORAL DAILY
Status: DISCONTINUED | OUTPATIENT
Start: 2025-08-31 | End: 2025-09-02 | Stop reason: HOSPADM

## 2025-08-31 RX ORDER — POTASSIUM CHLORIDE 1500 MG/1
40 TABLET, EXTENDED RELEASE ORAL PRN
Status: DISCONTINUED | OUTPATIENT
Start: 2025-08-31 | End: 2025-08-31

## 2025-08-31 RX ORDER — POLYETHYLENE GLYCOL 3350 17 G/17G
17 POWDER, FOR SOLUTION ORAL DAILY PRN
Status: DISCONTINUED | OUTPATIENT
Start: 2025-08-31 | End: 2025-09-02 | Stop reason: HOSPADM

## 2025-08-31 RX ORDER — MORPHINE SULFATE 2 MG/ML
2 INJECTION, SOLUTION INTRAMUSCULAR; INTRAVENOUS
Status: DISCONTINUED | OUTPATIENT
Start: 2025-08-31 | End: 2025-09-02 | Stop reason: HOSPADM

## 2025-08-31 RX ADMIN — MORPHINE SULFATE 2 MG: 2 INJECTION, SOLUTION INTRAMUSCULAR; INTRAVENOUS at 16:26

## 2025-08-31 RX ADMIN — METOPROLOL TARTRATE 25 MG: 25 TABLET, FILM COATED ORAL at 21:07

## 2025-08-31 RX ADMIN — MORPHINE SULFATE 2 MG: 2 INJECTION, SOLUTION INTRAMUSCULAR; INTRAVENOUS at 22:46

## 2025-08-31 RX ADMIN — ONDANSETRON 4 MG: 2 INJECTION, SOLUTION INTRAMUSCULAR; INTRAVENOUS at 08:08

## 2025-08-31 RX ADMIN — MORPHINE SULFATE 2 MG: 2 INJECTION, SOLUTION INTRAMUSCULAR; INTRAVENOUS at 08:08

## 2025-08-31 RX ADMIN — METOPROLOL TARTRATE 25 MG: 25 TABLET, FILM COATED ORAL at 08:09

## 2025-08-31 RX ADMIN — MORPHINE SULFATE 2 MG: 2 INJECTION, SOLUTION INTRAMUSCULAR; INTRAVENOUS at 02:20

## 2025-08-31 RX ADMIN — HEPARIN SODIUM AND DEXTROSE 12 UNITS/KG/HR: 10000; 5 INJECTION INTRAVENOUS at 02:49

## 2025-08-31 RX ADMIN — PANTOPRAZOLE SODIUM 40 MG: 40 INJECTION, POWDER, FOR SOLUTION INTRAVENOUS at 05:35

## 2025-08-31 RX ADMIN — HEPARIN SODIUM 4000 UNITS: 1000 INJECTION, SOLUTION INTRAVENOUS; SUBCUTANEOUS at 02:47

## 2025-08-31 ASSESSMENT — PAIN - FUNCTIONAL ASSESSMENT
PAIN_FUNCTIONAL_ASSESSMENT: 0-10
PAIN_FUNCTIONAL_ASSESSMENT: PREVENTS OR INTERFERES SOME ACTIVE ACTIVITIES AND ADLS
PAIN_FUNCTIONAL_ASSESSMENT: 0-10

## 2025-08-31 ASSESSMENT — PAIN SCALES - GENERAL
PAINLEVEL_OUTOF10: 4
PAINLEVEL_OUTOF10: 8
PAINLEVEL_OUTOF10: 7
PAINLEVEL_OUTOF10: 0
PAINLEVEL_OUTOF10: 6
PAINLEVEL_OUTOF10: 2
PAINLEVEL_OUTOF10: 0
PAINLEVEL_OUTOF10: 0
PAINLEVEL_OUTOF10: 7
PAINLEVEL_OUTOF10: 8

## 2025-08-31 ASSESSMENT — PAIN DESCRIPTION - LOCATION
LOCATION: ABDOMEN

## 2025-08-31 ASSESSMENT — PAIN DESCRIPTION - DESCRIPTORS
DESCRIPTORS: CRAMPING

## 2025-08-31 ASSESSMENT — PAIN DESCRIPTION - ORIENTATION
ORIENTATION: RIGHT;LEFT;LOWER

## 2025-09-01 ENCOUNTER — APPOINTMENT (OUTPATIENT)
Dept: ULTRASOUND IMAGING | Age: 85
DRG: 394 | End: 2025-09-01
Attending: RADIOLOGY
Payer: MEDICARE

## 2025-09-01 ENCOUNTER — APPOINTMENT (OUTPATIENT)
Dept: CT IMAGING | Age: 85
DRG: 394 | End: 2025-09-01
Attending: RADIOLOGY
Payer: MEDICARE

## 2025-09-01 LAB
ALBUMIN SERPL BCG-MCNC: 3.5 G/DL (ref 3.4–4.9)
ALP SERPL-CCNC: 82 U/L (ref 40–129)
ALT SERPL W/O P-5'-P-CCNC: 12 U/L (ref 10–50)
ANION GAP SERPL CALC-SCNC: 9 MEQ/L (ref 8–16)
APTT PPP: 40.1 SECONDS (ref 22–38)
APTT PPP: 95.9 SECONDS (ref 22–38)
APTT PPP: > 200 SECONDS (ref 22–38)
AST SERPL-CCNC: 25 U/L (ref 10–50)
AUTO DIFF PNL BLD: ABNORMAL
BASOPHILS ABSOLUTE: 0 THOU/MM3 (ref 0–0.1)
BASOPHILS NFR BLD AUTO: 0.3 %
BILIRUB CONJ SERPL-MCNC: 0.3 MG/DL (ref 0–0.2)
BILIRUB SERPL-MCNC: 0.6 MG/DL (ref 0.3–1.2)
BUN SERPL-MCNC: 29 MG/DL (ref 8–23)
CALCIUM SERPL-MCNC: 8.4 MG/DL (ref 8.5–10.5)
CHLORIDE SERPL-SCNC: 105 MEQ/L (ref 98–111)
CO2 SERPL-SCNC: 26 MEQ/L (ref 22–29)
CREAT SERPL-MCNC: 2 MG/DL (ref 0.7–1.2)
DEPRECATED RDW RBC AUTO: 56 FL (ref 35–45)
EOSINOPHIL NFR BLD AUTO: 3.8 %
EOSINOPHILS ABSOLUTE: 0.2 THOU/MM3 (ref 0–0.4)
ERYTHROCYTE [DISTWIDTH] IN BLOOD BY AUTOMATED COUNT: 13.5 % (ref 11.5–14.5)
GFR SERPL CREATININE-BSD FRML MDRD: 32 ML/MIN/1.73M2
GLUCOSE BLD STRIP.AUTO-MCNC: 94 MG/DL (ref 70–108)
GLUCOSE BLD STRIP.AUTO-MCNC: 96 MG/DL (ref 70–108)
GLUCOSE SERPL-MCNC: 95 MG/DL (ref 74–109)
HCT VFR BLD AUTO: 34.6 % (ref 42–52)
HGB BLD-MCNC: 10.8 GM/DL (ref 14–18)
IMM GRANULOCYTES # BLD AUTO: 0.06 THOU/MM3 (ref 0–0.07)
IMM GRANULOCYTES NFR BLD AUTO: 1 %
LACTATE SERPL-SCNC: 1.6 MMOL/L (ref 0.5–2.2)
LYMPHOCYTES ABSOLUTE: 0.5 THOU/MM3 (ref 1–4.8)
LYMPHOCYTES NFR BLD AUTO: 7.8 %
MACROCYTES BLD QL SMEAR: PRESENT
MAGNESIUM SERPL-MCNC: 2.1 MG/DL (ref 1.6–2.6)
MCH RBC QN AUTO: 35.1 PG (ref 26–33)
MCHC RBC AUTO-ENTMCNC: 31.2 GM/DL (ref 32.2–35.5)
MCV RBC AUTO: 112.3 FL (ref 80–94)
MONOCYTES ABSOLUTE: 0.4 THOU/MM3 (ref 0.4–1.3)
MONOCYTES NFR BLD AUTO: 7.1 %
NEUTROPHILS ABSOLUTE: 4.9 THOU/MM3 (ref 1.8–7.7)
NEUTROPHILS NFR BLD AUTO: 80 %
NRBC BLD AUTO-RTO: 0 /100 WBC
PATHOLOGIST REVIEW: ABNORMAL
PLATELET # BLD AUTO: 120 THOU/MM3 (ref 130–400)
PLATELET BLD QL SMEAR: ABNORMAL
PMV BLD AUTO: 9.5 FL (ref 9.4–12.4)
POTASSIUM SERPL-SCNC: 4.5 MEQ/L (ref 3.5–5.2)
PROT SERPL-MCNC: 5.7 G/DL (ref 6.4–8.3)
RBC # BLD AUTO: 3.08 MILL/MM3 (ref 4.7–6.1)
SCAN OF BLOOD SMEAR: NORMAL
SODIUM SERPL-SCNC: 140 MEQ/L (ref 135–145)
WBC # BLD AUTO: 6.1 THOU/MM3 (ref 4.8–10.8)

## 2025-09-01 PROCEDURE — 36592 COLLECT BLOOD FROM PICC: CPT

## 2025-09-01 PROCEDURE — 1200000000 HC SEMI PRIVATE

## 2025-09-01 PROCEDURE — 36415 COLL VENOUS BLD VENIPUNCTURE: CPT

## 2025-09-01 PROCEDURE — 6360000002 HC RX W HCPCS

## 2025-09-01 PROCEDURE — 83605 ASSAY OF LACTIC ACID: CPT

## 2025-09-01 PROCEDURE — 83735 ASSAY OF MAGNESIUM: CPT

## 2025-09-01 PROCEDURE — 85730 THROMBOPLASTIN TIME PARTIAL: CPT

## 2025-09-01 PROCEDURE — 6370000000 HC RX 637 (ALT 250 FOR IP)

## 2025-09-01 PROCEDURE — 85025 COMPLETE CBC W/AUTO DIFF WBC: CPT

## 2025-09-01 PROCEDURE — 6370000000 HC RX 637 (ALT 250 FOR IP): Performed by: NURSE PRACTITIONER

## 2025-09-01 PROCEDURE — 2500000003 HC RX 250 WO HCPCS

## 2025-09-01 PROCEDURE — 82248 BILIRUBIN DIRECT: CPT

## 2025-09-01 PROCEDURE — 82948 REAGENT STRIP/BLOOD GLUCOSE: CPT

## 2025-09-01 PROCEDURE — 80053 COMPREHEN METABOLIC PANEL: CPT

## 2025-09-01 PROCEDURE — 99232 SBSQ HOSP IP/OBS MODERATE 35: CPT | Performed by: NURSE PRACTITIONER

## 2025-09-01 RX ORDER — DICYCLOMINE HYDROCHLORIDE 10 MG/1
10 CAPSULE ORAL EVERY 6 HOURS PRN
Status: DISCONTINUED | OUTPATIENT
Start: 2025-09-01 | End: 2025-09-02 | Stop reason: HOSPADM

## 2025-09-01 RX ADMIN — METOPROLOL TARTRATE 25 MG: 25 TABLET, FILM COATED ORAL at 20:18

## 2025-09-01 RX ADMIN — DICYCLOMINE HYDROCHLORIDE 10 MG: 10 CAPSULE ORAL at 14:45

## 2025-09-01 RX ADMIN — METOPROLOL TARTRATE 25 MG: 25 TABLET, FILM COATED ORAL at 08:35

## 2025-09-01 RX ADMIN — DICYCLOMINE HYDROCHLORIDE 10 MG: 10 CAPSULE ORAL at 21:32

## 2025-09-01 RX ADMIN — HEPARIN SODIUM AND DEXTROSE 8 UNITS/KG/HR: 10000; 5 INJECTION INTRAVENOUS at 17:51

## 2025-09-01 RX ADMIN — HEPARIN SODIUM 2000 UNITS: 1000 INJECTION, SOLUTION INTRAVENOUS; SUBCUTANEOUS at 02:50

## 2025-09-01 RX ADMIN — SODIUM CHLORIDE, PRESERVATIVE FREE 10 ML: 5 INJECTION INTRAVENOUS at 20:18

## 2025-09-01 RX ADMIN — PANTOPRAZOLE SODIUM 40 MG: 40 INJECTION, POWDER, FOR SOLUTION INTRAVENOUS at 08:35

## 2025-09-01 RX ADMIN — SODIUM CHLORIDE, PRESERVATIVE FREE 10 ML: 5 INJECTION INTRAVENOUS at 08:35

## 2025-09-01 RX ADMIN — HEPARIN SODIUM AND DEXTROSE 11 UNITS/KG/HR: 10000; 5 INJECTION INTRAVENOUS at 09:23

## 2025-09-01 ASSESSMENT — PAIN SCALES - GENERAL
PAINLEVEL_OUTOF10: 4
PAINLEVEL_OUTOF10: 2

## 2025-09-01 ASSESSMENT — PAIN DESCRIPTION - ONSET: ONSET: ON-GOING

## 2025-09-01 ASSESSMENT — PAIN - FUNCTIONAL ASSESSMENT
PAIN_FUNCTIONAL_ASSESSMENT: 0-10
PAIN_FUNCTIONAL_ASSESSMENT: ACTIVITIES ARE NOT PREVENTED
PAIN_FUNCTIONAL_ASSESSMENT: 0-10

## 2025-09-01 ASSESSMENT — PAIN DESCRIPTION - ORIENTATION: ORIENTATION: LEFT;RIGHT;LOWER

## 2025-09-01 ASSESSMENT — PAIN DESCRIPTION - LOCATION: LOCATION: ABDOMEN

## 2025-09-01 ASSESSMENT — PAIN DESCRIPTION - DESCRIPTORS: DESCRIPTORS: CRAMPING

## 2025-09-01 ASSESSMENT — PAIN DESCRIPTION - FREQUENCY: FREQUENCY: CONTINUOUS

## 2025-09-01 ASSESSMENT — PAIN DESCRIPTION - PAIN TYPE: TYPE: ACUTE PAIN

## 2025-09-02 VITALS
HEART RATE: 66 BPM | DIASTOLIC BLOOD PRESSURE: 53 MMHG | SYSTOLIC BLOOD PRESSURE: 126 MMHG | OXYGEN SATURATION: 93 % | TEMPERATURE: 97.7 F | WEIGHT: 167.2 LBS | RESPIRATION RATE: 18 BRPM | HEIGHT: 68 IN | BODY MASS INDEX: 25.34 KG/M2

## 2025-09-02 PROBLEM — E43 SEVERE MALNUTRITION: Status: ACTIVE | Noted: 2025-09-02

## 2025-09-02 PROBLEM — K40.90 LEFT INGUINAL HERNIA: Status: ACTIVE | Noted: 2025-09-02

## 2025-09-02 LAB
AFP SERPL-MCNC: < 1.8 UG/L
ALBUMIN SERPL BCG-MCNC: 3.4 G/DL (ref 3.4–4.9)
ALP SERPL-CCNC: 95 U/L (ref 40–129)
ALT SERPL W/O P-5'-P-CCNC: 11 U/L (ref 10–50)
ANION GAP SERPL CALC-SCNC: 9 MEQ/L (ref 8–16)
APTT PPP: 54.6 SECONDS (ref 22–38)
APTT PPP: 71.3 SECONDS (ref 22–38)
AST SERPL-CCNC: 21 U/L (ref 10–50)
BASOPHILS ABSOLUTE: 0 THOU/MM3 (ref 0–0.1)
BASOPHILS NFR BLD AUTO: 0.6 %
BILIRUB CONJ SERPL-MCNC: 0.2 MG/DL (ref 0–0.2)
BILIRUB SERPL-MCNC: 0.4 MG/DL (ref 0.3–1.2)
BUN SERPL-MCNC: 28 MG/DL (ref 8–23)
CALCIUM SERPL-MCNC: 8.6 MG/DL (ref 8.5–10.5)
CHLORIDE SERPL-SCNC: 106 MEQ/L (ref 98–111)
CO2 SERPL-SCNC: 28 MEQ/L (ref 22–29)
CREAT SERPL-MCNC: 1.8 MG/DL (ref 0.7–1.2)
DEPRECATED RDW RBC AUTO: 54.2 FL (ref 35–45)
EOSINOPHIL NFR BLD AUTO: 6.4 %
EOSINOPHILS ABSOLUTE: 0.3 THOU/MM3 (ref 0–0.4)
ERYTHROCYTE [DISTWIDTH] IN BLOOD BY AUTOMATED COUNT: 13.3 % (ref 11.5–14.5)
GFR SERPL CREATININE-BSD FRML MDRD: 36 ML/MIN/1.73M2
GLUCOSE SERPL-MCNC: 92 MG/DL (ref 74–109)
HCT VFR BLD AUTO: 32.4 % (ref 42–52)
HGB BLD-MCNC: 10.3 GM/DL (ref 14–18)
IMM GRANULOCYTES # BLD AUTO: 0.1 THOU/MM3 (ref 0–0.07)
IMM GRANULOCYTES NFR BLD AUTO: 1.9 %
LYMPHOCYTES ABSOLUTE: 0.4 THOU/MM3 (ref 1–4.8)
LYMPHOCYTES NFR BLD AUTO: 8.6 %
MACROCYTES BLD QL SMEAR: PRESENT
MAGNESIUM SERPL-MCNC: 2.3 MG/DL (ref 1.6–2.6)
MCH RBC QN AUTO: 35 PG (ref 26–33)
MCHC RBC AUTO-ENTMCNC: 31.8 GM/DL (ref 32.2–35.5)
MCV RBC AUTO: 110.2 FL (ref 80–94)
MONOCYTES ABSOLUTE: 0.3 THOU/MM3 (ref 0.4–1.3)
MONOCYTES NFR BLD AUTO: 6.4 %
NEUTROPHILS ABSOLUTE: 3.9 THOU/MM3 (ref 1.8–7.7)
NEUTROPHILS NFR BLD AUTO: 76.1 %
NRBC BLD AUTO-RTO: 0 /100 WBC
PLATELET # BLD AUTO: 101 THOU/MM3 (ref 130–400)
PMV BLD AUTO: 9.4 FL (ref 9.4–12.4)
POTASSIUM SERPL-SCNC: 4.3 MEQ/L (ref 3.5–5.2)
PROT SERPL-MCNC: 5.5 G/DL (ref 6.4–8.3)
RBC # BLD AUTO: 2.94 MILL/MM3 (ref 4.7–6.1)
SCAN OF BLOOD SMEAR: NORMAL
SODIUM SERPL-SCNC: 143 MEQ/L (ref 135–145)
WBC # BLD AUTO: 5.1 THOU/MM3 (ref 4.8–10.8)

## 2025-09-02 PROCEDURE — 85730 THROMBOPLASTIN TIME PARTIAL: CPT

## 2025-09-02 PROCEDURE — 99231 SBSQ HOSP IP/OBS SF/LOW 25: CPT | Performed by: SURGERY

## 2025-09-02 PROCEDURE — 80053 COMPREHEN METABOLIC PANEL: CPT

## 2025-09-02 PROCEDURE — 85025 COMPLETE CBC W/AUTO DIFF WBC: CPT

## 2025-09-02 PROCEDURE — 6360000002 HC RX W HCPCS: Performed by: NURSE PRACTITIONER

## 2025-09-02 PROCEDURE — 83735 ASSAY OF MAGNESIUM: CPT

## 2025-09-02 PROCEDURE — 6360000002 HC RX W HCPCS

## 2025-09-02 PROCEDURE — 82248 BILIRUBIN DIRECT: CPT

## 2025-09-02 PROCEDURE — APPSS30 APP SPLIT SHARED TIME 16-30 MINUTES: Performed by: NURSE PRACTITIONER

## 2025-09-02 PROCEDURE — 36415 COLL VENOUS BLD VENIPUNCTURE: CPT

## 2025-09-02 PROCEDURE — 2500000003 HC RX 250 WO HCPCS

## 2025-09-02 PROCEDURE — 6370000000 HC RX 637 (ALT 250 FOR IP): Performed by: NURSE PRACTITIONER

## 2025-09-02 PROCEDURE — 6370000000 HC RX 637 (ALT 250 FOR IP)

## 2025-09-02 RX ORDER — HEPARIN 100 UNIT/ML
500 SYRINGE INTRAVENOUS PRN
Status: DISCONTINUED | OUTPATIENT
Start: 2025-09-02 | End: 2025-09-02 | Stop reason: HOSPADM

## 2025-09-02 RX ORDER — DICYCLOMINE HYDROCHLORIDE 10 MG/1
10 CAPSULE ORAL EVERY 6 HOURS PRN
Qty: 60 CAPSULE | Refills: 0 | Status: SHIPPED | OUTPATIENT
Start: 2025-09-02 | End: 2025-09-02

## 2025-09-02 RX ORDER — DICYCLOMINE HYDROCHLORIDE 10 MG/1
10-20 CAPSULE ORAL
Qty: 60 CAPSULE | Refills: 0 | Status: SHIPPED | OUTPATIENT
Start: 2025-09-02

## 2025-09-02 RX ADMIN — HEPARIN SODIUM 2000 UNITS: 1000 INJECTION, SOLUTION INTRAVENOUS; SUBCUTANEOUS at 09:32

## 2025-09-02 RX ADMIN — DICYCLOMINE HYDROCHLORIDE 10 MG: 10 CAPSULE ORAL at 11:05

## 2025-09-02 RX ADMIN — HEPARIN 500 UNITS: 100 SYRINGE at 14:57

## 2025-09-02 RX ADMIN — SODIUM CHLORIDE, PRESERVATIVE FREE 10 ML: 5 INJECTION INTRAVENOUS at 08:37

## 2025-09-02 RX ADMIN — METOPROLOL TARTRATE 25 MG: 25 TABLET, FILM COATED ORAL at 09:34

## 2025-09-02 RX ADMIN — HEPARIN SODIUM AND DEXTROSE 10 UNITS/KG/HR: 10000; 5 INJECTION INTRAVENOUS at 09:34

## 2025-09-02 RX ADMIN — DICYCLOMINE HYDROCHLORIDE 10 MG: 10 CAPSULE ORAL at 13:40

## 2025-09-02 RX ADMIN — PANTOPRAZOLE SODIUM 40 MG: 40 INJECTION, POWDER, FOR SOLUTION INTRAVENOUS at 08:37

## 2025-09-02 ASSESSMENT — PAIN DESCRIPTION - LOCATION: LOCATION: ABDOMEN

## 2025-09-02 ASSESSMENT — PAIN SCALES - GENERAL: PAINLEVEL_OUTOF10: 3

## 2025-09-05 ENCOUNTER — ANESTHESIA (OUTPATIENT)
Dept: OPERATING ROOM | Age: 85
End: 2025-09-05
Payer: MEDICARE

## 2025-09-05 ENCOUNTER — HOSPITAL ENCOUNTER (OUTPATIENT)
Age: 85
Setting detail: OUTPATIENT SURGERY
Discharge: HOME | End: 2025-09-05
Attending: SURGERY | Admitting: SURGERY
Payer: MEDICARE

## 2025-09-05 ENCOUNTER — ANESTHESIA EVENT (OUTPATIENT)
Dept: OPERATING ROOM | Age: 85
End: 2025-09-05
Payer: MEDICARE

## 2025-09-05 VITALS
HEIGHT: 68 IN | DIASTOLIC BLOOD PRESSURE: 53 MMHG | SYSTOLIC BLOOD PRESSURE: 100 MMHG | OXYGEN SATURATION: 97 % | TEMPERATURE: 97.3 F | BODY MASS INDEX: 25.76 KG/M2 | RESPIRATION RATE: 18 BRPM | HEART RATE: 74 BPM | WEIGHT: 170 LBS

## 2025-09-05 DIAGNOSIS — Z87.19 S/P LEFT INGUINAL HERNIA REPAIR: Primary | ICD-10-CM

## 2025-09-05 DIAGNOSIS — Z98.890 S/P LEFT INGUINAL HERNIA REPAIR: Primary | ICD-10-CM

## 2025-09-05 DIAGNOSIS — I25.10 ASCVD (ARTERIOSCLEROTIC CARDIOVASCULAR DISEASE): ICD-10-CM

## 2025-09-05 LAB
ANION GAP SERPL CALC-SCNC: 12 MEQ/L (ref 8–16)
APTT PPP: 54.4 SECONDS (ref 22–38)
BUN SERPL-MCNC: 29 MG/DL (ref 8–23)
CALCIUM SERPL-MCNC: 9.1 MG/DL (ref 8.5–10.5)
CHLORIDE SERPL-SCNC: 104 MEQ/L (ref 98–111)
CO2 SERPL-SCNC: 25 MEQ/L (ref 22–29)
CREAT SERPL-MCNC: 1.7 MG/DL (ref 0.7–1.2)
DEPRECATED RDW RBC AUTO: 54.7 FL (ref 35–45)
ERYTHROCYTE [DISTWIDTH] IN BLOOD BY AUTOMATED COUNT: 13.4 % (ref 11.5–14.5)
GFR SERPL CREATININE-BSD FRML MDRD: 39 ML/MIN/1.73M2
GLUCOSE SERPL-MCNC: 98 MG/DL (ref 74–109)
HCT VFR BLD AUTO: 34.6 % (ref 42–52)
HGB BLD-MCNC: 10.9 GM/DL (ref 14–18)
INR PPP: 1.03 (ref 0.85–1.13)
MCH RBC QN AUTO: 35.2 PG (ref 26–33)
MCHC RBC AUTO-ENTMCNC: 31.5 GM/DL (ref 32.2–35.5)
MCV RBC AUTO: 111.6 FL (ref 80–94)
PLATELET # BLD AUTO: 106 THOU/MM3 (ref 130–400)
PMV BLD AUTO: 9.7 FL (ref 9.4–12.4)
POTASSIUM SERPL-SCNC: 4.3 MEQ/L (ref 3.5–5.2)
PROTHROMBIN TIME: 12.1 SECONDS (ref 10–13.5)
RBC # BLD AUTO: 3.1 MILL/MM3 (ref 4.7–6.1)
SODIUM SERPL-SCNC: 141 MEQ/L (ref 135–145)
WBC # BLD AUTO: 6.1 THOU/MM3 (ref 4.8–10.8)

## 2025-09-05 PROCEDURE — 6360000002 HC RX W HCPCS: Performed by: NURSE ANESTHETIST, CERTIFIED REGISTERED

## 2025-09-05 PROCEDURE — 80048 BASIC METABOLIC PNL TOTAL CA: CPT

## 2025-09-05 PROCEDURE — 2580000003 HC RX 258: Performed by: NURSE PRACTITIONER

## 2025-09-05 PROCEDURE — 85027 COMPLETE CBC AUTOMATED: CPT

## 2025-09-05 PROCEDURE — 36415 COLL VENOUS BLD VENIPUNCTURE: CPT

## 2025-09-05 PROCEDURE — 85610 PROTHROMBIN TIME: CPT

## 2025-09-05 PROCEDURE — 6370000000 HC RX 637 (ALT 250 FOR IP): Performed by: NURSE PRACTITIONER

## 2025-09-05 PROCEDURE — 6360000002 HC RX W HCPCS

## 2025-09-05 PROCEDURE — 6360000002 HC RX W HCPCS: Performed by: ANESTHESIOLOGY

## 2025-09-05 PROCEDURE — 2500000003 HC RX 250 WO HCPCS: Performed by: SURGERY

## 2025-09-05 PROCEDURE — 6360000002 HC RX W HCPCS: Performed by: NURSE PRACTITIONER

## 2025-09-05 PROCEDURE — 85730 THROMBOPLASTIN TIME PARTIAL: CPT

## 2025-09-05 PROCEDURE — 2500000003 HC RX 250 WO HCPCS: Performed by: NURSE PRACTITIONER

## 2025-09-05 PROCEDURE — 2500000003 HC RX 250 WO HCPCS: Performed by: NURSE ANESTHETIST, CERTIFIED REGISTERED

## 2025-09-05 RX ORDER — PHENYLEPHRINE HCL IN 0.9% NACL 1 MG/10 ML
SYRINGE (ML) INTRAVENOUS
Status: DISCONTINUED | OUTPATIENT
Start: 2025-09-05 | End: 2025-09-05 | Stop reason: SDUPTHER

## 2025-09-05 RX ORDER — ONDANSETRON 2 MG/ML
4 INJECTION INTRAMUSCULAR; INTRAVENOUS EVERY 6 HOURS PRN
Status: CANCELLED | OUTPATIENT
Start: 2025-09-05

## 2025-09-05 RX ORDER — SODIUM CHLORIDE 0.9 % (FLUSH) 0.9 %
5-40 SYRINGE (ML) INJECTION EVERY 12 HOURS SCHEDULED
Status: CANCELLED | OUTPATIENT
Start: 2025-09-05

## 2025-09-05 RX ORDER — FENTANYL CITRATE 50 UG/ML
50 INJECTION, SOLUTION INTRAMUSCULAR; INTRAVENOUS EVERY 5 MIN PRN
Status: DISCONTINUED | OUTPATIENT
Start: 2025-09-05 | End: 2025-09-05 | Stop reason: HOSPADM

## 2025-09-05 RX ORDER — SODIUM CHLORIDE 9 MG/ML
INJECTION, SOLUTION INTRAVENOUS PRN
Status: DISCONTINUED | OUTPATIENT
Start: 2025-09-05 | End: 2025-09-05 | Stop reason: HOSPADM

## 2025-09-05 RX ORDER — OXYCODONE HYDROCHLORIDE 5 MG/1
10 TABLET ORAL EVERY 4 HOURS PRN
Refills: 0 | Status: CANCELLED | OUTPATIENT
Start: 2025-09-05

## 2025-09-05 RX ORDER — SODIUM CHLORIDE 0.9 % (FLUSH) 0.9 %
5-40 SYRINGE (ML) INJECTION PRN
Status: CANCELLED | OUTPATIENT
Start: 2025-09-05

## 2025-09-05 RX ORDER — PROPOFOL 10 MG/ML
INJECTION, EMULSION INTRAVENOUS
Status: DISCONTINUED | OUTPATIENT
Start: 2025-09-05 | End: 2025-09-05 | Stop reason: SDUPTHER

## 2025-09-05 RX ORDER — FENTANYL CITRATE 50 UG/ML
25 INJECTION, SOLUTION INTRAMUSCULAR; INTRAVENOUS EVERY 5 MIN PRN
Status: DISCONTINUED | OUTPATIENT
Start: 2025-09-05 | End: 2025-09-05 | Stop reason: HOSPADM

## 2025-09-05 RX ORDER — MORPHINE SULFATE 2 MG/ML
2 INJECTION, SOLUTION INTRAMUSCULAR; INTRAVENOUS
Status: DISCONTINUED | OUTPATIENT
Start: 2025-09-05 | End: 2025-09-05 | Stop reason: HOSPADM

## 2025-09-05 RX ORDER — SODIUM CHLORIDE 9 MG/ML
INJECTION, SOLUTION INTRAVENOUS CONTINUOUS
Status: DISCONTINUED | OUTPATIENT
Start: 2025-09-05 | End: 2025-09-05 | Stop reason: HOSPADM

## 2025-09-05 RX ORDER — SODIUM CHLORIDE 0.9 % (FLUSH) 0.9 %
5-40 SYRINGE (ML) INJECTION EVERY 12 HOURS SCHEDULED
Status: DISCONTINUED | OUTPATIENT
Start: 2025-09-05 | End: 2025-09-05 | Stop reason: HOSPADM

## 2025-09-05 RX ORDER — ONDANSETRON 2 MG/ML
4 INJECTION INTRAMUSCULAR; INTRAVENOUS
Status: DISCONTINUED | OUTPATIENT
Start: 2025-09-05 | End: 2025-09-05 | Stop reason: HOSPADM

## 2025-09-05 RX ORDER — CLOPIDOGREL BISULFATE 75 MG/1
75 TABLET ORAL DAILY
Qty: 90 TABLET | Refills: 3 | Status: CANCELLED | OUTPATIENT
Start: 2025-09-05

## 2025-09-05 RX ORDER — HYDROCODONE BITARTRATE AND ACETAMINOPHEN 5; 325 MG/1; MG/1
1 TABLET ORAL EVERY 6 HOURS PRN
Qty: 25 TABLET | Refills: 0 | Status: SHIPPED | OUTPATIENT
Start: 2025-09-05 | End: 2025-09-12

## 2025-09-05 RX ORDER — ACETAMINOPHEN 325 MG/1
650 TABLET ORAL EVERY 4 HOURS PRN
Status: CANCELLED | OUTPATIENT
Start: 2025-09-05

## 2025-09-05 RX ORDER — ONDANSETRON 4 MG/1
4 TABLET, ORALLY DISINTEGRATING ORAL EVERY 8 HOURS PRN
Status: DISCONTINUED | OUTPATIENT
Start: 2025-09-05 | End: 2025-09-05 | Stop reason: HOSPADM

## 2025-09-05 RX ORDER — MORPHINE SULFATE 2 MG/ML
INJECTION, SOLUTION INTRAMUSCULAR; INTRAVENOUS
Status: COMPLETED
Start: 2025-09-05 | End: 2025-09-05

## 2025-09-05 RX ORDER — SODIUM CHLORIDE 0.9 % (FLUSH) 0.9 %
5-40 SYRINGE (ML) INJECTION PRN
Status: DISCONTINUED | OUTPATIENT
Start: 2025-09-05 | End: 2025-09-05 | Stop reason: HOSPADM

## 2025-09-05 RX ORDER — HYDROCODONE BITARTRATE AND ACETAMINOPHEN 5; 325 MG/1; MG/1
1-2 TABLET ORAL EVERY 6 HOURS PRN
Qty: 20 TABLET | Refills: 0 | Status: CANCELLED | OUTPATIENT
Start: 2025-09-05 | End: 2025-09-10

## 2025-09-05 RX ORDER — HEPARIN 100 UNIT/ML
500 SYRINGE INTRAVENOUS ONCE
Status: COMPLETED | OUTPATIENT
Start: 2025-09-05 | End: 2025-09-05

## 2025-09-05 RX ORDER — ONDANSETRON 2 MG/ML
4 INJECTION INTRAMUSCULAR; INTRAVENOUS EVERY 6 HOURS PRN
Status: DISCONTINUED | OUTPATIENT
Start: 2025-09-05 | End: 2025-09-05 | Stop reason: HOSPADM

## 2025-09-05 RX ORDER — MORPHINE SULFATE 2 MG/ML
2 INJECTION, SOLUTION INTRAMUSCULAR; INTRAVENOUS
Refills: 0 | Status: CANCELLED | OUTPATIENT
Start: 2025-09-05

## 2025-09-05 RX ORDER — MORPHINE SULFATE 4 MG/ML
4 INJECTION, SOLUTION INTRAMUSCULAR; INTRAVENOUS
Status: DISCONTINUED | OUTPATIENT
Start: 2025-09-05 | End: 2025-09-05 | Stop reason: HOSPADM

## 2025-09-05 RX ORDER — ONDANSETRON 4 MG/1
4 TABLET, ORALLY DISINTEGRATING ORAL EVERY 8 HOURS PRN
Status: CANCELLED | OUTPATIENT
Start: 2025-09-05

## 2025-09-05 RX ORDER — OXYCODONE HYDROCHLORIDE 5 MG/1
5 TABLET ORAL EVERY 4 HOURS PRN
Refills: 0 | Status: CANCELLED | OUTPATIENT
Start: 2025-09-05

## 2025-09-05 RX ORDER — SODIUM CHLORIDE 9 MG/ML
INJECTION, SOLUTION INTRAVENOUS PRN
Status: CANCELLED | OUTPATIENT
Start: 2025-09-05

## 2025-09-05 RX ORDER — LIDOCAINE HYDROCHLORIDE 20 MG/ML
INJECTION, SOLUTION INFILTRATION; PERINEURAL
Status: DISCONTINUED | OUTPATIENT
Start: 2025-09-05 | End: 2025-09-05 | Stop reason: SDUPTHER

## 2025-09-05 RX ORDER — ONDANSETRON 2 MG/ML
INJECTION INTRAMUSCULAR; INTRAVENOUS
Status: DISCONTINUED | OUTPATIENT
Start: 2025-09-05 | End: 2025-09-05 | Stop reason: SDUPTHER

## 2025-09-05 RX ORDER — DEXAMETHASONE SODIUM PHOSPHATE 10 MG/ML
INJECTION, EMULSION INTRAMUSCULAR; INTRAVENOUS
Status: DISCONTINUED | OUTPATIENT
Start: 2025-09-05 | End: 2025-09-05 | Stop reason: SDUPTHER

## 2025-09-05 RX ORDER — ROCURONIUM BROMIDE 10 MG/ML
INJECTION, SOLUTION INTRAVENOUS
Status: DISCONTINUED | OUTPATIENT
Start: 2025-09-05 | End: 2025-09-05 | Stop reason: SDUPTHER

## 2025-09-05 RX ORDER — BUPIVACAINE HYDROCHLORIDE AND EPINEPHRINE 5; 5 MG/ML; UG/ML
INJECTION, SOLUTION EPIDURAL; INTRACAUDAL; PERINEURAL PRN
Status: DISCONTINUED | OUTPATIENT
Start: 2025-09-05 | End: 2025-09-05 | Stop reason: ALTCHOICE

## 2025-09-05 RX ORDER — HYDROCODONE BITARTRATE AND ACETAMINOPHEN 5; 325 MG/1; MG/1
1 TABLET ORAL EVERY 6 HOURS PRN
Status: DISCONTINUED | OUTPATIENT
Start: 2025-09-05 | End: 2025-09-05 | Stop reason: HOSPADM

## 2025-09-05 RX ORDER — FAMOTIDINE 20 MG/1
20 TABLET, FILM COATED ORAL 2 TIMES DAILY
Status: CANCELLED | OUTPATIENT
Start: 2025-09-05

## 2025-09-05 RX ORDER — MORPHINE SULFATE 4 MG/ML
4 INJECTION, SOLUTION INTRAMUSCULAR; INTRAVENOUS
Refills: 0 | Status: CANCELLED | OUTPATIENT
Start: 2025-09-05

## 2025-09-05 RX ORDER — FENTANYL CITRATE 50 UG/ML
INJECTION, SOLUTION INTRAMUSCULAR; INTRAVENOUS
Status: DISCONTINUED | OUTPATIENT
Start: 2025-09-05 | End: 2025-09-05 | Stop reason: SDUPTHER

## 2025-09-05 RX ADMIN — WATER 2000 MG: 1 INJECTION, SOLUTION INTRAVENOUS at 08:54

## 2025-09-05 RX ADMIN — HYDROCODONE BITARTRATE AND ACETAMINOPHEN 1 TABLET: 5; 325 TABLET ORAL at 10:20

## 2025-09-05 RX ADMIN — LIDOCAINE HYDROCHLORIDE 60 MG: 20 INJECTION, SOLUTION INFILTRATION; PERINEURAL at 08:48

## 2025-09-05 RX ADMIN — HEPARIN 500 UNITS: 100 SYRINGE at 12:59

## 2025-09-05 RX ADMIN — MORPHINE SULFATE 2 MG: 2 INJECTION, SOLUTION INTRAMUSCULAR; INTRAVENOUS at 12:12

## 2025-09-05 RX ADMIN — ROCURONIUM BROMIDE 40 MG: 10 INJECTION INTRAVENOUS at 08:48

## 2025-09-05 RX ADMIN — SODIUM CHLORIDE: 9 INJECTION, SOLUTION INTRAVENOUS at 08:43

## 2025-09-05 RX ADMIN — PROPOFOL 120 MG: 10 INJECTION, EMULSION INTRAVENOUS at 08:48

## 2025-09-05 RX ADMIN — Medication 100 MCG: at 08:56

## 2025-09-05 RX ADMIN — DEXAMETHASONE SODIUM PHOSPHATE 8 MG: 10 INJECTION, EMULSION INTRAMUSCULAR; INTRAVENOUS at 09:05

## 2025-09-05 RX ADMIN — ONDANSETRON 4 MG: 2 INJECTION, SOLUTION INTRAMUSCULAR; INTRAVENOUS at 09:05

## 2025-09-05 RX ADMIN — FENTANYL CITRATE 50 MCG: 50 INJECTION, SOLUTION INTRAMUSCULAR; INTRAVENOUS at 09:30

## 2025-09-05 RX ADMIN — SUGAMMADEX 200 MG: 100 INJECTION, SOLUTION INTRAVENOUS at 09:35

## 2025-09-05 RX ADMIN — FENTANYL CITRATE 50 MCG: 50 INJECTION, SOLUTION INTRAMUSCULAR; INTRAVENOUS at 09:01

## 2025-09-05 ASSESSMENT — PAIN DESCRIPTION - PAIN TYPE
TYPE: SURGICAL PAIN

## 2025-09-05 ASSESSMENT — PAIN SCALES - GENERAL
PAINLEVEL_OUTOF10: 7
PAINLEVEL_OUTOF10: 8
PAINLEVEL_OUTOF10: 8
PAINLEVEL_OUTOF10: 7

## 2025-09-05 ASSESSMENT — PAIN - FUNCTIONAL ASSESSMENT
PAIN_FUNCTIONAL_ASSESSMENT: 0-10
PAIN_FUNCTIONAL_ASSESSMENT: ACTIVITIES ARE NOT PREVENTED
PAIN_FUNCTIONAL_ASSESSMENT: ACTIVITIES ARE NOT PREVENTED

## 2025-09-05 ASSESSMENT — PAIN DESCRIPTION - ORIENTATION
ORIENTATION: LEFT

## 2025-09-05 ASSESSMENT — PAIN DESCRIPTION - LOCATION
LOCATION: GROIN

## 2025-09-05 ASSESSMENT — PAIN DESCRIPTION - DESCRIPTORS
DESCRIPTORS: SHARP
DESCRIPTORS: SHARP
DESCRIPTORS: BURNING
DESCRIPTORS: ACHING
DESCRIPTORS: SHARP
DESCRIPTORS: BURNING

## 2025-09-05 ASSESSMENT — PAIN DESCRIPTION - ONSET
ONSET: ON-GOING

## 2025-09-05 ASSESSMENT — PAIN DESCRIPTION - FREQUENCY
FREQUENCY: INTERMITTENT
FREQUENCY: CONTINUOUS
FREQUENCY: CONTINUOUS

## 2025-09-05 ASSESSMENT — ENCOUNTER SYMPTOMS: SHORTNESS OF BREATH: 1

## (undated) DEVICE — SUTURE PROL SZ 0 L30IN NONABSORBABLE BLU L36MM CT-1 1/2 CIR 8424H

## (undated) DEVICE — APPLICATOR PREP 26ML 0.7% IOD POVACRYLEX 74% ISO ALC ST

## (undated) DEVICE — SUTURE VCRL + SZ 0 L18IN ABSRB TIE VCP106G

## (undated) DEVICE — APPLIER LIG CLP M L11IN TI STR RNG HNDL FOR 20 CLP DISP

## (undated) DEVICE — SPONGE GZ W4XL4IN COT 12 PLY TYP VII WVN C FLD DSGN STERILE

## (undated) DEVICE — SUTURE PDS II SZ 1 L36IN ABSRB VLT L48MM CTX 1/2 CIR Z371T

## (undated) DEVICE — BASIC SINGLE BASIN BTC-LF: Brand: MEDLINE INDUSTRIES, INC.

## (undated) DEVICE — SHEET, T, LAPAROTOMY, STERILE: Brand: MEDLINE

## (undated) DEVICE — GOWN,SIRUS,NONRNF,SETINSLV,XL,20/CS: Brand: MEDLINE

## (undated) DEVICE — 450 ML BOTTLE OF 0.05% CHLORHEXIDINE GLUCONATE IN 99.95% STERILE WATER FOR IRRIGATION, USP AND APPLICATOR.: Brand: IRRISEPT ANTIMICROBIAL WOUND LAVAGE

## (undated) DEVICE — GLOVE ORANGE PI 7 1/2   MSG9075

## (undated) DEVICE — SPONGE LAP W18XL18IN WHT COT 4 PLY FLD STRUNG RADPQ DISP ST 2 PER PACK

## (undated) DEVICE — PENCIL SMK EVAC 15FT BLADE ELECTRD ROCKER F/TELSCP

## (undated) DEVICE — TOWEL,OR,DSP,ST,WHITE,DLX,XR,4/PK,20PK/C: Brand: MEDLINE

## (undated) DEVICE — BREAST HERNIA: Brand: MEDLINE INDUSTRIES, INC.

## (undated) DEVICE — Device

## (undated) DEVICE — APPLIER CLP L L13IN TI MULT RNG HNDL 20 CLP STR LIGACLP